# Patient Record
Sex: FEMALE | Race: BLACK OR AFRICAN AMERICAN | NOT HISPANIC OR LATINO | Employment: OTHER | URBAN - METROPOLITAN AREA
[De-identification: names, ages, dates, MRNs, and addresses within clinical notes are randomized per-mention and may not be internally consistent; named-entity substitution may affect disease eponyms.]

---

## 2017-09-13 ENCOUNTER — INITIAL CONSULT (OUTPATIENT)
Dept: OPTOMETRY | Facility: CLINIC | Age: 77
End: 2017-09-13
Payer: MEDICARE

## 2017-09-13 DIAGNOSIS — H01.029 SQUAMOUS BLEPHARITIS OF BOTH UPPER AND LOWER EYELID: ICD-10-CM

## 2017-09-13 DIAGNOSIS — H16.001 PERIPHERAL ULCERATIVE KERATITIS, RIGHT: Primary | ICD-10-CM

## 2017-09-13 PROCEDURE — 99999 PR PBB SHADOW E&M-EST. PATIENT-LVL II: CPT | Mod: PBBFAC,,, | Performed by: OPTOMETRIST

## 2017-09-13 PROCEDURE — 92002 INTRM OPH EXAM NEW PATIENT: CPT | Mod: S$GLB,,, | Performed by: OPTOMETRIST

## 2017-09-13 RX ORDER — NEOMYCIN SULFATE, POLYMYXIN B SULFATE AND DEXAMETHASONE 3.5; 10000; 1 MG/ML; [USP'U]/ML; MG/ML
1 SUSPENSION/ DROPS OPHTHALMIC 3 TIMES DAILY
Qty: 5 ML | Refills: 0 | Status: SHIPPED | OUTPATIENT
Start: 2017-09-13 | End: 2017-10-11 | Stop reason: ALTCHOICE

## 2017-09-13 RX ORDER — CIPROFLOXACIN HYDROCHLORIDE 3 MG/ML
1 SOLUTION/ DROPS OPHTHALMIC
COMMUNITY
End: 2017-10-11 | Stop reason: ALTCHOICE

## 2017-09-13 NOTE — PROGRESS NOTES
HPI     Patient's last dilated exam was: unsure    Pt here for annual eye exam. She had an eye exam on 8/20/2017 and was   treated for an eye infection, cipro 1 gtt OD Q2H while awake. She did this   regimen for 2 days only, currently only using systane ultra ou prn. She   did not follow up because the dr did not call in her rx and she had to   make a return trip to the office to pick it up, she was very unhappy. No   changes in vision, glasses are 9 months old. Only want to be seen for eye   infection. C/o fb sensation in her eyes, feels like she has trash in her   eyes. OD>OS    Last edited by LORIN Taylor on 9/13/2017  9:50 AM.   (History)            Assessment /Plan     For exam results, see Encounter Report.    Peripheral ulcerative keratitis, right  -     neomycin-polymyxin-dexamethasone (MAXITROL) 3.5mg/mL-10,000 unit/mL-0.1 % DrpS; Place 1 drop into the right eye 3 (three) times daily.  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  Dispense: 5 mL; Refill: 0            1-2.  Ciloxan tid OD only x 2 days then stop.  After stopping Ciloxan start Maxitrol tid OD only.  Educated pt need to do lid scrubs daily OU.  RTC 1 week for cornea check.

## 2017-09-20 ENCOUNTER — OFFICE VISIT (OUTPATIENT)
Dept: OPTOMETRY | Facility: CLINIC | Age: 77
End: 2017-09-20
Payer: MEDICARE

## 2017-09-20 DIAGNOSIS — H16.001 PERIPHERAL ULCERATIVE KERATITIS, RIGHT: Primary | ICD-10-CM

## 2017-09-20 DIAGNOSIS — H01.029 SQUAMOUS BLEPHARITIS OF BOTH UPPER AND LOWER EYELID: ICD-10-CM

## 2017-09-20 PROCEDURE — 99999 PR PBB SHADOW E&M-EST. PATIENT-LVL II: CPT | Mod: PBBFAC,,, | Performed by: OPTOMETRIST

## 2017-09-20 PROCEDURE — 92012 INTRM OPH EXAM EST PATIENT: CPT | Mod: S$GLB,,, | Performed by: OPTOMETRIST

## 2017-09-20 NOTE — PROGRESS NOTES
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 Feels her eyes are much better, no change in vision.     Last edited by Krys Ac, PCT on 9/20/2017 11:08 AM.   (History)            Assessment /Plan     For exam results, see Encounter Report.    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  Almost resolved.  Continue Maxitrol tid x 5 days then discontinue.  Continue with lid scrubs 3x/week for maintenance.

## 2017-10-11 ENCOUNTER — HOSPITAL ENCOUNTER (OUTPATIENT)
Dept: CARDIOLOGY | Facility: CLINIC | Age: 77
Discharge: HOME | End: 2017-10-11
Payer: MEDICARE

## 2017-10-11 ENCOUNTER — LAB VISIT (OUTPATIENT)
Dept: LAB | Facility: HOSPITAL | Age: 77
End: 2017-10-11
Attending: INTERNAL MEDICINE
Payer: MEDICARE

## 2017-10-11 ENCOUNTER — OFFICE VISIT (OUTPATIENT)
Dept: CARDIOLOGY | Facility: CLINIC | Age: 77
End: 2017-10-11
Payer: MEDICARE

## 2017-10-11 VITALS
WEIGHT: 203.69 LBS | HEIGHT: 64 IN | SYSTOLIC BLOOD PRESSURE: 138 MMHG | BODY MASS INDEX: 34.77 KG/M2 | HEART RATE: 70 BPM | DIASTOLIC BLOOD PRESSURE: 65 MMHG

## 2017-10-11 DIAGNOSIS — I44.0 AV BLOCK, 1ST DEGREE: Primary | ICD-10-CM

## 2017-10-11 DIAGNOSIS — R94.31 ABNORMAL EKG: Primary | ICD-10-CM

## 2017-10-11 DIAGNOSIS — I10 ESSENTIAL HYPERTENSION: ICD-10-CM

## 2017-10-11 DIAGNOSIS — R53.83 FATIGUE, UNSPECIFIED TYPE: ICD-10-CM

## 2017-10-11 DIAGNOSIS — I44.0 AV BLOCK, 1ST DEGREE: ICD-10-CM

## 2017-10-11 LAB
ALBUMIN SERPL BCP-MCNC: 3.4 G/DL
ALP SERPL-CCNC: 46 U/L
ALT SERPL W/O P-5'-P-CCNC: 11 U/L
ANION GAP SERPL CALC-SCNC: 8 MMOL/L
AST SERPL-CCNC: 16 U/L
BASOPHILS # BLD AUTO: 0.01 K/UL
BASOPHILS NFR BLD: 0.2 %
BILIRUB SERPL-MCNC: 0.4 MG/DL
BUN SERPL-MCNC: 17 MG/DL
CALCIUM SERPL-MCNC: 9.8 MG/DL
CHLORIDE SERPL-SCNC: 104 MMOL/L
CO2 SERPL-SCNC: 27 MMOL/L
CREAT SERPL-MCNC: 1.2 MG/DL
DIFFERENTIAL METHOD: ABNORMAL
EOSINOPHIL # BLD AUTO: 0.5 K/UL
EOSINOPHIL NFR BLD: 7 %
ERYTHROCYTE [DISTWIDTH] IN BLOOD BY AUTOMATED COUNT: 13.7 %
EST. GFR  (AFRICAN AMERICAN): 50 ML/MIN/1.73 M^2
EST. GFR  (NON AFRICAN AMERICAN): 43.4 ML/MIN/1.73 M^2
GLUCOSE SERPL-MCNC: 92 MG/DL
HCT VFR BLD AUTO: 37.1 %
HGB BLD-MCNC: 11.8 G/DL
LYMPHOCYTES # BLD AUTO: 2 K/UL
LYMPHOCYTES NFR BLD: 31.4 %
MCH RBC QN AUTO: 26.9 PG
MCHC RBC AUTO-ENTMCNC: 31.8 G/DL
MCV RBC AUTO: 85 FL
MONOCYTES # BLD AUTO: 0.5 K/UL
MONOCYTES NFR BLD: 7.3 %
NEUTROPHILS # BLD AUTO: 3.5 K/UL
NEUTROPHILS NFR BLD: 53.9 %
PLATELET # BLD AUTO: 259 K/UL
PMV BLD AUTO: 10.7 FL
POTASSIUM SERPL-SCNC: 4.2 MMOL/L
PROT SERPL-MCNC: 8.1 G/DL
RBC # BLD AUTO: 4.38 M/UL
SODIUM SERPL-SCNC: 139 MMOL/L
TSH SERPL DL<=0.005 MIU/L-ACNC: 0.89 UIU/ML
WBC # BLD AUTO: 6.43 K/UL

## 2017-10-11 PROCEDURE — 85025 COMPLETE CBC W/AUTO DIFF WBC: CPT

## 2017-10-11 PROCEDURE — 36415 COLL VENOUS BLD VENIPUNCTURE: CPT

## 2017-10-11 PROCEDURE — 93000 ELECTROCARDIOGRAM COMPLETE: CPT | Mod: S$GLB,,, | Performed by: INTERNAL MEDICINE

## 2017-10-11 PROCEDURE — 99213 OFFICE O/P EST LOW 20 MIN: CPT | Mod: S$GLB,,, | Performed by: INTERNAL MEDICINE

## 2017-10-11 PROCEDURE — 80053 COMPREHEN METABOLIC PANEL: CPT

## 2017-10-11 PROCEDURE — 84443 ASSAY THYROID STIM HORMONE: CPT

## 2017-10-11 PROCEDURE — 99999 PR PBB SHADOW E&M-EST. PATIENT-LVL III: CPT | Mod: PBBFAC,GC,, | Performed by: INTERNAL MEDICINE

## 2017-10-11 RX ORDER — CIPROFLOXACIN 250 MG/5ML
KIT ORAL 2 TIMES DAILY
COMMUNITY
End: 2018-09-20

## 2017-10-11 RX ORDER — ATORVASTATIN CALCIUM 40 MG/1
40 TABLET, FILM COATED ORAL DAILY
COMMUNITY
End: 2018-09-20

## 2017-10-11 RX ORDER — OMEPRAZOLE 20 MG/1
20 CAPSULE, DELAYED RELEASE ORAL DAILY
COMMUNITY
End: 2018-09-20

## 2017-10-11 NOTE — PROGRESS NOTES
Cardiology Clinic Note  Reason for Visit: Abnormal ECG and Hypertension    HPI:   Ms. Kelsey Fields is a 76 y.o. woman with history of HTN and first degree AV block presents for evaluation of an abnormal EKG.  She states she went to [facility] and had ekg there.  EKG was noted to be abnormal and so she was told to follow up with her cardiologist.  On assessment, she is in 1st degree av block which has been present since at least 2016 in our system.  She also notes a generalized sense of fatigue.  She does not state that she has an decreased exercise tolerance or functional changes, no weight gain, worsening orthopnea, PND, chest pain, shortness of breath, or nausea.  She just feels generally fatigued, lacks motivation, and finds herself needing to push herself just to get up and do things.  Upon further questioning patient states she does have sleep apnea, does snore at night, does not sleep most of the night, and wakes up fatigued with progressively worsening fatigue throughout the day.  She is non- compliant with CPAP and does not even have a machine, in fact.  Last sleep study was years ago.    ROS:    Review of Systems   Constitution: Negative.        Worsening fatigue   HENT: Negative.    Eyes: Negative.    Cardiovascular: Positive for leg swelling.   Respiratory: Negative.    Endocrine: Negative.    Skin: Negative.    Musculoskeletal: Negative.    Gastrointestinal: Negative.    Genitourinary: Negative.    Neurological: Negative.      PMH:     Past Medical History:   Diagnosis Date    Arthritis     Hypertension     Shingles      Past Surgical History:   Procedure Laterality Date     SECTION      FRACTURE SURGERY       Allergies:     Review of patient's allergies indicates:   Allergen Reactions    Codeine      Medications:     Current Outpatient Prescriptions on File Prior to Visit   Medication Sig Dispense Refill    amlodipine (NORVASC) 10 MG tablet       aspirin  "(ECOTRIN) 81 MG EC tablet Take 162 mg by mouth once daily.      gabapentin (NEURONTIN) 300 MG capsule Take one cap PO QHS for 7 days, then one cap PO BID for the next 7 days, and then take one cap PO TID and continue (Patient taking differently: 300 mg 3 (three) times daily. Take one cap PO QHS for 7 days, then one cap PO BID for the next 7 days, and then take one cap PO TID and continue) 90 capsule 2    hydrocodone-acetaminophen 5-325mg (NORCO) 5-325 mg per tablet       cyclobenzaprine (FLEXERIL) 10 MG tablet Take 10 mg by mouth 3 (three) times daily as needed for Muscle spasms.      furosemide (LASIX) 20 MG tablet Take 1 tablet (20 mg total) by mouth daily as needed. 30 tablet 6    [DISCONTINUED] ciprofloxacin HCl (CILOXAN) 0.3 % ophthalmic solution Place 1 drop into the right eye every 2 (two) hours.      [DISCONTINUED] neomycin-polymyxin-dexamethasone (MAXITROL) 3.5mg/mL-10,000 unit/mL-0.1 % DrpS Place 1 drop into the right eye 3 (three) times daily. 5 mL 0     No current facility-administered medications on file prior to visit.      Social History:     Social History   Substance Use Topics    Smoking status: Never Smoker    Smokeless tobacco: Never Used    Alcohol use No     Family History:     Family History   Problem Relation Age of Onset    Hypertension Mother     Stroke Mother     Hypertension Father     Heart attack Neg Hx     Heart disease Neg Hx      Physical Exam:   /65   Pulse 70   Ht 5' 4" (1.626 m)   Wt 92.4 kg (203 lb 11.3 oz)   BMI 34.97 kg/m²    Physical Exam   Constitutional: She is oriented to person, place, and time. She appears well-developed and well-nourished.   HENT:   Head: Normocephalic and atraumatic.   Eyes: Conjunctivae and EOM are normal. Pupils are equal, round, and reactive to light.   Neck: Normal range of motion. Neck supple. No JVD present.   Cardiovascular: Normal rate and regular rhythm.  Exam reveals no gallop and no friction rub.    Murmur heard.  2/6 " JF RSIS   Pulmonary/Chest: Effort normal and breath sounds normal. No respiratory distress. She has no wheezes. She has no rales. She exhibits no tenderness.   Abdominal: Soft. Bowel sounds are normal. She exhibits no distension. There is no tenderness.   Musculoskeletal: Normal range of motion. She exhibits edema. She exhibits no tenderness.   Mild RLE pitting edema     Neurological: She is alert and oriented to person, place, and time.   Skin: Skin is warm and dry. No erythema. No pallor.       Labs:     Lab Results   Component Value Date     10/27/2014    K 4.2 10/27/2014     10/27/2014    CO2 25 10/27/2014    BUN 20 10/27/2014    CREATININE 1.2 10/27/2014    ANIONGAP 14 10/27/2014     No results found for: HGBA1C  No results found for: BNP, BNPTRIAGEBLO Lab Results   Component Value Date    WBC 10.86 10/27/2014    HGB 13.0 10/27/2014    HCT 39.6 10/27/2014     10/27/2014    GRAN 7.8 (H) 10/27/2014    GRAN 71.5 10/27/2014     Lab Results   Component Value Date    CHOL 164 08/30/2006    HDL 29.0 (L) 08/30/2006    LDLCALC 113.8 08/30/2006    TRIG 106 08/30/2006          Imaging:     none    EKG: sinus with first degree avb no change from prior.  Assessment:     1. AV block, 1st degree    2. Fatigue, unspecified type          Plan:   AV block, 1st degree  No interventions  Follow up in 1 yr    Fatigue, unspecified type  Likely 2/2 untreated TRISTON  Recommend following up with PCP and pulmonary for repeat sleep study and assessment for CPAP    HTN  Continue amlodipine as she is well controlled    Attain baseline bloodwork cbc/cmp/tsh/lipids    Discussed with Dr. Zhou. RTC in 1 year.     Signed:  Kwesi Keyes MD  10/11/2017 3:29 PM

## 2017-10-12 DIAGNOSIS — I10 ESSENTIAL HYPERTENSION: Primary | ICD-10-CM

## 2017-10-26 ENCOUNTER — HOSPITAL ENCOUNTER (EMERGENCY)
Facility: HOSPITAL | Age: 77
Discharge: HOME | End: 2017-10-26
Attending: EMERGENCY MEDICINE
Payer: MEDICARE

## 2017-10-26 VITALS
WEIGHT: 206 LBS | OXYGEN SATURATION: 99 % | TEMPERATURE: 97 F | DIASTOLIC BLOOD PRESSURE: 77 MMHG | HEIGHT: 65 IN | BODY MASS INDEX: 34.32 KG/M2 | HEART RATE: 80 BPM | RESPIRATION RATE: 18 BRPM | SYSTOLIC BLOOD PRESSURE: 188 MMHG

## 2017-10-26 DIAGNOSIS — R51.9 NONINTRACTABLE HEADACHE, UNSPECIFIED CHRONICITY PATTERN, UNSPECIFIED HEADACHE TYPE: Primary | ICD-10-CM

## 2017-10-26 LAB
ALBUMIN SERPL BCP-MCNC: 3.6 G/DL
ALP SERPL-CCNC: 44 U/L
ALT SERPL W/O P-5'-P-CCNC: 10 U/L
ANION GAP SERPL CALC-SCNC: 7 MMOL/L
AST SERPL-CCNC: 16 U/L
BACTERIA #/AREA URNS AUTO: ABNORMAL /HPF
BASOPHILS # BLD AUTO: 0.03 K/UL
BASOPHILS NFR BLD: 0.5 %
BILIRUB SERPL-MCNC: 0.3 MG/DL
BILIRUB UR QL STRIP: NEGATIVE
BUN SERPL-MCNC: 13 MG/DL
CALCIUM SERPL-MCNC: 10 MG/DL
CHLORIDE SERPL-SCNC: 104 MMOL/L
CLARITY UR REFRACT.AUTO: CLEAR
CO2 SERPL-SCNC: 28 MMOL/L
COLOR UR AUTO: ABNORMAL
CREAT SERPL-MCNC: 1.1 MG/DL
DIFFERENTIAL METHOD: ABNORMAL
EOSINOPHIL # BLD AUTO: 0.5 K/UL
EOSINOPHIL NFR BLD: 7.7 %
ERYTHROCYTE [DISTWIDTH] IN BLOOD BY AUTOMATED COUNT: 13.8 %
EST. GFR  (AFRICAN AMERICAN): 55.6 ML/MIN/1.73 M^2
EST. GFR  (NON AFRICAN AMERICAN): 48.2 ML/MIN/1.73 M^2
GLUCOSE SERPL-MCNC: 94 MG/DL
GLUCOSE UR QL STRIP: NEGATIVE
HCT VFR BLD AUTO: 36.7 %
HGB BLD-MCNC: 11.7 G/DL
HGB UR QL STRIP: NEGATIVE
IMM GRANULOCYTES # BLD AUTO: 0.01 K/UL
IMM GRANULOCYTES NFR BLD AUTO: 0.2 %
KETONES UR QL STRIP: NEGATIVE
LEUKOCYTE ESTERASE UR QL STRIP: ABNORMAL
LYMPHOCYTES # BLD AUTO: 2 K/UL
LYMPHOCYTES NFR BLD: 33.4 %
MCH RBC QN AUTO: 27.4 PG
MCHC RBC AUTO-ENTMCNC: 31.9 G/DL
MCV RBC AUTO: 86 FL
MICROSCOPIC COMMENT: ABNORMAL
MONOCYTES # BLD AUTO: 0.5 K/UL
MONOCYTES NFR BLD: 8.2 %
NEUTROPHILS # BLD AUTO: 3 K/UL
NEUTROPHILS NFR BLD: 50 %
NITRITE UR QL STRIP: NEGATIVE
NON-SQ EPI CELLS #/AREA URNS AUTO: 1 /HPF
NRBC BLD-RTO: 0 /100 WBC
PH UR STRIP: 7 [PH] (ref 5–8)
PLATELET # BLD AUTO: 269 K/UL
PMV BLD AUTO: 10.2 FL
POTASSIUM SERPL-SCNC: 4.2 MMOL/L
PROT SERPL-MCNC: 8.3 G/DL
PROT UR QL STRIP: NEGATIVE
RBC # BLD AUTO: 4.27 M/UL
RBC #/AREA URNS AUTO: 1 /HPF (ref 0–4)
SODIUM SERPL-SCNC: 139 MMOL/L
SP GR UR STRIP: 1.01 (ref 1–1.03)
SQUAMOUS #/AREA URNS AUTO: 1 /HPF
URN SPEC COLLECT METH UR: ABNORMAL
UROBILINOGEN UR STRIP-ACNC: NEGATIVE EU/DL
WBC # BLD AUTO: 5.98 K/UL
WBC #/AREA URNS AUTO: 7 /HPF (ref 0–5)

## 2017-10-26 PROCEDURE — 25000003 PHARM REV CODE 250: Performed by: EMERGENCY MEDICINE

## 2017-10-26 PROCEDURE — 81001 URINALYSIS AUTO W/SCOPE: CPT

## 2017-10-26 PROCEDURE — 80053 COMPREHEN METABOLIC PANEL: CPT

## 2017-10-26 PROCEDURE — 99283 EMERGENCY DEPT VISIT LOW MDM: CPT

## 2017-10-26 PROCEDURE — 99283 EMERGENCY DEPT VISIT LOW MDM: CPT | Mod: ,,, | Performed by: EMERGENCY MEDICINE

## 2017-10-26 PROCEDURE — 85025 COMPLETE CBC W/AUTO DIFF WBC: CPT

## 2017-10-26 RX ORDER — ACETAMINOPHEN 325 MG/1
650 TABLET ORAL
Status: COMPLETED | OUTPATIENT
Start: 2017-10-26 | End: 2017-10-26

## 2017-10-26 RX ORDER — GABAPENTIN 300 MG/1
300 CAPSULE ORAL DAILY
COMMUNITY
End: 2018-09-20

## 2017-10-26 RX ORDER — FLUTICASONE PROPIONATE 50 MCG
1 SPRAY, SUSPENSION (ML) NASAL DAILY
COMMUNITY
End: 2018-01-05

## 2017-10-26 RX ADMIN — ACETAMINOPHEN 650 MG: 325 TABLET ORAL at 05:10

## 2017-10-26 NOTE — ED NOTES
Physician in to assess and speak at length with patient regarding current B/P Aware to take B/P and follow up with PCP. Dc home via wheelchair with family, states understanding to follow up as directed.
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
## 2017-10-26 NOTE — ED TRIAGE NOTES
Headache for 3 days.  Seen at [facility] earlier today and was told to come to the ER due to headaches and UTI.  Patient is a poor historian.    GENERAL: The patient is a frail elderly female in no apparent distress. Alert and oriented x4.                                                HEENT: Head is normocephalic and atraumatic. Extraocular muscles are intact. Pupils are equal, round, and reactive to light and accommodation. Nares appeared normal. Mouth is well hydrated and without lesions. Mucous membranes are moist. Posterior pharynx clear of any exudate or lesions.    NECK: Supple. No carotid bruits. No lymphadenopathy or thyromegaly.    LUNGS: Clear to auscultation.    HEART: Regular rate and rhythm without murmur.     ABDOMEN: Soft, nontender, and nondistended. Positive bowel sounds. No hepatosplenomegaly was noted.     EXTREMITIES: Without any cyanosis, clubbing, rash, lesions or edema.     NEUROLOGIC: Cranial nerves II through XII are grossly intact.     PSYCHIATRIC: Flat affect, but denies suicidal or homicidal ideations.    SKIN: No ulceration or induration present.

## 2017-11-09 ENCOUNTER — TELEPHONE (OUTPATIENT)
Dept: SLEEP MEDICINE | Facility: CLINIC | Age: 77
End: 2017-11-09

## 2017-11-09 ENCOUNTER — OFFICE VISIT (OUTPATIENT)
Dept: SLEEP MEDICINE | Facility: CLINIC | Age: 77
End: 2017-11-09
Payer: MEDICARE

## 2017-11-09 VITALS
BODY MASS INDEX: 34.93 KG/M2 | HEART RATE: 71 BPM | OXYGEN SATURATION: 97 % | SYSTOLIC BLOOD PRESSURE: 116 MMHG | HEIGHT: 65 IN | WEIGHT: 209.69 LBS | DIASTOLIC BLOOD PRESSURE: 60 MMHG

## 2017-11-09 DIAGNOSIS — G47.33 OBSTRUCTIVE SLEEP APNEA: Primary | ICD-10-CM

## 2017-11-09 DIAGNOSIS — R09.81 NASAL CONGESTION: ICD-10-CM

## 2017-11-09 DIAGNOSIS — E66.9 OBESITY (BMI 30-39.9): ICD-10-CM

## 2017-11-09 PROCEDURE — 99204 OFFICE O/P NEW MOD 45 MIN: CPT | Mod: S$GLB,,, | Performed by: NURSE PRACTITIONER

## 2017-11-09 PROCEDURE — 99999 PR PBB SHADOW E&M-EST. PATIENT-LVL III: CPT | Mod: PBBFAC,,, | Performed by: NURSE PRACTITIONER

## 2017-11-09 RX ORDER — CIPROFLOXACIN 500 MG/1
TABLET ORAL
COMMUNITY
Start: 2017-10-26 | End: 2017-11-09

## 2017-11-09 RX ORDER — CIPROFLOXACIN 250 MG/1
TABLET, FILM COATED ORAL
COMMUNITY
Start: 2017-10-10 | End: 2018-09-20

## 2017-11-09 NOTE — PROGRESS NOTES
"Kelsey Fields  was seen as a new patient at the request of  Mauricio Zhou MD for the evaluation of obstructive sleep apnea.    CHIEF COMPLAINT:    Chief Complaint   Patient presents with    Snoring       11/09/20017 KELIN Ross NP: HISTORY OF PRESENT ILLNESS: Kelsey Fields is a 76 y.o. female is here for sleep evaluation.       Cardiology, Dr. Keyes (Dr. Zhou) 10/11/2017: "Upon further questioning patient states she does have sleep apnea, does snore at night, does not sleep most of the night, and wakes up fatigued with progressively worsening fatigue throughout the day.  She is non- compliant with CPAP and does not even have a machine, in fact.  Last sleep study was years ago".    Patient complaints include: snoring, interrupted sleep, unrefreshing sleep, excessive daytime fatigue, and excessive daytime sleepiness     Diagnosed with TRISTON 8 years ago via in-lab sleep study at [facility]. Severity unknown. Never got set up with CPAP machine. Never had any TRISTON treatment.     Denies symptoms of restless legs or kicking during sleep.    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     Cutler Sleepiness Scale score during initial sleep evaluation was 13.    SLEEP ROUTINE:    Bed partner:  None   Time to bed: 9 pm when off, 8 am after working  Sleep onset latency:  <1 hour      Disruptions or awakenings: "multiple"  Wakeup time:   6 am when off, 2 pm when working   Perceived sleep quality:  fair        Previous Sleep Study:     08/20/2009 Baseline PSG BMI 36.6 AHI 12.4 with oxygen marie 80%.   10/22/2009 CPAP Titration study CPAP 8 cm       PAST MEDICAL HISTORY:    Active Ambulatory Problems     Diagnosis Date Noted    Esophageal stricture 10/27/2014    Hypertension 03/08/2016     Resolved Ambulatory Problems     Diagnosis Date Noted    No Resolved Ambulatory Problems     Past Medical History:   Diagnosis Date    Arthritis     GERD (gastroesophageal reflux " disease)     Hypertension     Shingles                 PAST SURGICAL HISTORY:    Past Surgical History:   Procedure Laterality Date     SECTION      FRACTURE SURGERY           FAMILY HISTORY:                Family History   Problem Relation Age of Onset    Hypertension Mother     Stroke Mother     Hypertension Father     Heart attack Neg Hx     Heart disease Neg Hx        SOCIAL HISTORY:          Tobacco:   History   Smoking Status    Never Smoker   Smokeless Tobacco    Never Used       Alcohol use:    History   Alcohol Use No                 ALLERGIES:    Review of patient's allergies indicates:   Allergen Reactions    Codeine        CURRENT MEDICATIONS:    Current Outpatient Prescriptions   Medication Sig Dispense Refill    amlodipine (NORVASC) 10 MG tablet       aspirin (ECOTRIN) 81 MG EC tablet Take 162 mg by mouth once daily.      atorvastatin (LIPITOR) 40 MG tablet Take 40 mg by mouth once daily.      ciprofloxacin 250 mg/5 ml (CIPRO) Take by mouth 2 (two) times daily.      ciprofloxacin HCl (CIPRO) 250 MG tablet       cyclobenzaprine (FLEXERIL) 10 MG tablet Take 10 mg by mouth 3 (three) times daily as needed for Muscle spasms.      fluticasone (FLONASE) 50 mcg/actuation nasal spray 1 spray by Each Nare route once daily.      gabapentin (NEURONTIN) 300 MG capsule Take one cap PO QHS for 7 days, then one cap PO BID for the next 7 days, and then take one cap PO TID and continue (Patient taking differently: 300 mg once daily. Take one cap PO QHS for 7 days, then one cap PO BID for the next 7 days, and then take one cap PO TID and continue) 90 capsule 2    gabapentin (NEURONTIN) 300 MG capsule Take 300 mg by mouth once daily.      hydrocodone-acetaminophen 5-325mg (NORCO) 5-325 mg per tablet       omeprazole (PRILOSEC) 20 MG capsule Take 20 mg by mouth once daily.      furosemide (LASIX) 20 MG tablet Take 1 tablet (20 mg total) by mouth daily as needed. 30 tablet 6     No current  "facility-administered medications for this visit.                   REVIEW OF SYSTEMS:     Sleep related symptoms as per HPI.  CONST:Denies weight gain    HEENT: Sometimes sinus congestion; dry mouth in AM  PULM: Denies dyspnea  CARD:  Denies palpitations   GI:  Reports acid reflux  : Denies polyuria  NEURO: Denies headaches  PSYCH: Denies mood disturbance  HEME: Denies anemia    Otherwise, a balance of systems reviewed is negative.          PHYSICAL EXAM:  Vitals:    11/09/17 1021   BP: 116/60   Pulse: 71   SpO2: 97%   Weight: 95.1 kg (209 lb 10.5 oz)   Height: 5' 5" (1.651 m)   PainSc: 0-No pain     Body mass index is 34.89 kg/m².     GENERAL: Overweight development, well groomed  HEENT:  Conjunctivae are non-erythematous; Pupils equal, round, and reactive to light; Nose is symmetrical; Nasal mucosa is pink and moist; Septum is midline; Inferior turbinates are normal; Nasal airflow is normal; Posterior pharynx is pink; Modified Mallampati: IV; Posterior palate is normal; Tonsils +1; Uvula is normal and pink;Tongue is normal; Dentition upper and lower dentures; No TMJ tenderness; Jaw opening and protrusion without click and without discomfort.  NECK: Supple. Neck circumference is 13.5 inches. No thyromegaly. No palpable nodes.    SKIN: On face and neck: No abrasions, no rashes, no lesions.  No subcutaneous nodules are palpable.  RESPIRATORY: Chest is clear to auscultation.  Normal chest expansion and non-labored breathing at rest.  CARDIOVASCULAR: Normal S1, S2.  No murmurs, gallops or rubs. No carotid bruits bilaterally.  EXTREMITIES: No edema. No clubbing. No cyanosis. Station normal. Gait normal.        NEURO/PSYCH: Oriented to time, place and person. Normal attention span and concentration. Affect is full. Mood is normal.                                              ASSESSMENT:    Obstructive sleep apnea, mild by AHI. The patient symptomatically has snoring, interrupted sleep, unrefreshing sleep, excessive " daytime fatigue, and excessive daytime sleepiness with findings of crowded oral airway and elevated body mas index. Medical co-morbidities: systemic HTN, 1st degree AV block, and obesity. This warrants treatment for obstructive sleep apnea.      Obesity, BMI >30, discussed relationship between TRISTON and weight    Nasal congestion, prn Flonase, could make acclimating to PAP therapy more difficult     PLAN:     Education: During our discussion today, we talked about the etiology of obstructive sleep apnea as well as the potential ramifications of untreated sleep apnea, which could include daytime sleepiness, hypertension, heart disease and/or stroke. We discussed potential treatment options, which could include weight loss, body positioning, continuous positive airway pressure (CPAP), OA, EPAP, or referral for surgical consideration.     Treatment:   -Trial auto CPAP 8 - 12 cm. RTC 31 - 90 days after .   -If patient has difficulty with CPAP adherence or ongoing TRISTON symptoms despite CPAP adherence, then consider an in-lab titration sleep study in order to determine optimal fixed CPAP setting.    Precautions: The patient was advised to abstain from driving should they feel sleepy  or drowsy.     Thank you for allowing me the opportunity to participate in the care of your patient.

## 2017-11-09 NOTE — TELEPHONE ENCOUNTER
----- Message from Padmini Tam MA sent at 11/9/2017  3:18 PM CST -----  Please notify pt that I have received and reviewed sleep study records from [facility].     I recommend starting CPAP therapy at this time, as we discussed during clinic visit.     Auto CPAP 8 - 12 cm machine/supplies ordered. After insurance approval, pt will be contacted by [employer] regarding set up.     RTC 31 - 90 days after CPAP .     Go ahead and schedule pt with me for f/u in 8 weeks.

## 2017-11-09 NOTE — TELEPHONE ENCOUNTER
Please notify pt that I have received and reviewed sleep study records from [facility].     I recommend starting CPAP therapy at this time, as we discussed during clinic visit.     Auto CPAP 8 - 12 cm machine/supplies ordered. After insurance approval, pt will be contacted by [employer] regarding set up.     RTC 31 - 90 days after CPAP .     Go ahead and schedule pt with me for f/u in 8 weeks.

## 2017-11-09 NOTE — LETTER
November 9, 2017      Mauricio Zhou MD  [address]  [place] [id number]           [facility]  [address]  [place] [address]  Phone: [phone]          Patient: Kelsey Fields   MR Number: [id number]   YOB: 1940   Date of Visit: 11/9/2017       Dear Dr. Mauricio Zhou:    Thank you for referring Kelsey Fields to me for evaluation. Attached you will find relevant portions of my assessment and plan of care.    If you have questions, please do not hesitate to call me. I look forward to following Kelsey Fields along with you.    Sincerely,    Anthony Ross, CANDY    Enclosure  CC:  No Recipients    If you would like to receive this communication electronically, please contact [email or web address] or [phone] to request more information on [facility] access.    For providers and/or their staff who would like to refer a patient to [facility], please contact us through our one-stop-shop provider referral line, [facility], at [phone].    If you feel you have received this communication in error or would no longer like to receive these types of communications, please e-mail [email or web address]

## 2017-11-10 ENCOUNTER — TELEPHONE (OUTPATIENT)
Dept: SLEEP MEDICINE | Facility: CLINIC | Age: 77
End: 2017-11-10

## 2017-11-10 NOTE — TELEPHONE ENCOUNTER
"Spoke with and told her that we already receive her sleep study from [facility] and "notify pt that I have received and reviewed sleep study records from [facility].      I recommend starting CPAP therapy at this time, as we discussed during clinic visit.      Auto CPAP 8 - 12 cm machine/supplies ordered. After insurance approval, pt will be contacted by [facility] regarding set up.      RTC 31 - 90 days after CPAP . "    Pt is scheduled for 1/5/2018 at 1:40 for after set up     "

## 2017-11-10 NOTE — TELEPHONE ENCOUNTER
----- Message from Paul Choudhury sent at 11/10/2017 11:00 AM CST -----  Contact: Kelsey Fields  X_  1st Request  _  2nd Request  _  3rd Request        Who: Kelsey Fields    Why: Patient needs to discuss bringing in old sleep studies from previous sleep provider    What Number to Call Back: [phone]    When to Expect a call back: (Within 24 hours)    Please return the call at earliest convenience. Thanks!

## 2017-11-15 ENCOUNTER — TELEPHONE (OUTPATIENT)
Dept: CARDIOLOGY | Facility: HOSPITAL | Age: 77
End: 2017-11-15

## 2017-11-15 DIAGNOSIS — D64.9 ANEMIA, UNSPECIFIED TYPE: Primary | ICD-10-CM

## 2017-11-15 NOTE — TELEPHONE ENCOUNTER
Spoke with patient re: decreased H/H  No obvious etiology per our conversation  Order consult to GI and Hemeoccult to begin evaluation for new and persistent anemia

## 2017-11-17 ENCOUNTER — TELEPHONE (OUTPATIENT)
Dept: SLEEP MEDICINE | Facility: CLINIC | Age: 77
End: 2017-11-17

## 2017-11-17 NOTE — TELEPHONE ENCOUNTER
----- Message from Eliz Swanson sent at 11/17/2017  9:07 AM CST -----  Regarding: Cpap face to face needed  I wanted to keep you in the loop I spoke with mrs. hunt yesterday. Since she never received a cpap I will need her face to face prior to her sleep study 8/2009 I just call dr. santillan's office at [facility] and requested it.

## 2017-11-27 ENCOUNTER — TELEPHONE (OUTPATIENT)
Dept: SLEEP MEDICINE | Facility: CLINIC | Age: 77
End: 2017-11-27

## 2017-11-27 NOTE — TELEPHONE ENCOUNTER
Received pt f2f encounter from Garrett (willi) as requested. Scanned pt face to face notes from Garrett on 11/17

## 2017-11-27 NOTE — TELEPHONE ENCOUNTER
Per Eliz Swanson, face to face prior to 8/20/2009 sleep study from [facility] required prior to CPAP .     Document received and scanned into Media by Dane Saavedra and uploaded on 11/17/2017 14:39.     As of today, 11/27/2017, pt has not been contacted by [facility] regarding CPAP .     Will follow-up with [facility].

## 2017-11-27 NOTE — TELEPHONE ENCOUNTER
----- Message from Dane Saavedra MA sent at 11/27/2017 11:39 AM CST -----  Contact: RODRIGO AVELAR [[id number]]  I'd called Eliz and asked her what more I can do(she didn't answer) I left a message for her requesting call back...    Please advise  ----- Message -----  From: Macey Gonzalez  Sent: 11/27/2017   9:43 AM  To: Cody Mccarthy Staff    _x  1st Request  _  2nd Request  _  3rd Request        Who: RODRIGO AVELAR [[id number]]    Why: Patient states she needs CANDY Ross to call her insurance company to state that it is medically necessary for her to have the C-PAP machine for them to pay for it.     What Number to Call Back: [phone]    When to Expect a call back: (Before the end of the day)   -- if call after 3:00 call back will be tomorrow.

## 2017-11-29 ENCOUNTER — TELEPHONE (OUTPATIENT)
Dept: SLEEP MEDICINE | Facility: CLINIC | Age: 77
End: 2017-11-29

## 2017-11-29 DIAGNOSIS — G47.33 OBSTRUCTIVE SLEEP APNEA: Primary | ICD-10-CM

## 2017-11-29 NOTE — TELEPHONE ENCOUNTER
Dane,    Please notify pt that pressure will be decreased remotely and no need to come in for that. The change will be reflected on her end in 24 - 48 hours. The starting pressure will be 4.     Please advise that at any point, should she feel that there is too much pressure to press the Ramp button on the machine. If her machine is white, it is the button to the right of the power button.

## 2017-11-29 NOTE — TELEPHONE ENCOUNTER
Rx updated in case she wants to switch to different mask. Advise pt to make switch before 30-day period is up post set up to get 30-day mask guarantee

## 2017-11-29 NOTE — TELEPHONE ENCOUNTER
LVM informing pt that she has 30 days from the day of set to change her mask, and after 30 days she won't be able to get it.  Provided [facility] contact info

## 2017-11-29 NOTE — TELEPHONE ENCOUNTER
----- Message from Paul Choudhury sent at 11/29/2017 11:34 AM CST -----  Contact: Kelsey Fields  X_  1st Request  _  2nd Request  _  3rd Request        Who: Kelsey Fields    Why: Patient following up on getting her CPAP adjusted     What Number to Call Back: [phone]    When to Expect a call back: (Within 24 hours)    Please return the call at earliest convenience. Thanks!

## 2017-11-29 NOTE — TELEPHONE ENCOUNTER
"Communicated with pt and notify pt that pressure will be decreased remotely and no need to come in for that. The change will be reflected on her end in 24 - 48 hours. The starting pressure will be 4.     Please advise that at any point, should she feel that there is too much pressure to press the Ramp button on the machine. If her machine is white, it is the button to the right of the power button"    Pt stated that she wants a nasal mask instead of FFM.  She said there is also air blowing out from the top of her nose.     And since she mentioned she sometime sleeps with mouth open, I told her to wait until pressure get adjusted before changing mask.    Provided [facility] number to pt so she can talk to someone about the leaking air from the top of her nose.  "

## 2017-12-06 ENCOUNTER — TELEPHONE (OUTPATIENT)
Dept: SLEEP MEDICINE | Facility: CLINIC | Age: 77
End: 2017-12-06

## 2017-12-06 NOTE — TELEPHONE ENCOUNTER
----- Message from Eliz Swanson sent at 12/5/2017  2:55 PM CST -----  Patient was setup with new cpap machine on 11/27/2017  ----- Message -----  From: Anthony Ross NP  Sent: 11/27/2017  11:51 AM  To: Eliz Wellington,    Apparently patient followed-up with Sleep Clinic again regarding set up of CPAP machine. As per your prior request, the Sleep Clinic has obtained face to face visit (07/22/2009)  from [facility] prior to baseline  PSG (08/20/2009) and was scanned into Media on 11/17/2017. But, patient has still not been set up with CPAP.     Please assist as soon as possible.       -sv

## 2017-12-12 ENCOUNTER — TELEPHONE (OUTPATIENT)
Dept: CARDIOLOGY | Facility: HOSPITAL | Age: 77
End: 2017-12-12

## 2017-12-12 NOTE — TELEPHONE ENCOUNTER
----- Message from Mauricio Zhou MD sent at 11/14/2017  6:53 AM CST -----  Keep trying to get the results to this patient. Her H&H has fallen . Let me know when done.    ARTI Zhou

## 2018-01-05 ENCOUNTER — OFFICE VISIT (OUTPATIENT)
Dept: SLEEP MEDICINE | Facility: CLINIC | Age: 78
End: 2018-01-05
Payer: MEDICARE

## 2018-01-05 VITALS
HEART RATE: 92 BPM | SYSTOLIC BLOOD PRESSURE: 129 MMHG | BODY MASS INDEX: 36.55 KG/M2 | HEIGHT: 64 IN | WEIGHT: 214.06 LBS | DIASTOLIC BLOOD PRESSURE: 62 MMHG

## 2018-01-05 DIAGNOSIS — E66.9 OBESITY (BMI 30-39.9): ICD-10-CM

## 2018-01-05 DIAGNOSIS — R09.81 NASAL CONGESTION: ICD-10-CM

## 2018-01-05 DIAGNOSIS — G47.33 OBSTRUCTIVE SLEEP APNEA: Primary | ICD-10-CM

## 2018-01-05 PROCEDURE — 99214 OFFICE O/P EST MOD 30 MIN: CPT | Mod: S$GLB,,, | Performed by: NURSE PRACTITIONER

## 2018-01-05 PROCEDURE — 99999 PR PBB SHADOW E&M-EST. PATIENT-LVL IV: CPT | Mod: PBBFAC,,, | Performed by: NURSE PRACTITIONER

## 2018-01-05 RX ORDER — FLUNISOLIDE 0.25 MG/ML
2 SOLUTION NASAL DAILY
Qty: 1 BOTTLE | Refills: 2 | Status: SHIPPED | OUTPATIENT
Start: 2018-01-05 | End: 2019-12-24

## 2018-01-05 NOTE — PROGRESS NOTES
"Kelsey Fields  was seen in follow-up for TRISTON management and CPAP equipment check after set up.     01/05/2018 Checked-in at 2:07 for 20 min appt scheduled at 1:40 pm.     CHIEF COMPLAINT:    Chief Complaint   Patient presents with    Sleep Apnea       INTERVAL HISTORY:    01/05/2018 KELIN Ross NP: Pt returns after CPAP  on 11/27/2017 at [facility]. Used CPAP some but irritated chronic nasal congestion. Cotninues to use Floanse OTC prn only. Since OTC daily use would require more Flonase which is cost prohibitive. Likes fit of nasal mask with chinstrap. Has not meaningfully used it much to tell if she really likes it. Continues to have snoring, interrupted sleep, unrefreshing sleep, excessive daytime fatigue, and excessive daytime sleepiness.  ESS - did not fill out form.     CPAP Interrogation: DreamStation APAP 4 - 12   Compliance Summary Days with Device Usage: 6 days Percentage of Days >=4 Hours: 0.0% Average Usage (Days Used): 7 mins. 20 secs. Average Usage (All Days): 1 mins. 28 secs  Apnea Indices Average AHI: 9.6 Average OA Index: 1.4 Average CA Index: 8.2   Large Leak Average Time in Large Leak: 0 secs. Average % of Night in Large Leak: 0.0%   Periodic Breathing Average % of Night in PB: 0.0%       11/09/20017 KELIN Ross NP: Initial  HISTORY OF PRESENT ILLNESS: Kelsey Fields is a 77 y.o. female is here for sleep evaluation.       Cardiology, Dr. Keyes (Dr. Zhou) 10/11/2017: "Upon further questioning patient states she does have sleep apnea, does snore at night, does not sleep most of the night, and wakes up fatigued with progressively worsening fatigue throughout the day.  She is non- compliant with CPAP and does not even have a machine, in fact.  Last sleep study was years ago".    Patient complaints include: snoring, interrupted sleep, unrefreshing sleep, excessive daytime fatigue, and excessive daytime sleepiness     Diagnosed with TRISTON 8 years ago via in-lab sleep study at [facility]. " "Severity unknown. Never got set up with CPAP machine. Never had any TRISTON treatment.     Denies symptoms of restless legs or kicking during sleep.    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     Henderson Sleepiness Scale score during initial sleep evaluation was 13.    SLEEP ROUTINE:    Bed partner:  None   Time to bed: 9 pm when off, 8 am after working  Sleep onset latency:  <1 hour      Disruptions or awakenings: "multiple"  Wakeup time:   6 am when off, 2 pm when working   Perceived sleep quality:  fair        Previous Sleep Study:     2009 Baseline PSG BMI 36.6 AHI 12.4 with oxygen marie 80%.   10/22/2009 CPAP Titration study CPAP 8 cm       PAST MEDICAL HISTORY:    Active Ambulatory Problems     Diagnosis Date Noted    Esophageal stricture 10/27/2014    Hypertension 2016     Resolved Ambulatory Problems     Diagnosis Date Noted    No Resolved Ambulatory Problems     Past Medical History:   Diagnosis Date    Arthritis     GERD (gastroesophageal reflux disease)     Hypertension     Shingles                 PAST SURGICAL HISTORY:    Past Surgical History:   Procedure Laterality Date     SECTION      FRACTURE SURGERY           FAMILY HISTORY:                Family History   Problem Relation Age of Onset    Hypertension Mother     Stroke Mother     Hypertension Father     Heart attack Neg Hx     Heart disease Neg Hx        SOCIAL HISTORY:          Tobacco:   History   Smoking Status    Never Smoker   Smokeless Tobacco    Never Used       Alcohol use:    History   Alcohol Use No                 ALLERGIES:    Review of patient's allergies indicates:   Allergen Reactions    Codeine        CURRENT MEDICATIONS:    Current Outpatient Prescriptions   Medication Sig Dispense Refill    amlodipine (NORVASC) 10 MG tablet       aspirin (ECOTRIN) 81 MG EC tablet Take 162 mg by mouth once daily.      atorvastatin (LIPITOR) 40 MG tablet Take " "40 mg by mouth once daily.      ciprofloxacin 250 mg/5 ml (CIPRO) Take by mouth 2 (two) times daily.      ciprofloxacin HCl (CIPRO) 250 MG tablet       cyclobenzaprine (FLEXERIL) 10 MG tablet Take 10 mg by mouth 3 (three) times daily as needed for Muscle spasms.      fluticasone (FLONASE) 50 mcg/actuation nasal spray 1 spray by Each Nare route once daily.      furosemide (LASIX) 20 MG tablet Take 1 tablet (20 mg total) by mouth daily as needed. 30 tablet 6    gabapentin (NEURONTIN) 300 MG capsule Take one cap PO QHS for 7 days, then one cap PO BID for the next 7 days, and then take one cap PO TID and continue (Patient taking differently: 300 mg once daily. Take one cap PO QHS for 7 days, then one cap PO BID for the next 7 days, and then take one cap PO TID and continue) 90 capsule 2    gabapentin (NEURONTIN) 300 MG capsule Take 300 mg by mouth once daily.      hydrocodone-acetaminophen 5-325mg (NORCO) 5-325 mg per tablet       omeprazole (PRILOSEC) 20 MG capsule Take 20 mg by mouth once daily.       No current facility-administered medications for this visit.                   REVIEW OF SYSTEMS:     Sleep related symptoms as per HPI.  CONST:Denies weight gain    HEENT: Sometimes sinus congestion; dry mouth in AM  PULM: Denies dyspnea  CARD:  Denies palpitations   GI:  Reports acid reflux  : Denies polyuria  NEURO: Denies headaches  PSYCH: Denies mood disturbance  HEME: Denies anemia    Otherwise, a balance of systems reviewed is negative.          PHYSICAL EXAM:  Vitals:    01/05/18 1412   BP: 129/62   Pulse: 92   Weight: 97.1 kg (214 lb 1.1 oz)   Height: 5' 4" (1.626 m)   PainSc:   8   PainLoc: Shoulder     Body mass index is 36.74 kg/m².     GENERAL: Overweight development, well groomed  HEENT:  Conjunctivae are non-erythematous; Pupils equal, round, and reactive to light; Nose is symmetrical; Nasal mucosa is pink and moist; Septum is midline; Inferior turbinates are normal; Nasal airflow is normal; " Posterior pharynx is pink; Modified Mallampati: IV; Posterior palate is normal; Tonsils +1; Uvula is normal and pink;Tongue is normal; Dentition upper and lower dentures; No TMJ tenderness; Jaw opening and protrusion without click and without discomfort.  NECK: Supple. Neck circumference is 13.5 inches. No thyromegaly. No palpable nodes.    SKIN: On face and neck: No abrasions, no rashes, no lesions.  No subcutaneous nodules are palpable.  RESPIRATORY: Chest is clear to auscultation.  Normal chest expansion and non-labored breathing at rest.  CARDIOVASCULAR: Normal S1, S2.  No murmurs, gallops or rubs. No carotid bruits bilaterally.  EXTREMITIES: No edema. No clubbing. No cyanosis. Station normal. Gait normal.        NEURO/PSYCH: Oriented to time, place and person. Normal attention span and concentration. Affect is full. Mood is normal.                                              ASSESSMENT:    Obstructive sleep apnea, mild by AHI. The patient symptomatically has snoring, interrupted sleep, unrefreshing sleep, excessive daytime fatigue, and excessive daytime sleepiness with findings of crowded oral airway and elevated body mas index. The patient has not yet met insurance compliance because she has not been able to meaningfully use CPAP machine due to nasal congestion. Medical co-morbidities: systemic HTN, 1st degree AV block, and obesity. This warrants treatment for obstructive sleep apnea.      Obesity, BMI >30, discussed relationship between TRISTON and weight    Nasal congestion, uncontrolled on prn Flonase, could make acclimating to PAP therapy more difficult     PLAN:     Treatment:   -Trial auto CPAP 8 - 12 cm. RTC 6- 8 weeks.   -Start flunisolide pending ins auth for nasal congestion. Start taking OTC antihistamine ie Zyrtec, Claritin, or Allegra  -Reviewed insurance guidelines for adherence to therapy defined as use of PAP = 4 hours per night on 70% of nights during a consecutive 30-day period anytime during  the first 3 months of initial usage.   -If patient has difficulty with CPAP adherence or ongoing TRISTON symptoms despite CPAP adherence, then consider an in-lab titration sleep study in order to determine optimal fixed CPAP setting.    Education: During our discussion today, we talked about the etiology of obstructive sleep apnea as well as the potential ramifications of untreated sleep apnea, which could include daytime sleepiness, hypertension, heart disease and/or stroke. We discussed potential treatment options, which could include weight loss, body positioning, continuous positive airway pressure (CPAP), OA, EPAP, or referral for surgical consideration.     Precautions: The patient was advised to abstain from driving should they feel sleepy  or drowsy.

## 2018-01-05 NOTE — PATIENT INSTRUCTIONS
For nasal congestion, start taking flunisolide intranasal spray 2 sprays in each nostril every day. In addition to nasal spray, start taking one of these over the counter antihistamines: Claritin, Allegra, or Zyrtec. Have the pharmacist show you.     Take both oral medication and nasal spray every day.

## 2018-01-06 NOTE — PROGRESS NOTES
Patient, Kelsey Fields (MRN #[id number]), presented with a recorded BMI of 36.74 kg/m^2 and a documented comorbidity(s):  - Obstructive Sleep Apnea  to which the severe obesity is a contributing factor. This is consistent with the definition of severe obesity (BMI 35.0-35.9) with comorbidity (ICD-10 E66.01, Z68.35). The patient's severe obesity was monitored, evaluated, addressed and/or treated. This addendum to the medical record is made on 01/06/2018.

## 2018-04-05 ENCOUNTER — OFFICE VISIT (OUTPATIENT)
Dept: SLEEP MEDICINE | Facility: CLINIC | Age: 78
End: 2018-04-05
Payer: MEDICARE

## 2018-04-05 VITALS
HEIGHT: 65 IN | BODY MASS INDEX: 36.07 KG/M2 | WEIGHT: 216.5 LBS | SYSTOLIC BLOOD PRESSURE: 126 MMHG | DIASTOLIC BLOOD PRESSURE: 80 MMHG | HEART RATE: 88 BPM

## 2018-04-05 DIAGNOSIS — G47.33 OBSTRUCTIVE SLEEP APNEA: Primary | ICD-10-CM

## 2018-04-05 PROCEDURE — 99999 PR PBB SHADOW E&M-EST. PATIENT-LVL III: CPT | Mod: PBBFAC,,, | Performed by: NURSE PRACTITIONER

## 2018-04-05 PROCEDURE — 3074F SYST BP LT 130 MM HG: CPT | Mod: CPTII,S$GLB,, | Performed by: NURSE PRACTITIONER

## 2018-04-05 PROCEDURE — 99213 OFFICE O/P EST LOW 20 MIN: CPT | Mod: S$GLB,,, | Performed by: NURSE PRACTITIONER

## 2018-04-05 PROCEDURE — 3079F DIAST BP 80-89 MM HG: CPT | Mod: CPTII,S$GLB,, | Performed by: NURSE PRACTITIONER

## 2018-04-05 NOTE — PROGRESS NOTES
Kelsey Fields  was seen in follow-up for TRISTON management and CPAP equipment check.    2018 Checked-in at 2:35 for 20 min appt scheduled at 2:20 pm.  2018 Checked-in at 2:07 for 20 min appt scheduled at 1:40 pm.     CHIEF COMPLAINT:    Chief Complaint   Patient presents with    Sleep Apnea       INTERVAL HISTORY:    2018 KELIN Ross NP:  Of note, pt was asked to return CPAP unit to [facility] due to non-compliance. But, rather than returning unit pt decided to purchase machine to continue use. However, she has not been able to use CPAP machine due to difficulty with donning current Respironics Comfort Gel Blue mask; specifically has difficulty fastening mask in place due to cumbersome fastener. Pt lives alone and does not have help with CPAP set up. She believes with an easier mask to fasten that she can better try CPAP. She continues to be tired during the day and have interrupted, unrefreshing sleep. ESS 10.     CPAP Interrogation: DreamStation APAP 4 - 12 cm, Therapy Hours: 8.1, Blower hours: 21.7, Days > 4 hours: 0, 30-day average: 0.1 hours, 30-day Mask Fit: 61%, Predicted AHI: 6.1, Periodic Breathin%, 90%tile pressure: 5.2 cm, Machine condition: good    2018 KELIN Ross NP: Pt returns after CPAP  on 2017 at [facility]. Used CPAP some but irritated chronic nasal congestion. Continues to use Floanse OTC prn only. Since OTC daily use would require more Flonase which is cost prohibitive. Likes fit of nasal mask with chinstrap. Has not meaningfully used it much to tell if she really likes it. Continues to have snoring, interrupted sleep, unrefreshing sleep, excessive daytime fatigue, and excessive daytime sleepiness.  ESS - did not fill out form.     CPAP Interrogation: DreamStation APAP 4 - 12   Compliance Summary Days with Device Usage: 6 days Percentage of Days >=4 Hours: 0.0% Average Usage (Days Used): 7 mins. 20 secs. Average Usage (All Days): 1 mins. 28 secs  Apnea  "Indices Average AHI: 9.6 Average OA Index: 1.4 Average CA Index: 8.2   Large Leak Average Time in Large Leak: 0 secs. Average % of Night in Large Leak: 0.0%   Periodic Breathing Average % of Night in PB: 0.0%       11/09/20017 KELIN Ross NP: Initial  HISTORY OF PRESENT ILLNESS: Kelsey Fields is a 77 y.o. female is here for sleep evaluation.       Cardiology, Dr. Keyes (Dr. Zhou) 10/11/2017: "Upon further questioning patient states she does have sleep apnea, does snore at night, does not sleep most of the night, and wakes up fatigued with progressively worsening fatigue throughout the day.  She is non- compliant with CPAP and does not even have a machine, in fact.  Last sleep study was years ago".    Patient complaints include: snoring, interrupted sleep, unrefreshing sleep, excessive daytime fatigue, and excessive daytime sleepiness     Diagnosed with TRISTON 8 years ago via in-lab sleep study at [facility]. Severity unknown. Never got set up with CPAP machine. Never had any TRISTON treatment.     Denies symptoms of restless legs or kicking during sleep.    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     [place] Sleepiness Scale score during initial sleep evaluation was 13.    SLEEP ROUTINE:    Bed partner:  None   Time to bed: 9 pm when off, 8 am after working  Sleep onset latency:  <1 hour      Disruptions or awakenings: "multiple"  Wakeup time:   6 am when off, 2 pm when working   Perceived sleep quality:  fair        Previous Sleep Study:     08/20/2009 Baseline PSG BMI 36.6 AHI 12.4 with oxygen marie 80%.   10/22/2009 CPAP Titration study CPAP 8 cm       PAST MEDICAL HISTORY:    Active Ambulatory Problems     Diagnosis Date Noted    Esophageal stricture 10/27/2014    Hypertension 03/08/2016     Resolved Ambulatory Problems     Diagnosis Date Noted    No Resolved Ambulatory Problems     Past Medical History:   Diagnosis Date    Arthritis     GERD " (gastroesophageal reflux disease)     Hypertension     Shingles                 PAST SURGICAL HISTORY:    Past Surgical History:   Procedure Laterality Date     SECTION      FRACTURE SURGERY           FAMILY HISTORY:                Family History   Problem Relation Age of Onset    Hypertension Mother     Stroke Mother     Hypertension Father     Heart attack Neg Hx     Heart disease Neg Hx        SOCIAL HISTORY:          Tobacco:   History   Smoking Status    Never Smoker   Smokeless Tobacco    Never Used       Alcohol use:    History   Alcohol Use No                 ALLERGIES:    Review of patient's allergies indicates:   Allergen Reactions    Codeine        CURRENT MEDICATIONS:    Current Outpatient Prescriptions   Medication Sig Dispense Refill    amlodipine (NORVASC) 10 MG tablet       aspirin (ECOTRIN) 81 MG EC tablet Take 162 mg by mouth once daily.      atorvastatin (LIPITOR) 40 MG tablet Take 40 mg by mouth once daily.      ciprofloxacin 250 mg/5 ml (CIPRO) Take by mouth 2 (two) times daily.      ciprofloxacin HCl (CIPRO) 250 MG tablet       cyclobenzaprine (FLEXERIL) 10 MG tablet Take 10 mg by mouth 3 (three) times daily as needed for Muscle spasms.      flunisolide 25 mcg, 0.025%, (NASALIDE) 25 mcg (0.025 %) Spry 2 sprays by Nasal route Daily. 1 Bottle 2    furosemide (LASIX) 20 MG tablet Take 1 tablet (20 mg total) by mouth daily as needed. 30 tablet 6    gabapentin (NEURONTIN) 300 MG capsule Take one cap PO QHS for 7 days, then one cap PO BID for the next 7 days, and then take one cap PO TID and continue (Patient taking differently: 300 mg once daily. Take one cap PO QHS for 7 days, then one cap PO BID for the next 7 days, and then take one cap PO TID and continue) 90 capsule 2    gabapentin (NEURONTIN) 300 MG capsule Take 300 mg by mouth once daily.      hydrocodone-acetaminophen 5-325mg (NORCO) 5-325 mg per tablet       omeprazole (PRILOSEC) 20 MG capsule Take 20 mg by  "mouth once daily.       No current facility-administered medications for this visit.                   REVIEW OF SYSTEMS:     Sleep related symptoms as per HPI.  CONST:Denies weight gain    HEENT: Sometimes sinus congestion; dry mouth in AM  PULM: Denies dyspnea  CARD:  Denies palpitations   GI:  Reports acid reflux  : Denies polyuria  NEURO: Denies headaches  PSYCH: Denies mood disturbance  HEME: Denies anemia    Otherwise, a balance of systems reviewed is negative.          PHYSICAL EXAM:  Vitals:    04/05/18 1447   BP: 126/80   Pulse: 88   Weight: 98.2 kg (216 lb 7.9 oz)   Height: 5' 5" (1.651 m)   PainSc:   8     Body mass index is 36.03 kg/m².     GENERAL: Overweight development, well groomed  HEENT:  Conjunctivae are non-erythematous; Pupils equal, round, and reactive to light; Nose is symmetrical; Nasal mucosa is pink and moist; Septum is midline; Inferior turbinates are normal; Nasal airflow is normal; Posterior pharynx is pink; Modified Mallampati: IV; Posterior palate is normal; Tonsils +1; Uvula is normal and pink;Tongue is normal; Dentition upper and lower dentures; No TMJ tenderness; Jaw opening and protrusion without click and without discomfort.  NECK: Supple. Neck circumference is 13.5 inches. No thyromegaly. No palpable nodes.    SKIN: On face and neck: No abrasions, no rashes, no lesions.  No subcutaneous nodules are palpable.  RESPIRATORY: Chest is clear to auscultation.  Normal chest expansion and non-labored breathing at rest.  CARDIOVASCULAR: Normal S1, S2.  No murmurs, gallops or rubs. No carotid bruits bilaterally.  EXTREMITIES: No edema. No clubbing. No cyanosis. Station normal. Gait normal.        NEURO/PSYCH: Oriented to time, place and person. Normal attention span and concentration. Affect is full. Mood is normal.                                              ASSESSMENT:    Obstructive sleep apnea, mild by AHI. The patient symptomatically has snoring, interrupted sleep, unrefreshing " sleep, excessive daytime fatigue, and excessive daytime sleepiness with findings of crowded oral airway and elevated body mas index. The patient has not been able to meaningfully use CPAP machine due to difficulty with interface fastener. Pt initially tried CPAP with nasal mask but required chinstrap for mouth breathing, which she did not like. She was switched to current FFM which she can not use.  Pt is motivated to continue trying CPAP therapy and paid out of pocket for machine when insurance compliance not met within 90-day window immediately after set up.  Medical co-morbidities: systemic HTN, 1st degree AV block, and obesity. This warrants treatment for obstructive sleep apnea.      Obesity, BMI >30, discussed relationship between TRISTON and weight    Nasal congestion, improving on flunisolide, could make acclimating to PAP therapy more difficult     PLAN:     Treatment:   -continue APAP 4 - 12 cm. Immediately go to [facility] to get fitted for Airfit F20 FFM - this mask has magnetic clip  -Daily flunisolide intranasal spray use vs prn   -Will call patient in 2 weeks to check if this mask is working better; RTC 6 months Sleep Clinic     Education: During our discussion today, we talked about the etiology of obstructive sleep apnea as well as the potential ramifications of untreated sleep apnea, which could include daytime sleepiness, hypertension, heart disease and/or stroke. We discussed potential treatment options, which could include weight loss, body positioning, continuous positive airway pressure (CPAP), OA, EPAP, or referral for surgical consideration.     Precautions: The patient was advised to abstain from driving should they feel sleepy  or drowsy.

## 2018-04-06 ENCOUNTER — TELEPHONE (OUTPATIENT)
Dept: SLEEP MEDICINE | Facility: CLINIC | Age: 78
End: 2018-04-06

## 2018-04-06 NOTE — TELEPHONE ENCOUNTER
Called pt to make sure she got Resmed Airfit F20 mask from [facility] yesterday as planned.     Per patient she did get new mask and it feels like she will be able to use this fastener with more ease because of magnetic clips. However, she fell asleep last night before putting mask on and was not able to wear new mask for CPAP last night. Pt reassured me that tonight she will try new mask.

## 2018-04-19 ENCOUNTER — TELEPHONE (OUTPATIENT)
Dept: INTERNAL MEDICINE | Facility: CLINIC | Age: 78
End: 2018-04-19

## 2018-04-19 NOTE — TELEPHONE ENCOUNTER
Attempted to call to follow-up regarding comfort/efficiency of new FFM acquired after last clinic visit.

## 2018-09-04 ENCOUNTER — TELEPHONE (OUTPATIENT)
Dept: INTERNAL MEDICINE | Facility: CLINIC | Age: 78
End: 2018-09-04

## 2018-09-20 ENCOUNTER — IMMUNIZATION (OUTPATIENT)
Dept: INTERNAL MEDICINE | Facility: CLINIC | Age: 78
End: 2018-09-20
Payer: MEDICARE

## 2018-09-20 ENCOUNTER — LAB VISIT (OUTPATIENT)
Dept: LAB | Facility: HOSPITAL | Age: 78
End: 2018-09-20
Attending: INTERNAL MEDICINE
Payer: MEDICARE

## 2018-09-20 ENCOUNTER — OFFICE VISIT (OUTPATIENT)
Dept: PRIMARY CARE CLINIC | Facility: CLINIC | Age: 78
End: 2018-09-20
Payer: MEDICARE

## 2018-09-20 VITALS
WEIGHT: 205.25 LBS | DIASTOLIC BLOOD PRESSURE: 60 MMHG | HEIGHT: 60 IN | SYSTOLIC BLOOD PRESSURE: 118 MMHG | OXYGEN SATURATION: 98 % | HEART RATE: 79 BPM | BODY MASS INDEX: 40.3 KG/M2

## 2018-09-20 DIAGNOSIS — B02.23 NEUROPATHY DUE TO HERPES ZOSTER: ICD-10-CM

## 2018-09-20 DIAGNOSIS — J45.20 MILD INTERMITTENT ASTHMA WITHOUT COMPLICATION: ICD-10-CM

## 2018-09-20 DIAGNOSIS — E66.01 MORBID OBESITY: ICD-10-CM

## 2018-09-20 DIAGNOSIS — R79.9 ABNORMAL FINDING OF BLOOD CHEMISTRY: ICD-10-CM

## 2018-09-20 DIAGNOSIS — E55.9 VITAMIN D DEFICIENCY: ICD-10-CM

## 2018-09-20 DIAGNOSIS — D69.2 SENILE PURPURA: ICD-10-CM

## 2018-09-20 DIAGNOSIS — K21.9 GASTROESOPHAGEAL REFLUX DISEASE WITHOUT ESOPHAGITIS: ICD-10-CM

## 2018-09-20 DIAGNOSIS — G47.39 OTHER SLEEP APNEA: ICD-10-CM

## 2018-09-20 DIAGNOSIS — I10 ESSENTIAL HYPERTENSION: ICD-10-CM

## 2018-09-20 DIAGNOSIS — E78.2 MIXED HYPERLIPIDEMIA: ICD-10-CM

## 2018-09-20 DIAGNOSIS — R21 RASH: ICD-10-CM

## 2018-09-20 LAB
25(OH)D3+25(OH)D2 SERPL-MCNC: 18 NG/ML
ALBUMIN SERPL BCP-MCNC: 3.8 G/DL
ALP SERPL-CCNC: 38 U/L
ALT SERPL W/O P-5'-P-CCNC: 12 U/L
ANION GAP SERPL CALC-SCNC: 8 MMOL/L
AST SERPL-CCNC: 19 U/L
BASOPHILS # BLD AUTO: 0.02 K/UL
BASOPHILS NFR BLD: 0.3 %
BILIRUB SERPL-MCNC: 0.4 MG/DL
BUN SERPL-MCNC: 15 MG/DL
CALCIUM SERPL-MCNC: 9.5 MG/DL
CHLORIDE SERPL-SCNC: 107 MMOL/L
CHOLEST SERPL-MCNC: 224 MG/DL
CHOLEST/HDLC SERPL: 6.1 {RATIO}
CO2 SERPL-SCNC: 24 MMOL/L
CREAT SERPL-MCNC: 1.2 MG/DL
DIFFERENTIAL METHOD: ABNORMAL
EOSINOPHIL # BLD AUTO: 0.5 K/UL
EOSINOPHIL NFR BLD: 6.5 %
ERYTHROCYTE [DISTWIDTH] IN BLOOD BY AUTOMATED COUNT: 14 %
EST. GFR  (AFRICAN AMERICAN): 50 ML/MIN/1.73 M^2
EST. GFR  (NON AFRICAN AMERICAN): 43 ML/MIN/1.73 M^2
ESTIMATED AVG GLUCOSE: 120 MG/DL
GLUCOSE SERPL-MCNC: 93 MG/DL
HBA1C MFR BLD HPLC: 5.8 %
HCT VFR BLD AUTO: 37 %
HDLC SERPL-MCNC: 37 MG/DL
HDLC SERPL: 16.5 %
HGB BLD-MCNC: 11.8 G/DL
LDLC SERPL CALC-MCNC: 167.2 MG/DL
LYMPHOCYTES # BLD AUTO: 2.4 K/UL
LYMPHOCYTES NFR BLD: 30.7 %
MCH RBC QN AUTO: 27.5 PG
MCHC RBC AUTO-ENTMCNC: 31.9 G/DL
MCV RBC AUTO: 86 FL
MONOCYTES # BLD AUTO: 0.6 K/UL
MONOCYTES NFR BLD: 7.3 %
NEUTROPHILS # BLD AUTO: 4.3 K/UL
NEUTROPHILS NFR BLD: 55.1 %
NONHDLC SERPL-MCNC: 187 MG/DL
PLATELET # BLD AUTO: 238 K/UL
PMV BLD AUTO: 10.4 FL
POTASSIUM SERPL-SCNC: 4.2 MMOL/L
PROT SERPL-MCNC: 8.1 G/DL
RBC # BLD AUTO: 4.29 M/UL
SODIUM SERPL-SCNC: 139 MMOL/L
TRIGL SERPL-MCNC: 99 MG/DL
TSH SERPL DL<=0.005 MIU/L-ACNC: 0.72 UIU/ML
WBC # BLD AUTO: 7.81 K/UL

## 2018-09-20 PROCEDURE — 80061 LIPID PANEL: CPT

## 2018-09-20 PROCEDURE — 1101F PT FALLS ASSESS-DOCD LE1/YR: CPT | Mod: CPTII,S$GLB,, | Performed by: INTERNAL MEDICINE

## 2018-09-20 PROCEDURE — 82306 VITAMIN D 25 HYDROXY: CPT

## 2018-09-20 PROCEDURE — 36415 COLL VENOUS BLD VENIPUNCTURE: CPT

## 2018-09-20 PROCEDURE — 99215 OFFICE O/P EST HI 40 MIN: CPT | Mod: S$GLB,,, | Performed by: INTERNAL MEDICINE

## 2018-09-20 PROCEDURE — 85025 COMPLETE CBC W/AUTO DIFF WBC: CPT

## 2018-09-20 PROCEDURE — 90662 IIV NO PRSV INCREASED AG IM: CPT | Mod: PBBFAC

## 2018-09-20 PROCEDURE — 83036 HEMOGLOBIN GLYCOSYLATED A1C: CPT

## 2018-09-20 PROCEDURE — 80053 COMPREHEN METABOLIC PANEL: CPT

## 2018-09-20 PROCEDURE — 3078F DIAST BP <80 MM HG: CPT | Mod: CPTII,S$GLB,, | Performed by: INTERNAL MEDICINE

## 2018-09-20 PROCEDURE — 84443 ASSAY THYROID STIM HORMONE: CPT

## 2018-09-20 PROCEDURE — 3074F SYST BP LT 130 MM HG: CPT | Mod: CPTII,S$GLB,, | Performed by: INTERNAL MEDICINE

## 2018-09-20 RX ORDER — ATORVASTATIN CALCIUM 20 MG/1
20 TABLET, FILM COATED ORAL DAILY
Qty: 90 TABLET | Refills: 3 | Status: SHIPPED | OUTPATIENT
Start: 2018-09-20 | End: 2018-11-27

## 2018-09-20 RX ORDER — AMLODIPINE BESYLATE 10 MG/1
10 TABLET ORAL DAILY
Qty: 90 TABLET | Refills: 3 | Status: SHIPPED | OUTPATIENT
Start: 2018-09-20 | End: 2019-09-24 | Stop reason: SDUPTHER

## 2018-09-20 RX ORDER — CIMETIDINE 400 MG/1
400 TABLET, FILM COATED ORAL 2 TIMES DAILY
Qty: 180 TABLET | Refills: 3 | Status: SHIPPED | OUTPATIENT
Start: 2018-09-20 | End: 2019-07-15 | Stop reason: SDUPTHER

## 2018-09-20 RX ORDER — GABAPENTIN 300 MG/1
300 CAPSULE ORAL 2 TIMES DAILY PRN
Qty: 180 CAPSULE | Refills: 3 | Status: SHIPPED | OUTPATIENT
Start: 2018-09-20 | End: 2021-04-07 | Stop reason: SDUPTHER

## 2018-09-20 RX ORDER — ALBUTEROL SULFATE 90 UG/1
2 AEROSOL, METERED RESPIRATORY (INHALATION) EVERY 6 HOURS PRN
Qty: 3 INHALER | Refills: 3 | Status: SHIPPED | OUTPATIENT
Start: 2018-09-20 | End: 2020-06-02 | Stop reason: SDUPTHER

## 2018-09-20 NOTE — PROGRESS NOTES
"Primary Care Provider Appointment    Subjective:      Patient ID: Kelsey Fields is a 77 y.o. female with HTN, HLD, skin disorder, obesity, GERD    Chief Complaint: Establish Care    New patient to this clinic, previously seen by Dr Malhotra. Has received care at [facility] for knee surgery with Dr Fam. She gets narcotics from him. Also saw another PCP at [facility] (Dr Malhotra and Dr Moody) but her doctor moved to private practice on [facility]. She hasn't had labs in over a year, care not consolidated. Sees providers in numerous hospital systems here to collaborate care.    She heard about [facility] through [facility], and lecture Dr Todd performed there on CKD. She has CKD stage 2, is very concerned about this.    Pressure is well-controlled on amlodipine 10mg. She is noncompliant with statin. She is taking ASA, but has uncontrolled GERD on tagamet 200mg daily.    She is noncompliant with CPAP nor nasal spray due to "filling up with all that mucus."    Past Surgical History:   Procedure Laterality Date     SECTION      ESOPHAGOGASTRODUODENOSCOPY (EGD) WITH REMOVAL OF FOREIGN BODY(FOOD) N/A 10/27/2014    Performed by Pacheco Cole MD at [facility] ENDO    FRACTURE SURGERY         Past Medical History:   Diagnosis Date    Arthritis     GERD (gastroesophageal reflux disease)     Hypertension     Shingles        Review of Systems   Constitutional: Positive for activity change, appetite change and fatigue.   HENT: Negative for sneezing and sore throat.    Eyes: Negative for photophobia and pain.   Respiratory: Negative for cough, chest tightness and shortness of breath.    Cardiovascular: Negative for chest pain and leg swelling.   Gastrointestinal: Negative for abdominal pain, constipation and diarrhea.   Endocrine: Negative for cold intolerance, heat intolerance and polyuria.   Genitourinary: Negative for frequency.   Musculoskeletal: Positive for back pain. " Negative for joint swelling and neck stiffness.   Skin: Negative for color change and pallor.   Neurological: Negative for seizures, speech difficulty and headaches.   Hematological: Negative for adenopathy.   Psychiatric/Behavioral: Positive for decreased concentration, dysphoric mood, sleep disturbance and suicidal ideas. Negative for agitation.       Objective:   /60 (BP Location: Left arm, Patient Position: Sitting, BP Method: Large (Manual))   Pulse 79   Ht 5' (1.524 m)   Wt 93.1 kg (205 lb 4 oz)   SpO2 98%   BMI 40.08 kg/m²     Physical Exam   Constitutional: She is oriented to person, place, and time. She appears well-developed and well-nourished.   obese   HENT:   Head: Normocephalic and atraumatic.   underbite   Neck: Normal range of motion.   Cardiovascular: Normal rate.   Pulmonary/Chest: Effort normal.   Musculoskeletal: She exhibits edema and deformity.   Neurological: She is alert and oriented to person, place, and time.   Skin:   Ecchymoses and purpura on UE bilaterally   Psychiatric: She has a normal mood and affect. Her behavior is normal.   Vitals reviewed.      Lab Results   Component Value Date    WBC 5.98 10/26/2017    HGB 11.7 (L) 10/26/2017    HCT 36.7 (L) 10/26/2017     10/26/2017    CHOL 164 08/30/2006    TRIG 106 08/30/2006    HDL 29.0 (L) 08/30/2006    ALT 10 10/26/2017    AST 16 10/26/2017     10/26/2017    K 4.2 10/26/2017     10/26/2017    CREATININE 1.1 10/26/2017    BUN 13 10/26/2017    CO2 28 10/26/2017    TSH 0.893 10/11/2017         Assessment:   77 y.o. 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     T2 dermatome pain following herpes zoster episode  · Continue gabapentin  · Advised lidocaine patch         Relevant Medications    gabapentin (NEURONTIN) 300 MG  capsule       Derm    Rash     Numerous skin lesions, possible psoriasis  · Refer to Dermatology         Relevant Orders    Ambulatory Referral to Dermatology       Pulmonary    Mild intermittent asthma without complication     Controlled with rescue inhaler  · Continue ventolin         Relevant Medications    albuterol (PROVENTIL/VENTOLIN HFA) 90 mcg/actuation inhaler       Cardiac/Vascular    Hypertension     BP well-controlled on CCB  · Continue amlodipine 10mg  · Discontinue ASA  · Decrease statin to atorvastatin 20mg         Relevant Medications    amLODIPine (NORVASC) 10 MG tablet    Other Relevant Orders    Hemoglobin A1c    Comprehensive metabolic panel    CBC auto differential    Mixed hyperlipidemia     Elevated ASCVD, noncompliant with high-intensity statin  · Start atorvastatin 20mg  · Discontinue ASA         Relevant Medications    atorvastatin (LIPITOR) 20 MG tablet    Other Relevant Orders    Comprehensive metabolic panel    Lipid panel    TSH       Hematology    Senile purpura     Ecchymoses and purpura on UE bilaterally  · monitor         Relevant Orders    CBC auto differential    Vitamin D       Endocrine    Morbid obesity     BMI>35, HTN, HLD, OA  · Advised to increase activity         Relevant Orders    TSH       GI    Gastroesophageal reflux disease without esophagitis     Uncontrolled reflux on cimetidine 200mg  · Increase to cimetidine 400mg BID         Relevant Medications    cimetidine (TAGAMET) 400 MG tablet    Other Relevant Orders    TSH       Other    Other sleep apnea     Noncompliant with CPAP  · Refer to sleep medicine  · Advised to bring machine to appointment         Relevant Orders    Ambulatory consult to Sleep Disorders      Other Visit Diagnoses     Abnormal finding of blood chemistry         Relevant Orders    Hemoglobin A1c    Vitamin D deficiency         Relevant Orders    Vitamin D          Health Maintenance       Date Due Completion Date    TETANUS VACCINE 11/19/1956 ---     DEXA SCAN 11/19/1978 ---    Lipid Panel 08/30/2011 8/30/2006- TODAY    Influenza Vaccine 08/01/2018 4/5/2018- TODAY          Follow-up in about 3 months (around 12/20/2018). . One hour spent with this patient today, half of that in counseling.    Laurie Todd MD/MPH  Internal Medicine  [facility]  [phone]

## 2018-09-20 NOTE — ASSESSMENT & PLAN NOTE
"Subjective   Suzanne Adames is a 66 y.o. female.     History of Present Illness   Mrs. Adames is here today with complaint of , nausea, and abdominal pain since last Tues (6 days),  has had diarrhea for same time period. Patient states she took a laxative, has had one BM since last Tues, and describes the abdominal pain as a \"knot\" in her stomach.  She does have history of chronic constipation and was on Amitiza in the past. Her last colonoscopy was in 2015 and was normal. Patient denies chest pain, shortness of breath, or dizziness.   The following portions of the patient's history were reviewed and updated as appropriate: allergies, current medications, past family history, past medical history, past social history, past surgical history and problem list.    Review of Systems   Constitutional: Positive for fatigue. Negative for activity change, diaphoresis, fever and unexpected weight change.   Eyes: Negative.    Respiratory: Negative.    Cardiovascular: Negative.    Gastrointestinal: Positive for abdominal pain (no real pain but \"bloating\"), constipation and nausea (very mild, no emesis). Negative for diarrhea and vomiting.        Positive for gastric reflux   Endocrine: Negative.    Genitourinary: Negative.    Musculoskeletal: Positive for arthralgias, back pain and neck pain.   Allergic/Immunologic: Negative.    Neurological: Negative for dizziness, seizures, facial asymmetry, light-headedness and numbness.   Hematological: Negative.    Psychiatric/Behavioral: Negative.        Objective   Physical Exam   Constitutional: She is oriented to person, place, and time. She appears well-developed and well-nourished. No distress.   HENT:   Head: Normocephalic and atraumatic.   Mouth/Throat: Oropharynx is clear and moist.   Eyes: Conjunctivae and EOM are normal. No scleral icterus.   Neck: Normal range of motion. Neck supple. No JVD present.   Cardiovascular: Normal rate, regular rhythm, normal heart sounds and " T2 dermatome pain following herpes zoster episode  · Continue gabapentin  · Advised lidocaine patch   intact distal pulses.  Exam reveals no friction rub.    No murmur heard.  Pulmonary/Chest: Effort normal and breath sounds normal. No respiratory distress. She has no wheezes.   Abdominal: Soft. Bowel sounds are normal. She exhibits no distension. There is tenderness (proximal umbilicus area tenderness to palpation).   Musculoskeletal: She exhibits no edema, tenderness or deformity.   Normal ROM of major joints   Lymphadenopathy:     She has no cervical adenopathy.   Neurological: She is alert and oriented to person, place, and time.   Skin: Skin is warm and dry.   Psychiatric: She has a normal mood and affect. Her behavior is normal. Judgment and thought content normal.   Nursing note and vitals reviewed.      Assessment/Plan   Suzanne was seen today for bloated.    Diagnoses and all orders for this visit:    Abdominal bloating  -     POC Urinalysis Dipstick, Automated    Fatigue, unspecified type  -     CBC & Differential  -     TSH  -     Comprehensive Metabolic Panel      Results for orders placed or performed in visit on 04/10/17   POC Urinalysis Dipstick, Automated   Result Value Ref Range    Color Yellow Yellow, Straw, Dark Yellow, Neha    Clarity, UA Clear Clear    Glucose, UA Negative Negative, 1000 mg/dL (3+) mg/dL    Bilirubin Negative Negative    Ketones, UA Negative Negative    Specific Gravity  1.020 1.005 - 1.030    Blood, UA Negative Negative    pH, Urine 6.0 5.0 - 8.0    Protein, POC Negative Negative mg/dL    Urobilinogen, UA Normal Normal    Leukocytes Negative Negative    Nitrite, UA Negative Negative       UA was negative in clinic today. Screening labs collected to evaluate cause of fatigue.I will contact patient regarding test results and provide instructions regarding any necessary changes in plan of care. I have also given her an order for abdominal xray.    If the labs and xray are normal, I discussed starting patient back on Amitiza to manage the constipation and will start her on a  daily pill for gastric reflux.  Patient was encouraged to keep me informed of any acute changes, lack of improvement, or any new concerning symptoms.  Her abdomens is soft and certainly does not appear to be a surgical issue  Patient voiced understanding of all instructions and denied further questions.  Written by Sarah CAMPBELLN, APRN student, acting as a scribe for NIYAH Oreilly  This note accurately reflects work and decisions made by myself, Ying MCDOWELL

## 2018-09-20 NOTE — ASSESSMENT & PLAN NOTE
BP well-controlled on CCB  · Continue amlodipine 10mg  · Discontinue ASA  · Decrease statin to atorvastatin 20mg

## 2018-09-20 NOTE — PATIENT INSTRUCTIONS
TODAY:  - flu vaccine at pharmacy  - sleep medicine appointment  - labs today  - dermatology appointment  - refills sent to [facility]  - stop taking aspirin  - cut atorvastatin in half    + new script sent to [facility] for atorvastatin 20mg  - increase cimetidine to 400mg (2 tablets) twice daily for reflux

## 2018-09-20 NOTE — ASSESSMENT & PLAN NOTE
Elevated ASCVD, noncompliant with high-intensity statin  · Start atorvastatin 20mg  · Discontinue ASA

## 2018-09-21 ENCOUNTER — TELEPHONE (OUTPATIENT)
Dept: PRIMARY CARE CLINIC | Facility: CLINIC | Age: 78
End: 2018-09-21

## 2018-09-21 RX ORDER — ACETAMINOPHEN 500 MG
5000 TABLET ORAL DAILY
COMMUNITY
Start: 2018-09-21 | End: 2019-09-24 | Stop reason: SDUPTHER

## 2018-09-21 NOTE — TELEPHONE ENCOUNTER
Please let patient know that her labs show that her cholesterol is elevated, and vitamin D low. Otherwise her labs look great!    I want her to restart her statin, she can take a half-tablet of the 40mg. Her new script for atorvastatin 20mg was sent yesterday to [facility] pharmacy.     Please order her daily vitamin D 5000U from [employer] [facility], if she agrees.    Thanks,  KARLY

## 2018-09-24 ENCOUNTER — OFFICE VISIT (OUTPATIENT)
Dept: SLEEP MEDICINE | Facility: CLINIC | Age: 78
End: 2018-09-24
Payer: MEDICARE

## 2018-09-24 VITALS
WEIGHT: 204.38 LBS | SYSTOLIC BLOOD PRESSURE: 112 MMHG | BODY MASS INDEX: 40.13 KG/M2 | DIASTOLIC BLOOD PRESSURE: 62 MMHG | HEART RATE: 80 BPM | HEIGHT: 60 IN

## 2018-09-24 DIAGNOSIS — G47.33 OBSTRUCTIVE SLEEP APNEA: Primary | ICD-10-CM

## 2018-09-24 PROCEDURE — 1101F PT FALLS ASSESS-DOCD LE1/YR: CPT | Mod: CPTII,,, | Performed by: NURSE PRACTITIONER

## 2018-09-24 PROCEDURE — 99999 PR PBB SHADOW E&M-EST. PATIENT-LVL III: CPT | Mod: PBBFAC,,, | Performed by: NURSE PRACTITIONER

## 2018-09-24 PROCEDURE — 99213 OFFICE O/P EST LOW 20 MIN: CPT | Mod: S$PBB,,, | Performed by: NURSE PRACTITIONER

## 2018-09-24 PROCEDURE — 3078F DIAST BP <80 MM HG: CPT | Mod: CPTII,,, | Performed by: NURSE PRACTITIONER

## 2018-09-24 PROCEDURE — 3074F SYST BP LT 130 MM HG: CPT | Mod: CPTII,,, | Performed by: NURSE PRACTITIONER

## 2018-09-24 PROCEDURE — 99213 OFFICE O/P EST LOW 20 MIN: CPT | Mod: PBBFAC | Performed by: NURSE PRACTITIONER

## 2018-09-24 NOTE — PATIENT INSTRUCTIONS
Start using nasal spray flunisolide 2 sprays in each nostril once daily     In addition to nasal spray, start taking over-the-counter antihistamine Zyrtec 10 mg once daily - this should not make you sleepy, but if it does, you can take it at bedtime.

## 2018-09-24 NOTE — PROGRESS NOTES
Kelsey Fields  was seen in follow-up for TRISTON management and CPAP equipment check.    09/24/2018 Checked-in at 10:54 am for 20 min appt scheduled at 10:40 am  04/05/2018 Checked-in at 2:35 for 20 min appt scheduled at 2:20 pm.  01/05/2018 Checked-in at 2:07 for 20 min appt scheduled at 1:40 pm.     INTERVAL HISTORY:    09/24/2018 KELIN Ross NP: Still not able to use CPAP due to nasal congestion. Pt has not started using intranasal steroid spray or OTC antihistamine as recommended. Now has nasal congestion all the time, not just with CPAP use. Reports its worse when she spends time outside. 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 Has gotten Airfit F20, thinks she likes mask but has not used it much.     ESS 13    CPAP Interrogation:  APAP 4 - 12 cm  04/05/2018 - 09/23/2018  Compliance Summary Days with Device Usage: 10 days Percentage of Days >=4 Hours: 0.0% Average Usage (Days Used): 1 hrs. 1 mins. 57 secs. Average Usage (All Days): 3 mins. 36 secs.  Apnea Indices Average AHI: 9.6 Average OA Index: 0.1 Average CA Index: 0.0   Large Leak Average Time in Large Leak: 14 mins. 30 secs. Average % of Night in Large Leak: 23.4%   Periodic Breathing Average % of Night in PB: 3.1%        04/05/2018 KELIN Ross NP:  Of note, pt was asked to return CPAP unit to [facility] due to non-compliance. But, rather than returning unit pt decided to purchase machine to continue use. However, she has not been able to use CPAP machine due to difficulty with donning current Respironics Comfort Gel Blue mask; specifically has difficulty fastening mask in place due to cumbersome fastener. Pt lives alone and does not have help with CPAP set up. She believes with an easier mask to fasten that she can better try CPAP. She continues to be tired during the day and have interrupted, unrefreshing sleep. ESS 10.     CPAP Interrogation:  "DreamStation APAP 4 - 12 cm, Therapy Hours: 8.1, Blower hours: 21.7, Days > 4 hours: 030, 30-day average: 0.1 hours, 30-day Mask Fit: 61%, Predicted AHI: 6.1, Periodic Breathin%, 90%tile pressure: 5.2 cm, Machine condition: good    2018 KELIN Ross NP: Pt returns after CPAP  on 2017 at [facility]. Used CPAP some but irritated chronic nasal congestion. Continues to use Floanse OTC prn only. Since OTC daily use would require more Flonase which is cost prohibitive. Likes fit of nasal mask with chinstrap. Has not meaningfully used it much to tell if she really likes it. Continues to have snoring, interrupted sleep, unrefreshing sleep, excessive daytime fatigue, and excessive daytime sleepiness.  ESS - did not fill out form.     CPAP Interrogation: DreamStation APAP 4 - 12   Compliance Summary Days with Device Usage: 6 days Percentage of Days >=4 Hours: 0.0% Average Usage (Days Used): 7 mins. 20 secs. Average Usage (All Days): 1 mins. 28 secs  Apnea Indices Average AHI: 9.6 Average OA Index: 1.4 Average CA Index: 8.2   Large Leak Average Time in Large Leak: 0 secs. Average % of Night in Large Leak: 0.0%   Periodic Breathing Average % of Night in PB: 0.0%        KELIN Ross NP: Initial  HISTORY OF PRESENT ILLNESS: Kelsey Fields is a 77 y.o. female is here for sleep evaluation.       Cardiology, Dr. Keyes (Dr. Zhou) 10/11/2017: "Upon further questioning patient states she does have sleep apnea, does snore at night, does not sleep most of the night, and wakes up fatigued with progressively worsening fatigue throughout the day.  She is non- compliant with CPAP and does not even have a machine, in fact.  Last sleep study was years ago".    Patient complaints include: snoring, interrupted sleep, unrefreshing sleep, excessive daytime fatigue, and excessive daytime sleepiness     Diagnosed with TRISTON 8 years ago via in-lab sleep study at [facility]. Severity unknown. Never got set up with CPAP " "machine. Never had any TRISTON treatment.     Denies symptoms of restless legs or kicking during sleep.    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     Saint Anthony Sleepiness Scale score during initial sleep evaluation was 13.    SLEEP ROUTINE:    Bed partner:  None   Time to bed: 9 pm when off, 8 am after working  Sleep onset latency:  <1 hour      Disruptions or awakenings: "multiple"  Wakeup time:   6 am when off, 2 pm when working   Perceived sleep quality:  fair        Previous Sleep Study:     2009 Baseline PSG BMI 36.6 AHI 12.4 with oxygen marie 80%.   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    Mild intermittent asthma without complication 2018     Resolved Ambulatory Problems     Diagnosis Date Noted    No Resolved Ambulatory Problems     Past Medical History:   Diagnosis Date    Arthritis     GERD (gastroesophageal reflux disease)     Hypertension     Shingles                 PAST SURGICAL HISTORY:    Past Surgical History:   Procedure Laterality Date     SECTION      ESOPHAGOGASTRODUODENOSCOPY (EGD) WITH REMOVAL OF FOREIGN BODY(FOOD) N/A 10/27/2014    Performed by Pacheco [facility] at [facility] ENDO    FRACTURE SURGERY           FAMILY HISTORY:                Family History   Problem Relation Age of Onset    Hypertension Mother     Stroke Mother     Hypertension Father     Heart attack Neg Hx     Heart disease Neg Hx        SOCIAL HISTORY:          Tobacco:   Social History     Tobacco Use   Smoking Status Never Smoker "   Smokeless Tobacco Never Used       Alcohol use:    Social History     Substance and Sexual Activity   Alcohol Use No                 ALLERGIES:    Review of patient's allergies indicates:   Allergen Reactions    Codeine        CURRENT MEDICATIONS:    Current Outpatient Medications   Medication Sig Dispense Refill    albuterol (PROVENTIL/VENTOLIN HFA) 90 mcg/actuation inhaler Inhale 2 puffs into the lungs every 6 (six) hours as needed for Wheezing. Rescue 3 Inhaler 3    amLODIPine (NORVASC) 10 MG tablet Take 1 tablet (10 mg total) by mouth once daily. 90 tablet 3    atorvastatin (LIPITOR) 20 MG tablet Take 1 tablet (20 mg total) by mouth once daily. 90 tablet 3    cholecalciferol, vitamin D3, (VITAMIN D3) 5,000 unit Tab Take 5,000 Units by mouth once daily.      cimetidine (TAGAMET) 400 MG tablet Take 1 tablet (400 mg total) by mouth 2 (two) times daily. 180 tablet 3    flunisolide 25 mcg, 0.025%, (NASALIDE) 25 mcg (0.025 %) Spry 2 sprays by Nasal route Daily. 1 Bottle 2    gabapentin (NEURONTIN) 300 MG capsule Take 1 capsule (300 mg total) by mouth 2 (two) times daily as needed. 180 capsule 3    hydrocodone-acetaminophen 5-325mg (NORCO) 5-325 mg per tablet        No current facility-administered medications for this visit.                   REVIEW OF SYSTEMS:     Sleep related symptoms as per HPI.  CONST:Denies weight gain    HEENT: Sometimes sinus congestion; dry mouth in AM  PULM: Denies dyspnea  CARD:  Denies palpitations   GI:  Reports acid reflux  : Denies polyuria  NEURO: Denies headaches  PSYCH: Denies mood disturbance  HEME: Denies anemia    Otherwise, a balance of systems reviewed is negative.          PHYSICAL EXAM:  There were no vitals filed for this visit.  There is no height or weight on file to calculate BMI.     GENERAL: Overweight development, well groomed                                             ASSESSMENT:    Obstructive sleep apnea, mild by AHI. The patient symptomatically has  snoring, interrupted sleep, unrefreshing sleep, excessive daytime fatigue, and excessive daytime sleepiness with findings of crowded oral airway and elevated body mass index. The patient has not been able to meaningfully use CPAP machine due to difficulty with interface fastener. Pt initially tried CPAP with nasal mask but required chinstrap for mouth breathing, which she did not like. Pt is motivated to continue trying CPAP therapy and paid out of pocket for machine when insurance compliance not met within 90-day window immediately after set up. 09/24/2018 unable to try CPAP due to continued nasal congestion, did not start intranasal spray and antihistamine as recommended.  Medical co-morbidities: systemic HTN, 1st degree AV block, and obesity. This warrants treatment for obstructive sleep apnea.      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 Immediately go to [facility] to get fitted for Airfit F20 FFM - this mask has magnetic clip  -Reinforced daily flunisolide and antistamine  -Will call patient in 2 weeks to check if this mask is working better; RTC 6 months Sleep Clinic     Education: During our discussion today, we talked about the etiology of obstructive sleep apnea as well as the potential ramifications of untreated sleep apnea, which could include daytime sleepiness, hypertension, heart disease and/or stroke. We discussed potential treatment options, which could include weight loss, body positioning, continuous positive airway pressure (CPAP), OA, EPAP, or referral for surgical consideration.     Precautions: The patient was advised to abstain from driving should they feel sleepy  or drowsy.

## 2018-09-24 NOTE — LETTER
September 24, 2018      Laurie Todd MD  [address]  [facility] [address]           [facility]  [address]  [facility] [address]  Phone: [phone]          Patient: Kelsey Fields   MR Number: [id number]   YOB: 1940   Date of Visit: 9/24/2018       Dear Dr. Laurie Todd:    Thank you for referring Kelsey Fields to me for evaluation. Attached you will find relevant portions of my assessment and plan of care.    If you have questions, please do not hesitate to call me. I look forward to following Kelsey Fields along with you.    Sincerely,    Anthony Ross, CANDY    Enclosure  CC:  No Recipients    If you would like to receive this communication electronically, please contact [email or web address] or [phone] to request more information on [facility] access.    For providers and/or their staff who would like to refer a patient to [facility], please contact us through our one-stop-shop provider referral line, [facility], at [phone].    If you feel you have received this communication in error or would no longer like to receive these types of communications, please e-mail [email or web address]

## 2018-11-19 ENCOUNTER — TELEPHONE (OUTPATIENT)
Dept: PRIMARY CARE CLINIC | Facility: CLINIC | Age: 78
End: 2018-11-19

## 2018-11-19 ENCOUNTER — OFFICE VISIT (OUTPATIENT)
Dept: PRIMARY CARE CLINIC | Facility: CLINIC | Age: 78
End: 2018-11-19
Payer: MEDICARE

## 2018-11-19 VITALS
SYSTOLIC BLOOD PRESSURE: 144 MMHG | BODY MASS INDEX: 34.38 KG/M2 | WEIGHT: 206.38 LBS | HEIGHT: 65 IN | OXYGEN SATURATION: 98 % | HEART RATE: 100 BPM | DIASTOLIC BLOOD PRESSURE: 64 MMHG

## 2018-11-19 DIAGNOSIS — N18.1 CHRONIC KIDNEY DISEASE, STAGE I: ICD-10-CM

## 2018-11-19 DIAGNOSIS — R73.03 PREDIABETES: ICD-10-CM

## 2018-11-19 DIAGNOSIS — G47.39 OTHER SLEEP APNEA: ICD-10-CM

## 2018-11-19 DIAGNOSIS — R53.82 CHRONIC FATIGUE: ICD-10-CM

## 2018-11-19 DIAGNOSIS — E78.2 MIXED HYPERLIPIDEMIA: ICD-10-CM

## 2018-11-19 PROCEDURE — 1100F PTFALLS ASSESS-DOCD GE2>/YR: CPT | Mod: CPTII,HCNC,S$GLB, | Performed by: INTERNAL MEDICINE

## 2018-11-19 PROCEDURE — 3077F SYST BP >= 140 MM HG: CPT | Mod: CPTII,HCNC,S$GLB, | Performed by: INTERNAL MEDICINE

## 2018-11-19 PROCEDURE — 99215 OFFICE O/P EST HI 40 MIN: CPT | Mod: HCNC,S$GLB,, | Performed by: INTERNAL MEDICINE

## 2018-11-19 PROCEDURE — 3288F FALL RISK ASSESSMENT DOCD: CPT | Mod: CPTII,HCNC,S$GLB, | Performed by: INTERNAL MEDICINE

## 2018-11-19 PROCEDURE — 3078F DIAST BP <80 MM HG: CPT | Mod: CPTII,HCNC,S$GLB, | Performed by: INTERNAL MEDICINE

## 2018-11-19 NOTE — PATIENT INSTRUCTIONS
TODAY:  - avoid gabapentin  - use your CPAP  - continue your statin and blood pressure meds  - you do NOT have diabetes, you have prediabetes   + CUT BACK ON SUGAR AND CARBS  - nutrition appt

## 2018-11-19 NOTE — PROGRESS NOTES
"Primary Care Provider Appointment    Subjective:      Patient ID: Kelsey Fields is a 78 y.o. female with healthcare anxiety, HTN, HLD, preDM    Chief Complaint: diabetes ck and Fatigue    Patient went to health screening today. Was a walk-in for clinic today because she was told that she MAY have diabetes at health screening. Last A1c was 5.8 in 2018.    She was started on a statin 2 months ago. She is taking atorvastatin 20mg most days.    She states "I was all confused yesterday." She takes gabapentin "anytime I feel the pain."    She is noncompliant with CPAP, she states mask does not fit well and she falls asleep before she puts the machine on. Last seen in clinic 2018.      Past Surgical History:   Procedure Laterality Date     SECTION      ESOPHAGOGASTRODUODENOSCOPY (EGD) WITH REMOVAL OF FOREIGN BODY(FOOD) N/A 10/27/2014    Performed by Pacheco Cole MD at [facility] [place] ENDO    FRACTURE SURGERY         Past Medical History:   Diagnosis Date    Arthritis     GERD (gastroesophageal reflux disease)     Hypertension     Shingles        Review of Systems   Constitutional: Positive for activity change, appetite change and fatigue.   HENT: Negative for sneezing and sore throat.    Eyes: Negative for photophobia and pain.   Respiratory: Negative for cough, chest tightness and shortness of breath.    Cardiovascular: Negative for chest pain and leg swelling.   Gastrointestinal: Negative for abdominal pain, constipation and diarrhea.   Endocrine: Negative for cold intolerance, heat intolerance and polyuria.   Genitourinary: Negative for frequency.   Musculoskeletal: Positive for back pain. Negative for joint swelling and neck stiffness.   Skin: Negative for color change and pallor.   Neurological: Negative for seizures, speech difficulty and headaches.   Hematological: Negative for adenopathy.   Psychiatric/Behavioral: Positive for decreased concentration, dysphoric mood, sleep disturbance and " "suicidal ideas. Negative for agitation.       Objective:   BP (!) 144/64 (BP Location: Left arm, Patient Position: Sitting, BP Method: Medium (Manual))   Pulse 100   Ht 5' 5" (1.651 m)   Wt 93.6 kg (206 lb 5.6 oz)   SpO2 98%   BMI 34.34 kg/m²     Physical Exam   Constitutional: She is oriented to person, place, and time. She appears well-developed and well-nourished.   obese   HENT:   Head: Normocephalic and atraumatic.   underbite   Neck: Normal range of motion.   Cardiovascular: Normal rate.   Pulmonary/Chest: Effort normal.   Musculoskeletal: She exhibits edema and deformity.   Neurological: She is alert and oriented to person, place, and time.   Skin:   Ecchymoses and purpura on UE bilaterally   Psychiatric: She has a normal mood and affect. Her behavior is normal.   Vitals reviewed.      Lab Results   Component Value Date    WBC 7.81 09/20/2018    HGB 11.8 (L) 09/20/2018    HCT 37.0 09/20/2018     09/20/2018    CHOL 224 (H) 09/20/2018    TRIG 99 09/20/2018    HDL 37 (L) 09/20/2018    ALT 12 09/20/2018    AST 19 09/20/2018     09/20/2018    K 4.2 09/20/2018     09/20/2018    CREATININE 1.2 09/20/2018    BUN 15 09/20/2018    CO2 24 09/20/2018    TSH 0.725 09/20/2018    HGBA1C 5.8 (H) 09/20/2018         Assessment:   78 y.o. female with multiple co-morbid illnesses here to continue work-up of chronic issues notably healthcare anxiety, HTN, HLD, preDM.     Plan:     Problem List Items Addressed This Visit        Cardiac/Vascular    Mixed hyperlipidemia     Elevated ASCVD, newly compliant with high-intensity statin  · Continue atorvastatin 20mg  · Discontinue ASA         Relevant Orders    Ambulatory consult to Nutrition Services       Endocrine    Prediabetes     A1c 5.8, poor dietary compliance  · Diabetes education  · Advised lifestyle changes         Relevant Orders    Ambulatory consult to Nutrition Services       Other    Other sleep apnea     Noncompliant with CPAP  · Refer to sleep " medicine  · Advised to bring machine to appointment         Relevant Orders    Ambulatory consult to Nutrition Services    Chronic fatigue     Likely related to gabapentin use and untreataed sleep apnea  · Avoid gapabentin  · Use CPAP         Relevant Orders    Ambulatory consult to Nutrition Services      Other Visit Diagnoses     Chronic kidney disease, stage I         Relevant Orders    Ambulatory consult to Nutrition Services          Health Maintenance       Date Due Completion Date    TETANUS VACCINE 11/19/1956 ---    DEXA SCAN 11/19/1978 ---    Lipid Panel 09/20/2023 9/20/2018          Follow-up if symptoms worsen or fail to improve. Already has f/u on 12/10/18. One hour spent with this patient today, half of that in counseling.    Laurie Todd MD/MPH  Internal Medicine  [facility]  [phone]

## 2018-11-19 NOTE — TELEPHONE ENCOUNTER
Pt went to have a life line screening and was told she may have diabetes. Pt said latly shes been feeling tired and sleepy. Pt also said she is having L foot pain. Pt would like to come in now to see you.

## 2018-11-19 NOTE — ASSESSMENT & PLAN NOTE
Elevated ASCVD, newly compliant with high-intensity statin  · Continue atorvastatin 20mg  · Discontinue ASA

## 2018-11-20 ENCOUNTER — NUTRITION (OUTPATIENT)
Dept: NUTRITION | Facility: CLINIC | Age: 78
End: 2018-11-20
Payer: MEDICARE

## 2018-11-20 VITALS — HEIGHT: 65 IN | BODY MASS INDEX: 34.53 KG/M2 | WEIGHT: 207.25 LBS

## 2018-11-20 DIAGNOSIS — E78.5 DYSLIPIDEMIA: ICD-10-CM

## 2018-11-20 DIAGNOSIS — E66.9 OBESITY (BMI 30-39.9): ICD-10-CM

## 2018-11-20 DIAGNOSIS — Z71.3 DIETARY COUNSELING: ICD-10-CM

## 2018-11-20 DIAGNOSIS — R73.03 PREDIABETES: Primary | ICD-10-CM

## 2018-11-20 DIAGNOSIS — I10 ESSENTIAL HYPERTENSION: ICD-10-CM

## 2018-11-20 PROCEDURE — 99999 PR PBB SHADOW E&M-EST. PATIENT-LVL III: CPT | Mod: PBBFAC,HCNC,,

## 2018-11-20 PROCEDURE — 97802 MEDICAL NUTRITION INDIV IN: CPT | Mod: HCNC,S$GLB,, | Performed by: DIETITIAN, REGISTERED

## 2018-11-20 NOTE — PROGRESS NOTES
"Referring Physician:Laurie Todd, *     Reason for visit:  Chief Complaint   Patient presents with    prediabetes    Hypertension    Obesity    Dyslipidemia    Nutrition Counseling      Initial Visit    :1940     Allergies Reviewed  Meds Reviewed    Anthropometrics  Weight:94 kg (207 lb 3.7 oz)  Height:5' 5" (1.651 m)  BMI:Body mass index is 34.49 kg/m².   IBW:   56.8 kg  +/-10%    Meds:  Outpatient Medications Prior to Visit   Medication Sig Dispense Refill    albuterol (PROVENTIL/VENTOLIN HFA) 90 mcg/actuation inhaler Inhale 2 puffs into the lungs every 6 (six) hours as needed for Wheezing. Rescue 3 Inhaler 3    amLODIPine (NORVASC) 10 MG tablet Take 1 tablet (10 mg total) by mouth once daily. 90 tablet 3    atorvastatin (LIPITOR) 20 MG tablet Take 1 tablet (20 mg total) by mouth once daily. 90 tablet 3    cholecalciferol, vitamin D3, (VITAMIN D3) 5,000 unit Tab Take 5,000 Units by mouth once daily.      cimetidine (TAGAMET) 400 MG tablet Take 1 tablet (400 mg total) by mouth 2 (two) times daily. 180 tablet 3    flunisolide 25 mcg, 0.025%, (NASALIDE) 25 mcg (0.025 %) Spry 2 sprays by Nasal route Daily. 1 Bottle 2    gabapentin (NEURONTIN) 300 MG capsule Take 1 capsule (300 mg total) by mouth 2 (two) times daily as needed. 180 capsule 3     No facility-administered medications prior to visit.          Vitamins/Supplements/Herbs:  Vit D    Labs:   HgbA1c  5.8   Chol  224   HDL  37   TG  99  LDL Chol  167.2     Nutrition Prescription:   1704 Kcals/day( 30 kcal/kg IBW),  46 g protein( 0.8 g/kg IBW)     Support System:    Pt lives alone, and does her own grocery shopping.  Doesn't cook much; relies on restaurant meals.  Pt's sister Helga present with her today.    Diet Hx:   Pt has been following a low sodium diet; using Light Salt.  Pt likes to go to [facility] for fried fish and veggies and beet salad; [employer]'s hot food bar for mashed potatoes, meat, veggie.  Eats canned fruit in heavy " "syrup.  Snacks:  Cream-filled cookies; green onion chips; Fritos    Breakfast:   At restaurant:  Grits and fish.  Coffee with sugar.  Lunch:   Cheese & crackers.  Diet root beer  Dinner:   Red beans or butter beans and rice.    Current activity level and/or physical limitations:    Works at [employer] as a  on the evening shift.  In wheelchair today; walks with cane    Motivation to make changes/anticipated barriers and/or expected adherence:    Pt with family history of diabetes; "I want to stay well, so I guess I'll follow the recommendations"    Nutrition-Focus Physical Findings:    Appears well nourished      Assessment:   Pt attentive and asked relevant questions about foods/beverages recommended and to avoid; healthy snacks; eating out tips.  All questions answered, and pt verbalized understanding of information.    Nutrition Diagnosis: Food- and nutrition-related knowledge deficit RT lack of prior exposure to information AEB dx prediabetes      Recommendations:   Carbohydrate controlled, low fat, low sodium, high fiber diet.  Handouts provided & reviewed:  My Plate Planner; Prediabetes Management Guide    Strategies Implemented:    Switch to Splenda in coffee; canned fruit in own juice; vanilla wafers or alfred crackers instead of cream-filled cookies    Consultation Time:40 minutes.    Follow Up:  Pt provided with dietitian contact number and advised to call with questions or make future appointment if further intervention needed.    "

## 2018-11-27 ENCOUNTER — OFFICE VISIT (OUTPATIENT)
Dept: CARDIOLOGY | Facility: CLINIC | Age: 78
End: 2018-11-27
Payer: MEDICARE

## 2018-11-27 VITALS
SYSTOLIC BLOOD PRESSURE: 151 MMHG | WEIGHT: 209 LBS | BODY MASS INDEX: 34.82 KG/M2 | HEIGHT: 65 IN | HEART RATE: 81 BPM | DIASTOLIC BLOOD PRESSURE: 68 MMHG

## 2018-11-27 DIAGNOSIS — R00.2 PALPITATIONS: ICD-10-CM

## 2018-11-27 DIAGNOSIS — G47.33 OSA (OBSTRUCTIVE SLEEP APNEA): ICD-10-CM

## 2018-11-27 DIAGNOSIS — E78.5 HYPERLIPIDEMIA, UNSPECIFIED HYPERLIPIDEMIA TYPE: ICD-10-CM

## 2018-11-27 DIAGNOSIS — I10 HYPERTENSION, UNSPECIFIED TYPE: Primary | ICD-10-CM

## 2018-11-27 DIAGNOSIS — E78.2 MIXED HYPERLIPIDEMIA: ICD-10-CM

## 2018-11-27 PROCEDURE — 3077F SYST BP >= 140 MM HG: CPT | Mod: CPTII,HCNC,GC,S$GLB | Performed by: INTERNAL MEDICINE

## 2018-11-27 PROCEDURE — 99215 OFFICE O/P EST HI 40 MIN: CPT | Mod: HCNC,GC,S$GLB, | Performed by: INTERNAL MEDICINE

## 2018-11-27 PROCEDURE — 93000 ELECTROCARDIOGRAM COMPLETE: CPT | Mod: HCNC,S$GLB,, | Performed by: INTERNAL MEDICINE

## 2018-11-27 PROCEDURE — 3078F DIAST BP <80 MM HG: CPT | Mod: CPTII,HCNC,GC,S$GLB | Performed by: INTERNAL MEDICINE

## 2018-11-27 PROCEDURE — 99999 PR PBB SHADOW E&M-EST. PATIENT-LVL III: CPT | Mod: PBBFAC,HCNC,GC, | Performed by: INTERNAL MEDICINE

## 2018-11-27 PROCEDURE — 1100F PTFALLS ASSESS-DOCD GE2>/YR: CPT | Mod: CPTII,HCNC,GC,S$GLB | Performed by: INTERNAL MEDICINE

## 2018-11-27 PROCEDURE — 3288F FALL RISK ASSESSMENT DOCD: CPT | Mod: CPTII,HCNC,GC,S$GLB | Performed by: INTERNAL MEDICINE

## 2018-11-27 RX ORDER — PREDNISONE 20 MG/1
TABLET ORAL DAILY PRN
COMMUNITY
Start: 2018-10-05 | End: 2019-05-07 | Stop reason: DRUGHIGH

## 2018-11-27 RX ORDER — CYCLOBENZAPRINE HCL 10 MG
10 TABLET ORAL 3 TIMES DAILY PRN
COMMUNITY
End: 2019-12-13

## 2018-11-27 RX ORDER — ATORVASTATIN CALCIUM 20 MG/1
40 TABLET, FILM COATED ORAL DAILY
Qty: 180 TABLET | Refills: 3 | Status: SHIPPED | OUTPATIENT
Start: 2018-11-27 | End: 2018-12-10

## 2018-11-27 NOTE — PROGRESS NOTES
I have personally taken the history and examined this patient and agree with the Fellow's note as stated above.    Agree with increase atorvastatin.

## 2018-11-27 NOTE — PROGRESS NOTES
Cardiology Clinic Note  Reason for Visit: AV block, 1st degree (1 yr fu)    HPI:   Ms. Kelsey Fields is a 78 y.o. woman with history of  HTN and first degree AV block first presented to our clinic 10/2017 for evaluation of an abnormal EKG.  She states she went to [facility] and had ekg there.  EKG was noted to be abnormal and so she was told to follow up with her cardiologist.  On assessment, she is in 1st degree av block which has been present since at least  in our system.  She also noted a generalized sense of fatigue at the time.  She did also state that she has an decreased exercise tolerance or functional changes, no weight gain, worsening orthopnea, PND, chest pain, shortness of breath, or nausea.  She felt generally fatigued, lacked motivation, and needed to push herself just to get up and do things.  Upon further questioning patient stated she did have sleep apnea, does snore at night, does not sleep most of the night, and wakes up fatigued with progressively worsening fatigue throughout the day.  She was non- compliant with CPAP and does not even have a machine, in fact.  Last sleep study was years ago.    Since then, she is still non-compliant with CPAP and is hypertensive today.  She reports improved fatigue and otherwise improved wellness overall.          ROS:    Review of Systems   Constitution: Negative.   HENT: Negative.    Eyes: Negative.    Cardiovascular: Negative.    Respiratory: Negative.    Endocrine: Negative.    Skin: Negative.    Musculoskeletal: Negative.    Gastrointestinal: Negative.    Genitourinary: Negative.    Neurological: Negative.      PMH:     Past Medical History:   Diagnosis Date    Arthritis     GERD (gastroesophageal reflux disease)     Hypertension     Shingles      Past Surgical History:   Procedure Laterality Date     SECTION      ESOPHAGOGASTRODUODENOSCOPY (EGD) WITH REMOVAL OF FOREIGN BODY(FOOD) N/A 10/27/2014    Performed by  "Pacheco Cole MD at [facility] ENDO    FRACTURE SURGERY       Allergies:     Review of patient's allergies indicates:   Allergen Reactions    Codeine      Medications:     Current Outpatient Medications on File Prior to Visit   Medication Sig Dispense Refill    albuterol (PROVENTIL/VENTOLIN HFA) 90 mcg/actuation inhaler Inhale 2 puffs into the lungs every 6 (six) hours as needed for Wheezing. Rescue 3 Inhaler 3    amLODIPine (NORVASC) 10 MG tablet Take 1 tablet (10 mg total) by mouth once daily. 90 tablet 3    atorvastatin (LIPITOR) 20 MG tablet Take 1 tablet (20 mg total) by mouth once daily. 90 tablet 3    cholecalciferol, vitamin D3, (VITAMIN D3) 5,000 unit Tab Take 5,000 Units by mouth once daily.      cimetidine (TAGAMET) 400 MG tablet Take 1 tablet (400 mg total) by mouth 2 (two) times daily. 180 tablet 3    cyclobenzaprine (FLEXERIL) 10 MG tablet Take 10 mg by mouth 3 (three) times daily as needed for Muscle spasms.      flunisolide 25 mcg, 0.025%, (NASALIDE) 25 mcg (0.025 %) Spry 2 sprays by Nasal route Daily. 1 Bottle 2    gabapentin (NEURONTIN) 300 MG capsule Take 1 capsule (300 mg total) by mouth 2 (two) times daily as needed. 180 capsule 3    predniSONE (DELTASONE) 20 MG tablet daily as needed.        No current facility-administered medications on file prior to visit.      Social History:     Social History     Tobacco Use    Smoking status: Never Smoker    Smokeless tobacco: Never Used   Substance Use Topics    Alcohol use: No     Family History:     Family History   Problem Relation Age of Onset    Hypertension Mother     Stroke Mother     Hypertension Father     Heart attack Neg Hx     Heart disease Neg Hx      Physical Exam:   BP (!) 151/68 (BP Location: Left arm, Patient Position: Sitting, BP Method: X-Large (Automatic))   Pulse 81   Ht 5' 5" (1.651 m)   Wt 94.8 kg (208 lb 15.9 oz)   BMI 34.78 kg/m²    Physical Exam   Constitutional: She is oriented to person, place, and time. " She appears well-developed and well-nourished.   HENT:   Head: Normocephalic and atraumatic.   Eyes: Conjunctivae and EOM are normal. Pupils are equal, round, and reactive to light.   Neck: Normal range of motion. Neck supple. No JVD present.   Cardiovascular: Normal rate and regular rhythm. Exam reveals no gallop and no friction rub.   Murmur (1/6 diffuse precordial murmur) heard.  Pulmonary/Chest: Effort normal and breath sounds normal. No respiratory distress. She has no wheezes. She has no rales. She exhibits no tenderness.   Abdominal: Soft. Bowel sounds are normal. She exhibits no distension. There is no tenderness.   Musculoskeletal: Normal range of motion. She exhibits edema (1+ pitting edema). She exhibits no tenderness.   Neurological: She is alert and oriented to person, place, and time.   Skin: Skin is warm and dry. No erythema. No pallor.       Labs:     Lab Results   Component Value Date     09/20/2018    K 4.2 09/20/2018     09/20/2018    CO2 24 09/20/2018    BUN 15 09/20/2018    CREATININE 1.2 09/20/2018    ANIONGAP 8 09/20/2018     Lab Results   Component Value Date    HGBA1C 5.8 (H) 09/20/2018     No results found for: BNP, BNPTRIAGEBLO Lab Results   Component Value Date    WBC 7.81 09/20/2018    HGB 11.8 (L) 09/20/2018    HCT 37.0 09/20/2018     09/20/2018    GRAN 4.3 09/20/2018    GRAN 55.1 09/20/2018     Lab Results   Component Value Date    CHOL 224 (H) 09/20/2018    HDL 37 (L) 09/20/2018    LDLCALC 167.2 (H) 09/20/2018    TRIG 99 09/20/2018          Imaging:   none    EKG: nsr 1st degree avb  Assessment:     1. Hypertension, unspecified type    2. Hyperlipidemia, unspecified hyperlipidemia type    3. TRISTON (obstructive sleep apnea)          Plan:   Hypertension, unspecified type  Continue norvasc  Started on hctz yesterday -- has yet to take it -- will monitor on this medication    Hyperlipidemia, unspecified hyperlipidemia type  Increase atorvastatin to 40mg po qhs given  latest lipid profile    TRISTON (obstructive sleep apnea)  Stress need for compliance with CPAP      Discussed with Dr. Garrison. RTC as needed.     Signed:  Kwesi Keyes MD  11/27/2018 2:34 PM

## 2018-11-28 ENCOUNTER — TELEPHONE (OUTPATIENT)
Dept: CARDIOLOGY | Facility: CLINIC | Age: 78
End: 2018-11-28

## 2018-11-28 DIAGNOSIS — R00.2 PALPITATIONS: Primary | ICD-10-CM

## 2018-12-10 ENCOUNTER — OFFICE VISIT (OUTPATIENT)
Dept: PRIMARY CARE CLINIC | Facility: CLINIC | Age: 78
End: 2018-12-10
Payer: MEDICARE

## 2018-12-10 VITALS
WEIGHT: 212.94 LBS | BODY MASS INDEX: 35.48 KG/M2 | OXYGEN SATURATION: 98 % | DIASTOLIC BLOOD PRESSURE: 40 MMHG | HEIGHT: 65 IN | SYSTOLIC BLOOD PRESSURE: 130 MMHG | HEART RATE: 96 BPM

## 2018-12-10 DIAGNOSIS — E78.2 MIXED HYPERLIPIDEMIA: ICD-10-CM

## 2018-12-10 DIAGNOSIS — N18.30 ANEMIA DUE TO STAGE 3 CHRONIC KIDNEY DISEASE: ICD-10-CM

## 2018-12-10 DIAGNOSIS — N18.30 STAGE 3 CHRONIC KIDNEY DISEASE: ICD-10-CM

## 2018-12-10 DIAGNOSIS — G47.39 OTHER SLEEP APNEA: ICD-10-CM

## 2018-12-10 DIAGNOSIS — I10 HYPERTENSION, UNSPECIFIED TYPE: ICD-10-CM

## 2018-12-10 DIAGNOSIS — D63.1 ANEMIA DUE TO STAGE 3 CHRONIC KIDNEY DISEASE: ICD-10-CM

## 2018-12-10 DIAGNOSIS — R73.03 PREDIABETES: ICD-10-CM

## 2018-12-10 LAB
BILIRUB UR QL STRIP: NEGATIVE
CLARITY UR REFRACT.AUTO: CLEAR
COLOR UR AUTO: NORMAL
GLUCOSE UR QL STRIP: NEGATIVE
HGB UR QL STRIP: NEGATIVE
KETONES UR QL STRIP: NEGATIVE
LEUKOCYTE ESTERASE UR QL STRIP: NEGATIVE
NITRITE UR QL STRIP: NEGATIVE
PH UR STRIP: 6 [PH] (ref 5–8)
PROT UR QL STRIP: NEGATIVE
SP GR UR STRIP: 1.01 (ref 1–1.03)
URN SPEC COLLECT METH UR: NORMAL

## 2018-12-10 PROCEDURE — 99215 OFFICE O/P EST HI 40 MIN: CPT | Mod: HCNC,S$GLB,, | Performed by: INTERNAL MEDICINE

## 2018-12-10 PROCEDURE — 1101F PT FALLS ASSESS-DOCD LE1/YR: CPT | Mod: CPTII,HCNC,S$GLB, | Performed by: INTERNAL MEDICINE

## 2018-12-10 PROCEDURE — 3075F SYST BP GE 130 - 139MM HG: CPT | Mod: CPTII,HCNC,S$GLB, | Performed by: INTERNAL MEDICINE

## 2018-12-10 PROCEDURE — 81003 URINALYSIS AUTO W/O SCOPE: CPT | Mod: HCNC

## 2018-12-10 PROCEDURE — 3078F DIAST BP <80 MM HG: CPT | Mod: CPTII,HCNC,S$GLB, | Performed by: INTERNAL MEDICINE

## 2018-12-10 RX ORDER — ATORVASTATIN CALCIUM 40 MG/1
40 TABLET, FILM COATED ORAL DAILY
Qty: 90 TABLET | Refills: 3
Start: 2018-12-10 | End: 2019-09-24 | Stop reason: SDUPTHER

## 2018-12-10 NOTE — ASSESSMENT & PLAN NOTE
Noncompliant with CPAP (unclear whether she has a machine)  · Refer to sleep medicine  · Advised to bring machine to appointment (if she has one??)

## 2018-12-10 NOTE — ASSESSMENT & PLAN NOTE
BP well-controlled on CCB  · Continue amlodipine 10mg  · PCP discontinued ASA  · Cardiology increased statin to atorvastatin 40mg

## 2018-12-10 NOTE — PROGRESS NOTES
"Primary Care Provider Appointment    Subjective:      Patient ID: Kelsey Fields is a 78 y.o. female with CKD, HTN, HLD, GERD    Chief Complaint: Hypertension; Hyperlipidemia; Toe Pain (L foot ); and Follow-up    Patient with paperwork from [facility] with outside PCP and nephrologist. Patient requesting that all specialists are changed to [facility].    She reports that her cardiologist increased her statin to atorvastatin 40mg. She has an old pill bottles for HCTZ with her today that she states she takes as needed for swelling. Last cardiologist note advises to restart it, she was hyPERtensive at that appt, although controlled today. She is taking as needed.     She also has a script for prednisone 10mg in addition to flexeril for her chronic knee pain following knee surgery. Followed by outside Ortho.    She complains of leg cramping at night. She has numerous nutritional deficiencies (iron, vit D). Unclear whether she is taking her supplements. Hg trending down. Her last colonoscopy was "a long long time ago at [facility]." She had an EGD in  for removal of food bolus. Is no longer able to take PPI due to CKD. Tolerating tagamet.    Has CKD stage 3, previously saw [facility], needs follow-up here.    She continues to have a rash, has Derm follow-up in 2019.    She reported to PCP that she has an outdated CPAP, but advised her cardiologist that she does not have a machine at all. On chart review she has a CPAP that is one year old (17). Had sleep eval at [facility] in 2009, which is scanned into chart. Under "care everywhere" there is a colonoscopy order on 3/20/2008, but patient is unclear whether this was performed. She reports compliance with her nasal sprays.     Past Surgical History:   Procedure Laterality Date     SECTION      ESOPHAGOGASTRODUODENOSCOPY (EGD) WITH REMOVAL OF FOREIGN BODY(FOOD) N/A 10/27/2014    Performed by [facility] at [facility] ENDO    " "FRACTURE SURGERY         Past Medical History:   Diagnosis Date    Arthritis     GERD (gastroesophageal reflux disease)     Hypertension     Shingles        Review of Systems   Constitutional: Positive for activity change, appetite change and fatigue.   HENT: Negative for sneezing and sore throat.    Eyes: Negative for photophobia and pain.   Respiratory: Negative for cough, chest tightness and shortness of breath.    Cardiovascular: Negative for chest pain and leg swelling.   Gastrointestinal: Negative for abdominal pain, constipation and diarrhea.   Endocrine: Negative for cold intolerance, heat intolerance and polyuria.   Genitourinary: Negative for frequency.   Musculoskeletal: Positive for back pain. Negative for joint swelling and neck stiffness.   Skin: Negative for color change and pallor.   Neurological: Negative for seizures, speech difficulty and headaches.   Hematological: Negative for adenopathy.   Psychiatric/Behavioral: Positive for decreased concentration, dysphoric mood, sleep disturbance and suicidal ideas. Negative for agitation.       Objective:   BP (!) 130/40 (BP Location: Right arm, Patient Position: Sitting, BP Method: Large (Manual))   Pulse 96   Ht 5' 5" (1.651 m)   Wt 96.6 kg (212 lb 15.4 oz)   SpO2 98%   BMI 35.44 kg/m²     Physical Exam   Constitutional: She is oriented to person, place, and time. She appears well-developed and well-nourished.   obese   HENT:   Head: Normocephalic and atraumatic.   underbite   Neck: Normal range of motion.   Cardiovascular: Normal rate.   Pulmonary/Chest: Effort normal.   Musculoskeletal: She exhibits edema and deformity.   Neurological: She is alert and oriented to person, place, and time.   Skin:   Ecchymoses and purpura on UE bilaterally  Pruritic dry skin on UE bilaterally   Psychiatric: She has a normal mood and affect. Her behavior is normal.   Vitals reviewed.      Lab Results   Component Value Date    WBC 7.81 09/20/2018    HGB 11.8 (L) " 09/20/2018    HCT 37.0 09/20/2018     09/20/2018    CHOL 224 (H) 09/20/2018    TRIG 99 09/20/2018    HDL 37 (L) 09/20/2018    ALT 12 09/20/2018    AST 19 09/20/2018     09/20/2018    K 4.2 09/20/2018     09/20/2018    CREATININE 1.2 09/20/2018    BUN 15 09/20/2018    CO2 24 09/20/2018    TSH 0.725 09/20/2018    HGBA1C 5.8 (H) 09/20/2018         Assessment:   78 y.o. female with multiple co-morbid illnesses here to continue work-up of chronic issues notably CKD, HTN, HLD, GERD     Plan:     Problem List Items Addressed This Visit        Cardiac/Vascular    Hypertension     BP well-controlled on CCB  · Continue amlodipine 10mg  · PCP discontinued ASA  · Cardiology increased statin to atorvastatin 40mg         Relevant Orders    Ambulatory Referral to Podiatry    Mixed hyperlipidemia     Elevated ASCVD, newly compliant with high-intensity statin  · Cardiology increased to atorvastatin 40mg  · PCP discontinued ASA         Relevant Medications    atorvastatin (LIPITOR) 40 MG tablet       Renal/    Stage 3 chronic kidney disease     GFR stable, possible ACI. Previously followed by [facility]  · Monitor labs  · Nephro referral         Relevant Orders    PTH, intact    Uric acid    Comprehensive metabolic panel    URINALYSIS    Ambulatory Referral to Nephrology       Oncology    Anemia due to stage 3 chronic kidney disease     GFR stable, Hg trending down  · Monitor  · Work-up for anemia         Relevant Orders    CBC auto differential    Iron and TIBC    Ferritin       Endocrine    Prediabetes     A1c 5.8, poor dietary compliance  · Diabetes education  · Advised lifestyle changes         Relevant Orders    Ambulatory Referral to Podiatry       Other    Other sleep apnea     Noncompliant with CPAP (unclear whether she has a machine)  · Refer to sleep medicine  · Advised to bring machine to appointment (if she has one??)         Relevant Orders    Ambulatory consult to Sleep Disorders          Health  Maintenance       Date Due Completion Date    TETANUS VACCINE 11/19/1956 ---    DEXA SCAN 11/19/1978 ---    Lipid Panel 09/20/2023 9/20/2018          Follow-up in about 2 months (around 2/10/2019). One hour spent with this patient today, half of that in counseling.    Laurie Todd MD/MPH  Internal Medicine  [facility]  [phone]

## 2018-12-10 NOTE — ASSESSMENT & PLAN NOTE
Elevated ASCVD, newly compliant with high-intensity statin  · Cardiology increased to atorvastatin 40mg  · PCP discontinued ASA

## 2018-12-11 ENCOUNTER — TELEPHONE (OUTPATIENT)
Dept: INTERNAL MEDICINE | Facility: CLINIC | Age: 78
End: 2018-12-11

## 2018-12-11 NOTE — TELEPHONE ENCOUNTER
Please let patient know that her labs showed that she will need to see Nephrology to discuss her need for additional medications to help with her kidney. Her kidney function is stable at this time, so the January appointment is fine for her to wait for.    Please remind her of her follow-up appointments.    KARLY Valenzuela   good, to achieve stated therapy goals

## 2018-12-19 ENCOUNTER — OFFICE VISIT (OUTPATIENT)
Dept: PODIATRY | Facility: CLINIC | Age: 78
End: 2018-12-19
Payer: MEDICARE

## 2018-12-19 VITALS
SYSTOLIC BLOOD PRESSURE: 130 MMHG | HEIGHT: 65 IN | RESPIRATION RATE: 19 BRPM | HEART RATE: 68 BPM | WEIGHT: 210.31 LBS | DIASTOLIC BLOOD PRESSURE: 71 MMHG | BODY MASS INDEX: 35.04 KG/M2

## 2018-12-19 DIAGNOSIS — N18.30 STAGE 3 CHRONIC KIDNEY DISEASE: Primary | ICD-10-CM

## 2018-12-19 DIAGNOSIS — L84 CORN OR CALLUS: ICD-10-CM

## 2018-12-19 DIAGNOSIS — L60.9 DISEASE OF NAIL: ICD-10-CM

## 2018-12-19 PROCEDURE — 11057 PARNG/CUTG B9 HYPRKR LES >4: CPT | Mod: Q9,HCNC,S$GLB, | Performed by: PODIATRIST

## 2018-12-19 PROCEDURE — 11721 DEBRIDE NAIL 6 OR MORE: CPT | Mod: 59,Q9,HCNC,S$GLB | Performed by: PODIATRIST

## 2018-12-19 PROCEDURE — 99499 UNLISTED E&M SERVICE: CPT | Mod: HCNC,S$GLB,, | Performed by: PODIATRIST

## 2018-12-19 PROCEDURE — 99999 PR PBB SHADOW E&M-EST. PATIENT-LVL III: CPT | Mod: PBBFAC,HCNC,, | Performed by: PODIATRIST

## 2018-12-19 RX ORDER — HYDROCHLOROTHIAZIDE 50 MG/1
TABLET ORAL
COMMUNITY
Start: 2018-12-10 | End: 2019-02-13

## 2018-12-19 RX ORDER — RESVER/WINE/BFL/GRPSD/PC/C/POM 200MG-60MG
CAPSULE ORAL
COMMUNITY
Start: 2018-12-04 | End: 2018-12-19 | Stop reason: SDUPTHER

## 2018-12-19 NOTE — LETTER
December 19, 2018      Laurie Todd MD  [address]  [facility] [address]           [facility]  [address]  [facility] [address]  Phone: [phone]          Patient: Kelsey Fields   MR Number: [id number]   YOB: 1940   Date of Visit: 12/19/2018       Dear Dr. Laurie Todd:    Thank you for referring Kelsey Fields to me for evaluation. Attached you will find relevant portions of my assessment and plan of care.    If you have questions, please do not hesitate to call me. I look forward to following Kelsey Fields along with you.    Sincerely,    Shahida Augustin, CALLIE    Enclosure  CC:  No Recipients    If you would like to receive this communication electronically, please contact [email or web address] or [phone] to request more information on [employer] Link access.    For providers and/or their staff who would like to refer a patient to [facility], please contact us through our one-stop-shop provider referral line, [facility], at [phone].    If you feel you have received this communication in error or would no longer like to receive these types of communications, please e-mail [email or web address]

## 2018-12-19 NOTE — PROGRESS NOTES
"Subjective:      Patient ID: Kelsey Fields is a 78 y.o. female.    Chief Complaint: PCP (DIANA TODD 12/10/18); Nail Problem (Toe pain 4th toe left foot ); Nail Care; and Callouses    Kelsey is a 78 y.o. female who presents to the clinic for evaluation and treatment of high risk feet. Kelsey has a past medical history of Arthritis, GERD (gastroesophageal reflux disease), Hypertension, and Shingles. The patient's chief complaint is long, thick toenails. This patient has documented high risk feet requiring routine maintenance secondary to CKD.    PCP: Diana Todd MD    Date Last Seen by PCP:   Chief Complaint   Patient presents with    PCP     DIANA TODD 12/10/18    Nail Problem     Toe pain 4th toe left foot     Nail Care    Callouses       Last encounter in this department: Visit date not found    Hemoglobin A1C   Date Value Ref Range Status   09/20/2018 5.8 (H) 4.0 - 5.6 % Final     Comment:     ADA Screening Guidelines:  5.7-6.4%  Consistent with prediabetes  >or=6.5%  Consistent with diabetes  High levels of fetal hemoglobin interfere with the HbA1C  assay. Heterozygous hemoglobin variants (HbS, HgC, etc)do  not significantly interfere with this assay.   However, presence of multiple variants may affect accuracy.         Review of Systems   Constitution: Negative for chills, decreased appetite and fever.   Cardiovascular: Negative for leg swelling.   Skin: Positive for dry skin and nail changes.   Musculoskeletal: Positive for back pain and joint pain. Negative for arthritis, joint swelling and myalgias.   Gastrointestinal: Negative for nausea and vomiting.   Neurological: Negative for loss of balance, numbness and paresthesias.           Objective:       Vitals:    12/19/18 0830   BP: 130/71   Pulse: 68   Resp: 19   Weight: 95.4 kg (210 lb 5.1 oz)   Height: 5' 5" (1.651 m)   PainSc:   5   PainLoc: Toe        Physical Exam   Constitutional: She is oriented to person, place, and " time. She appears well-developed and well-nourished.   Cardiovascular:   Dorsalis pedis and posterior tibial pulses are diminished Bilaterally. Toes are cool to touch. Feet are warm proximally.There is decreased digital hair. Skin is atrophic, slightly hyperpigmented, and mildly edematous       Musculoskeletal: Normal range of motion. She exhibits no edema or tenderness.   Adequate joint range of motion without pain, limitation, nor crepitation Bilateral feet and ankle joints. Muscle strength is 5/5 in all groups bilaterally.         Neurological: She is alert and oriented to person, place, and time.   [place] 5.07 monofilamant testing is diminished Jorge feet. Sharp/dull sensation diminished Bilaterally. Light touch absent Bilaterally.       Skin: Skin is warm, dry and intact. No ecchymosis and no lesion noted. No erythema.   Nails x10 are elongated by  3-4mm's, thickened by 3-8 mm's, dystrophic, and are white, crumbling in  coloration . Xerosis Bilaterally. No open lesions noted.    Hyperkeratotic tissue noted to  distal L 4th toe, medial HIPJ b/l, plantar 5th MPJ b/l      Psychiatric: She has a normal mood and affect. Her behavior is normal.             Assessment:       Encounter Diagnoses   Name Primary?    Stage 3 chronic kidney disease Yes    Disease of nail     Corn or callus          Plan:       Kelsey was seen today for pcp, nail problem, nail care and callouses.    Diagnoses and all orders for this visit:    Stage 3 chronic kidney disease    Disease of nail    Corn or callus      I counseled the patient on her conditions, their implications and medical management.        - Shoe inspection.  Foot Education.  Patient instructed on proper foot hygeine. We discussed wearing proper shoe gear, daily foot inspections, never walking without protective shoe gear, never putting sharp instruments to feet, routine podiatric nail visits every 2-3 months.      - With patient's permission, nails were  aggressively reduced and debrided x 10 to their soft tissue attachment mechanically and with electric , removing all offending nail and debris. Patient relates relief following the procedure. She will continue to monitor the areas daily, inspect her feet, wear protective shoe gear when ambulatory, moisturizer to maintain skin integrity and follow in this office in approximately 2-3 months, sooner p.r.n.    - After cleansing the  area w/ alcohol prep pad the above mentioned hyperkeratosis was trimmed utilizing No 15 scapel, to a smooth base with out incident. Patient tolerated this  well and reported comfort to the area of distal L 4th toe, medial HIPJ b/l, plantar 5th MPJ b/l

## 2019-01-07 ENCOUNTER — TELEPHONE (OUTPATIENT)
Dept: SLEEP MEDICINE | Facility: CLINIC | Age: 79
End: 2019-01-07

## 2019-01-07 NOTE — TELEPHONE ENCOUNTER
Unsure why pt scheduled today; Called to inquire since pt takes public transit, in case in-person visit unwarranted. Per pt she is not having issues with regards to CPAP and TRISTON. Unsure why she has this appointment. Admits she is not using machine hardly at all. Reports that kidney issues are her main priority right now. Reports that nasal congestion is better, but have not really used machine to see if it continues to be controlled with addition of CPAP. [facility] most recent use was 32 minutes on 01/06/2019 evening. Encouraged pt that even if she is not using machine nightly, it is best for her to try every day even if just 30 minutes at a time so as to have daily exposure to CPAP. As it is, sporadic use of CPAP is not helping acclimation. Per pt she likes mask that she has, but simply does not prioritize use. Explicitly recommended increasing use and revisited ramifications of untreated TRISTON. Ultimate goal hourly use daily.     Will send reminder to make appt in 6 months

## 2019-01-21 ENCOUNTER — INITIAL CONSULT (OUTPATIENT)
Dept: DERMATOLOGY | Facility: CLINIC | Age: 79
End: 2019-01-21
Payer: MEDICARE

## 2019-01-21 DIAGNOSIS — L85.3 XEROSIS CUTIS: Primary | ICD-10-CM

## 2019-01-21 PROCEDURE — 1101F PT FALLS ASSESS-DOCD LE1/YR: CPT | Mod: CPTII,HCNC,S$GLB, | Performed by: DERMATOLOGY

## 2019-01-21 PROCEDURE — 99999 PR PBB SHADOW E&M-EST. PATIENT-LVL II: ICD-10-PCS | Mod: PBBFAC,HCNC,, | Performed by: DERMATOLOGY

## 2019-01-21 PROCEDURE — 99999 PR PBB SHADOW E&M-EST. PATIENT-LVL II: CPT | Mod: PBBFAC,HCNC,, | Performed by: DERMATOLOGY

## 2019-01-21 PROCEDURE — 99202 OFFICE O/P NEW SF 15 MIN: CPT | Mod: HCNC,S$GLB,, | Performed by: DERMATOLOGY

## 2019-01-21 PROCEDURE — 1101F PR PT FALLS ASSESS DOC 0-1 FALLS W/OUT INJ PAST YR: ICD-10-PCS | Mod: CPTII,HCNC,S$GLB, | Performed by: DERMATOLOGY

## 2019-01-21 PROCEDURE — 99202 PR OFFICE/OUTPT VISIT, NEW, LEVL II, 15-29 MIN: ICD-10-PCS | Mod: HCNC,S$GLB,, | Performed by: DERMATOLOGY

## 2019-01-21 RX ORDER — TRIAMCINOLONE ACETONIDE 0.25 MG/ML
LOTION TOPICAL
Qty: 2 BOTTLE | Refills: 3 | Status: SHIPPED | OUTPATIENT
Start: 2019-01-21 | End: 2019-01-21 | Stop reason: SDUPTHER

## 2019-01-21 RX ORDER — TRIAMCINOLONE ACETONIDE 0.25 MG/ML
LOTION TOPICAL
Qty: 2 BOTTLE | Refills: 3 | Status: SHIPPED | OUTPATIENT
Start: 2019-01-21 | End: 2020-02-26 | Stop reason: SDUPTHER

## 2019-01-21 NOTE — LETTER
January 21, 2019      Laurie Todd MD  [address]  [facility] [address]           [facility] - Dermatology  [address]  [facility] [address]  Phone: [phone]  Fax: [phone]          Patient: Kelsey Fields   MR Number: [id number]   YOB: 1940   Date of Visit: 1/21/2019       Dear Dr. Laurie Todd:    Thank you for referring Kelsey Fields to me for evaluation. Attached you will find relevant portions of my assessment and plan of care.    If you have questions, please do not hesitate to call me. I look forward to following Kelsey Fields along with you.    Sincerely,    Nida Jaimes MD    Enclosure  CC:  No Recipients    If you would like to receive this communication electronically, please contact [email or web address] or [phone] to request more information on [employer] Link access.    For providers and/or their staff who would like to refer a patient to [facility], please contact us through our one-stop-shop provider referral line, [facility] [place], at [phone].    If you feel you have received this communication in error or would no longer like to receive these types of communications, please e-mail [email or web address]

## 2019-01-21 NOTE — PROGRESS NOTES
Subjective:       Patient ID:  Kelsey Fields is a 78 y.o. female who presents for   Chief Complaint   Patient presents with    Rash     arms and legs     Patient is an 78 year old woman here today for evaluation of rash on her arms and legs. She states it has been present for many years. She says it is scaly, mildly pruritic. Patient notes a history of psoriasis on her legs, but no psoriatic lesions today. She previously had TAC, but it is very old/ and no longer works. She is using OTC moisturizer (Dermasil) with minimal improvement.       Rash  - Initial  Affected locations: left arm, right arm, right lower leg, left lower leg, left upper leg and right upper leg  Signs / symptoms: itching and scaling (at times)  Relieving factors/Treatments tried: Rx topical steroids ( but only using OTC Dermasil now)  Improvement on treatment: mild    Uses Dial soap. Does not take long hot showers or baths.      Review of Systems   Skin: Positive for itching (at times) and rash.        Objective:    Physical Exam   Constitutional: She appears well-developed and well-nourished. No distress.   Neurological: She is alert and oriented to person, place, and time. She is not disoriented.   Psychiatric: She has a normal mood and affect.   Skin:   Areas Examined (abnormalities noted in diagram):   Head / Face Inspection Performed  Neck Inspection Performed  Chest / Axilla Inspection Performed  RUE Inspected  LUE Inspection Performed  RLE Inspected  LLE Inspection Performed  Nails and Digits Inspection Performed              Diagram Legend     Erythematous scaling macule/papule c/w actinic keratosis       Vascular papule c/w angioma      Pigmented verrucoid papule/plaque c/w seborrheic keratosis      Yellow umbilicated papule c/w sebaceous hyperplasia      Irregularly shaped tan macule c/w lentigo     1-2 mm smooth white papules consistent with Milia      Movable subcutaneous cyst with punctum c/w epidermal inclusion cyst       Subcutaneous movable cyst c/w pilar cyst      Firm pink to brown papule c/w dermatofibroma      Pedunculated fleshy papule(s) c/w skin tag(s)      Evenly pigmented macule c/w junctional nevus     Mildly variegated pigmented, slightly irregular-bordered macule c/w mildly atypical nevus      Flesh colored to evenly pigmented papule c/w intradermal nevus       Pink pearly papule/plaque c/w basal cell carcinoma      Erythematous hyperkeratotic cursted plaque c/w SCC      Surgical scar with no sign of skin cancer recurrence      Open and closed comedones      Inflammatory papules and pustules      Verrucoid papule consistent consistent with wart     Erythematous eczematous patches and plaques     Dystrophic onycholytic nail with subungual debris c/w onychomycosis     Umbilicated papule    Erythematous-base heme-crusted tan verrucoid plaque consistent with inflamed seborrheic keratosis     Erythematous Silvery Scaling Plaque c/w Psoriasis     See annotation      Assessment / Plan:        Xerosis cutis -   Apply Am Lactin lotion or cream to arms and hands nightly. Available over-the counter. Would advise Ultra strength to elbows; and regular to body.    Other orders  -     triamcinolone acetonide 0.025 % Lotn; AAA to itching on arms and legs  Dispense: 2 Bottle; Refill: 3    Good skin care regimen discussed including limiting to one bath or shower/day, using lukewarm water with mild soap and moisturizing cream to skin 1 - 2x/day. Brochure was provided and reviewed with patient.    I advised changing to a fragrance free bar soap or body wash such as Dove, and fragrance free detergent such as Tide Free & Clear, for sensitive skin.               Follow-up if symptoms worsen or fail to improve.

## 2019-01-21 NOTE — TELEPHONE ENCOUNTER
Called pharmacy to verify directions for medication for pt. They stated they do not have a medication under this pt from Dr. Jaimes. Asked for medication to be resent. Sent refill request to Dr. Jaimes. ----- Message from Rosemarie Cristina sent at 1/21/2019 12:57 PM CST -----  Contact: [facility] [place]   Pharmacy Calling  Cool -PT Reason for call:  Clarify the directions the frequency of use of the lotion     Pharmacy Name: [facility]     Prescription Name:  triamcinolone acetonide 0.025 % Lotn    Phone Number: [phone]    Additional Information: none    RICH

## 2019-01-25 ENCOUNTER — HOSPITAL ENCOUNTER (OUTPATIENT)
Dept: RADIOLOGY | Facility: HOSPITAL | Age: 79
Discharge: HOME | End: 2019-01-25
Attending: INTERNAL MEDICINE
Payer: MEDICARE

## 2019-01-25 ENCOUNTER — OFFICE VISIT (OUTPATIENT)
Dept: NEPHROLOGY | Facility: CLINIC | Age: 79
End: 2019-01-25
Payer: MEDICARE

## 2019-01-25 VITALS
HEART RATE: 97 BPM | WEIGHT: 209.19 LBS | BODY MASS INDEX: 34.85 KG/M2 | DIASTOLIC BLOOD PRESSURE: 78 MMHG | SYSTOLIC BLOOD PRESSURE: 150 MMHG | HEIGHT: 65 IN | OXYGEN SATURATION: 98 %

## 2019-01-25 DIAGNOSIS — N18.30 STAGE 3 CHRONIC KIDNEY DISEASE: ICD-10-CM

## 2019-01-25 DIAGNOSIS — D63.1 ANEMIA DUE TO STAGE 3 CHRONIC KIDNEY DISEASE: ICD-10-CM

## 2019-01-25 DIAGNOSIS — N18.30 ANEMIA DUE TO STAGE 3 CHRONIC KIDNEY DISEASE: ICD-10-CM

## 2019-01-25 DIAGNOSIS — N18.30 STAGE 3 CHRONIC KIDNEY DISEASE: Primary | ICD-10-CM

## 2019-01-25 PROCEDURE — 3078F DIAST BP <80 MM HG: CPT | Mod: HCNC,CPTII,S$GLB, | Performed by: INTERNAL MEDICINE

## 2019-01-25 PROCEDURE — 3077F PR MOST RECENT SYSTOLIC BLOOD PRESSURE >= 140 MM HG: ICD-10-PCS | Mod: HCNC,CPTII,S$GLB, | Performed by: INTERNAL MEDICINE

## 2019-01-25 PROCEDURE — 99204 OFFICE O/P NEW MOD 45 MIN: CPT | Mod: HCNC,S$GLB,, | Performed by: INTERNAL MEDICINE

## 2019-01-25 PROCEDURE — 99999 PR PBB SHADOW E&M-EST. PATIENT-LVL III: ICD-10-PCS | Mod: PBBFAC,HCNC,, | Performed by: INTERNAL MEDICINE

## 2019-01-25 PROCEDURE — 93975 VASCULAR STUDY: CPT | Mod: TC,HCNC

## 2019-01-25 PROCEDURE — 3077F SYST BP >= 140 MM HG: CPT | Mod: HCNC,CPTII,S$GLB, | Performed by: INTERNAL MEDICINE

## 2019-01-25 PROCEDURE — 3078F PR MOST RECENT DIASTOLIC BLOOD PRESSURE < 80 MM HG: ICD-10-PCS | Mod: HCNC,CPTII,S$GLB, | Performed by: INTERNAL MEDICINE

## 2019-01-25 PROCEDURE — 99204 PR OFFICE/OUTPT VISIT, NEW, LEVL IV, 45-59 MIN: ICD-10-PCS | Mod: HCNC,S$GLB,, | Performed by: INTERNAL MEDICINE

## 2019-01-25 PROCEDURE — 1101F PR PT FALLS ASSESS DOC 0-1 FALLS W/OUT INJ PAST YR: ICD-10-PCS | Mod: HCNC,CPTII,S$GLB, | Performed by: INTERNAL MEDICINE

## 2019-01-25 PROCEDURE — 93975 VASCULAR STUDY: CPT | Mod: 26,HCNC,, | Performed by: RADIOLOGY

## 2019-01-25 PROCEDURE — 99999 PR PBB SHADOW E&M-EST. PATIENT-LVL III: CPT | Mod: PBBFAC,HCNC,, | Performed by: INTERNAL MEDICINE

## 2019-01-25 PROCEDURE — 93975 US RENAL ARTERY STENOSIS HYPERTEN (XPD): ICD-10-PCS | Mod: 26,HCNC,, | Performed by: RADIOLOGY

## 2019-01-25 PROCEDURE — 1101F PT FALLS ASSESS-DOCD LE1/YR: CPT | Mod: HCNC,CPTII,S$GLB, | Performed by: INTERNAL MEDICINE

## 2019-01-25 NOTE — PROGRESS NOTES
Subjective:       Patient ID: Kelsey Fields is a 78 y.o. Black or  female who presents for new evaluation of No chief complaint on file.  The patient has a history of HTN x 35 yrs, first degree AV block, arthritis, TRISTON. Has had CKD 3 for the last yrs, followed by Dr. Pavon (U).   The patient has no family history of kidney disease, no history of kidney stones (father had kidney stone). The patient does not freuqently use NSAIDS or herbal supplements.     HPI  Review of Systems   Constitutional: Negative for activity change, appetite change, chills, fatigue, fever and unexpected weight change.   HENT: Negative.  Negative for nosebleeds.    Eyes: Negative.    Respiratory: Positive for shortness of breath (short winded with stairs). Negative for cough and chest tightness.    Cardiovascular: Negative.  Negative for chest pain and leg swelling.   Gastrointestinal: Negative for abdominal pain, anal bleeding, diarrhea, nausea and vomiting.   Genitourinary: Positive for urgency. Negative for difficulty urinating, dysuria, flank pain, frequency and hematuria.   Musculoskeletal: Negative.  Negative for joint swelling and myalgias.   Skin: Negative.  Negative for rash.   Neurological: Negative.    Psychiatric/Behavioral: Negative.    All other systems reviewed and are negative.      Objective:      Physical Exam   Constitutional: She is oriented to person, place, and time. She appears well-developed and well-nourished. No distress.   HENT:   Head: Normocephalic and atraumatic.   Right Ear: External ear normal.   Left Ear: External ear normal.   Nose: Nose normal.   Mouth/Throat: Oropharynx is clear and moist.   Eyes: Conjunctivae and EOM are normal. Pupils are equal, round, and reactive to light.   Neck: Normal range of motion. Neck supple. No thyromegaly present.   Cardiovascular: Normal rate, regular rhythm and intact distal pulses.   Murmur heard.  Pulmonary/Chest: Effort normal and breath sounds  normal. No respiratory distress. She has no wheezes. She has no rales. She exhibits no tenderness.   Abdominal: Soft. Normal appearance and bowel sounds are normal. She exhibits no distension. There is no tenderness. There is no rebound and no guarding.   Musculoskeletal: Normal range of motion. She exhibits edema (bilateral). She exhibits no tenderness.   Neurological: She is alert and oriented to person, place, and time. She has normal reflexes.   Skin: Skin is warm, dry and intact. She is not diaphoretic.   Psychiatric: She has a normal mood and affect. Her behavior is normal. Judgment and thought content normal.   Nursing note and vitals reviewed.      Assessment:       1. Stage 3 chronic kidney disease    2. Anemia due to stage 3 chronic kidney disease        Plan:       1. CKD3, long standing: no signs of microscopic hematuria or proteinuria. Likely secondary to vascular disease and HTN.   We will send the patient for a renal US, follow UA and protein/creatinine ratio and uric acid level.  Please see me back in my clinic in 6 month with blood work.        Lab Results   Component Value Date    CREATININE 1.3 12/10/2018     Protein Creatinine Ratios:    No results found for: UTPCR  ·   ·   Acid-Base:   Lab Results   Component Value Date     12/10/2018    K 4.2 12/10/2018    CO2 25 12/10/2018     2. HTN: Blood pressures is 130/70    3. Renal osteodystrophy: last PTH increased  Lab Results   Component Value Date    .0 (H) 12/10/2018    CALCIUM 9.9 12/10/2018       4. Anemia:  stable  Lab Results   Component Value Date    HGB 11.3 (L) 12/10/2018        5. DM:  Last HbA1C   Lab Results   Component Value Date    HGBA1C 5.8 (H) 09/20/2018       6. Lipid management:   Lab Results   Component Value Date    LDLCALC 167.2 (H) 09/20/2018       Follow up in  6 month with labs and US

## 2019-01-25 NOTE — LETTER
January 25, 2019      Laurie Todd MD  [address]  [facility] [address]           [facility] - Nephrology  [address]  [facility] [address]  Phone: [phone]  Fax: [phone]          Patient: Kelsey Fields   MR Number: [id number]   YOB: 1940   Date of Visit: 1/25/2019       Dear Dr. Laurie Todd:    Thank you for referring Kelsey Fields to me for evaluation. Attached you will find relevant portions of my assessment and plan of care.    If you have questions, please do not hesitate to call me. I look forward to following Kelsey Fields along with you.    Sincerely,    Linn Larson MD    Enclosure  CC:  No Recipients    If you would like to receive this communication electronically, please contact [email or web address] or [phone] to request more information on [facility] access.    For providers and/or their staff who would like to refer a patient to [facility], please contact us through our one-stop-shop provider referral line, [facility], at [phone].    If you feel you have received this communication in error or would no longer like to receive these types of communications, please e-mail [email or web address]

## 2019-02-13 ENCOUNTER — TELEPHONE (OUTPATIENT)
Dept: SLEEP MEDICINE | Facility: CLINIC | Age: 79
End: 2019-02-13

## 2019-02-13 ENCOUNTER — TELEPHONE (OUTPATIENT)
Dept: INTERNAL MEDICINE | Facility: CLINIC | Age: 79
End: 2019-02-13

## 2019-02-13 ENCOUNTER — OFFICE VISIT (OUTPATIENT)
Dept: PRIMARY CARE CLINIC | Facility: CLINIC | Age: 79
End: 2019-02-13
Payer: MEDICARE

## 2019-02-13 VITALS
DIASTOLIC BLOOD PRESSURE: 76 MMHG | BODY MASS INDEX: 35.56 KG/M2 | HEART RATE: 78 BPM | SYSTOLIC BLOOD PRESSURE: 146 MMHG | HEIGHT: 64 IN | WEIGHT: 208.31 LBS | OXYGEN SATURATION: 99 %

## 2019-02-13 DIAGNOSIS — K59.01 SLOW TRANSIT CONSTIPATION: ICD-10-CM

## 2019-02-13 DIAGNOSIS — E66.01 MORBID OBESITY: ICD-10-CM

## 2019-02-13 DIAGNOSIS — N18.30 STAGE 3 CHRONIC KIDNEY DISEASE: ICD-10-CM

## 2019-02-13 DIAGNOSIS — D69.2 SENILE PURPURA: ICD-10-CM

## 2019-02-13 DIAGNOSIS — G47.33 OBSTRUCTIVE SLEEP APNEA: Primary | ICD-10-CM

## 2019-02-13 DIAGNOSIS — M25.571 ACUTE RIGHT ANKLE PAIN: ICD-10-CM

## 2019-02-13 DIAGNOSIS — E55.9 VITAMIN D DEFICIENCY: ICD-10-CM

## 2019-02-13 DIAGNOSIS — R93.3 ABNORMAL FINDINGS ON DIAGNOSTIC IMAGING OF OTHER PARTS OF DIGESTIVE TRACT: ICD-10-CM

## 2019-02-13 DIAGNOSIS — R73.03 PREDIABETES: ICD-10-CM

## 2019-02-13 PROCEDURE — 3078F DIAST BP <80 MM HG: CPT | Mod: HCNC,CPTII,S$GLB, | Performed by: INTERNAL MEDICINE

## 2019-02-13 PROCEDURE — 3078F PR MOST RECENT DIASTOLIC BLOOD PRESSURE < 80 MM HG: ICD-10-PCS | Mod: HCNC,CPTII,S$GLB, | Performed by: INTERNAL MEDICINE

## 2019-02-13 PROCEDURE — 1101F PT FALLS ASSESS-DOCD LE1/YR: CPT | Mod: HCNC,CPTII,S$GLB, | Performed by: INTERNAL MEDICINE

## 2019-02-13 PROCEDURE — 3077F SYST BP >= 140 MM HG: CPT | Mod: HCNC,CPTII,S$GLB, | Performed by: INTERNAL MEDICINE

## 2019-02-13 PROCEDURE — 99215 OFFICE O/P EST HI 40 MIN: CPT | Mod: HCNC,S$GLB,, | Performed by: INTERNAL MEDICINE

## 2019-02-13 PROCEDURE — 99215 PR OFFICE/OUTPT VISIT, EST, LEVL V, 40-54 MIN: ICD-10-PCS | Mod: HCNC,S$GLB,, | Performed by: INTERNAL MEDICINE

## 2019-02-13 PROCEDURE — 3077F PR MOST RECENT SYSTOLIC BLOOD PRESSURE >= 140 MM HG: ICD-10-PCS | Mod: HCNC,CPTII,S$GLB, | Performed by: INTERNAL MEDICINE

## 2019-02-13 PROCEDURE — 1101F PR PT FALLS ASSESS DOC 0-1 FALLS W/OUT INJ PAST YR: ICD-10-PCS | Mod: HCNC,CPTII,S$GLB, | Performed by: INTERNAL MEDICINE

## 2019-02-13 RX ORDER — LOSARTAN POTASSIUM 25 MG/1
25 TABLET ORAL DAILY
Qty: 90 TABLET | Refills: 3 | Status: SHIPPED | OUTPATIENT
Start: 2019-02-13 | End: 2019-05-07 | Stop reason: SDUPTHER

## 2019-02-13 NOTE — TELEPHONE ENCOUNTER
Called pt to let her know that she would have to contact her DME to schedule an appointment to be fitted for a new mask.

## 2019-02-13 NOTE — TELEPHONE ENCOUNTER
Farrukh, please call patient to advise her that since she is currently having troubles with current CPAP mask that she can call [facility] at [phone] to request an appointment for mask fitting. She does not need to come to [facility] for this.

## 2019-02-13 NOTE — MEDICAL/APP STUDENT
Subjective:       Patient ID: Kelsey Fields is a 78 y.o. female.    Chief Complaint: Hypertension; Hyperlipidemia; and Follow-up    Patient's sister, Helga, is present at interview.     Hasn't been able to consistently use CPAP because she lives alone and has problems fastening it. Patient thinks that triamcinolone cream prescribed by dermatology is helping; she just needs to use it more (uses cream about 3 times per week). She is wondering what her dietary options with CKD. Patient stated that nephrology never spoke to her about this. She states that she taking all of her medications.     Sometimes feels like her right ankle is giving out (has hx of knee replacement). Has pain in 4th toe of left foot after traumatic toenail removal by a nurse.    She complains of white thrush-like substance on tongue in the morning that can be scraped off. Patient stated she does not drink a lot of water. She also reports post-nasal drip for around 2 weeks.    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 Pain is about 7/10 in intensity. Pain is constant.       Review of Systems   Constitutional: Positive for fatigue. Negative for appetite change and fever.   HENT: Positive for sore throat.    Respiratory: Positive for choking.    Cardiovascular: Negative for chest pain.   Gastrointestinal: Positive for constipation. Negative for abdominal pain and diarrhea.   Genitourinary: Positive for urgency. Negative for difficulty urinating.   Musculoskeletal: Negative for back pain.   Skin: Positive for color change. Negative for rash.   Neurological: Negative for headaches.   Psychiatric/Behavioral: Negative.        Objective:      Physical Exam   Constitutional: She is oriented to person, place, and time. She appears well-developed and well-nourished.   Cardiovascular: Normal rate and regular rhythm.   Murmur (systolic) heard.  Pulmonary/Chest: Effort normal and breath sounds normal. She has no wheezes.   Abdominal:  Soft. Bowel sounds are normal.   Neurological: She is alert and oriented to person, place, and time.   Skin: Skin is warm and dry. Capillary refill takes less than 2 seconds. Rash (left forearm) noted.   Psychiatric: She has a normal mood and affect. Her behavior is normal. Thought content normal.       Assessment:   78 y.o. F with a past medical history of CKD stage 3, herpes zoster-ibnduced neuropathy, HTN, HLD, asthma, and sleep apnea presents to clinic for f/u appointment.   Plan:      HTN  -BP elevated at 146/76 today  -continue amlodipine and HCTZ PRN    Neuropathy  -continue 2, 300 mg gabapentin (limited due to CKD stage 3)    Sleep apnea  -patient has difficulty fastening CPAP mask to her face  -patient should bring mask to her next appointment so we can work on how to figure out she can fasten it on her own    HLD  -continue atorvastatin

## 2019-02-13 NOTE — TELEPHONE ENCOUNTER
----- Message from Anthony Ross NP sent at 2/13/2019  1:50 PM CST -----  You shouldn't have to place another order at all since I have ordered it. After mask re-fitting, pending she is comfortable with mask she gets, [facility] will then forward to [employer] for q3mo refills. The script I wrote today is good for the year.    -sv      ----- Message -----  From: Laurie Todd MD  Sent: 2/13/2019   1:48 PM  To: Anthony Ross NP    So I shouldn't sign the script from [employer] until you get her refitted?    KARLY Valenzuela  ----- Message -----  From: Anthony Ross NP  Sent: 2/13/2019   1:03 PM  To: Laurie Todd MD    I called [facility] to clarify. [employer] is the new company (3rd party) that is going to refill q 3 month CPAP supplies for patients. If via [employer], supplies will be replaced but they won't complete formal mask fittings. I re-ordered her supplies today with directions to perform formal mask fitting because of issues with current mask she has. Our patient liaison at [facility] will call the patient to schedule.     No need to send me copy of order from [employer].     -sv      ----- Message -----  From: Laurie Todd MD  Sent: 2/13/2019  12:03 PM  To: Anthony Ross NP    [facility] referred us to [employer]. They are sending me the script. Do you mind if I email it to you once I receive it? I want to make sure I'm ordering the right thing for her.    KARLY Zepeda  ----- Message -----  From: Anthony Ross NP  Sent: 2/13/2019  11:59 AM  To: Laurie Todd MD    I'm not familiar with [employer]. [facility] will do proper mask fitting. We will get them in touch.     -sv      ----- Message -----  From: Laurie Todd MD  Sent: 2/13/2019  11:39 AM  To: Anthony Ross NP    My MA called your office and someone from [employer] will deliver a new mask to her. Will they fit her for it? I think the design and fit are the  reasons that she can't wear the current mask.    Thanks,  KARLY  ----- Message -----  From: Anthony Ross NP  Sent: 2/13/2019  11:16 AM  To: MD Dr. Peyton Cheek,     We will get in touch with patient so she can get fitted for different mask at [facility].     -sv    ----- Message -----  From: Laurie Todd MD  Sent: 2/13/2019  10:57 AM  To: Anthony Ross NP    Patient can not apply her current CPAP mask to her face. Can she come in to get fitted for a new mask, as she is noncompliant at this time.    Thanks,  KARLY (PCP)

## 2019-02-13 NOTE — Clinical Note
Patient can not apply her current CPAP mask to her face. Can she come in to get fitted for a new mask, as she is noncompliant at this time.Thanks,KARLY (PCP)

## 2019-02-13 NOTE — ASSESSMENT & PLAN NOTE
Dehydrated, low fiber diet, previously on diuretic  · Advised increased fruit/veggie  · Increase water intkae

## 2019-02-13 NOTE — PATIENT INSTRUCTIONS
TODAY:  - PCP messaged podiatry about the ankle weakness and next steps in work-up  - PCP messaged sleep medicine to get another mask   + call [employer] for future CPAP supplies [phone]   + new mask to be delivered  - order colace from [facility] mag  - labs today  - start losartan for blood pressure   + sent to [employer]  - avoid hydrochlorothiazide  - increase water intake  - come to prediabetes talk at [facility] on 1/21/19    NEXT:  - bone density scan

## 2019-02-13 NOTE — Clinical Note
"Dear Dr Kaylie Willis,Can you assist with this patient's R ankle weakness? She describes the sensation as  "my ankle trying to leave my foot." Could you help with this work-up? Thanks,KARLY (PCP)"

## 2019-02-13 NOTE — TELEPHONE ENCOUNTER
----- Message from Laurie Todd MD sent at 2/13/2019 10:57 AM CST -----  Patient can not apply her current CPAP mask to her face. Can she come in to get fitted for a new mask, as she is noncompliant at this time.    Thanks,  KARLY (PCP)

## 2019-02-13 NOTE — PROGRESS NOTES
"Primary Care Provider Appointment    Subjective:      Patient ID: Kelsey Fields is a 78 y.o. female with CKD 3, anemia, GERD, preDM    Chief Complaint: Hypertension; Hyperlipidemia; and Follow-up    Tea complains of pain in her ankle. She attributes this to a [facility] foot exam where her toenail was "twisted and yanked." She sees Dr Augustin, has f/u on 3/26 for routine debridement. She feels that "my ankle trying to leave my foot." She had 5/5 strength on exam with podiatry on 18. Patient refuses XRays today.    She also complains of wrist pain. She has decreased ROM in R wrist.    Seen by Derm for rash on legs, and started on topical steroids.     Has mild anemia, is beyond age for colonsocopy.    CKD stable, is followed by Nephro. Had renal US with no KAM, and does have medical renal disease likely     Seen by Cardiology. She is noncompliant with CPAP because she falls asleep on the couch without it. She was not seen in sleep medicine clinic because not using CPAP. She states the mask doesn't fit. PCP messaged sleep medicine to get another mask.    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 Cardiology started HCTZ, she only takes it every 3 days due to polyuria such that she can't leave her padgett at work. She is constipated, and does not drink much water.    Past Surgical History:   Procedure Laterality Date     SECTION      ESOPHAGOGASTRODUODENOSCOPY (EGD) WITH REMOVAL OF FOREIGN BODY(FOOD) N/A 10/27/2014    Performed by Pacheco Cole [facility] at [facility]O    FRACTURE SURGERY         Past Medical History:   Diagnosis Date    Arthritis     GERD (gastroesophageal reflux disease)     Hypertension     Shingles        Review of Systems   Constitutional: Positive for activity change, appetite change and fatigue.   HENT: Negative for sneezing and sore throat.    Eyes: Negative for photophobia and pain.   Respiratory: Negative for cough, chest tightness and shortness of breath.  " "  Cardiovascular: Negative for chest pain and leg swelling.   Gastrointestinal: Negative for abdominal pain, constipation and diarrhea.   Endocrine: Negative for cold intolerance, heat intolerance and polyuria.   Genitourinary: Negative for frequency.   Musculoskeletal: Positive for back pain. Negative for joint swelling and neck stiffness.   Skin: Negative for color change and pallor.   Neurological: Negative for seizures, speech difficulty and headaches.   Hematological: Negative for adenopathy.   Psychiatric/Behavioral: Positive for decreased concentration, dysphoric mood and sleep disturbance. Negative for agitation.       Objective:   BP (!) 146/76 (BP Location: Left arm, Patient Position: Sitting, BP Method: Medium (Manual))   Pulse 78   Ht 5' 4" (1.626 m)   Wt 94.5 kg (208 lb 5.4 oz)   SpO2 99%   BMI 35.76 kg/m²     Physical Exam   Constitutional: She is oriented to person, place, and time. She appears well-developed and well-nourished.   obese   HENT:   Head: Normocephalic and atraumatic.   underbite   Neck: Normal range of motion.   Cardiovascular: Normal rate.   Pulmonary/Chest: Effort normal.   Musculoskeletal: She exhibits edema and deformity.   Neurological: She is alert and oriented to person, place, and time.   Skin:   Ecchymoses and purpura on UE bilaterally  Pruritic dry skin on UE bilaterally   Psychiatric: She has a normal mood and affect. Her behavior is normal.   Vitals reviewed.      Lab Results   Component Value Date    WBC 8.95 12/10/2018    HGB 11.3 (L) 12/10/2018    HCT 36.7 (L) 12/10/2018     12/10/2018    CHOL 224 (H) 09/20/2018    TRIG 99 09/20/2018    HDL 37 (L) 09/20/2018    ALT 11 12/10/2018    AST 17 12/10/2018     12/10/2018    K 4.2 12/10/2018     12/10/2018    CREATININE 1.3 12/10/2018    BUN 19 12/10/2018    CO2 25 12/10/2018    TSH 0.725 09/20/2018    HGBA1C 5.8 (H) 09/20/2018         Assessment:   78 y.o. female with multiple co-morbid illnesses here to " continue work-up of chronic issues notably with CKD 3, anemia, GERD, preDM.     Plan:     Problem List Items Addressed This Visit        Renal/    Stage 3 chronic kidney disease     GFR stable, possible ACI. Previously followed by [facility]  · Monitor labs  · Nephro referral         Relevant Medications    losartan (COZAAR) 25 MG tablet    Other Relevant Orders    Comprehensive metabolic panel       Hematology    Senile purpura     Ecchymoses and purpura on UE bilaterally  · monitor            Endocrine    Morbid obesity     BMI>35, HTN, HLD, OA  · Advised to increase activity         Prediabetes     A1c 5.8, poor dietary compliance  · Diabetes education  · Advised lifestyle changes         Relevant Medications    losartan (COZAAR) 25 MG tablet    Other Relevant Orders    Hemoglobin A1c    Comprehensive metabolic panel    CBC auto differential    Lipid panel    Vitamin D deficiency     Low Vit D, supplementing with Vit D3 5000U daily  · Monitor level  · Continue supplementation         Relevant Orders    Vitamin D       GI    Slow transit constipation     Dehydrated, low fiber diet, previously on diuretic  · Advised increased fruit/veggie  · Increase water intkae             Orthopedic    Acute right ankle pain     Ankle weakness, followed by podiatry  · PCP messaged podiatry for next steps  · Patient refusing surgery           Other Visit Diagnoses     Abnormal findings on diagnostic imaging of other parts of digestive tract         Relevant Orders    Lipid panel          Health Maintenance       Date Due Completion Date    TETANUS VACCINE 11/19/1956 ---    DEXA SCAN 11/19/1978 ---    Lipid Panel 09/20/2023 9/20/2018          Follow-up in about 3 months (around 5/13/2019). One hour spent with this patient today, half of that in counseling.    Laurie Todd MD/MPH  Internal Medicine  [facility]  [phone]

## 2019-02-13 NOTE — ASSESSMENT & PLAN NOTE
Ankle weakness, followed by podiatry  · PCP messaged podiatry for next steps  · Patient refusing surgery

## 2019-02-14 ENCOUNTER — TELEPHONE (OUTPATIENT)
Dept: INTERNAL MEDICINE | Facility: CLINIC | Age: 79
End: 2019-02-14

## 2019-02-14 NOTE — TELEPHONE ENCOUNTER
Please let patient know that all of her labs look great! A1c is well-controlled at 6 (this is still pre-diabetes), Vit D therapeutic, kidney function stable, cholesterol well-controlled.    She needs to continue all of her lifestyle changes including physical activity and a healthy diet.    Thanks,  KARLY

## 2019-03-26 ENCOUNTER — OFFICE VISIT (OUTPATIENT)
Dept: PODIATRY | Facility: CLINIC | Age: 79
End: 2019-03-26
Payer: MEDICARE

## 2019-03-26 VITALS
HEART RATE: 73 BPM | RESPIRATION RATE: 18 BRPM | HEIGHT: 64 IN | SYSTOLIC BLOOD PRESSURE: 140 MMHG | DIASTOLIC BLOOD PRESSURE: 76 MMHG | WEIGHT: 208 LBS | BODY MASS INDEX: 35.51 KG/M2

## 2019-03-26 DIAGNOSIS — L84 CORN OR CALLUS: ICD-10-CM

## 2019-03-26 DIAGNOSIS — L60.9 DISEASE OF NAIL: ICD-10-CM

## 2019-03-26 DIAGNOSIS — N18.30 STAGE 3 CHRONIC KIDNEY DISEASE: Primary | ICD-10-CM

## 2019-03-26 PROCEDURE — 11721 DEBRIDE NAIL 6 OR MORE: CPT | Mod: 59,Q9,HCNC,S$GLB | Performed by: PODIATRIST

## 2019-03-26 PROCEDURE — 11056 PARNG/CUTG B9 HYPRKR LES 2-4: CPT | Mod: Q9,S$GLB,, | Performed by: PODIATRIST

## 2019-03-26 PROCEDURE — 11056 PR TRIM BENIGN HYPERKERATOTIC SKIN LESION,2-4: ICD-10-PCS | Mod: Q9,S$GLB,, | Performed by: PODIATRIST

## 2019-03-26 PROCEDURE — 99999 PR PBB SHADOW E&M-EST. PATIENT-LVL III: ICD-10-PCS | Mod: PBBFAC,HCNC,, | Performed by: PODIATRIST

## 2019-03-26 PROCEDURE — 99999 PR PBB SHADOW E&M-EST. PATIENT-LVL III: CPT | Mod: PBBFAC,HCNC,, | Performed by: PODIATRIST

## 2019-03-26 PROCEDURE — 11721 PR DEBRIDEMENT OF NAILS, 6 OR MORE: ICD-10-PCS | Mod: 59,Q9,HCNC,S$GLB | Performed by: PODIATRIST

## 2019-03-26 PROCEDURE — 99499 NO LOS: ICD-10-PCS | Mod: HCNC,S$GLB,, | Performed by: PODIATRIST

## 2019-03-26 PROCEDURE — 99499 UNLISTED E&M SERVICE: CPT | Mod: HCNC,S$GLB,, | Performed by: PODIATRIST

## 2019-03-26 RX ORDER — ATORVASTATIN CALCIUM 20 MG/1
TABLET, FILM COATED ORAL
COMMUNITY
Start: 2019-03-20 | End: 2019-05-07 | Stop reason: DRUGHIGH

## 2019-03-26 NOTE — PROGRESS NOTES
Subjective:      Patient ID: Kelsey Fields is a 78 y.o. female.    Chief Complaint: PCP (DIANA TODD 2/13/19); Nail Problem; Nail Care; Callouses (Corns ); and Toe Pain (4th toe left foot )    Kelsey is a 78 y.o. female who presents to the clinic for evaluation and treatment of high risk feet. Kelsey has a past medical history of Arthritis, GERD (gastroesophageal reflux disease), Hypertension, and Shingles. The patient's chief complaint is long, thick toenails. This patient has documented high risk feet requiring routine maintenance secondary to CKD.    PCP: Diana Todd MD    Date Last Seen by PCP:   Chief Complaint   Patient presents with    PCP     DIANA TODD 2/13/19    Nail Problem    Nail Care    Callouses     Corns     Toe Pain     4th toe left foot        Last encounter in this department: Visit date not found    Hemoglobin A1C   Date Value Ref Range Status   02/13/2019 6.0 (H) 4.0 - 5.6 % Final     Comment:     ADA Screening Guidelines:  5.7-6.4%  Consistent with prediabetes  >or=6.5%  Consistent with diabetes  High levels of fetal hemoglobin interfere with the HbA1C  assay. Heterozygous hemoglobin variants (HbS, HgC, etc)do  not significantly interfere with this assay.   However, presence of multiple variants may affect accuracy.     09/20/2018 5.8 (H) 4.0 - 5.6 % Final     Comment:     ADA Screening Guidelines:  5.7-6.4%  Consistent with prediabetes  >or=6.5%  Consistent with diabetes  High levels of fetal hemoglobin interfere with the HbA1C  assay. Heterozygous hemoglobin variants (HbS, HgC, etc)do  not significantly interfere with this assay.   However, presence of multiple variants may affect accuracy.         Review of Systems   Constitution: Negative for chills, decreased appetite and fever.   Cardiovascular: Negative for leg swelling.   Skin: Positive for dry skin and nail changes. Negative for itching.   Musculoskeletal: Positive for back pain and joint  "pain. Negative for arthritis, joint swelling and myalgias.   Gastrointestinal: Negative for nausea and vomiting.   Neurological: Negative for loss of balance, numbness and paresthesias.           Objective:       Vitals:    03/26/19 1430   BP: (!) 140/76   Pulse: 73   Resp: 18   Weight: 94.3 kg (208 lb)   Height: 5' 4" (1.626 m)   PainSc:   6   PainLoc: Toe        Physical Exam   Constitutional: She is oriented to person, place, and time. She appears well-developed and well-nourished.   Cardiovascular:   Dorsalis pedis and posterior tibial pulses are diminished Bilaterally. Toes are cool to touch. Feet are warm proximally.There is decreased digital hair. Skin is atrophic, slightly hyperpigmented, and mildly edematous       Musculoskeletal: Normal range of motion. She exhibits no edema or tenderness.   Adequate joint range of motion without pain, limitation, nor crepitation Bilateral feet and ankle joints. Muscle strength is 5/5 in all groups bilaterally.         Neurological: She is alert and oriented to person, place, and time.   [place]-Dilip 5.07 monofilamant testing is diminished Jorge feet. Sharp/dull sensation diminished Bilaterally. Light touch absent Bilaterally.       Skin: Skin is warm, dry and intact. No ecchymosis and no lesion noted. No erythema.   Nails x10 are elongated by  2-6mm's, thickened by 3-8 mm's, dystrophic, and are white, crumbling in  coloration . Xerosis Bilaterally. No open lesions noted.    Hyperkeratotic tissue noted to  distal L 4th toe, medial HIPJ b/l, plantar 5th MPJ b/l      Psychiatric: She has a normal mood and affect. Her behavior is normal.             Assessment:       Encounter Diagnoses   Name Primary?    Stage 3 chronic kidney disease Yes    Disease of nail     Corn or callus          Plan:       Kelsey was seen today for pcp, nail problem, nail care, callouses and toe pain.    Diagnoses and all orders for this visit:    Stage 3 chronic kidney disease    Disease of " nail    Corn or callus      I counseled the patient on her conditions, their implications and medical management.        - Shoe inspection.  Foot Education.  Patient instructed on proper foot hygeine. We discussed wearing proper shoe gear, daily foot inspections, never walking without protective shoe gear, never putting sharp instruments to feet, routine podiatric nail visits every 2-3 months.      - With patient's permission, nails were aggressively reduced and debrided x 10 to their soft tissue attachment mechanically and with electric , removing all offending nail and debris. Patient relates relief following the procedure. She will continue to monitor the areas daily, inspect her feet, wear protective shoe gear when ambulatory, moisturizer to maintain skin integrity and follow in this office in approximately 2-3 months, sooner p.r.n.    - After cleansing the  area w/ alcohol prep pad the above mentioned hyperkeratosis was trimmed utilizing No 15 scapel, to a smooth base with out incident. Patient tolerated this  well and reported comfort to the area of distal L 4th toe, medial HIPJ b/l, plantar 5th MPJ b/l

## 2019-05-01 ENCOUNTER — PATIENT OUTREACH (OUTPATIENT)
Dept: PRIMARY CARE CLINIC | Facility: CLINIC | Age: 79
End: 2019-05-01

## 2019-05-01 DIAGNOSIS — Z13.820 OSTEOPOROSIS SCREENING: Primary | ICD-10-CM

## 2019-05-01 DIAGNOSIS — Z78.0 ASYMPTOMATIC MENOPAUSAL STATE: ICD-10-CM

## 2019-05-01 NOTE — PROGRESS NOTES
VM reminder left of PCP visit due 5-7-19 and of need to call our office to schedule her Dexa Scan.

## 2019-05-07 ENCOUNTER — OFFICE VISIT (OUTPATIENT)
Dept: PRIMARY CARE CLINIC | Facility: CLINIC | Age: 79
End: 2019-05-07
Payer: MEDICARE

## 2019-05-07 VITALS
HEIGHT: 64 IN | HEART RATE: 77 BPM | OXYGEN SATURATION: 98 % | BODY MASS INDEX: 35.98 KG/M2 | DIASTOLIC BLOOD PRESSURE: 60 MMHG | SYSTOLIC BLOOD PRESSURE: 124 MMHG | WEIGHT: 210.75 LBS

## 2019-05-07 DIAGNOSIS — R73.03 PREDIABETES: ICD-10-CM

## 2019-05-07 DIAGNOSIS — G89.29 WRIST PAIN, CHRONIC, RIGHT: ICD-10-CM

## 2019-05-07 DIAGNOSIS — N18.30 STAGE 3 CHRONIC KIDNEY DISEASE: ICD-10-CM

## 2019-05-07 DIAGNOSIS — G47.39 OTHER SLEEP APNEA: ICD-10-CM

## 2019-05-07 DIAGNOSIS — I10 ESSENTIAL HYPERTENSION: ICD-10-CM

## 2019-05-07 DIAGNOSIS — R53.82 CHRONIC FATIGUE: ICD-10-CM

## 2019-05-07 DIAGNOSIS — Z91.89 AT RISK FOR DECREASED BONE DENSITY: ICD-10-CM

## 2019-05-07 DIAGNOSIS — M25.571 ACUTE RIGHT ANKLE PAIN: Primary | ICD-10-CM

## 2019-05-07 DIAGNOSIS — M25.531 WRIST PAIN, CHRONIC, RIGHT: ICD-10-CM

## 2019-05-07 DIAGNOSIS — M89.9 DISORDER OF BONE: ICD-10-CM

## 2019-05-07 PROCEDURE — 1101F PR PT FALLS ASSESS DOC 0-1 FALLS W/OUT INJ PAST YR: ICD-10-PCS | Mod: HCNC,CPTII,S$GLB, | Performed by: INTERNAL MEDICINE

## 2019-05-07 PROCEDURE — 3074F SYST BP LT 130 MM HG: CPT | Mod: HCNC,CPTII,S$GLB, | Performed by: INTERNAL MEDICINE

## 2019-05-07 PROCEDURE — 99215 OFFICE O/P EST HI 40 MIN: CPT | Mod: HCNC,S$GLB,, | Performed by: INTERNAL MEDICINE

## 2019-05-07 PROCEDURE — 3078F DIAST BP <80 MM HG: CPT | Mod: HCNC,CPTII,S$GLB, | Performed by: INTERNAL MEDICINE

## 2019-05-07 PROCEDURE — 1101F PT FALLS ASSESS-DOCD LE1/YR: CPT | Mod: HCNC,CPTII,S$GLB, | Performed by: INTERNAL MEDICINE

## 2019-05-07 PROCEDURE — 3074F PR MOST RECENT SYSTOLIC BLOOD PRESSURE < 130 MM HG: ICD-10-PCS | Mod: HCNC,CPTII,S$GLB, | Performed by: INTERNAL MEDICINE

## 2019-05-07 PROCEDURE — 99215 PR OFFICE/OUTPT VISIT, EST, LEVL V, 40-54 MIN: ICD-10-PCS | Mod: HCNC,S$GLB,, | Performed by: INTERNAL MEDICINE

## 2019-05-07 PROCEDURE — 3078F PR MOST RECENT DIASTOLIC BLOOD PRESSURE < 80 MM HG: ICD-10-PCS | Mod: HCNC,CPTII,S$GLB, | Performed by: INTERNAL MEDICINE

## 2019-05-07 RX ORDER — LOSARTAN POTASSIUM 25 MG/1
25 TABLET ORAL DAILY
Qty: 90 TABLET | Refills: 3 | Status: SHIPPED | OUTPATIENT
Start: 2019-05-07 | End: 2019-12-27 | Stop reason: SDUPTHER

## 2019-05-07 RX ORDER — PREDNISONE 5 MG/1
5 TABLET ORAL DAILY PRN
Qty: 20 TABLET | Refills: 0 | Status: SHIPPED | OUTPATIENT
Start: 2019-05-07 | End: 2019-09-06 | Stop reason: CLARIF

## 2019-05-07 NOTE — PROGRESS NOTES
Primary Care Provider Appointment    Subjective:      Patient ID: Kelsey Fields is a 78 y.o. female with HTN, HLD, CKD, anemia, preDM    Chief Complaint: Follow-up; Hand Pain (right hand); and Fatigue    Patient is compliant with all meds, brought the bottles with her today.     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 She worked an overnight shift last night at the [employer] parking padgett. Her legs are swollen today due to being dependent last night. She does not have any compression stockings (calf cirm 40.5cm).    She saw a podiatrist who cut her toenails, and she complains of toe pain today. Last seen by podiatry on 3/26/19.     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    Past Surgical History:   Procedure Laterality Date     SECTION      ESOPHAGOGASTRODUODENOSCOPY (EGD) WITH REMOVAL OF FOREIGN BODY(FOOD) N/A 10/27/2014    Performed by Pacheco Cole MD at [facility]    FRACTURE SURGERY         Past Medical History:   Diagnosis Date    Arthritis     GERD (gastroesophageal reflux disease)     Hypertension     Shingles        Social History     Socioeconomic History    Marital status: Single     Spouse name: Not on file    Number of children: Not on file    Years of education: Not on file    Highest education level: Not on file   Occupational History    Not on file   Social Needs    Financial resource strain: Not very hard    Food insecurity:     Worry: Never true     Inability: Never true    Transportation needs:     Medical: No     Non-medical: No   Tobacco Use    Smoking status: Never Smoker    Smokeless tobacco: Never Used   Substance and Sexual Activity    Alcohol use: No    Drug use: No    Sexual activity: Never   Lifestyle    Physical activity:     Days per week: Not on file     Minutes per session: Not on file    Stress: Not on file   Relationships    Social  "connections:     Talks on phone: Not on file     Gets together: Not on file     Attends [employer] service: Not on file     Active member of club or organization: Not on file     Attends meetings of clubs or organizations: Not on file     Relationship status: Not on file   Other Topics Concern    Not on file   Social History Narrative    Not on file       Review of Systems   Constitutional: Positive for activity change, appetite change and fatigue.   HENT: Negative for sneezing and sore throat.    Eyes: Negative for photophobia and pain.   Respiratory: Negative for cough, chest tightness and shortness of breath.    Cardiovascular: Negative for chest pain and leg swelling.   Gastrointestinal: Negative for abdominal pain, constipation and diarrhea.   Endocrine: Negative for cold intolerance, heat intolerance and polyuria.   Genitourinary: Negative for frequency.   Musculoskeletal: Positive for back pain. Negative for joint swelling and neck stiffness.   Skin: Negative for color change and pallor.   Neurological: Negative for seizures, speech difficulty and headaches.   Hematological: Negative for adenopathy.   Psychiatric/Behavioral: Positive for decreased concentration, dysphoric mood and sleep disturbance. Negative for agitation.       Objective:   /60   Pulse 77   Ht 5' 4" (1.626 m)   Wt 95.6 kg (210 lb 12.2 oz)   SpO2 98%   BMI 36.18 kg/m²     Physical Exam   Constitutional: She is oriented to person, place, and time. She appears well-developed and well-nourished.   obese   HENT:   Head: Normocephalic and atraumatic.   prognathism   Neck: Normal range of motion.   Cardiovascular: Normal rate.   Pulmonary/Chest: Effort normal.   Musculoskeletal: She exhibits edema and deformity.   calf cirm 40.5cm, 16inches  Mild LE edema   Neurological: She is alert and oriented to person, place, and time.   Skin:   Ecchymoses and purpura on UE bilaterally  Pruritic dry skin on UE bilaterally   Psychiatric: She has a " normal mood and affect. Her behavior is normal.   Vitals reviewed.      Lab Results   Component Value Date    WBC 5.79 02/13/2019    HGB 11.8 (L) 02/13/2019    HCT 38.4 02/13/2019     02/13/2019    CHOL 139 02/13/2019    TRIG 72 02/13/2019    HDL 41 02/13/2019    ALT 14 02/13/2019    AST 19 02/13/2019     02/13/2019    K 3.9 02/13/2019     02/13/2019    CREATININE 1.2 02/13/2019    BUN 16 02/13/2019    CO2 27 02/13/2019    TSH 0.725 09/20/2018    HGBA1C 6.0 (H) 02/13/2019       RESULTS: Reviewed labs and images today    Assessment:   78 y.o. 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 Pain controlled with PRN prednisone  · Does not want surgery  · Continue steroids         Relevant Medications    predniSONE (DELTASONE) 5 MG tablet       Other    Other sleep apnea     Noncompliant with CPAP (unclear whether she has a machine)  · Refer to sleep medicine  · Advised to bring machine to appointment (if she has one??)  · Advised to use CPAP         Chronic fatigue     Likely related to gabapentin use and untreataed sleep apnea  · Avoid gapabentin  · Use CPAP  · Advised to sleep at night (and NOT work at the [facility] parking garage)           Other Visit Diagnoses     Disorder of bone         Relevant Orders    DXA Bone Density Spine And Hip          Health Maintenance       Date Due Completion Date     TETANUS VACCINE 11/19/1956 ---    DEXA SCAN 11/19/1978 ---TODAY    Influenza Vaccine 08/01/2019 9/20/2018    Lipid Panel 02/13/2024 2/13/2019          Follow up in about 3 months (around 8/7/2019). Total face-to-face time was 60 min, 50% of this was spent on counseling and coordination of care. The following issues were discussed: with HTN, HLD, CKD, anemia, preDM    Laurie Todd MD/MPH  Internal Medicine  [facility]  [phone]

## 2019-05-07 NOTE — ASSESSMENT & PLAN NOTE
Likely related to gabapentin use and untreataed sleep apnea  · Avoid gapabentin  · Use CPAP  · Advised to sleep at night (and NOT work at the [facility] parking garage)

## 2019-05-07 NOTE — PATIENT INSTRUCTIONS
TODAY:  - order compression stockings from [employer] (beige size C) and vitamind D3 5000U  - request refill of losartan  - DEXA scan  - low-dose prednisone sent to primary care pharmacy   + only use when needed  - podiatry appt  - increase vegetable and fruit intake

## 2019-05-07 NOTE — ASSESSMENT & PLAN NOTE
Noncompliant with CPAP (unclear whether she has a machine)  · Refer to sleep medicine  · Advised to bring machine to appointment (if she has one??)  · Advised to use CPAP

## 2019-05-07 NOTE — ASSESSMENT & PLAN NOTE
BP well-controlled on CCB and ARB  · Continue amlodipine 10mg and losartan 25mg  · PCP discontinued ASA  · Cardiology increased statin to atorvastatin 40mg

## 2019-05-07 NOTE — ASSESSMENT & PLAN NOTE
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 Pain controlled with PRN prednisone  · Does not want surgery  · Continue steroids

## 2019-05-16 ENCOUNTER — HOSPITAL ENCOUNTER (OUTPATIENT)
Dept: RADIOLOGY | Facility: CLINIC | Age: 79
Discharge: HOME | End: 2019-05-16
Attending: INTERNAL MEDICINE
Payer: MEDICARE

## 2019-05-16 ENCOUNTER — OFFICE VISIT (OUTPATIENT)
Dept: PODIATRY | Facility: CLINIC | Age: 79
End: 2019-05-16
Payer: MEDICARE

## 2019-05-16 VITALS
HEIGHT: 64 IN | HEART RATE: 70 BPM | WEIGHT: 210 LBS | SYSTOLIC BLOOD PRESSURE: 134 MMHG | DIASTOLIC BLOOD PRESSURE: 60 MMHG | BODY MASS INDEX: 35.85 KG/M2

## 2019-05-16 DIAGNOSIS — M89.9 DISORDER OF BONE: ICD-10-CM

## 2019-05-16 DIAGNOSIS — M20.41 HAMMER TOES OF BOTH FEET: ICD-10-CM

## 2019-05-16 DIAGNOSIS — B35.3 TINEA PEDIS OF BOTH FEET: ICD-10-CM

## 2019-05-16 DIAGNOSIS — L84 CORN OR CALLUS: Primary | ICD-10-CM

## 2019-05-16 DIAGNOSIS — M20.42 HAMMER TOES OF BOTH FEET: ICD-10-CM

## 2019-05-16 DIAGNOSIS — Z91.89 AT RISK FOR DECREASED BONE DENSITY: ICD-10-CM

## 2019-05-16 DIAGNOSIS — M79.675 TOE PAIN, LEFT: ICD-10-CM

## 2019-05-16 PROCEDURE — 99213 PR OFFICE/OUTPT VISIT, EST, LEVL III, 20-29 MIN: ICD-10-PCS | Mod: HCNC,S$GLB,, | Performed by: PODIATRIST

## 2019-05-16 PROCEDURE — 1101F PT FALLS ASSESS-DOCD LE1/YR: CPT | Mod: HCNC,CPTII,S$GLB, | Performed by: PODIATRIST

## 2019-05-16 PROCEDURE — 99213 OFFICE O/P EST LOW 20 MIN: CPT | Mod: HCNC,S$GLB,, | Performed by: PODIATRIST

## 2019-05-16 PROCEDURE — 99999 PR PBB SHADOW E&M-EST. PATIENT-LVL III: ICD-10-PCS | Mod: PBBFAC,HCNC,, | Performed by: PODIATRIST

## 2019-05-16 PROCEDURE — 3075F SYST BP GE 130 - 139MM HG: CPT | Mod: HCNC,CPTII,S$GLB, | Performed by: PODIATRIST

## 2019-05-16 PROCEDURE — 3078F PR MOST RECENT DIASTOLIC BLOOD PRESSURE < 80 MM HG: ICD-10-PCS | Mod: HCNC,CPTII,S$GLB, | Performed by: PODIATRIST

## 2019-05-16 PROCEDURE — 1101F PR PT FALLS ASSESS DOC 0-1 FALLS W/OUT INJ PAST YR: ICD-10-PCS | Mod: HCNC,CPTII,S$GLB, | Performed by: PODIATRIST

## 2019-05-16 PROCEDURE — 3078F DIAST BP <80 MM HG: CPT | Mod: HCNC,CPTII,S$GLB, | Performed by: PODIATRIST

## 2019-05-16 PROCEDURE — 77080 DXA BONE DENSITY AXIAL: CPT | Mod: 26,HCNC,, | Performed by: INTERNAL MEDICINE

## 2019-05-16 PROCEDURE — 3075F PR MOST RECENT SYSTOLIC BLOOD PRESS GE 130-139MM HG: ICD-10-PCS | Mod: HCNC,CPTII,S$GLB, | Performed by: PODIATRIST

## 2019-05-16 PROCEDURE — 99999 PR PBB SHADOW E&M-EST. PATIENT-LVL III: CPT | Mod: PBBFAC,HCNC,, | Performed by: PODIATRIST

## 2019-05-16 PROCEDURE — 77080 DXA BONE DENSITY AXIAL: CPT | Mod: TC,HCNC

## 2019-05-16 PROCEDURE — 77080 DEXA BONE DENSITY SPINE HIP: ICD-10-PCS | Mod: 26,HCNC,, | Performed by: INTERNAL MEDICINE

## 2019-05-16 RX ORDER — AMMONIUM LACTATE 12 G/100G
CREAM TOPICAL
Qty: 140 G | Refills: 11 | Status: SHIPPED | OUTPATIENT
Start: 2019-05-16 | End: 2022-02-15

## 2019-05-16 RX ORDER — CICLOPIROX OLAMINE 7.7 MG/G
CREAM TOPICAL 2 TIMES DAILY
Qty: 90 G | Refills: 2 | Status: SHIPPED | OUTPATIENT
Start: 2019-05-16

## 2019-05-16 NOTE — LETTER
May 16, 2019      Laurie Todd MD  [address]  [facility] [address]           [facility] - Podiat[facility]  [address]  [facility] [address]  Phone: [phone]          Patient: Kelsey Fields   MR Number: [id number]   YOB: 1940   Date of Visit: 5/16/2019       Dear Dr. Laurie Todd:    Thank you for referring Kelsey Fields to me for evaluation. Attached you will find relevant portions of my assessment and plan of care.    If you have questions, please do not hesitate to call me. I look forward to following Kelsey Fields along with you.    Sincerely,    Quinn Pena, CALLIE    Enclosure  CC:  No Recipients    If you would like to receive this communication electronically, please contact [email or web address] or [phone] to request more information on [email or web address] Link access.    For providers and/or their staff who would like to refer a patient to [facility], please contact us through our one-stop-shop provider referral line, [facility], at [phone].    If you feel you have received this communication in error or would no longer like to receive these types of communications, please e-mail [email or web address]

## 2019-05-16 NOTE — PROGRESS NOTES
Subjective:      Patient ID: Kelsey Fields is a 78 y.o. female.    Chief Complaint: Diabetes Mellitus (dr laurie bruner 5/7/19) and Ankle Pain    Kelsey is a 78 y.o. female who presents to the clinic for evaluation and treatment of high risk feet. Kelsey has a past medical history of Arthritis, GERD (gastroesophageal reflux disease), Hypertension, and Shingles. The patient's chief complaint is pain at tip 4th toe left.  Gradual onset, worsening over past several weeks, aggravated by increased weight bearing, shoe gear, pressure.  No previous medical treatment.  OTC pain med not helping. Denies trauma, surgery left foot/toes .    CC2 dry flaking itching skin bottom both feet.  Gradual onset, worsening over past several weeks, aggravated by increased weight bearing, shoe gear, pressure.  No previous medical treatment.  No self treatment.    PCP: Laurie Bruner MD    Date Last Seen by PCP:   Chief Complaint   Patient presents with    Diabetes Mellitus     dr laurie bruner 5/7/19    Ankle Pain         Current shoe gear:  Affected Foot: Casual shoes     Unaffected Foot: Casual shoes    Last encounter in this department: 3/26/2019    Hemoglobin A1C   Date Value Ref Range Status   02/13/2019 6.0 (H) 4.0 - 5.6 % Final     Comment:     ADA Screening Guidelines:  5.7-6.4%  Consistent with prediabetes  >or=6.5%  Consistent with diabetes  High levels of fetal hemoglobin interfere with the HbA1C  assay. Heterozygous hemoglobin variants (HbS, HgC, etc)do  not significantly interfere with this assay.   However, presence of multiple variants may affect accuracy.     09/20/2018 5.8 (H) 4.0 - 5.6 % Final     Comment:     ADA Screening Guidelines:  5.7-6.4%  Consistent with prediabetes  >or=6.5%  Consistent with diabetes  High levels of fetal hemoglobin interfere with the HbA1C  assay. Heterozygous hemoglobin variants (HbS, HgC, etc)do  not significantly interfere with this assay.   However, presence of  multiple variants may affect accuracy.         Review of Systems   Constitution: Negative for chills, diaphoresis, fever, malaise/fatigue and night sweats.   Cardiovascular: Negative for claudication, cyanosis, leg swelling and syncope.   Skin: Positive for dry skin, itching and nail changes. Negative for color change, rash, suspicious lesions and unusual hair distribution.   Musculoskeletal: Negative for falls, joint pain, joint swelling, muscle cramps, muscle weakness and stiffness.   Gastrointestinal: Negative for constipation, diarrhea, nausea and vomiting.   Neurological: Negative for brief paralysis, disturbances in coordination, focal weakness, numbness, paresthesias, sensory change and tremors.           Objective:      Physical Exam   Constitutional: She is oriented to person, place, and time. She appears well-developed and well-nourished. She is cooperative.   Oriented to time, place, and person.   Cardiovascular:   Pulses:       Dorsalis pedis pulses are 2+ on the right side, and 2+ on the left side.        Posterior tibial pulses are 1+ on the right side, and 1+ on the left side.   Capillary fill time 3-5 seconds.  All toes warm to touch.      Negative lower extremity edema bilateral.    Negative elevational pallor and dependent rubor bilateral.     Musculoskeletal:   Normal angle, base, station of gait. Decreased stride length, early heel off, moderately propulsive toe off bilateral.    All ten toes without clubbing, cyanosis, or signs of ischemia.      Patient has hammertoes of digits   2-5 bilateral                partially reducible with pain to palpation distal tip 4th toe without evidence of trauma or loss of function.     No pain to palpation bilateral lower extremities.      Range of motion, stability, muscle strength, and muscle tone are age and health appropriate normal bilateral feet and legs.       Lymphadenopathy:   Negative lymphadenopathy bilateral popliteal fossa and tarsal  tunnel.  Negative lymphangitic streaking bilateral foot/ankle bilateral.     Neurological: She is alert and oriented to person, place, and time. She has normal strength. She is not disoriented. She displays no atrophy and no tremor. A sensory deficit is present. She exhibits normal muscle tone.   Reflex Scores:       Patellar reflexes are 2+ on the right side and 2+ on the left side.       Achilles reflexes are 2+ on the right side and 2+ on the left side.  Decreased/absent vibratory sensation bilateral feet to 128Hz tuning fork.       Skin: Skin is warm, dry and intact. No abrasion, no bruising, no burn, no ecchymosis, no laceration, no lesion, no petechiae and no rash noted. She is not diaphoretic. No cyanosis or erythema. No pallor. Nails show no clubbing.   Skin thin, atrophic, with decreased density and distribution of pedal hair bilateral, but without hyperpigmentation, onofre discoloration,  ulcers, masses, nodules or cords palpated bilateral feet and legs.      Toenails 1st, 2nd, 3rd, 4th, 5th bilateral are hypertrophic thickened 2-3 mm, dystrophic, discolored tanish brown with tan, gray crumbly subungual debris.  Neatly trimmed and not tender to distal nail plate pressure, without periungual skin abnormality of each.               Assessment:       Encounter Diagnoses   Name Primary?    Corn or callus Yes    Hammer toes of both feet     Toe pain, left     Tinea pedis of both feet          Plan:       Kelsey was seen today for diabetes mellitus and ankle pain.    Diagnoses and all orders for this visit:    Corn or callus    Hammer toes of both feet    Toe pain, left    Tinea pedis of both feet    Other orders  -     ciclopirox (LOPROX) 0.77 % Crea; Apply topically 2 (two) times daily.  -     ammonium lactate 12 % Crea; Apply twice daily to affected parts both feet as needed.      I counseled the patient on her conditions, their implications and medical management.    Discussed conservative treatment with  shoes of adequate dimensions, material, and style to alleviate symptoms and delay or prevent surgical intervention.    Rx lac hydrin to callus bid.      Rx loprox cream bid soles of feet.    Declines  Xray.          Follow up if symptoms worsen or fail to improve.

## 2019-05-17 ENCOUNTER — TELEPHONE (OUTPATIENT)
Dept: SLEEP MEDICINE | Facility: CLINIC | Age: 79
End: 2019-05-17

## 2019-05-21 ENCOUNTER — TELEPHONE (OUTPATIENT)
Dept: SLEEP MEDICINE | Facility: CLINIC | Age: 79
End: 2019-05-21

## 2019-05-22 ENCOUNTER — TELEPHONE (OUTPATIENT)
Dept: PRIMARY CARE CLINIC | Facility: CLINIC | Age: 79
End: 2019-05-22

## 2019-05-22 NOTE — TELEPHONE ENCOUNTER
----- Message from Laurie Todd MD sent at 5/22/2019  2:27 PM CDT -----  Please let patient know that her bone density scan was normal. She needs to take calcium with vitamin D supplements to keep her bones strong. Exercise will help her maintain her bone strength, especially activities that are weight bearing (walking, jogging, weight lifting). We need to repeat the scan in 2 years.

## 2019-05-29 ENCOUNTER — TELEPHONE (OUTPATIENT)
Dept: PRIMARY CARE CLINIC | Facility: CLINIC | Age: 79
End: 2019-05-29

## 2019-05-29 DIAGNOSIS — H25.10 NUCLEAR SCLEROSIS, UNSPECIFIED LATERALITY: ICD-10-CM

## 2019-05-29 DIAGNOSIS — H26.9 CATARACT, UNSPECIFIED CATARACT TYPE, UNSPECIFIED LATERALITY: Primary | ICD-10-CM

## 2019-05-29 NOTE — TELEPHONE ENCOUNTER
Ophthalmology referral to Dr De Souza placed based on recommendation by patient's outside optometrist, Dr Jerilyn Hurst. Please schedule appt.    Also, scan notes into chart.    Thanks,  KJ

## 2019-06-12 ENCOUNTER — TELEPHONE (OUTPATIENT)
Dept: INTERNAL MEDICINE | Facility: CLINIC | Age: 79
End: 2019-06-12

## 2019-06-12 NOTE — TELEPHONE ENCOUNTER
----- Message from Prema Yu sent at 6/12/2019  2:01 PM CDT -----  Contact: Patient [phone]  At her last visit you ordered compression stockings and she never received them.    Please call and advise    Thank you

## 2019-06-21 ENCOUNTER — OFFICE VISIT (OUTPATIENT)
Dept: OPHTHALMOLOGY | Facility: CLINIC | Age: 79
End: 2019-06-21
Payer: MEDICARE

## 2019-06-21 DIAGNOSIS — H25.13 NUCLEAR SCLEROSIS, BILATERAL: Primary | ICD-10-CM

## 2019-06-21 PROCEDURE — 92014 COMPRE OPH EXAM EST PT 1/>: CPT | Mod: HCNC,S$GLB,, | Performed by: OPHTHALMOLOGY

## 2019-06-21 PROCEDURE — 92014 PR EYE EXAM, EST PATIENT,COMPREHESV: ICD-10-PCS | Mod: HCNC,S$GLB,, | Performed by: OPHTHALMOLOGY

## 2019-06-21 PROCEDURE — 99999 PR PBB SHADOW E&M-EST. PATIENT-LVL III: CPT | Mod: PBBFAC,HCNC,, | Performed by: OPHTHALMOLOGY

## 2019-06-21 PROCEDURE — 99999 PR PBB SHADOW E&M-EST. PATIENT-LVL III: ICD-10-PCS | Mod: PBBFAC,HCNC,, | Performed by: OPHTHALMOLOGY

## 2019-06-21 RX ORDER — TROPICAMIDE 10 MG/ML
1 SOLUTION/ DROPS OPHTHALMIC
Status: CANCELLED | OUTPATIENT
Start: 2019-06-21

## 2019-06-21 RX ORDER — MOXIFLOXACIN 5 MG/ML
1 SOLUTION/ DROPS OPHTHALMIC
Status: CANCELLED | OUTPATIENT
Start: 2019-06-21

## 2019-06-21 RX ORDER — PHENYLEPHRINE HYDROCHLORIDE 25 MG/ML
1 SOLUTION/ DROPS OPHTHALMIC
Status: CANCELLED | OUTPATIENT
Start: 2019-06-21

## 2019-06-21 RX ORDER — TETRACAINE HYDROCHLORIDE 5 MG/ML
1 SOLUTION OPHTHALMIC
Status: CANCELLED | OUTPATIENT
Start: 2019-06-21
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
## 2019-06-21 NOTE — LETTER
June 21, 2019      Laurie Todd MD  [address]  [facility] [address]           [facility] - Ophthalmology  [address]  [facility] [address]  Phone: [phone]  Fax: [phone]          Patient: Kelsey Fields   MR Number: [id number]   YOB: 1940   Date of Visit: 6/21/2019       Dear Dr. Laurie Todd:    Thank you for referring Kelsey Fields to me for evaluation. Attached you will find relevant portions of my assessment and plan of care.    If you have questions, please do not hesitate to call me. I look forward to following Kelsey Fields along with you.    Sincerely,    Alysa De Souza MD    Enclosure  CC:  No Recipients    If you would like to receive this communication electronically, please contact [email or web address] or [phone] to request more information on [employer] Link access.    For providers and/or their staff who would like to refer a patient to [facility], please contact us through our one-stop-shop provider referral line, [facility], at [phone].    If you feel you have received this communication in error or would no longer like to receive these types of communications, please e-mail [email or web address]

## 2019-06-27 RX ORDER — PREDNISOLONE ACETATE-GATIFLOXACIN-BROMFENAC .75; 5; 1 MG/ML; MG/ML; MG/ML
1 SUSPENSION/ DROPS OPHTHALMIC 3 TIMES DAILY
Qty: 7 ML | Refills: 3 | Status: SHIPPED | OUTPATIENT
Start: 2019-06-27 | End: 2019-07-10

## 2019-06-27 NOTE — TELEPHONE ENCOUNTER
Surgery scheduled for 09/16 and 09/30.  Paperwork place in mail to Helga Price's address.  Rx sent to [facility].

## 2019-06-27 NOTE — TELEPHONE ENCOUNTER
----- Message from Jeremy Dailey sent at 6/27/2019  9:13 AM CDT -----  Contact: hunter lobo MsSulaiman Lobo was calling to schedule Ms. Fields surgery. She can be reached at [phone]

## 2019-07-09 PROCEDURE — 99283 EMERGENCY DEPT VISIT LOW MDM: CPT | Mod: HCNC,,, | Performed by: EMERGENCY MEDICINE

## 2019-07-09 PROCEDURE — 99283 PR EMERGENCY DEPT VISIT,LEVEL III: ICD-10-PCS | Mod: HCNC,,, | Performed by: EMERGENCY MEDICINE

## 2019-07-09 PROCEDURE — 99283 EMERGENCY DEPT VISIT LOW MDM: CPT | Mod: HCNC

## 2019-07-10 ENCOUNTER — HOSPITAL ENCOUNTER (EMERGENCY)
Facility: HOSPITAL | Age: 79
Discharge: HOME | End: 2019-07-10
Attending: EMERGENCY MEDICINE
Payer: MEDICARE

## 2019-07-10 ENCOUNTER — TELEPHONE (OUTPATIENT)
Dept: OPHTHALMOLOGY | Facility: CLINIC | Age: 79
End: 2019-07-10

## 2019-07-10 ENCOUNTER — TELEPHONE (OUTPATIENT)
Dept: INTERNAL MEDICINE | Facility: CLINIC | Age: 79
End: 2019-07-10

## 2019-07-10 VITALS
BODY MASS INDEX: 32.99 KG/M2 | HEART RATE: 89 BPM | OXYGEN SATURATION: 97 % | WEIGHT: 198 LBS | SYSTOLIC BLOOD PRESSURE: 170 MMHG | DIASTOLIC BLOOD PRESSURE: 77 MMHG | TEMPERATURE: 99 F | RESPIRATION RATE: 15 BRPM | HEIGHT: 65 IN

## 2019-07-10 DIAGNOSIS — H57.10 PAIN IN EYE, UNSPECIFIED LATERALITY: Primary | ICD-10-CM

## 2019-07-10 DIAGNOSIS — H10.9 CONJUNCTIVITIS OF RIGHT EYE, UNSPECIFIED CONJUNCTIVITIS TYPE: Primary | ICD-10-CM

## 2019-07-10 PROCEDURE — 25000003 PHARM REV CODE 250: Mod: HCNC | Performed by: EMERGENCY MEDICINE

## 2019-07-10 RX ORDER — ERYTHROMYCIN 5 MG/G
OINTMENT OPHTHALMIC
Status: COMPLETED | OUTPATIENT
Start: 2019-07-10 | End: 2019-07-10

## 2019-07-10 RX ORDER — PROPARACAINE HYDROCHLORIDE 5 MG/ML
1 SOLUTION/ DROPS OPHTHALMIC
Status: COMPLETED | OUTPATIENT
Start: 2019-07-10 | End: 2019-07-10

## 2019-07-10 RX ORDER — ERYTHROMYCIN 5 MG/G
OINTMENT OPHTHALMIC
Qty: 1 TUBE | Refills: 0 | Status: SHIPPED | OUTPATIENT
Start: 2019-07-10 | End: 2019-07-11 | Stop reason: SINTOL

## 2019-07-10 RX ADMIN — PROPARACAINE HYDROCHLORIDE 1 DROP: 5 SOLUTION/ DROPS OPHTHALMIC at 01:07

## 2019-07-10 RX ADMIN — ERYTHROMYCIN 1 INCH: 5 OINTMENT OPHTHALMIC at 02:07

## 2019-07-10 RX ADMIN — FLUORESCEIN SODIUM 1 EACH: 1 STRIP OPHTHALMIC at 01:07
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
## 2019-07-10 NOTE — TELEPHONE ENCOUNTER
Aditya seen in ED for chronic eye issue last night. Please encourage her to come to clinic to address chronic issues. Why didn't she call us?    Does she need a referral to an eye doctor?    Thanks,  KJ

## 2019-07-10 NOTE — TELEPHONE ENCOUNTER
Spoke w/ pt, pt stated that her eye was in so much pain so she just decided to go to the ED and her sister is usually the person who does the calling if needed. She states she did not want a follow up, but she will like a referral to see the eye doctor the ED recommended her to, she stated that her right eye feels a little better but is still irritated she has some ointment they prescribed for her that is helping out

## 2019-07-10 NOTE — TELEPHONE ENCOUNTER
Optometry referral placed, please schedule.    If it is an issue that patient has had for months to years, then it should be addressed in clinic. Please advise her to call us!    Thanks,  KJ

## 2019-07-11 ENCOUNTER — OFFICE VISIT (OUTPATIENT)
Dept: OPTOMETRY | Facility: CLINIC | Age: 79
End: 2019-07-11
Payer: MEDICARE

## 2019-07-11 DIAGNOSIS — H15.101 EPISCLERITIS, RIGHT: Primary | ICD-10-CM

## 2019-07-11 PROCEDURE — 99999 PR PBB SHADOW E&M-EST. PATIENT-LVL III: ICD-10-PCS | Mod: PBBFAC,HCNC,, | Performed by: OPTOMETRIST

## 2019-07-11 PROCEDURE — 99999 PR PBB SHADOW E&M-EST. PATIENT-LVL III: CPT | Mod: PBBFAC,HCNC,, | Performed by: OPTOMETRIST

## 2019-07-11 PROCEDURE — 92012 PR EYE EXAM, EST PATIENT,INTERMED: ICD-10-PCS | Mod: HCNC,S$GLB,, | Performed by: OPTOMETRIST

## 2019-07-11 PROCEDURE — 92012 INTRM OPH EXAM EST PATIENT: CPT | Mod: HCNC,S$GLB,, | Performed by: OPTOMETRIST

## 2019-07-11 RX ORDER — FLUOROMETHOLONE 1 MG/ML
1 SUSPENSION/ DROPS OPHTHALMIC 3 TIMES DAILY
Qty: 5 ML | Refills: 1 | Status: SHIPPED | OUTPATIENT
Start: 2019-07-11 | End: 2019-07-15

## 2019-07-11 NOTE — PATIENT INSTRUCTIONS
Please discontinue the Erythromycin ointment.    Start the Fluorometholone eye drops: 1 drop, right eye, 3 times per day, for about 7-10 days. Shake bottle well before each use.
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
## 2019-07-11 NOTE — LETTER
July 11, 2019      Laurie Todd MD  [address]  [facility] [address]           [facility]  [address]  [facility] [address]  Phone: [phone]          Patient: Kelsey Fields   MR Number: [id number]   YOB: 1940   Date of Visit: 7/11/2019       Dear Dr. Laurie Todd:    Thank you for referring Kelsey Fields to me for evaluation. Attached you will find relevant portions of my assessment and plan of care.    If you have questions, please do not hesitate to call me. I look forward to following Kelsey Fields along with you.    Sincerely,    Nohelia Parikh, OD    Enclosure  CC:  No Recipients    If you would like to receive this communication electronically, please contact [email or web address] or [phone] to request more information on [employer] access.    For providers and/or their staff who would like to refer a patient to [facility], please contact us through our one-stop-shop provider referral line, [facility], at [phone].    If you feel you have received this communication in error or would no longer like to receive these types of communications, please e-mail [email or web address]

## 2019-07-15 ENCOUNTER — OFFICE VISIT (OUTPATIENT)
Dept: OPTOMETRY | Facility: CLINIC | Age: 79
End: 2019-07-15
Payer: MEDICARE

## 2019-07-15 DIAGNOSIS — H15.101 EPISCLERITIS, RIGHT: Primary | ICD-10-CM

## 2019-07-15 DIAGNOSIS — K21.9 GASTROESOPHAGEAL REFLUX DISEASE WITHOUT ESOPHAGITIS: ICD-10-CM

## 2019-07-15 PROCEDURE — 99999 PR PBB SHADOW E&M-EST. PATIENT-LVL III: ICD-10-PCS | Mod: PBBFAC,HCNC,, | Performed by: OPTOMETRIST

## 2019-07-15 PROCEDURE — 92012 PR EYE EXAM, EST PATIENT,INTERMED: ICD-10-PCS | Mod: HCNC,S$GLB,, | Performed by: OPTOMETRIST

## 2019-07-15 PROCEDURE — 92012 INTRM OPH EXAM EST PATIENT: CPT | Mod: HCNC,S$GLB,, | Performed by: OPTOMETRIST

## 2019-07-15 PROCEDURE — 99999 PR PBB SHADOW E&M-EST. PATIENT-LVL III: CPT | Mod: PBBFAC,HCNC,, | Performed by: OPTOMETRIST

## 2019-07-15 RX ORDER — PREDNISOLONE ACETATE 10 MG/ML
1 SUSPENSION/ DROPS OPHTHALMIC
Qty: 10 ML | Refills: 1 | Status: SHIPPED | OUTPATIENT
Start: 2019-07-15 | End: 2019-07-29

## 2019-07-15 RX ORDER — PREDNISOLONE ACETATE 10 MG/ML
1 SUSPENSION/ DROPS OPHTHALMIC EVERY 6 HOURS
Qty: 10 ML | Refills: 1 | Status: SHIPPED | OUTPATIENT
Start: 2019-07-15 | End: 2019-07-15 | Stop reason: SDUPTHER

## 2019-07-15 NOTE — PROGRESS NOTES
HPI     Ms. Kelsey Fields is her for a problem focused visit and f/u for   episcleritis of right eye.    Patient complains of deep pain in right eye she says is a 10 pain level.   She denies any discharge, irritation, sensitivity to light, or changes to   vision. She has been using Fluorometholone drops TID OD with no   improvement.     Would patient like a refraction today? No    (+)drops: FML TID OD  (-)flashes  (-)floaters  (-)diplopia    (-)Diabetes    OCULAR HISTORY  Last Eye Exam 07/11/19 Dr Parikh  (-)eye surgery   Peripheral ulcerative keratitis OD (2017)  Blepharitis OU  Cataracts OU (she says she will be having cataract surgery in a few   months)  Episcleritis, right     FAMILY HISTORY  (-)Glaucoma           Last edited by Nohelia Parikh, OD on 7/15/2019 12:03 PM. (History)            Assessment /Plan     For exam results, see Encounter Report.    Episcleritis, right   No improvement with FML drops TID OD x 4 days. Unable to take oral NSAIDS due to kidney disease.   Discontinue FML and switch to PredForte q2H while awake OD. Shake bottle well before each use. RTC in 3-4 days.    -     prednisoLONE acetate (PRED FORTE) 1 % DrpS; Place 1 drop into the right eye every 2 (two) hours. for 14 days  Dispense: 10 mL; Refill: 1      RTC 3-4 days for f/u (scheduled for 07/19/19)

## 2019-07-15 NOTE — PATIENT INSTRUCTIONS
Please stop the Fluorometholone drops.   Instead, use the Prednisolone drops. 1 drop every 2 hours while awake, in the right eye. Shake bottle well before each use.

## 2019-07-16 RX ORDER — CIMETIDINE 400 MG/1
400 TABLET, FILM COATED ORAL 2 TIMES DAILY
Qty: 180 TABLET | Refills: 3 | Status: SHIPPED | OUTPATIENT
Start: 2019-07-16 | End: 2020-09-09 | Stop reason: SDUPTHER

## 2019-07-19 ENCOUNTER — TELEPHONE (OUTPATIENT)
Dept: OPHTHALMOLOGY | Facility: CLINIC | Age: 79
End: 2019-07-19

## 2019-07-19 ENCOUNTER — TELEPHONE (OUTPATIENT)
Dept: INTERNAL MEDICINE | Facility: CLINIC | Age: 79
End: 2019-07-19

## 2019-07-19 ENCOUNTER — OFFICE VISIT (OUTPATIENT)
Dept: OPTOMETRY | Facility: CLINIC | Age: 79
End: 2019-07-19
Payer: MEDICARE

## 2019-07-19 DIAGNOSIS — H15.101 EPISCLERITIS, RIGHT: Primary | ICD-10-CM

## 2019-07-19 PROCEDURE — 99999 PR PBB SHADOW E&M-EST. PATIENT-LVL II: ICD-10-PCS | Mod: PBBFAC,HCNC,, | Performed by: OPTOMETRIST

## 2019-07-19 PROCEDURE — 99999 PR PBB SHADOW E&M-EST. PATIENT-LVL II: CPT | Mod: PBBFAC,HCNC,, | Performed by: OPTOMETRIST

## 2019-07-19 PROCEDURE — 92012 PR EYE EXAM, EST PATIENT,INTERMED: ICD-10-PCS | Mod: HCNC,S$GLB,, | Performed by: OPTOMETRIST

## 2019-07-19 PROCEDURE — 92012 INTRM OPH EXAM EST PATIENT: CPT | Mod: HCNC,S$GLB,, | Performed by: OPTOMETRIST

## 2019-07-19 NOTE — TELEPHONE ENCOUNTER
----- Message from Nohelia Parikh OD sent at 7/18/2019  3:37 PM CDT -----  Dear Dr. Todd,    I have been seeing Ms. Fields for episcleritis of her right eye, which I am treating with topical steroids. However, this is not working as effectively as I would like and she is very uncomfortable. Other treatment options include oral NSAIDS (which I am avoiding due to her kidney disease) and oral steroids. Do you think it would be okay if I increased her oral Prednisone to 60mg qDay for 1 week, followed by a slow taper over a few weeks?    Thank you very much for your help.    Sincerely,  Nohelia Parikh OD

## 2019-07-19 NOTE — TELEPHONE ENCOUNTER
Pt came in and needed paperwork signed for her up coming sx to take time off. Dr. De Souza signed both forms for each cataract sx she is having. SDF

## 2019-07-19 NOTE — PROGRESS NOTES
HPI     Ms. Kelsey Fields is here for an episcleritis follow-up.     She is feeling much better since her last visit (now 2 on pain scale).   However, OD still has mild occasional burning and irritation.    (+)drops: Pred forte OD q2h (last used a few minutes ago)  (-)flashes  (-)floaters  (-)diplopia    (-)Diabetes    OCULAR HISTORY  Last Eye Exam 07/15/19 Dr Parikh  (-)eye surgery   Peripheral ulcerative keratitis OD (2017)  Blepharitis OU  Cataracts OU (she says she will be having cataract surgery in September 2019 with Dr. De Souza)  Episcleritis, right      FAMILY HISTORY  (-)Glaucoma           Last edited by Nohelia Parikh, OD on 7/19/2019 10:18 AM. (History)            Assessment /Plan     For exam results, see Encounter Report.    Episcleritis, right   Improvement in pain, but minimal improvement in signs with PredForte q2H OD x 4 days (previously no improvement with FML x 4 days).   Focal injection with elevation showing minimal improvement. And pt is very concerned about how this will affect cataract surgery with Dr. De Souza in September. Will schedule her to see Dr. De Souza next week.    In the meantime, continue PF q2H OD. Unable to take oral NSAIDS due to kidney disease.        RTC within 1 week with Dr. De Souza

## 2019-07-19 NOTE — TELEPHONE ENCOUNTER
----- Message from Amy Parmar sent at 7/19/2019  9:21 AM CDT -----  Contact: RODRIGO AVELAR  Pt would like to speak with  nurse about the paperwork pt can be reached at reached at [phone] please thank you.

## 2019-07-26 ENCOUNTER — OFFICE VISIT (OUTPATIENT)
Dept: OPHTHALMOLOGY | Facility: CLINIC | Age: 79
End: 2019-07-26
Payer: MEDICARE

## 2019-07-26 DIAGNOSIS — H25.13 NUCLEAR SCLEROSIS, BILATERAL: Primary | ICD-10-CM

## 2019-07-26 PROCEDURE — 99999 PR PBB SHADOW E&M-EST. PATIENT-LVL II: CPT | Mod: PBBFAC,HCNC,, | Performed by: OPHTHALMOLOGY

## 2019-07-26 PROCEDURE — 92012 PR EYE EXAM, EST PATIENT,INTERMED: ICD-10-PCS | Mod: HCNC,S$GLB,, | Performed by: OPHTHALMOLOGY

## 2019-07-26 PROCEDURE — 99999 PR PBB SHADOW E&M-EST. PATIENT-LVL II: ICD-10-PCS | Mod: PBBFAC,HCNC,, | Performed by: OPHTHALMOLOGY

## 2019-07-26 PROCEDURE — 92012 INTRM OPH EXAM EST PATIENT: CPT | Mod: HCNC,S$GLB,, | Performed by: OPHTHALMOLOGY

## 2019-07-26 RX ORDER — PREDNISOLONE ACETATE 10 MG/ML
1 SUSPENSION/ DROPS OPHTHALMIC 2 TIMES DAILY
Qty: 5 ML | Refills: 0 | Status: SHIPPED | OUTPATIENT
Start: 2019-07-26 | End: 2019-11-08 | Stop reason: ALTCHOICE
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
## 2019-08-08 ENCOUNTER — TELEPHONE (OUTPATIENT)
Dept: OPHTHALMOLOGY | Facility: CLINIC | Age: 79
End: 2019-08-08

## 2019-08-08 DIAGNOSIS — H25.11 NUCLEAR SCLEROTIC CATARACT OF RIGHT EYE: Primary | ICD-10-CM

## 2019-08-08 DIAGNOSIS — H25.12 NUCLEAR SCLEROTIC CATARACT OF LEFT EYE: Primary | ICD-10-CM

## 2019-08-20 ENCOUNTER — TELEPHONE (OUTPATIENT)
Dept: OPHTHALMOLOGY | Facility: CLINIC | Age: 79
End: 2019-08-20

## 2019-08-20 NOTE — TELEPHONE ENCOUNTER
Patient advised we will call with arrival time for surgery the week prior.  Likely to be between 6 am and 12 noon.

## 2019-09-12 ENCOUNTER — TELEPHONE (OUTPATIENT)
Dept: OPHTHALMOLOGY | Facility: CLINIC | Age: 79
End: 2019-09-12

## 2019-09-12 NOTE — TELEPHONE ENCOUNTER
Confirmed surgery time with patient, which is 730am, on 9/16/19. Nothing to eat or drink after 9pm, the night before surgery. May have water, gatorade, or powerade after 9pm, until leaving home the morning of surgery. Start drops on Saturday, one drop TID into operative eye. 9/12/19 TR

## 2019-09-16 ENCOUNTER — TELEPHONE (OUTPATIENT)
Dept: OPHTHALMOLOGY | Facility: CLINIC | Age: 79
End: 2019-09-16

## 2019-09-16 ENCOUNTER — ANESTHESIA (OUTPATIENT)
Dept: SURGERY | Facility: OTHER | Age: 79
End: 2019-09-16
Payer: MEDICARE

## 2019-09-16 ENCOUNTER — ANESTHESIA EVENT (OUTPATIENT)
Dept: SURGERY | Facility: OTHER | Age: 79
End: 2019-09-16
Payer: MEDICARE

## 2019-09-16 ENCOUNTER — HOSPITAL ENCOUNTER (OUTPATIENT)
Facility: OTHER | Age: 79
Discharge: HOME | End: 2019-09-16
Attending: OPHTHALMOLOGY | Admitting: OPHTHALMOLOGY
Payer: MEDICARE

## 2019-09-16 ENCOUNTER — OFFICE VISIT (OUTPATIENT)
Dept: OPHTHALMOLOGY | Facility: CLINIC | Age: 79
End: 2019-09-16
Payer: MEDICARE

## 2019-09-16 VITALS
SYSTOLIC BLOOD PRESSURE: 137 MMHG | BODY MASS INDEX: 38.46 KG/M2 | OXYGEN SATURATION: 98 % | TEMPERATURE: 98 F | HEIGHT: 62 IN | WEIGHT: 209 LBS | DIASTOLIC BLOOD PRESSURE: 70 MMHG | HEART RATE: 78 BPM | RESPIRATION RATE: 16 BRPM

## 2019-09-16 DIAGNOSIS — H25.13 NUCLEAR SCLEROSIS, BILATERAL: ICD-10-CM

## 2019-09-16 DIAGNOSIS — Z98.890 POST-OPERATIVE STATE: Primary | ICD-10-CM

## 2019-09-16 PROCEDURE — 99999 PR PBB SHADOW E&M-EST. PATIENT-LVL II: CPT | Mod: PBBFAC,HCNC,, | Performed by: OPHTHALMOLOGY

## 2019-09-16 PROCEDURE — 99999 PR PBB SHADOW E&M-EST. PATIENT-LVL II: ICD-10-PCS | Mod: PBBFAC,HCNC,, | Performed by: OPHTHALMOLOGY

## 2019-09-16 PROCEDURE — 37000008 HC ANESTHESIA 1ST 15 MINUTES: Mod: HCNC | Performed by: OPHTHALMOLOGY

## 2019-09-16 PROCEDURE — 71000015 HC POSTOP RECOV 1ST HR: Mod: HCNC | Performed by: OPHTHALMOLOGY

## 2019-09-16 PROCEDURE — 25000003 PHARM REV CODE 250: Mod: HCNC | Performed by: OPHTHALMOLOGY

## 2019-09-16 PROCEDURE — 36000707: Mod: HCNC | Performed by: OPHTHALMOLOGY

## 2019-09-16 PROCEDURE — 63600175 PHARM REV CODE 636 W HCPCS: Mod: HCNC | Performed by: NURSE ANESTHETIST, CERTIFIED REGISTERED

## 2019-09-16 PROCEDURE — 66984 PR REMOVAL, CATARACT, W/INSRT INTRAOC LENS, W/O ENDO CYCLO: ICD-10-PCS | Mod: HCNC,LT,, | Performed by: OPHTHALMOLOGY

## 2019-09-16 PROCEDURE — 99024 POSTOP FOLLOW-UP VISIT: CPT | Mod: HCNC,S$GLB,, | Performed by: OPHTHALMOLOGY

## 2019-09-16 PROCEDURE — 37000009 HC ANESTHESIA EA ADD 15 MINS: Mod: HCNC | Performed by: OPHTHALMOLOGY

## 2019-09-16 PROCEDURE — 99024 PR POST-OP FOLLOW-UP VISIT: ICD-10-PCS | Mod: HCNC,S$GLB,, | Performed by: OPHTHALMOLOGY

## 2019-09-16 PROCEDURE — 36000706: Mod: HCNC | Performed by: OPHTHALMOLOGY

## 2019-09-16 PROCEDURE — V2632 POST CHMBR INTRAOCULAR LENS: HCPCS | Mod: HCNC | Performed by: OPHTHALMOLOGY

## 2019-09-16 PROCEDURE — 66984 XCAPSL CTRC RMVL W/O ECP: CPT | Mod: HCNC,LT,, | Performed by: OPHTHALMOLOGY

## 2019-09-16 DEVICE — LENS IOL ITEC PRELOAD 23.0D: Type: IMPLANTABLE DEVICE | Site: EYE | Status: FUNCTIONAL

## 2019-09-16 RX ORDER — PHENYLEPHRINE HYDROCHLORIDE 25 MG/ML
1 SOLUTION/ DROPS OPHTHALMIC
Status: COMPLETED | OUTPATIENT
Start: 2019-09-16 | End: 2019-09-16

## 2019-09-16 RX ORDER — TETRACAINE HYDROCHLORIDE 5 MG/ML
SOLUTION OPHTHALMIC
Status: DISCONTINUED | OUTPATIENT
Start: 2019-09-16 | End: 2019-09-16 | Stop reason: HOSPADM

## 2019-09-16 RX ORDER — TETRACAINE HYDROCHLORIDE 5 MG/ML
1 SOLUTION OPHTHALMIC
Status: COMPLETED | OUTPATIENT
Start: 2019-09-16 | End: 2019-09-16

## 2019-09-16 RX ORDER — MIDAZOLAM HYDROCHLORIDE 1 MG/ML
INJECTION INTRAMUSCULAR; INTRAVENOUS
Status: DISCONTINUED | OUTPATIENT
Start: 2019-09-16 | End: 2019-09-16

## 2019-09-16 RX ORDER — PREDNISOLONE ACETATE-GATIFLOXACIN-BROMFENAC .75; 5; 1 MG/ML; MG/ML; MG/ML
SUSPENSION/ DROPS OPHTHALMIC
COMMUNITY
End: 2019-11-08 | Stop reason: ALTCHOICE

## 2019-09-16 RX ORDER — MOXIFLOXACIN 5 MG/ML
1 SOLUTION/ DROPS OPHTHALMIC
Status: COMPLETED | OUTPATIENT
Start: 2019-09-16 | End: 2019-09-16

## 2019-09-16 RX ORDER — LIDOCAINE HYDROCHLORIDE 40 MG/ML
INJECTION, SOLUTION RETROBULBAR
Status: DISCONTINUED | OUTPATIENT
Start: 2019-09-16 | End: 2019-09-16 | Stop reason: HOSPADM

## 2019-09-16 RX ORDER — ACETAMINOPHEN 325 MG/1
650 TABLET ORAL EVERY 4 HOURS PRN
Status: DISCONTINUED | OUTPATIENT
Start: 2019-09-16 | End: 2019-09-16 | Stop reason: HOSPADM

## 2019-09-16 RX ORDER — MOXIFLOXACIN 5 MG/ML
SOLUTION/ DROPS OPHTHALMIC
Status: DISCONTINUED | OUTPATIENT
Start: 2019-09-16 | End: 2019-09-16 | Stop reason: HOSPADM

## 2019-09-16 RX ORDER — TROPICAMIDE 10 MG/ML
1 SOLUTION/ DROPS OPHTHALMIC
Status: COMPLETED | OUTPATIENT
Start: 2019-09-16 | End: 2019-09-16

## 2019-09-16 RX ORDER — PROPARACAINE HYDROCHLORIDE 5 MG/ML
1 SOLUTION/ DROPS OPHTHALMIC
Status: DISCONTINUED | OUTPATIENT
Start: 2019-09-16 | End: 2019-09-16 | Stop reason: HOSPADM

## 2019-09-16 RX ORDER — TETRACAINE HYDROCHLORIDE 5 MG/ML
1 SOLUTION OPHTHALMIC
Status: DISCONTINUED | OUTPATIENT
Start: 2019-09-16 | End: 2019-09-16

## 2019-09-16 RX ORDER — TROPICAMIDE 10 MG/ML
1 SOLUTION/ DROPS OPHTHALMIC
Status: DISCONTINUED | OUTPATIENT
Start: 2019-09-16 | End: 2019-09-16

## 2019-09-16 RX ORDER — PHENYLEPHRINE HYDROCHLORIDE 25 MG/ML
1 SOLUTION/ DROPS OPHTHALMIC
Status: DISCONTINUED | OUTPATIENT
Start: 2019-09-16 | End: 2019-09-16

## 2019-09-16 RX ORDER — MOXIFLOXACIN 5 MG/ML
1 SOLUTION/ DROPS OPHTHALMIC
Status: DISCONTINUED | OUTPATIENT
Start: 2019-09-16 | End: 2019-09-16

## 2019-09-16 RX ADMIN — MOXIFLOXACIN 1 DROP: 5 SOLUTION/ DROPS OPHTHALMIC at 08:09

## 2019-09-16 RX ADMIN — MOXIFLOXACIN 1 DROP: 5 SOLUTION/ DROPS OPHTHALMIC at 07:09

## 2019-09-16 RX ADMIN — MOXIFLOXACIN HYDROCHLORIDE 1 DROP: 5 SOLUTION/ DROPS OPHTHALMIC at 07:09

## 2019-09-16 RX ADMIN — MIDAZOLAM HYDROCHLORIDE 2 MG: 1 INJECTION, SOLUTION INTRAMUSCULAR; INTRAVENOUS at 08:09

## 2019-09-16 RX ADMIN — TROPICAMIDE 1 DROP: 10 SOLUTION/ DROPS OPHTHALMIC at 07:09

## 2019-09-16 RX ADMIN — PHENYLEPHRINE HYDROCHLORIDE 1 DROP: 25 SOLUTION/ DROPS OPHTHALMIC at 07:09

## 2019-09-16 RX ADMIN — TETRACAINE HYDROCHLORIDE 1 DROP: 5 SOLUTION OPHTHALMIC at 07:09

## 2019-09-16 NOTE — PLAN OF CARE
Kelsey Fields has met all discharge criteria from Phase II. Vital Signs are stable, ambulating  without difficulty. Discharge instructions given, patient verbalized understanding. Discharged from facility via wheelchair in stable condition.

## 2019-09-16 NOTE — TELEPHONE ENCOUNTER
Patient complaining of severe pain not relieved with Tylenol, drops or patching.  Will see Dr De Souza today at [facility] clinic.     Dr. Mcclain

## 2019-09-16 NOTE — ANESTHESIA POSTPROCEDURE EVALUATION
Anesthesia Post Evaluation    Patient: Kelsey Fields    Procedure(s) Performed: Procedure(s) (LRB):  EXTRACTION, CATARACT, WITH IOL INSERTION (Left)    Final Anesthesia Type: MAC  Patient location during evaluation: [facility]  Patient participation: Yes- Able to Participate  Level of consciousness: awake and alert, awake and oriented  Post-procedure vital signs: reviewed and stable  Pain management: adequate  Airway patency: patent  PONV status at discharge: No PONV  Anesthetic complications: no      Cardiovascular status: blood pressure returned to baseline  Respiratory status: unassisted  Hydration status: euvolemic  Follow-up not needed.          Vitals Value Taken Time   /70 9/16/2019  9:09 AM   Temp 36.6 °C (97.8 °F) 9/16/2019  8:48 AM   Pulse 78 9/16/2019  9:09 AM   Resp 16 9/16/2019  8:48 AM   SpO2 98 % 9/16/2019  9:09 AM         No case tracking events are documented in the log.      Pain/Carole Score: No data recorded

## 2019-09-16 NOTE — TELEPHONE ENCOUNTER
----- Message from Natalia Watson sent at 9/16/2019 12:24 PM CDT -----  Contact: Kelsey  Reji had surgery on this morning and complains of a lot of pain and would like to know what to do.  She has already taken two Tylenol.   She can be reached at [phone].

## 2019-09-16 NOTE — DISCHARGE SUMMARY
Outcome: Successful outpatient ophthalmic surgical procedure  Preprinted Instructions given to patient.  Regular diet.  Activity: No restrictions  Meds: see Med Rec  Condition: stable  Follow up: 1 day with Dr De Souza  Disposition: Home  Diagnosis: s/p eye surgery

## 2019-09-16 NOTE — PROGRESS NOTES
HPI     Same day post op CE with IOL OS (09/16/2019).    Pt states that before she left the hospital; she told the nurses she had a   scratchy feeling in her OS. Pt was told that was normal and when she gets   home to try tylenol. Pt took tylenol but still having pain in OS. Denies   any flashes or floaters OU.     Pt reports throwing up after she got home.  Pt confirms using  PGB TID OS only used once today    Last edited by Lorna Dillon on 9/16/2019  2:26 PM. (History)            Assessment /Plan     For exam results, see Encounter Report.    Post-operative state    Nuclear sclerosis, bilateral      IOP elevated, wound burped, Diamox given  Pt feeling much better after Tx  FU tomorrow

## 2019-09-16 NOTE — OP NOTE
SURGEON:  Alysa De Souza M.D.    PREOPERATIVE DIAGNOSIS:    Nuclear Sclerotic Cataract Left Eye    POSTOPERATIVE DIAGNOSIS:    Nuclear Sclerotic Cataract Left Eye    PROCEDURES:    Phacoemulsification with  intraocular lens, Left eye (05115)    DATE OF SURGERY: 09/16/2019    IMPLANT: pcboo 23.0    ANESTHESIA:  MAC with topical Lidocaine    COMPLICATIONS:  None    ESTIMATED BLOOD LOSS: None    SPECIMENS: None    INDICATIONS:    The patient has a history of painless progressive visual loss and  difficulty with activities of daily living secondary to cataract formation.  After a thorough discussion of the risks, benefits, and alternatives to cataract surgery, including, but not limited to, the rare risks of infection, retinal detachment, hemorrhage, need for additional surgery, loss of vision, and even loss of the eye, the patient voices understanding and desires to proceed.    DESCRIPTION OF PROCEDURE:    The patients IOL calculations were reviewed, and the lens selection confirmed.   After verification and marking of the proper eye in the preop holding area, the patient was brought to the operating room in supine position where the eye was prepped and draped in standard sterile fashion with 5% Betadine and a lid speculum placed in the eye.   Topical 4% Lidocaine was used in addition to the preoperative anesthesia and the procedure was begun by the creation of a paracentesis incision through which viscoelastic was used to fill the anterior chamber.  Next, a keratome blade was used to create a triplanar temporal clear corneal incision and a cystotome and Utrata forceps used to fashion a continuous curvilinear capsulorrhexis.  Hydrodissection was carried out using the Stanton hydrodissection cannula and the nucleus was found to be mobile.  Phacoemulsification of the nucleus was carried out using a quick chop technique, and all remaining epinuclear and cortical material was removed.  The eye was then reformed with  Viscoelastic and the  intraocular lens was implanted into the capsular bag.  All remaining viscoelastics were removed from the eye and at the end of the case the pupil was round, the lens was well-centered within the capsular bag and all wounds were found to be water tight.  Drops of Vigamox and Pred Forte were instilled and a shield was placed over the eye. The patient will follow up with Dr. De Souza in the morning.

## 2019-09-16 NOTE — ANESTHESIA PREPROCEDURE EVALUATION
09/16/2019  Kelsey Fields is a 78 y.o., female.    Pre-op Assessment    I have reviewed the Patient Summary Reports.     I have reviewed the Nursing Notes.   I have reviewed the Medications.     Review of Systems  Anesthesia Hx:  No problems with previous Anesthesia    Social:  Non-Smoker, No Alcohol Use    Hematology/Oncology:  Hematology Normal   Oncology Normal     EENT/Dental:EENT/Dental Normal   Cardiovascular:   Exercise tolerance: good Hypertension, well controlled    Pulmonary:   Asthma asymptomatic Sleep Apnea    Renal/:   Chronic Renal Disease    Hepatic/GI:   GERD, well controlled    Musculoskeletal:   Chronic LBP   Neurological:  Neurology Normal    Endocrine:  Endocrine Normal    Dermatological:  Skin Normal    Psych:  Psychiatric Normal           Physical Exam  General:  Obesity    Airway/Jaw/Neck:  Airway Findings: Mouth Opening: Normal Tongue: Normal  General Airway Assessment: Adult  Mallampati: II  TM Distance: Normal, at least 6 cm  Jaw/Neck Findings:  Neck ROM: Normal ROM      Dental:  Dental Findings: Upper Dentures, Lower Dentures             Anesthesia Plan  Type of Anesthesia, risks & benefits discussed:  Anesthesia Type:  general  Patient's Preference:   Intra-op Monitoring Plan: standard ASA monitors  Intra-op Monitoring Plan Comments:   Post Op Pain Control Plan: per primary service following discharge from PACU  Post Op Pain Control Plan Comments:   Induction:    Beta Blocker:         Informed Consent: Patient understands risks and agrees with Anesthesia plan.  Questions answered. Anesthesia consent signed with patient.  ASA Score: 3     Day of Surgery Review of History & Physical:    H&P update referred to the surgeon.     Anesthesia Plan Notes: Cat. Ext. L eye today.        Ready For Surgery From Anesthesia Perspective.

## 2019-09-16 NOTE — DISCHARGE INSTRUCTIONS
Alysa De Souza MD  [facility]  Department of Ophthalmology      AFTER: Cataract Surgery:  Relax at home and DO NOT exert yourself for the rest of the day.  Plan to see Dr. De Souza tomorrow at the eye clinic:   [facility] [facility] [address]  [place] [address]    Refer to attached eye drop instruction sheet     Precautions:  DO NOT rub your eye.  You may resume moderate activity the day after surgery.  Wear protective sunglasses during the day and a shield at night for 1(one) week.  If you have pain, redness and decreased vision, call Dr. De Souza (or the on-call doctor after hours) @[phone].            Home Care Instructions for Eye Surgeries    1. ACTIVITY:  Limit your activity today. Relax at home and DO NOT exert yourself for the rest of the day. Increase activity gradually. You may return to work or school as directed by your physician.    2. DIET:  Drink plenty of fluids. Resume your normal diet unless instructed otherwise.    3. PAIN:  Expect a moderate amount of pain. If a prescription for pain is not sent home with you, you may take your commonly used pain reliever as directed. If this is not sufficient, call your physician. You may resume any other prescription medication unless otherwise directed by your physician.     Discuss any problem with your physician as soon as it arises. Do not Delay.      EMERGENCY- If you are unable to contact your physician, please go to the nearest Emergency Room.       Anesthesia: Monitored Anesthesia Care (MAC)    Anesthesia Safety  · Have an adult family member or friend drive you home after the procedure.  · For the first 24 hours after your surgery:  · Do not drive or use heavy equipment.  · Do not make important decisions or sign documents.  · Avoid alcohol.  · Have someone stay with you, if possible. They can watch for problems and help keep you safe.

## 2019-09-17 ENCOUNTER — OFFICE VISIT (OUTPATIENT)
Dept: OPHTHALMOLOGY | Facility: CLINIC | Age: 79
End: 2019-09-17
Attending: OPHTHALMOLOGY
Payer: MEDICARE

## 2019-09-17 DIAGNOSIS — Z98.890 POST-OPERATIVE STATE: Primary | ICD-10-CM

## 2019-09-17 DIAGNOSIS — H25.12 NUCLEAR SCLEROTIC CATARACT OF LEFT EYE: ICD-10-CM

## 2019-09-17 PROCEDURE — 99999 PR PBB SHADOW E&M-EST. PATIENT-LVL II: CPT | Mod: PBBFAC,HCNC,, | Performed by: OPHTHALMOLOGY

## 2019-09-17 PROCEDURE — 99024 POSTOP FOLLOW-UP VISIT: CPT | Mod: HCNC,S$GLB,, | Performed by: OPHTHALMOLOGY

## 2019-09-17 PROCEDURE — 99024 PR POST-OP FOLLOW-UP VISIT: ICD-10-PCS | Mod: HCNC,S$GLB,, | Performed by: OPHTHALMOLOGY

## 2019-09-17 PROCEDURE — 99999 PR PBB SHADOW E&M-EST. PATIENT-LVL II: ICD-10-PCS | Mod: PBBFAC,HCNC,, | Performed by: OPHTHALMOLOGY

## 2019-09-17 NOTE — LETTER
September 17, 2019      [facility] [place] [address] [facility] [address]  [address]  [facility] [address]  Phone: [phone]  Fax: [phone]       Patient: Kelsey Fields   YOB: 1940  Date of Visit: 09/17/2019    To Whom It May Concern:    Christie Fields  was at [facility] on 09/17/2019. She may return to work/school on 09/18/2019 with no restrictions. If you have any questions or concerns, or if I can be of further assistance, please do not hesitate to contact me.    Sincerely,  MD Lorna Jackson, COA

## 2019-09-24 ENCOUNTER — TELEPHONE (OUTPATIENT)
Dept: PRIMARY CARE CLINIC | Facility: CLINIC | Age: 79
End: 2019-09-24

## 2019-09-24 ENCOUNTER — OFFICE VISIT (OUTPATIENT)
Dept: PRIMARY CARE CLINIC | Facility: CLINIC | Age: 79
End: 2019-09-24
Payer: MEDICARE

## 2019-09-24 ENCOUNTER — OFFICE VISIT (OUTPATIENT)
Dept: OPHTHALMOLOGY | Facility: CLINIC | Age: 79
End: 2019-09-24
Attending: OPHTHALMOLOGY
Payer: MEDICARE

## 2019-09-24 VITALS
WEIGHT: 216.69 LBS | HEIGHT: 63 IN | HEART RATE: 64 BPM | SYSTOLIC BLOOD PRESSURE: 138 MMHG | BODY MASS INDEX: 38.39 KG/M2 | OXYGEN SATURATION: 96 % | DIASTOLIC BLOOD PRESSURE: 60 MMHG

## 2019-09-24 DIAGNOSIS — E55.9 VITAMIN D DEFICIENCY: ICD-10-CM

## 2019-09-24 DIAGNOSIS — H25.11 NUCLEAR SCLEROSIS OF RIGHT EYE: ICD-10-CM

## 2019-09-24 DIAGNOSIS — R73.03 PREDIABETES: ICD-10-CM

## 2019-09-24 DIAGNOSIS — I10 ESSENTIAL HYPERTENSION: ICD-10-CM

## 2019-09-24 DIAGNOSIS — M54.41 CHRONIC BILATERAL LOW BACK PAIN WITH BILATERAL SCIATICA: ICD-10-CM

## 2019-09-24 DIAGNOSIS — M54.42 CHRONIC BILATERAL LOW BACK PAIN WITH BILATERAL SCIATICA: ICD-10-CM

## 2019-09-24 DIAGNOSIS — E78.2 MIXED HYPERLIPIDEMIA: ICD-10-CM

## 2019-09-24 DIAGNOSIS — G89.29 CHRONIC BILATERAL LOW BACK PAIN WITH BILATERAL SCIATICA: ICD-10-CM

## 2019-09-24 DIAGNOSIS — N18.30 ANEMIA DUE TO STAGE 3 CHRONIC KIDNEY DISEASE: ICD-10-CM

## 2019-09-24 DIAGNOSIS — D63.1 ANEMIA DUE TO STAGE 3 CHRONIC KIDNEY DISEASE: ICD-10-CM

## 2019-09-24 DIAGNOSIS — Z98.890 POST-OPERATIVE STATE: Primary | ICD-10-CM

## 2019-09-24 DIAGNOSIS — H25.12 NUCLEAR SCLEROTIC CATARACT OF LEFT EYE: ICD-10-CM

## 2019-09-24 PROCEDURE — 99215 OFFICE O/P EST HI 40 MIN: CPT | Mod: 25,HCNC,S$GLB, | Performed by: INTERNAL MEDICINE

## 2019-09-24 PROCEDURE — 3075F PR MOST RECENT SYSTOLIC BLOOD PRESS GE 130-139MM HG: ICD-10-PCS | Mod: HCNC,CPTII,S$GLB, | Performed by: INTERNAL MEDICINE

## 2019-09-24 PROCEDURE — G0008 ADMIN INFLUENZA VIRUS VAC: HCPCS | Mod: HCNC,S$GLB,, | Performed by: INTERNAL MEDICINE

## 2019-09-24 PROCEDURE — 3288F PR FALLS RISK ASSESSMENT DOCUMENTED: ICD-10-PCS | Mod: HCNC,CPTII,S$GLB, | Performed by: INTERNAL MEDICINE

## 2019-09-24 PROCEDURE — 92136 OPHTHALMIC BIOMETRY: CPT | Mod: 26,RT,S$GLB, | Performed by: OPHTHALMOLOGY

## 2019-09-24 PROCEDURE — 99215 PR OFFICE/OUTPT VISIT, EST, LEVL V, 40-54 MIN: ICD-10-PCS | Mod: 25,HCNC,S$GLB, | Performed by: INTERNAL MEDICINE

## 2019-09-24 PROCEDURE — 99999 PR PBB SHADOW E&M-EST. PATIENT-LVL II: ICD-10-PCS | Mod: PBBFAC,HCNC,, | Performed by: OPHTHALMOLOGY

## 2019-09-24 PROCEDURE — 99024 POSTOP FOLLOW-UP VISIT: CPT | Mod: HCNC,S$GLB,, | Performed by: OPHTHALMOLOGY

## 2019-09-24 PROCEDURE — 1100F PTFALLS ASSESS-DOCD GE2>/YR: CPT | Mod: HCNC,CPTII,S$GLB, | Performed by: INTERNAL MEDICINE

## 2019-09-24 PROCEDURE — 90662 IIV NO PRSV INCREASED AG IM: CPT | Mod: HCNC,S$GLB,, | Performed by: INTERNAL MEDICINE

## 2019-09-24 PROCEDURE — 3078F PR MOST RECENT DIASTOLIC BLOOD PRESSURE < 80 MM HG: ICD-10-PCS | Mod: HCNC,CPTII,S$GLB, | Performed by: INTERNAL MEDICINE

## 2019-09-24 PROCEDURE — 99999 PR PBB SHADOW E&M-EST. PATIENT-LVL II: CPT | Mod: PBBFAC,HCNC,, | Performed by: OPHTHALMOLOGY

## 2019-09-24 PROCEDURE — 1100F PR PT FALLS ASSESS DOC 2+ FALLS/FALL W/INJURY/YR: ICD-10-PCS | Mod: HCNC,CPTII,S$GLB, | Performed by: INTERNAL MEDICINE

## 2019-09-24 PROCEDURE — G0008 FLU VACCINE - HIGH DOSE (65+) PRESERVATIVE FREE IM: ICD-10-PCS | Mod: HCNC,S$GLB,, | Performed by: INTERNAL MEDICINE

## 2019-09-24 PROCEDURE — 3075F SYST BP GE 130 - 139MM HG: CPT | Mod: HCNC,CPTII,S$GLB, | Performed by: INTERNAL MEDICINE

## 2019-09-24 PROCEDURE — 92136 PR OPHTHAL BIOMETRY,INTRAOC LENS POW CALC: ICD-10-PCS | Mod: 26,RT,S$GLB, | Performed by: OPHTHALMOLOGY

## 2019-09-24 PROCEDURE — 99024 PR POST-OP FOLLOW-UP VISIT: ICD-10-PCS | Mod: HCNC,S$GLB,, | Performed by: OPHTHALMOLOGY

## 2019-09-24 PROCEDURE — 99499 UNLISTED E&M SERVICE: CPT | Mod: HCNC,S$GLB,, | Performed by: INTERNAL MEDICINE

## 2019-09-24 PROCEDURE — 99499 RISK ADDL DX/OHS AUDIT: ICD-10-PCS | Mod: HCNC,S$GLB,, | Performed by: INTERNAL MEDICINE

## 2019-09-24 PROCEDURE — 3288F FALL RISK ASSESSMENT DOCD: CPT | Mod: HCNC,CPTII,S$GLB, | Performed by: INTERNAL MEDICINE

## 2019-09-24 PROCEDURE — 3078F DIAST BP <80 MM HG: CPT | Mod: HCNC,CPTII,S$GLB, | Performed by: INTERNAL MEDICINE

## 2019-09-24 PROCEDURE — 90662 FLU VACCINE - HIGH DOSE (65+) PRESERVATIVE FREE IM: ICD-10-PCS | Mod: HCNC,S$GLB,, | Performed by: INTERNAL MEDICINE

## 2019-09-24 RX ORDER — MOXIFLOXACIN 5 MG/ML
1 SOLUTION/ DROPS OPHTHALMIC
Status: CANCELLED | OUTPATIENT
Start: 2019-09-24

## 2019-09-24 RX ORDER — TROPICAMIDE 10 MG/ML
1 SOLUTION/ DROPS OPHTHALMIC
Status: CANCELLED | OUTPATIENT
Start: 2019-09-24

## 2019-09-24 RX ORDER — ATORVASTATIN CALCIUM 40 MG/1
40 TABLET, FILM COATED ORAL DAILY
Qty: 90 TABLET | Refills: 3 | Status: SHIPPED | OUTPATIENT
Start: 2019-09-24 | End: 2020-08-10 | Stop reason: SDUPTHER

## 2019-09-24 RX ORDER — AMLODIPINE BESYLATE 10 MG/1
10 TABLET ORAL DAILY
Qty: 14 TABLET | Refills: 0 | Status: SHIPPED | OUTPATIENT
Start: 2019-09-24 | End: 2019-10-07 | Stop reason: SDUPTHER

## 2019-09-24 RX ORDER — TETRACAINE HYDROCHLORIDE 5 MG/ML
1 SOLUTION OPHTHALMIC
Status: CANCELLED | OUTPATIENT
Start: 2019-09-24

## 2019-09-24 RX ORDER — PHENYLEPHRINE HYDROCHLORIDE 25 MG/ML
1 SOLUTION/ DROPS OPHTHALMIC
Status: CANCELLED | OUTPATIENT
Start: 2019-09-24

## 2019-09-24 RX ORDER — ACETAMINOPHEN 500 MG
5000 TABLET ORAL DAILY
Qty: 90 TABLET | Refills: 3 | Status: SHIPPED | OUTPATIENT
Start: 2019-09-24 | End: 2019-12-13

## 2019-09-24 RX ORDER — AMLODIPINE BESYLATE 10 MG/1
10 TABLET ORAL DAILY
Qty: 90 TABLET | Refills: 3 | Status: SHIPPED | OUTPATIENT
Start: 2019-09-24 | End: 2020-01-23

## 2019-09-24 NOTE — PROGRESS NOTES
Primary Care Provider Appointment    Subjective:      Patient ID: Kelsey Fields is a 78 y.o. female with HTN, HLD, preDM    Chief Complaint: Follow-up (med refill)    Patient is out of BP meds. Wants short-term script for [facility]ine to [facility]. [facility]d to [facility].    Had DEXA in 2019, which was normal. Has low Vit D is supplementing. She had a fall on 19 in the parking lot of [facility]. She was given script for flexeril from Ortho. Wants more Ortho follow-up, but has not tried physical therapy.    She sees Dr De Souza for cataracts and recent eye injury. Has plans for surgery on 19.    Past Surgical History:   Procedure Laterality Date    blepharitis Bilateral 2017    Alessandra Grijalva OD    CATARACT EXTRACTION Bilateral 2017    Alessandra Grijalva MD    CATARACT EXTRACTION W/  INTRAOCULAR LENS IMPLANT Left 2019    Procedure: EXTRACTION, CATARACT, WITH IOL INSERTION;  Surgeon: Alysa De Souza MD;  Location: [facility];  Service: Ophthalmology;  Laterality: Left;     SECTION      FRACTURE SURGERY      JOINT REPLACEMENT      right knee        Past Medical History:   Diagnosis Date    Arthritis     GERD (gastroesophageal reflux disease)     Hypertension     Shingles     Sleep apnea        Social History     Socioeconomic History    Marital status: Single     Spouse name: Not on file    Number of children: Not on file    Years of education: Not on file    Highest education level: Not on file   Occupational History    Not on file   Social Needs    Financial resource strain: Not very hard    Food insecurity:     Worry: Never true     Inability: Never true    Transportation needs:     Medical: No     Non-medical: No   Tobacco Use    Smoking status: Never Smoker    Smokeless tobacco: Never Used   Substance and Sexual Activity    Alcohol use: No    Drug use: No    Sexual activity: Never   Lifestyle    Physical activity:     Days per week: Not on file     Minutes per session: Not on file  "   Stress: Not on file   Relationships    Social connections:     Talks on phone: Not on file     Gets together: Not on file     Attends [employer] service: Not on file     Active member of club or organization: Not on file     Attends meetings of clubs or organizations: Not on file     Relationship status: Not on file   Other Topics Concern    Not on file   Social History Narrative    Not on file       Review of Systems   Constitutional: Positive for activity change, appetite change and fatigue.   HENT: Negative for sneezing and sore throat.    Eyes: Negative for photophobia and pain.   Respiratory: Negative for cough, chest tightness and shortness of breath.    Cardiovascular: Negative for chest pain and leg swelling.   Gastrointestinal: Negative for abdominal pain, constipation and diarrhea.   Endocrine: Negative for cold intolerance, heat intolerance and polyuria.   Genitourinary: Negative for frequency.   Musculoskeletal: Positive for back pain. Negative for joint swelling and neck stiffness.   Skin: Negative for color change and pallor.   Neurological: Negative for seizures, speech difficulty and headaches.   Hematological: Negative for adenopathy.   Psychiatric/Behavioral: Positive for decreased concentration, dysphoric mood and sleep disturbance. Negative for agitation.       Objective:   /60   Pulse 64   Ht 5' 3" (1.6 m)   Wt 98.3 kg (216 lb 11.4 oz)   SpO2 96%   BMI 38.39 kg/m²     Physical Exam   Constitutional: She is oriented to person, place, and time. She appears well-developed and well-nourished.   obese   HENT:   Head: Normocephalic and atraumatic.   prognathism   Neck: Normal range of motion.   Cardiovascular: Normal rate.   Pulmonary/Chest: Effort normal.   Musculoskeletal: She exhibits edema and deformity.   calf cirm 40.5cm, 16inches  Mild LE edema   Neurological: She is alert and oriented to person, place, and time.   Skin:   Ecchymoses and purpura on UE bilaterally  Pruritic dry " skin on UE bilaterally   Psychiatric: She has a normal mood and affect. Her behavior is normal.   Vitals reviewed.      Lab Results   Component Value Date    WBC 5.79 02/13/2019    HGB 11.8 (L) 02/13/2019    HCT 38.4 02/13/2019     02/13/2019    CHOL 139 02/13/2019    TRIG 72 02/13/2019    HDL 41 02/13/2019    ALT 14 02/13/2019    AST 19 02/13/2019     02/13/2019    K 3.9 02/13/2019     02/13/2019    CREATININE 1.2 02/13/2019    BUN 16 02/13/2019    CO2 27 02/13/2019    TSH 0.725 09/20/2018    HGBA1C 6.0 (H) 02/13/2019       RESULTS: Reviewed labs and images today    Assessment:   78 y.o. female with multiple co-morbid illnesses here to continue work-up of chronic issues notably with HTN, HLD, preDM     Plan:     Problem List Items Addressed This Visit        Cardiac/Vascular    Essential hypertension     BP well-controlled on CCB and ARB  · Continue amlodipine 10mg and losartan 25mg  · PCP discontinued ASA  · Cardiology increased statin to atorvastatin 40mg         Relevant Medications    amLODIPine (NORVASC) 10 MG tablet    amLODIPine (NORVASC) 10 MG tablet    Other Relevant Orders    Ambulatory Referral to Outpatient Case Management    Mixed hyperlipidemia    Relevant Medications    atorvastatin (LIPITOR) 40 MG tablet    Other Relevant Orders    Ambulatory Referral to Outpatient Case Management       Oncology    Anemia due to stage 3 chronic kidney disease     GFR stable, Hg trending down  · Monitor  · Work-up for anemia         Relevant Orders    Ambulatory Referral to Outpatient Case Management       Endocrine    Prediabetes     A1c 5.8, poor dietary compliance  · Diabetes education  · Advised lifestyle changes         Relevant Medications    cholecalciferol, vitamin D3, (VITAMIN D3) 5,000 unit Tab    Other Relevant Orders    Ambulatory Referral to Outpatient Case Management    Vitamin D deficiency     Low Vit D, supplementing with Vit D3 5000U daily  · Monitor level  · Continue  supplementation         Relevant Medications    cholecalciferol, vitamin D3, (VITAMIN D3) 5,000 unit Tab       Orthopedic    Chronic bilateral low back pain with bilateral sciatica     Chronic low back pain, poor functional status  · Refer to PT/OT on [place]         Relevant Medications    cholecalciferol, vitamin D3, (VITAMIN D3) 5,000 unit Tab    Other Relevant Orders    Ambulatory Referral to Physical/Occupational Therapy          Health Maintenance       Date Due Completion Date    TETANUS VACCINE 11/19/1956 ---    Shingles Vaccine (2 of 3) 07/08/2016 5/13/2016    Influenza Vaccine (1) 09/01/2019 9/20/2018- TODAY    DEXA SCAN 05/16/2022 5/16/2019    Lipid Panel 02/13/2024 2/13/2019          Follow up in about 3 months (around 12/24/2019). Total face-to-face time was 60 min, 50% of this was spent on counseling and coordination of care. The following issues were discussed: with HTN, HLD, preDM    Laurie Todd MD/MPH  Internal Medicine  [facility]  [phone]

## 2019-09-24 NOTE — TELEPHONE ENCOUNTER
Used 2 pt identifiers and reviewed allergies prior to giving fluzone injection in the L/deltoid, VIS given  then asked pt to wait 15 mins post injection for s/sx of adverse reactions.

## 2019-09-24 NOTE — PROGRESS NOTES
HPI     POW1 CE w/IOL OS    Patient states she is doing well, no problems or complaints.     PGB TID OS    Last edited by Niya Bonds, PCT on 9/24/2019  2:19 PM. (History)            Assessment /Plan     For exam results, see Encounter Report.    Post-operative state    Nuclear sclerotic cataract of left eye      Slit lamp exam:  L/L: nl  K: clear, wound sealed  AC: trace cell  Iris/Lens: IOL centered and stable    POW1 s/p phaco: Surgery healing well with no signs of infection or abnormal inflammation.    Patient wishes to proceed with surgery in the second eye. Risks, benefits, alternatives reviewed. IOL selection reviewed.     Right eye  IOL: pcboo 23.0

## 2019-09-24 NOTE — PATIENT INSTRUCTIONS
TODAY:  - PT/OT on [place]  - meds refilled to [facility]  - short-term supply of amlodipine to [facility]  - flu vaccine

## 2019-09-25 DIAGNOSIS — M54.41 CHRONIC BILATERAL LOW BACK PAIN WITH BILATERAL SCIATICA: Primary | ICD-10-CM

## 2019-09-25 DIAGNOSIS — M54.42 CHRONIC BILATERAL LOW BACK PAIN WITH BILATERAL SCIATICA: Primary | ICD-10-CM

## 2019-09-25 DIAGNOSIS — G89.29 CHRONIC BILATERAL LOW BACK PAIN WITH BILATERAL SCIATICA: Primary | ICD-10-CM

## 2019-09-26 ENCOUNTER — TELEPHONE (OUTPATIENT)
Dept: OPTOMETRY | Facility: CLINIC | Age: 79
End: 2019-09-26

## 2019-09-26 NOTE — TELEPHONE ENCOUNTER
Spoke with patient. Confirmed sx arrival time, which is 1130am on 9/30/2019. Nothing to eat or drink after 9 pm the night before sx. May have water, gatorade, or powerade after 9 pm until leaving home the morning of surgery. Pt is to start eyedrops on Saturday, one drop TID into operative eye. TR 9/26/2019

## 2019-09-30 ENCOUNTER — ANESTHESIA (OUTPATIENT)
Dept: SURGERY | Facility: OTHER | Age: 79
End: 2019-09-30
Payer: MEDICARE

## 2019-09-30 ENCOUNTER — TELEPHONE (OUTPATIENT)
Dept: PRIMARY CARE CLINIC | Facility: CLINIC | Age: 79
End: 2019-09-30

## 2019-09-30 ENCOUNTER — HOSPITAL ENCOUNTER (OUTPATIENT)
Facility: OTHER | Age: 79
Discharge: HOME | End: 2019-09-30
Attending: OPHTHALMOLOGY | Admitting: OPHTHALMOLOGY
Payer: MEDICARE

## 2019-09-30 ENCOUNTER — ANESTHESIA EVENT (OUTPATIENT)
Dept: SURGERY | Facility: OTHER | Age: 79
End: 2019-09-30
Payer: MEDICARE

## 2019-09-30 VITALS
DIASTOLIC BLOOD PRESSURE: 70 MMHG | TEMPERATURE: 98 F | SYSTOLIC BLOOD PRESSURE: 142 MMHG | WEIGHT: 216 LBS | OXYGEN SATURATION: 98 % | RESPIRATION RATE: 16 BRPM | HEART RATE: 89 BPM | HEIGHT: 62 IN | BODY MASS INDEX: 39.75 KG/M2

## 2019-09-30 DIAGNOSIS — H25.11 NUCLEAR SCLEROTIC CATARACT OF RIGHT EYE: Primary | ICD-10-CM

## 2019-09-30 DIAGNOSIS — H25.11 NUCLEAR SCLEROSIS OF RIGHT EYE: ICD-10-CM

## 2019-09-30 DIAGNOSIS — Z98.890 POST-OPERATIVE STATE: ICD-10-CM

## 2019-09-30 PROCEDURE — 66984 PR REMOVAL, CATARACT, W/INSRT INTRAOC LENS, W/O ENDO CYCLO: ICD-10-PCS | Mod: HCNC,79,RT, | Performed by: OPHTHALMOLOGY

## 2019-09-30 PROCEDURE — V2632 POST CHMBR INTRAOCULAR LENS: HCPCS | Mod: HCNC | Performed by: OPHTHALMOLOGY

## 2019-09-30 PROCEDURE — 25000003 PHARM REV CODE 250: Mod: HCNC | Performed by: OPHTHALMOLOGY

## 2019-09-30 PROCEDURE — 37000009 HC ANESTHESIA EA ADD 15 MINS: Mod: HCNC | Performed by: OPHTHALMOLOGY

## 2019-09-30 PROCEDURE — 63600175 PHARM REV CODE 636 W HCPCS: Mod: HCNC | Performed by: NURSE ANESTHETIST, CERTIFIED REGISTERED

## 2019-09-30 PROCEDURE — 66984 XCAPSL CTRC RMVL W/O ECP: CPT | Mod: HCNC,79,RT, | Performed by: OPHTHALMOLOGY

## 2019-09-30 PROCEDURE — 71000015 HC POSTOP RECOV 1ST HR: Mod: HCNC | Performed by: OPHTHALMOLOGY

## 2019-09-30 PROCEDURE — 37000008 HC ANESTHESIA 1ST 15 MINUTES: Mod: HCNC | Performed by: OPHTHALMOLOGY

## 2019-09-30 PROCEDURE — 36000707: Mod: HCNC | Performed by: OPHTHALMOLOGY

## 2019-09-30 PROCEDURE — 36000706: Mod: HCNC | Performed by: OPHTHALMOLOGY

## 2019-09-30 DEVICE — LENS IOL ITEC PRELOAD 23.0D: Type: IMPLANTABLE DEVICE | Site: EYE | Status: FUNCTIONAL

## 2019-09-30 RX ORDER — TETRACAINE HYDROCHLORIDE 5 MG/ML
SOLUTION OPHTHALMIC
Status: DISCONTINUED | OUTPATIENT
Start: 2019-09-30 | End: 2019-09-30 | Stop reason: HOSPADM

## 2019-09-30 RX ORDER — MIDAZOLAM HYDROCHLORIDE 1 MG/ML
INJECTION INTRAMUSCULAR; INTRAVENOUS
Status: DISCONTINUED | OUTPATIENT
Start: 2019-09-30 | End: 2019-09-30

## 2019-09-30 RX ORDER — ACETAMINOPHEN 325 MG/1
650 TABLET ORAL EVERY 4 HOURS PRN
Status: DISCONTINUED | OUTPATIENT
Start: 2019-09-30 | End: 2019-09-30 | Stop reason: HOSPADM

## 2019-09-30 RX ORDER — MOXIFLOXACIN 5 MG/ML
1 SOLUTION/ DROPS OPHTHALMIC
Status: COMPLETED | OUTPATIENT
Start: 2019-09-30 | End: 2019-09-30

## 2019-09-30 RX ORDER — TROPICAMIDE 10 MG/ML
1 SOLUTION/ DROPS OPHTHALMIC
Status: COMPLETED | OUTPATIENT
Start: 2019-09-30 | End: 2019-09-30

## 2019-09-30 RX ORDER — PROPARACAINE HYDROCHLORIDE 5 MG/ML
1 SOLUTION/ DROPS OPHTHALMIC
Status: DISCONTINUED | OUTPATIENT
Start: 2019-09-30 | End: 2019-09-30 | Stop reason: HOSPADM

## 2019-09-30 RX ORDER — PHENYLEPHRINE HYDROCHLORIDE 25 MG/ML
1 SOLUTION/ DROPS OPHTHALMIC
Status: COMPLETED | OUTPATIENT
Start: 2019-09-30 | End: 2019-09-30

## 2019-09-30 RX ORDER — TETRACAINE HYDROCHLORIDE 5 MG/ML
1 SOLUTION OPHTHALMIC
Status: COMPLETED | OUTPATIENT
Start: 2019-09-30 | End: 2019-09-30

## 2019-09-30 RX ORDER — LIDOCAINE HYDROCHLORIDE 40 MG/ML
INJECTION, SOLUTION RETROBULBAR
Status: DISCONTINUED | OUTPATIENT
Start: 2019-09-30 | End: 2019-09-30 | Stop reason: HOSPADM

## 2019-09-30 RX ORDER — FENTANYL CITRATE 50 UG/ML
INJECTION, SOLUTION INTRAMUSCULAR; INTRAVENOUS
Status: DISCONTINUED | OUTPATIENT
Start: 2019-09-30 | End: 2019-09-30

## 2019-09-30 RX ORDER — MOXIFLOXACIN 5 MG/ML
SOLUTION/ DROPS OPHTHALMIC
Status: DISCONTINUED | OUTPATIENT
Start: 2019-09-30 | End: 2019-09-30 | Stop reason: HOSPADM

## 2019-09-30 RX ADMIN — MOXIFLOXACIN HYDROCHLORIDE 1 DROP: 5 SOLUTION/ DROPS OPHTHALMIC at 11:09

## 2019-09-30 RX ADMIN — MIDAZOLAM HYDROCHLORIDE 1 MG: 1 INJECTION, SOLUTION INTRAMUSCULAR; INTRAVENOUS at 12:09

## 2019-09-30 RX ADMIN — TROPICAMIDE 1 DROP: 10 SOLUTION/ DROPS OPHTHALMIC at 11:09

## 2019-09-30 RX ADMIN — MOXIFLOXACIN 1 DROP: 5 SOLUTION/ DROPS OPHTHALMIC at 01:09

## 2019-09-30 RX ADMIN — PHENYLEPHRINE HYDROCHLORIDE 1 DROP: 25 SOLUTION/ DROPS OPHTHALMIC at 11:09

## 2019-09-30 RX ADMIN — TETRACAINE HYDROCHLORIDE 1 DROP: 5 SOLUTION OPHTHALMIC at 11:09

## 2019-09-30 RX ADMIN — FENTANYL CITRATE 50 MCG: 50 INJECTION, SOLUTION INTRAMUSCULAR; INTRAVENOUS at 12:09

## 2019-09-30 NOTE — ANESTHESIA PREPROCEDURE EVALUATION
09/30/2019  Kelsey Fields is a 78 y.o., female.    Pre-op Assessment    I have reviewed the Patient Summary Reports.     I have reviewed the Nursing Notes.   I have reviewed the Medications.     Review of Systems  Anesthesia Hx:  No problems with previous Anesthesia    Social:  Non-Smoker, No Alcohol Use    Hematology/Oncology:  Hematology Normal   Oncology Normal     EENT/Dental:EENT/Dental Normal   Cardiovascular:   Exercise tolerance: good Hypertension, well controlled    Pulmonary:   Asthma asymptomatic Sleep Apnea    Renal/:   Chronic Renal Disease    Hepatic/GI:   GERD, well controlled    Musculoskeletal:   Chronic LBP   Neurological:  Neurology Normal    Endocrine:  Endocrine Normal    Dermatological:  Skin Normal    Psych:  Psychiatric Normal           Physical Exam  General:  Obesity    Airway/Jaw/Neck:  Airway Findings: Mouth Opening: Normal Tongue: Normal  General Airway Assessment: Adult  Mallampati: II  TM Distance: Normal, at least 6 cm  Jaw/Neck Findings:  Neck ROM: Normal ROM      Dental:  Dental Findings: Upper Dentures, Lower Dentures             Anesthesia Plan  Type of Anesthesia, risks & benefits discussed:  Anesthesia Type:  general  Patient's Preference:   Intra-op Monitoring Plan: standard ASA monitors  Intra-op Monitoring Plan Comments:   Post Op Pain Control Plan: per primary service following discharge from PACU  Post Op Pain Control Plan Comments:   Induction:    Beta Blocker:         Informed Consent: Patient understands risks and agrees with Anesthesia plan.  Questions answered. Anesthesia consent signed with patient.  ASA Score: 3     Day of Surgery Review of History & Physical:    H&P update referred to the surgeon.         Ready For Surgery From Anesthesia Perspective.

## 2019-09-30 NOTE — OP NOTE
SURGEON:  Alysa De Souza M.D.    PREOPERATIVE DIAGNOSIS:    Nuclear Sclerotic Cataract Right Eye    POSTOPERATIVE DIAGNOSIS:    Nuclear Sclerotic Cataract Right Eye    PROCEDURES:    Phacoemulsification with  intraocular lens, Right eye (97456)    DATE OF SURGERY: 09/30/2019    IMPLANT: pcboo 23.0    ANESTHESIA:  MAC with topical Lidocaine    COMPLICATIONS:  None    ESTIMATED BLOOD LOSS: None    SPECIMENS: None    INDICATIONS:    The patient has a history of painless progressive visual loss and  difficulty with activities of daily living secondary to cataract formation.  After a thorough discussion of the risks, benefits, and alternatives to cataract surgery, including, but not limited to, the rare risks of infection, retinal detachment, hemorrhage, need for additional surgery, loss of vision, and even loss of the eye, the patient voices understanding and desires to proceed.    DESCRIPTION OF PROCEDURE:    The patients IOL calculations were reviewed, and the lens selection confirmed.   After verification and marking of the proper eye in the preop holding area, the patient was brought to the operating room in supine position where the eye was prepped and draped in standard sterile fashion with 5% Betadine and a lid speculum placed in the eye.   Topical 4% Lidocaine was used in addition to the preoperative anesthesia and the procedure was begun by the creation of a paracentesis incision through which viscoelastic was used to fill the anterior chamber.  Next, a keratome blade was used to create a triplanar temporal clear corneal incision and a cystotome and Utrata forceps used to fashion a continuous curvilinear capsulorrhexis.  Hydrodissection was carried out using the Stanton hydrodissection cannula and the nucleus was found to be mobile.  Phacoemulsification of the nucleus was carried out using a quick chop technique, and all remaining epinuclear and cortical material was removed.  The eye was then reformed with  Viscoelastic and the  intraocular lens was implanted into the capsular bag.  All remaining viscoelastics were removed from the eye and at the end of the case the pupil was round, the lens was well-centered within the capsular bag and all wounds were found to be water tight.  Drops of Vigamox and Pred Forte were instilled and a shield was placed over the eye. The patient will follow up with Dr. De Souza in the morning.

## 2019-09-30 NOTE — DISCHARGE INSTRUCTIONS
Alysa De Souza MD  [facility]  Department of Ophthalmology      AFTER: Cataract Surgery:  Relax at home and DO NOT exert yourself for the rest of the day.  Plan to see Dr. De Souza tomorrow at the eye clinic:   [facility] [address]  [place] [address]    Refer to attached eye drop instruction sheet     Precautions:  DO NOT rub your eye.  You may resume moderate activity the day after surgery.  Wear protective sunglasses during the day and a shield at night for 1(one) week.  If you have pain, redness and decreased vision, call Dr. De Souza (or the on-call doctor after hours) @[phone].            Home Care Instructions for Eye Surgeries    1. ACTIVITY:  Limit your activity today. Relax at home and DO NOT exert yourself for the rest of the day. Increase activity gradually. You may return to work or school as directed by your physician.    2. DIET:  Drink plenty of fluids. Resume your normal diet unless instructed otherwise.    3. PAIN:  Expect a moderate amount of pain. If a prescription for pain is not sent home with you, you may take your commonly used pain reliever as directed. If this is not sufficient, call your physician. You may resume any other prescription medication unless otherwise directed by your physician.     Discuss any problem with your physician as soon as it arises. Do not Delay.      EMERGENCY- If you are unable to contact your physician, please go to the nearest Emergency Room.       Anesthesia: Monitored Anesthesia Care (MAC)    Anesthesia Safety  · Have an adult family member or friend drive you home after the procedure.  · For the first 24 hours after your surgery:  · Do not drive or use heavy equipment.  · Do not make important decisions or sign documents.  · Avoid alcohol.  · Have someone stay with you, if possible. They can watch for problems and help keep you safe.

## 2019-09-30 NOTE — ANESTHESIA POSTPROCEDURE EVALUATION
Anesthesia Post Evaluation    Patient: Kelsey Fields    Procedure(s) Performed: Procedure(s) (LRB):  EXTRACTION, CATARACT, WITH IOL INSERTION (Right)    Final Anesthesia Type: MAC  Patient location during evaluation: [facility]  Patient participation: Yes- Able to Participate  Level of consciousness: awake and alert, awake and oriented  Post-procedure vital signs: reviewed and stable  Pain management: adequate  Airway patency: patent  PONV status at discharge: No PONV  Anesthetic complications: no      Cardiovascular status: blood pressure returned to baseline  Respiratory status: unassisted, spontaneous ventilation and room air  Hydration status: euvolemic  Follow-up not needed.          Vitals Value Taken Time   /71 9/30/2019 11:57 AM   Temp 36.6 °C (97.9 °F) 9/30/2019 11:57 AM   Pulse 79 9/30/2019 11:57 AM   Resp 18 9/30/2019 11:57 AM   SpO2 99 % 9/30/2019 11:57 AM         No case tracking events are documented in the log.      Pain/Carole Score: No data recorded

## 2019-10-01 ENCOUNTER — OFFICE VISIT (OUTPATIENT)
Dept: OPHTHALMOLOGY | Facility: CLINIC | Age: 79
End: 2019-10-01
Attending: OPHTHALMOLOGY
Payer: MEDICARE

## 2019-10-01 ENCOUNTER — OUTPATIENT CASE MANAGEMENT (OUTPATIENT)
Dept: ADMINISTRATIVE | Facility: OTHER | Age: 79
End: 2019-10-01

## 2019-10-01 DIAGNOSIS — H25.11 NUCLEAR SCLEROSIS OF RIGHT EYE: ICD-10-CM

## 2019-10-01 DIAGNOSIS — Z98.890 POST-OPERATIVE STATE: Primary | ICD-10-CM

## 2019-10-01 PROCEDURE — 99999 PR PBB SHADOW E&M-EST. PATIENT-LVL III: CPT | Mod: PBBFAC,HCNC,, | Performed by: OPHTHALMOLOGY

## 2019-10-01 PROCEDURE — 99999 PR PBB SHADOW E&M-EST. PATIENT-LVL III: ICD-10-PCS | Mod: PBBFAC,HCNC,, | Performed by: OPHTHALMOLOGY

## 2019-10-01 PROCEDURE — 99024 POSTOP FOLLOW-UP VISIT: CPT | Mod: HCNC,S$GLB,, | Performed by: OPHTHALMOLOGY

## 2019-10-01 PROCEDURE — 99024 PR POST-OP FOLLOW-UP VISIT: ICD-10-PCS | Mod: HCNC,S$GLB,, | Performed by: OPHTHALMOLOGY

## 2019-10-01 NOTE — PROGRESS NOTES
HPI     POD1 CE w/IOL OD     Patient states he is doing well, no problems or complaints.     PGB OU TID     Last edited by Niya Bonds PCT on 10/1/2019  9:43 AM. (History)            Assessment /Plan     For exam results, see Encounter Report.    Post-operative state    Nuclear sclerosis of right eye      Slit lamp exam:  L/L: nl  K: clear, wound sealed  AC: 1+ cell  Lens: IOL centered and stable    POD1 s/p Phaco/IOL  Appropriate precautions and post op medications reviewed.  Patient instructed to call or come in if symptoms of redness, decreased vision, or pain are experienced.

## 2019-10-01 NOTE — LETTER
October 10, 2019    Kelsey Fields  [address] [facility] [place] [address]             [facility]  [address] [place] [address] Dear Ms. Kelsey CORREASulaiman Fields:    Welcome to [facility]s Complex Care Management Program.  It was a pleasure talking with you today.  My name is Dina Rodas RN and I look forward to being your Care Manager.  My goal is to help you function at the healthiest and highest level possible.  You can contact me directly at [phone].     As an [facility] patient with [employer], some of the services we may be able to provide include:      Development of an individualized care plan with a Registered Nurse    Connection with a    Connection with available resources and services     Coordinate communication among your care team members    Provide coaching and education    Help you understand your doctors treatment plan   Help you obtain information about your insurance coverage.     All services provided by [facility]s Complex Care Managers and other care team members are coordinated with and communicated to your primary care team.    As part of your enrollment, you will be receiving education materials and more information about these services in your [facility] account, by phone or through the mail.  If you do not wish to participate or receive information, please contact our office at [phone].      Sincerely,        Dina Rodas RN  [facility]   Out-patient RN Complex Care Manager                                                                              -2-      [facility]:    Dina Rodas RN, [facility]-    Tel: [phone], Mon-Fri, 8 am- 4:30 pm  Baltazar Jurado,  [facility]  Tel:   [phone], Mon-Fri, 8 am- 4:30 pm  [facility] Main Office- TEL:  [phone]     Office Hours Mon-Fri, 8 am - 4:30 pm   [facility] On Call, 24/7  Nurse Help Line (non emergent)- TEL:  [phone]        [employer]:    [employer] First 24 Hour Nurse Line--TEL:  [phone]  [employer]- Over-the-Counter Benefit-- TEL:  [phone]  Ciara Burrell, Cornelio Representative-- TEL:  [phone], ext [phone] can help to change [employer] plan to reflect either Diabetes or Heart Disease or both in order to lower co pay costs for medical appointments.   [employer] Marinelli Exercise- Nisha, Jignesh Cote [facility]  [employer] Patricio- Well Dine -TEL:  [phone].  Call once home within 30 days after a discharge from an Inpatient Stay at the Hospital, Rehab or Skilled Facility. 10 meals are delivered to your home from [employer]. All meals are Heart Healthy, Low Sodium.   [employer] Curry- Transportation Reservation-- TEL:  [phone]  Mon-Fri, 8 am-5 pm, 3 business days notice required.

## 2019-10-07 DIAGNOSIS — I10 ESSENTIAL HYPERTENSION: ICD-10-CM

## 2019-10-07 RX ORDER — AMLODIPINE BESYLATE 10 MG/1
10 TABLET ORAL DAILY
Qty: 14 TABLET | Refills: 0 | Status: SHIPPED | OUTPATIENT
Start: 2019-10-07 | End: 2019-12-13

## 2019-10-07 NOTE — TELEPHONE ENCOUNTER
Pt is here requesting another refill because her [employer] mail order has not arrived. She has 2 doses left .

## 2019-10-07 NOTE — TELEPHONE ENCOUNTER
Call [employer] to see if this script is being mailed to the patient and when will it arrive?    Additional 14d supply sent to [facility]    KJ

## 2019-10-10 NOTE — PROGRESS NOTES
Outpatient Care Management  Initial Patient Assessment    Patient: Kelsey Fields  MRN:  [id number]  Date of Service:  10/10/2019  Completed by:  Dina Rodas RN    Reason for Visit   Patient presents with    OPCM Chart Review     10/01/19    OPCM RN First Assessment Attempt     10/7/19 Pt requests a call back since she is currently at PCP clinic    Initial Assessment     10/10/19    Nursing Assessment    PHQ-9    Plan Of Care    OPCM Enrollment Call       Patient Summary     Involvement of Care:  Do I have permission to speak with other family members about your care?  Yes  Assessment completed by:  Date/Time: Thu Oct 10, 2019 10:48 AM  Value: Patient  ----------------      Patient Reported Insurance:  Patient-verified current insurance plan:  Date/Time: Thu Oct 10, 2019 12:15 PM  Value: [employer]age  Comment: Pt says it has been a long time since she ordered anything from the  OTC benefit. Agrees to copy of 2019 Catalog mailed to her.   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  Compared to other people your age, how would you rate your health?   Date/Time: Thu Oct 10, 2019 12:14 PM  Value: Good  Comment: Only concern is my leg swells at night,  in [facility] parking lot. Works 3-4 days a wk, 10 pm-6 am. Sometimes at home, I am sleeping and has ankle pain cramping  ----------------      Patient Reported Labs & Vitals:  Any Patient Reported Labs & Vitals?  Date/Time: Thu Oct 10, 2019 10:48 AM  Value: Yes  ----------------    Patient Reported Blood Pressure:  Date/Time: Thu Oct 10, 2019 10:48 AM  Value: 138/77  or 156/70  ----------------    Patient Reported Pulse:  No data was found  Patient Reported Weight (Kg):  No data was found  Patient Reported Blood Glucose (mg/dl):  No data was found    History:  Reviewed medical history with patient and/or caregiver    Social History:  Social history reviewed with patient and/or caregiver    Social Determinants     Advanced Care Planning  Do you have any of the following?  Date/Time: Thu Oct 10, 2019 10:48 AM  Value: Caregiver makes informed decisions on your behalf  Comment: Anyone could do it, my sister or daughter  ----------------    If yes, do we have a copy?  Date/Time: Thu Oct 10, 2019 10:48 AM  Value: No  ----------------    If no, would you like Advance Directive resources?  No data was found  Advance Care Planning resources provided?  Date/Time: Thu Oct 10, 2019 11:28 AM  Value: No  ----------------    Is Advance Care Planning an area of need?  Date/Time: Thu Oct 10, 2019 10:48 AM  Value: Yes  ----------------      Support  Caregiver presence?  Date/Time: Thu Oct 10, 2019 11:02 AM  Value: No  ----------------    Name of primary caregiver:  No data was found  Primary caregiver phone number:  No data was found  Primary caregiver:  No data was found  Present activity level:  Date/Time: Thu Oct 10, 2019 11:02 AM  Value: need help from person or special equipment to leave house  Comment: Amb with cane or walker for distances  ----------------    Who takes you to your medical appointments?  Date/Time: Thu Oct 10,  2019 11:02 AM  Value: patient  ----------------    Is the caregiver supporting a need?  Date/Time: Thu Oct 10, 2019 11:02 AM  Value: No  Comment: I think I can do it on my own. The only thing is I need to use my CPAP.   ----------------    Does the current primary caregiver meet the patient's needs?  Date/Time: Thu Oct 10, 2019 11:02 AM  Value: No  ----------------      Housing  Living arrangements:  Date/Time: Thu Oct 10, 2019 11:02 AM  Value: alone  ----------------    Number of people in home:  Date/Time: Thu Oct 10, 2019 11:02 AM  Value: 1  ----------------    Type of residence:  Date/Time: Thu Oct 10, 2019 11:02 AM  Value: single family home  ----------------    Own or rent?  Date/Time: Thu Oct 10, 2019 11:02 AM  Value: own  ----------------    Permanent residence?  Date/Time: Thu Oct 10, 2019 11:02 AM  Value: yes  ----------------    Does the patient have any of the following?  Date/Time: Thu Oct 10, 2019 11:02 AM  Value: grab bars in the bathroom  ----------------    Housing needs identified?  Date/Time: Thu Oct 10, 2019 11:28 AM  Value: no  ----------------      Access to [employer]  Does the patient have access to any of the following devices or technologies?  Date/Time: Thu Oct 10, 2019 11:02 AM  Value:   Comment: Flip phone only  ----------------      Clinical Assessment     Medications:  Active Medication List was reviewed and reconciled with patient and/or caregiver.    Medication Adherence:  How do you obtain your medications?  Date/Time: Thu Oct 10, 2019 11:02 AM  Value: patient drives  ----------------    How many days a week do you miss your medications?  Date/Time: Thu Oct 10, 2019 11:02 AM  Value: never  ----------------    Do you use a pill box or medication chart to help you manage your medications?  Date/Time: Thu Oct 10, 2019 11:28 AM  Value: other (see comment)  Comment: Pt has a container on her bed pt keeps her medications bottles in. Pt system--moves bottles out of the  container when med taken  ----------------    Do you sometimes have difficulty refilling your medications?  Date/Time: Thu Oct 10, 2019 12:14 PM  Value: no  ----------------    Medication reconciliation completed? medication reconciliation  Is medication adherence an area of need?  Date/Time: Thu Oct 10, 2019 11:28 AM  Value: No  ----------------      Nutritional Status  Diet:  Date/Time: Thu Oct 10, 2019 11:02 AM  Value: low sodium  ----------------    Change in appetite?  Date/Time: Thu Oct 10, 2019 11:02 AM  Value: no  ----------------    Dentition:  Date/Time: Thu Oct 10, 2019 11:02 AM  Value: wears dentures  ----------------    Recent changes in weight?  Date/Time: Thu Oct 10, 2019 11:28 AM  Value: no  ----------------    Was weight change intentional or unintentional?  No data was found  Is nutrition an area of need?  Date/Time: Thu Oct 10, 2019 11:28 AM  Value: yes  Comment: Need information on low na diet  ----------------      Labs:  Reviewed and discussed with patient and/or caregiver.  Do you have regular lab work to monitor your medications?  Date/Time: Thu Oct 10, 2019 11:28 AM  Value: no  ----------------    Type of regular lab work:  No data was found  Where do you get your labs drawn?  No data was found  Is lab adherence an area of need?  Date/Time: Thu Oct 10, 2019 11:28 AM  Value: no  ----------------      PHQ Depression Screening:  Results of PHQ Taken in last 30 days:  A PHQ screening has not been completed for the patient within the last 30 days.  The last PHQ2 score was:  No data recorded  The last PHQ9 score was:  No data recorded  PHQ Depression Screening indicates a barrier to meeting self-care needs?  No data was found  Action taken:  No data was found    Cognitive/Behavioral Health:  Alert and oriented?  Date/Time: Thu Oct 10, 2019 11:14 AM  Value: yes  ----------------    Difficulty thinking?  Date/Time: u Oct 10, 2019 11:28 AM  Value: no  ----------------    Requires prompting?   Date/Time: Thu Oct 10, 2019 11:28 AM  Value: no  ----------------    Requires assistance for routine expression?  Date/Time: Thu Oct 10, 2019 11:28 AM  Value: no  ----------------    Is Cognitive/Behavioral Health an area of need?  Date/Time: Thu Oct 10, 2019 11:28 AM  Value: no  ----------------      Culture/Presybeterian:  Cultural or Faith beliefs that may impact ability to access healthcare?  Date/Time: Thu Oct 10, 2019 11:28 AM  Value: no  ----------------    If yes:  No data was found    Communication:  Language preference:  Date/Time: Thu Oct 10, 2019 11:02 AM  Value: English  ----------------     needed:  Date/Time: Thu Oct 10, 2019 11:02 AM  Value: no  ----------------    Hearing problems:  Date/Time: Thu Oct 10, 2019 11:02 AM  Value: no  ----------------    Hearing assistance:  No data was found  Decreased vision:  Date/Time: Thu Oct 10, 2019 11:02 AM  Value: no  ----------------    Legally blind:  Date/Time: Thu Oct 10, 2019 11:02 AM  Value: no  ----------------    Vision assistance:  Date/Time: Thu Oct 10, 2019 11:02 AM  Value: glasses  Comment: Needs new rx glasses  ----------------    Is there a communication need?  Date/Time: Thu Oct 10, 2019 11:28 AM  Value: no  ----------------      Health Literacy:  Preferred Learning Method:  Date/Time: Thu Oct 10, 2019 11:28 AM  Value: reading materials  ----------------    How often do you need to have someone help you read instructions, pamphlets, or other written material from your doctor or pharmacy?  Date/Time: Thu Oct 10, 2019 11:28 AM  Value: occasionally  ----------------    Health literacy need?  Date/Time: Thu Oct 10, 2019 11:28 AM  Value: yes  ----------------      Activities of Daily Living:  ADL's were assessed today with the patient and/or caregiver.  Ambulation:  Date/Time: Thu Oct 10, 2019 11:28 AM  Value: independent  ----------------    Dressing:  Date/Time: Thu Oct 10, 2019 11:28 AM  Value: independent  ----------------    Bathing:   Date/Time: Thu Oct 10, 2019 11:28 AM  Value: independent  ----------------    Transfers:  Date/Time: Thu Oct 10, 2019 11:28 AM  Value: independent  ----------------    Toileting:  Date/Time: Thu Oct 10, 2019 11:28 AM  Value: independent  ----------------    Feeding:  Date/Time: Thu Oct 10, 2019 11:28 AM  Value: independent  ----------------    Cleaning home/chores:  Date/Time: Thu Oct 10, 2019 11:28 AM  Value: independent  ----------------    Telephone use:  Date/Time: Thu Oct 10, 2019 11:28 AM  Value: independent  ----------------    Shopping/Attending Doctors' Appointments:  Date/Time: Thu Oct 10, 2019 11:28 AM  Value: independent  ----------------    Paying bills:  Date/Time: Thu Oct 10, 2019 11:28 AM  Value: independent  ----------------    Taking meds:  Date/Time: Thu Oct 10, 2019 11:28 AM  Value: independent  ----------------    Climbing stairs:  Date/Time: Thu Oct 10, 2019 11:28 AM  Value: independent  ----------------      Fall Risk:  Patient mobility status:  Date/Time: Thu Oct 10, 2019 11:28 AM  Value: ambulatory with assistance  ----------------    Number of falls in past 12 months:  No data was found  Fall risk?  No data was found    Equipment / Current Services:  Equipment/Supplies used in home:  Date/Time: Thu Oct 10, 2019 12:14 PM  Value: walker,cane,tub seat,CPAP/BPAP machine,other (see comment)  Comment: Pt says she has not been using her CPAP but says she will plan to start  ----------------    CPAP/BPAP Supplies used?  No data was found  Glucometer meter supplies used?  No data was found  Current services?  No data was found  Is Equipment/Supplies/Services an area of need?  Date/Time: Thu Oct 10, 2019 11:14 AM  Value: no  ----------------      Community & Government Programs:  Support:  Date/Time: Thu Oct 10, 2019 12:14 PM  Value: other (see comments)  Comment: Pt reports going to the [employer] [facility]   ----------------    Government support assistance:  Date/Time: Thu Oct 10,  2019 11:28 AM  Value: none  ----------------    [facility]:  Date/Time: Thu Oct 10, 2019 11:14 AM  Value: None  ----------------      Community Resource Assessment:  Based on the assessment of needs:  Date/Time: Thu Oct 10, 2019 11:28 AM  Value: Patient is in need of community resources  ----------------    [facility]  consulted to assist with the following:  Date/Time: Thu Oct 10, 2019 11:28 AM  Value: other (see comment)  Comment: Advanced Care Planning,   ----------------      Reviewed and provided basic information on available community resources for mental health, transportation, wellness resources, and palliative care programs with patient and/or caregiver.      Complex Care Plan     Care plan was discussed and completed today with input from patient and/or caregiver.    Goals      Patient/caregiver will have an action plan in place to manage and prevent complications of  HTN prior to discharge from [facility]. - Priority: HIGH       Overall Time to Completion  2 months from 10/10/2019    [facility] Identified Patient Barriers:  Health Literacy  -Care Plan Created  Nutrition-----Care Plan Created  Advanced Care Planning-- -Referred to [facility]        [facility] Identified Education Barriers:  Education Barrier for Hypertension        Short Term Goals  Patient will have appropriate health related knowledge and understanding of nutritional requirements within 1 month.   Interventions   · Mail Blood pressure logs for home use.  · Recognize and provide educational material (KIRSTIN).      Status  · Partially met        Patient/caregiver will measure and record the blood pressure 1 times per day for 1 month.  Interventions   · Assess for availability of working blood pressure cuff in home setting.   10/10--Patient/Caregiver agrees to get new battery for existing BP machine by one week.  · 10/10--Patient/Caregiver agrees to OPCM follow up within one week to assess progress to goal.    ·      Status  · Partially met      Patient/caregiver will verbalize 3 food items, 3 healthy food preparation methods, 3 Seasoning alternatives for  Low Sodium within 2 weeks.  Interventions   · Recognize and provide educational material (KIRSTIN).   · 10/10-- Mailed KRAMES education materials.   · Foods----Fresh fruits, fresh vegs, lean meats (chicken, fish, turkey)  · Cooking -- Bake, Broil, Boil foods.------------------Began providing education 10/10  · Seasonings- Mrs. Dash, Pepper, Lemon Juice, onions---Began providing education 10/10  ·      Status  · Partially met      Patient/caregiver will verbalize 3 ways of preventing complications due to disease process within 1 month.  Interventions   · Empower patient/caregiver to discuss treatment plan with Physician/care team.  · Encourage compliance with Physician follow-ups.  · Encourage Dietary Compliance.  · Encourage Exercise.  · Encourage Medication Compliance.   · Daily BP checks and logging.  · Report persistently elevated BPs greater than 130/80.      Status  · Partially met            Clinical Reference Documents Added to Patient Instructions       Document    FALLS IN THE HOME, PREVENTING (ENGLISH)    HIGH BLOOD PRESSURE, WHAT IS?  (ENGLISH)    HYPERTENSION, ESTABLISHED (ENGLISH)    SALT, TIPS FOR USING LESS (ENGLISH)    STROKE AND HIGH BLOOD PRESSURE, UNDERSTANDING THE LINK BETWEEN (ENGLISH)             Patient/caregiver will have an action plan in place to manage and prevent complications of Sleep Apnea prior to discharge from OPCM. - Priority: HIGH      Overall Time to Completion  2 months from 10/10/2019    [facility] Identified Patient Barriers:  Health Literacy  Nutrition  Advanced Care Planning      OPCM Identified Education Barriers:  Education Barrier for Hypertension        Short Term Goals  Patient/caregiver will obtain CPAP to support disease process within 1 month.  Interventions                  Collaborate with Physician as appropriate to meet  patient needs as found necessary in promoting pt adherence to CPAP use.                10/10-- Explained the importance of using the CPAP-- to be sure pt is getting needed O2 while sleeps, needed to restore body's energy, to                            prevent neg affects on body with the CPAP ie heart disease, obesity, compromised functioning due to fatigue/sleepiness.  Done 10/10              10/10-Patient/Caregiver agrees to start wearing her CPAP within 2 wks.                10/10--Patient/Caregiver agrees to OPCM follow up within 2 weeks to assess progress to goal.        Status  · Partially met            Clinical Reference Documents Added to Patient Instructions       Document    FALLS IN THE HOME, PREVENTING (ENGLISH)    HIGH BLOOD PRESSURE, WHAT IS?  (ENGLISH)    HYPERTENSION, ESTABLISHED (ENGLISH)    SALT, TIPS FOR USING LESS (ENGLISH)    STROKE AND HIGH BLOOD PRESSURE, UNDERSTANDING THE LINK BETWEEN (ENGLISH)             Patient/caregiver will have knowledge of resources available in order to obtain the services that are needed prior to discharge from OPCM. - Priority: MOD      Overall Time to Completion  2 months from 10/10/2019    OPCM Identified Patient Barriers:  Health Literacy  Nutrition  Advanced Care Planning       OPCM Identified Education Barriers:  Education Barrier for Hypertension      Short Term Goals  Patient/caregiver will have contact information for identified community resources IE:OPCM  for follow-up within 1 month.  Interventions   · Mail [employer] O[employer] Benefit Contact Information.     Done 10/10  · Refer to Outpatient Case Management Social Worker.  Done 10/10  Attempted to provide pt with contact for this RN CM and OOC however pt very sleepy and pen not writing.  Mailed contact information along with Welcome Letter, education material, [facility] OTC 2019 catalog, BP logs.   10/10-- Omitted PHQ2. Assess on next encounter.       Status  · Partially met            Clinical Reference  "Documents Added to Patient Instructions       Document    FALLS IN THE HOME, PREVENTING (ENGLISH)    HIGH BLOOD PRESSURE, WHAT IS?  (ENGLISH)    HYPERTENSION, ESTABLISHED (ENGLISH)    SALT, TIPS FOR USING LESS (ENGLISH)    STROKE AND HIGH BLOOD PRESSURE, UNDERSTANDING THE LINK BETWEEN (ENGLISH)                  Patient Instructions     Instructions were provided via the [email or web address] patient resources and are available for the patient to view on the patient portal.    Follow up in 8 days (on 10/9/2019) for OPCM Initial Assessment.     Summary:  Pt underwent riya cat extractions, latest 9/30/19 with next post op appt 11/8. Pt is aware of PCP referral to OP PT/OT however at this point, pt states plans to wait until she is discharged from Ophthalm first. Pt states as well, "I don't know where I am suppose to go". Referral appears to include either [place] or [facility] sites. More clarification can be made as pt determines she is ready to participate in OP therapy. Pt aware of need to have her BP machine in working condition for daily BP checks. Pt to replace battery. Pt aware that she should resume CPAP use.   Pt gives this RN CM permission to add her daughter's name to contacts in Dem[employer]--done. Pt admits to falling asleep during encounter and this RN CM failed to complete PHQ2 or fall check. Will f/u with pt.   Pt agrees to f/u call from RN CM in one week and agrees to be contacted by OPC LCSW for advance care planning.       Todays OPC Self-Management Care Plan was developed with the patients/caregivers input and was based on identified barriers from todays assessment.  Goals were written today with the patient/caregiver and the patient has agreed to work towards these goals to improve his/her overall well-being. Patient verbalized understanding of the care plan, goals, and all of today's instructions. Encouraged patient/caregiver to communicate with his/her physician and health care team about health " conditions and the treatment plan.  Provided my contact information today and encouraged patient/caregiver to call me with any questions as needed.

## 2019-10-14 ENCOUNTER — OUTPATIENT CASE MANAGEMENT (OUTPATIENT)
Dept: ADMINISTRATIVE | Facility: OTHER | Age: 79
End: 2019-10-14

## 2019-10-14 NOTE — PROGRESS NOTES
Summary:  Reviewed chart prior to contact.  She is a [facility] Patient.  Forwarded referral to Monae Angulo LMSW.    Interventions:  None    Plan:  None

## 2019-10-25 ENCOUNTER — OUTPATIENT CASE MANAGEMENT (OUTPATIENT)
Dept: ADMINISTRATIVE | Facility: OTHER | Age: 79
End: 2019-10-25

## 2019-10-25 NOTE — PROGRESS NOTES
Outpatient Care Management  Plan of Care Follow Up Visit    Patient: Kelsey Fields  MRN: [id number]  Date of Service: 10/25/2019  Completed by: Dina Rodas RN  Referral Date: 09/24/2019  Program: Case Management (High Risk)    No chief complaint on file.    Summary:  Incoming call from pt to report receipt of mailings and to state her appreciation. Pt rambles as she asks about where to locate the [facility]. Pt actually referring to OP PT/OT ordered 9/25/19 by PCP. Pt remains undecided about OP therapy due to the question of the exact location. Pt's night work schedule alternates 4 nights one week/3 nights the next. Pt continues to drive. Pt was advised on the exact address for the [facility] site [facility]. Pt prefers to visit the location in person & possibly schedule her appt herself. Pt denies further falls. C/o stooped posture, questions use of a back brace. Pt advised to direct such questions to a PT or to her PCP.   Pt to benefit from continue education on HTN/PreDiabetes management.       Patient Summary     Involvement of Care:  Do I have permission to speak with other family members about your care?       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    Patient Reported Labs & Vitals:  1.  Any Patient Reported Labs & Vitals?     2.  Patient Reported Blood Pressure:     3.  Patient Reported Pulse:     4.  Patient Reported Weight (Kg):     5.  Patient Reported Blood Glucose (mg/dl):       Medical History:  Reviewed medical history with patient and/or caregiver    Social History:  Reviewed social history with patient and/or caregiver    Clinical Assessment     Reviewed and provided basic information on available community resources for mental health, transportation, wellness resources, and palliative care programs with patient and/or caregiver.    Complex Care Plan     Care plan was discussed and completed today with input from patient and/or caregiver.    Goals        Outpatient Case Management     Patient/caregiver will have an action plan in place to manage and prevent complications of  HTN prior to discharge from OPCM. - Priority: HIGH       Overall Time to Completion  2 months from 10/10/2019    OPCM Identified Patient Barriers:  Health Literacy  -Care Plan Created  Nutrition-----Care Plan Created  Advanced Care Planning-- -Referred to OPCM        OPCM Identified Education Barriers:  Education Barrier for Hypertension   -Care Plan Created        Short Term Goals  Patient will have appropriate health related knowledge and understanding of nutritional requirements within 1 month.   Interventions   · Mail Blood pressure logs for home use.  · Recognize and provide educational material (KIRSTIN).      Status  · Partially met        Patient/caregiver will measure and record the blood pressure 1 times per day for 1 month.  Interventions   · Assess for availability of working blood pressure cuff in home setting.   10/10--Patient/Caregiver agrees to get new battery for existing BP machine by one week.  · 10/10--Patient/Caregiver agrees to OPCM follow up within one week to assess progress to  goal.   · 10/25-- Pt reports replacing the batteries to BP machine and compared her BP reading to the reading from a friend's BP and found a difference in her reading being higher on hers--- 151/76 vs 135/70 on friend's.  ·  Pt plans to put in another battery.   · 10/25---Provided reinforcement to pt's efforts to get BP machine working to begin daily BP checks.   10/25- Patient/Caregiver agrees to try different batteries for BP machine within one week.  · Patient/Caregiver agrees to OPCM follow up within one week to assess progress to goal.      Status  · Partially met      Patient/caregiver will verbalize 3 food items, 3 healthy food preparation methods, 3 Seasoning alternatives for  Low Sodium within 2 weeks.  Interventions   · Recognize and provide educational material (KIRSTIN).   · 10/10-- Mailed KRAMES education materials.   · Foods----Fresh fruits, fresh vegs, lean meats (chicken, fish, turkey)  · Cooking -- Bake, Broil, Boil foods.------------------Began providing education 10/10  · Seasonings- Mrs. Dash, Pepper, Lemon Juice, onions---Began providing education-- 10/10  ·      Status  · Partially met      Patient/caregiver will verbalize 3 ways of preventing complications due to disease process within 1 month.  Interventions   · Empower patient/caregiver to discuss treatment plan with Physician/care team.  · Encourage compliance with Physician follow-ups.  · Encourage Dietary Compliance.  · Encourage Exercise.  · Encourage Medication Compliance.   · Daily BP checks and logging.  · Report persistently elevated BPs greater than 130/80.      Status  · Partially met            Clinical Reference Documents Added to Patient Instructions       Document    FALLS IN THE HOME, PREVENTING (ENGLISH)    HIGH BLOOD PRESSURE, WHAT IS?  (ENGLISH)        SALT, TIPS FOR USING LESS (ENGLISH)    STROKE AND HIGH BLOOD PRESSURE, UNDERSTANDING THE LINK BETWEEN (ENGLISH)             Patient/caregiver will have an action plan in  place to manage and prevent complications of PreDiabetes  prior to discharge from OPCM. - Priority: HIGH      Overall Time to Completion  1 month from 10/25/2019     [facility] Identified Patient Barriers:  Health Literacy  -Care Plan Created  Nutrition-----Care Plan Created  Advanced Care Planning-- -Referred to [facility]        OPCM Identified Education Barriers:  Education Barrier for Hypertension   -Care Plan Created         Short Term Goals  Patient/caregiver will verbalize food required to create a well-balanced food plate within 1 month.  Interventions   · Encourage Dietary Compliance.  · Recognize and provide educational material (KIRSTIN).  · Review eating/nutrition habits.   ·  10/25- Pt c/o her toe hurting after she ate a cobbler. Pt relates her pain to information she learned from PCP regarding diabetes & potential complications. Pt says I really need to watch the salt and the sugar.   · 10/25- Mailed education material on Pre Diabetes and MyFood Plate.      Status  · Partially met           Clinical Reference Documents Added to Patient Instructions       Document    PREDIABETES (ENGLISH)    USDA MYPLATE, UNDERSTANDING (ENGLISH)             Patient/caregiver will have an action plan in place to manage and prevent complications of Sleep Apnea prior to discharge from OPCM. - Priority: HIGH      Overall Time to Completion  2 months from 10/10/2019    [facility] Identified Patient Barriers:  Health Literacy  -Care Plan Created  Nutrition-----Care Plan Created  Advanced Care Planning-- -Referred to [facility]        [facility] Identified Education Barriers:  Education Barrier for Hypertension   -Care Plan Created        Short Term Goals  Patient/caregiver will obtain CPAP to support disease process within 1 month.  Interventions                  Collaborate with Physician as appropriate to meet patient needs as found necessary in promoting pt adherence to CPAP use.                10/10-- Explained the importance  of using the CPAP-- to be sure pt is getting needed O2 while sleeps, needed to restore body's energy, to                            prevent neg affects on body with the CPAP ie heart disease, obesity, compromised functioning due to fatigue/sleepiness.  Done 10/10              10/10-Patient/Caregiver agrees to start wearing her CPAP within 2 wks.                10/10--Patient/Caregiver agrees to OPCM follow up within 2 weeks to assess progress to goal.        Status  · Partially met            Clinical Reference Documents Added to Patient Instructions       Document    FALLS IN THE HOME, PREVENTING (ENGLISH)    HIGH BLOOD PRESSURE, WHAT IS?  (ENGLISH)        SALT, TIPS FOR USING LESS (ENGLISH)    STROKE AND HIGH BLOOD PRESSURE, UNDERSTANDING THE LINK BETWEEN (ENGLISH)             Patient/caregiver will have knowledge of resources available in order to obtain the services that are needed prior to discharge from OPCM. - Priority: MOD      Overall Time to Completion  2 months from 10/10/2019      OPCM Identified Patient Barriers:  Health Literacy  -Care Plan Created  Nutrition-----Care Plan Created  Advanced Care Planning-- -Referred to OPCM      OPCM Identified Education Barriers:  Education Barrier for Hypertension    Short Term Goals  Patient/caregiver will have contact information for identified community resources IE:[facility]  for follow-up within 1 month.  Interventions   · Mail [employer] OTC Benefit Contact Information.     Done 10/10  · Refer to Outpatient Case Management Social Worker.  Done 10/10  Attempted to provide pt with contact for this RN CM and OOC however pt very sleepy and pen not writing.  Mailed contact information along with Welcome Letter, education material, [facility] OTC 2019 catalog, BP logs.   10/10-- Omitted PHQ2. Assess on next encounter.   10/25- Completed PHQ2=0.   10/25-- . Incoming call from pt today who wanted to let YANETH DOWNS know that she received the mailings and that she  is very thankful to receive them, still looking through them and will look at the 2019 [facility] OTC catalog. Pt tammie, asks about [facility] location.   Provided explanation of the location being the [facility], [address], [address]-- (not the fitness center across street). . Pt plans to drive by to learn the location & stop in, in person for an appt possibly. Pt undecided about attending OP therapy,referral placed 9/25/19 for PT/OT. Pt does not wish to be contacted to schedule initial appt.       Status  · Partially met            Clinical Reference Documents Added to Patient Instructions       Document    FALLS IN THE HOME, PREVENTING (ENGLISH)    HIGH BLOOD PRESSURE, WHAT IS?  (ENGLISH)        SALT, TIPS FOR USING LESS (ENGLISH)    STROKE AND HIGH BLOOD PRESSURE, UNDERSTANDING THE LINK BETWEEN (ENGLISH)                  Patient Instructions     Instructions were provided via the [employer] patient resources and are available for the patient to view on the patient portal.    No follow-ups on file.      Todays OPCM Self-Management Care Plan was developed with the patients/caregivers input and was based on identified barriers from todays assessment.  Goals were written today with the patient/caregiver and the patient has agreed to work towards these goals to improve his/her overall well-being. Patient verbalized understanding of the care plan, goals, and all of today's instructions. Encouraged patient/caregiver to communicate with his/her physician and health care team about health conditions and the treatment plan.  Provided my contact information today and encouraged patient/caregiver to call me with any questions as needed.

## 2019-11-08 ENCOUNTER — OFFICE VISIT (OUTPATIENT)
Dept: OPTOMETRY | Facility: CLINIC | Age: 79
End: 2019-11-08
Payer: MEDICARE

## 2019-11-08 DIAGNOSIS — Z98.42 S/P BILATERAL CATARACT EXTRACTION: Primary | ICD-10-CM

## 2019-11-08 DIAGNOSIS — Z98.41 S/P BILATERAL CATARACT EXTRACTION: Primary | ICD-10-CM

## 2019-11-08 PROCEDURE — 99024 PR POST-OP FOLLOW-UP VISIT: ICD-10-PCS | Mod: HCNC,S$GLB,, | Performed by: OPTOMETRIST

## 2019-11-08 PROCEDURE — 99999 PR PBB SHADOW E&M-EST. PATIENT-LVL II: CPT | Mod: PBBFAC,HCNC,, | Performed by: OPTOMETRIST

## 2019-11-08 PROCEDURE — 99999 PR PBB SHADOW E&M-EST. PATIENT-LVL II: ICD-10-PCS | Mod: PBBFAC,HCNC,, | Performed by: OPTOMETRIST

## 2019-11-08 PROCEDURE — 99024 POSTOP FOLLOW-UP VISIT: CPT | Mod: HCNC,S$GLB,, | Performed by: OPTOMETRIST

## 2019-11-08 NOTE — PROGRESS NOTES
HPI     Post-op Evaluation      Additional comments: 5 wk phaco OD              Comments     Last eye exam was 10/1/19 with Dr. De Souza.  Patient states vision is better since cataract sx's. OD still feels sore   since the surgery-wanted to know if she need to use the PF again. Also   wanted to know what caused eyebrows and eyelids to droop.    09/30/2019 IMPLANT: pcboo 23.0 OD  09/16/2019 IMPLANT: pcboo 23.0 OS          Last edited by Little Dunlap on 11/8/2019 10:35 AM. (History)            Assessment /Plan     For exam results, see Encounter Report.    S/P bilateral cataract extraction  -     OCT- Retina          1.  Pt doing well.  Bifocal rx given.  Artificial tears as needed.  No CME OU.  Retina flat and intact OU--no holes, tears, breaks, or RDs.    RTC 1 year for routine exam.

## 2019-11-21 ENCOUNTER — PATIENT OUTREACH (OUTPATIENT)
Dept: ADMINISTRATIVE | Facility: HOSPITAL | Age: 79
End: 2019-11-21

## 2019-11-21 NOTE — PROGRESS NOTES
Chart Reviewed- VM left, made aware to take scheduled Am blood pressure medications and to bring blood pressure readings and monitor to PCP visit.

## 2019-12-02 ENCOUNTER — OUTPATIENT CASE MANAGEMENT (OUTPATIENT)
Dept: ADMINISTRATIVE | Facility: OTHER | Age: 79
End: 2019-12-02

## 2019-12-02 ENCOUNTER — CLINICAL SUPPORT (OUTPATIENT)
Dept: REHABILITATION | Facility: HOSPITAL | Age: 79
End: 2019-12-02
Attending: INTERNAL MEDICINE
Payer: MEDICARE

## 2019-12-02 DIAGNOSIS — R26.9 GAIT ABNORMALITY: ICD-10-CM

## 2019-12-02 DIAGNOSIS — M54.41 BILATERAL LOW BACK PAIN WITH BILATERAL SCIATICA: Primary | ICD-10-CM

## 2019-12-02 DIAGNOSIS — G89.29 CHRONIC PAIN: ICD-10-CM

## 2019-12-02 DIAGNOSIS — R29.898 BILATERAL LEG WEAKNESS: ICD-10-CM

## 2019-12-02 DIAGNOSIS — R29.3 POOR POSTURE: ICD-10-CM

## 2019-12-02 DIAGNOSIS — M54.42 BILATERAL LOW BACK PAIN WITH BILATERAL SCIATICA: Primary | ICD-10-CM

## 2019-12-02 DIAGNOSIS — G89.29 CHRONIC BILATERAL LOW BACK PAIN WITHOUT SCIATICA: ICD-10-CM

## 2019-12-02 DIAGNOSIS — M54.50 CHRONIC BILATERAL LOW BACK PAIN WITHOUT SCIATICA: ICD-10-CM

## 2019-12-02 PROCEDURE — 97161 PT EVAL LOW COMPLEX 20 MIN: CPT | Mod: HCNC

## 2019-12-02 PROCEDURE — G8978 MOBILITY CURRENT STATUS: HCPCS | Mod: CK,HCNC

## 2019-12-02 PROCEDURE — G8979 MOBILITY GOAL STATUS: HCPCS | Mod: CJ,HCNC

## 2019-12-03 PROBLEM — M54.9 BACK PAIN: Chronic | Status: ACTIVE | Noted: 2019-12-03

## 2019-12-03 PROBLEM — R29.898 BILATERAL LEG WEAKNESS: Status: ACTIVE | Noted: 2019-12-03

## 2019-12-03 PROBLEM — M54.9 BACK PAIN: Status: RESOLVED | Noted: 2019-12-03 | Resolved: 2019-12-03

## 2019-12-03 PROBLEM — M54.9 BACK PAIN: Status: ACTIVE | Noted: 2019-12-03

## 2019-12-03 PROBLEM — R26.9 GAIT ABNORMALITY: Status: ACTIVE | Noted: 2019-12-03

## 2019-12-03 PROBLEM — R29.3 POOR POSTURE: Status: ACTIVE | Noted: 2019-12-03

## 2019-12-03 NOTE — PROGRESS NOTES
Outpatient Care Management  Plan of Care Follow Up Visit    Patient: Kelsey Fields  MRN: [id number]  Date of Service: 12/3/2019  Completed by: Dina Rodas RN  Referral Date: 09/24/2019  Program: Case Management (High Risk)    Reason for Visit   Patient presents with    OPCM RN First Follow-Up Attempt     12/2/19  Pt driving back from OP PT; agrees to be called back in AM.     Update Plan Of Care     12/3/19       Brief Summary:   F/u call to pt. The following in basket message was sent to Dr. Todd and Monae Angulo, OPCM LMSW as result of today's findings:  Pt's situation is a concern when making my follow up call to Ms. Fields today. Pt always sounds slow and tired. She continues to work nights 10 pm-6 am at the [employer], alternating 4 x/3x wk schedule. She admits to being so tired when she comes home that she falls asleep without using her CPAP, without checking her BP, eating whatever she can, and letting her mail pile up. Pt would like PCP to know about her left foot 4th toe pain she blames on eating sweet food. Pt has questions about her diet and asks for a Nutrition Consult. I have fallen behind in my pt contact and will address healthy meals low in Na and Low in sugar/carbs.   Pt admits to some depression related to recent conversations her sister has had following a recent death in the family. Pt lacks adequate support and may not be incline to want assistance. Pt admits to not feeling motivated.   It seems like perhaps a home visit or Palliative Care may be a plan. Pt is walking a fine line with her health.   Pt tells me she will check her BPs daily this week &  bring her BP machine (pt not sure if it is accurate) along with her BP log to upcoming PCP appt 12/10.    Pt mentions not wanting to go to a NH. Pt doesn't recall receiving education materials mailed.     Patient Summary     Involvement of Care:  Do I have permission to speak with other family members about your care?        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    Anemia due to stage 3 chronic kidney disease    Stage 3 chronic kidney disease    Acute right ankle pain    Vitamin D deficiency    Slow transit constipation    At risk for decreased bone density    Wrist pain, chronic, right    Nuclear sclerosis, bilateral    Chronic bilateral low back pain with bilateral sciatica    Post-operative state    Nuclear sclerotic cataract of right eye    Back pain    Poor posture    Bilateral leg weakness    Gait abnormality       Reviewed Active Problem List with patient and/or Caregiver.    Patient Reported Labs & Vitals:  1.  Any Patient Reported Labs & Vitals?     2.  Patient Reported Blood Pressure:     3.  Patient Reported Pulse:     4.  Patient Reported Weight (Kg):     5.  Patient Reported Blood Glucose (mg/dl):       Medical History:  Reviewed medical history with patient and/or caregiver    Social History:  Reviewed social history with patient and/or caregiver    Clinical Assessment     Reviewed and provided basic information on available community resources for mental health, transportation, wellness resources, and palliative care programs with patient and/or caregiver.    Complex Care Plan     Care plan was discussed and completed today with input from patient and/or caregiver.    Goals        Outpatient Case Management     Patient/caregiver will have an action plan in place to manage and prevent complications of  HTN prior to discharge from OPCM. - Priority: HIGH       Overall Time to Completion  2 months from  10/10/2019    OPCM Identified Patient Barriers:  Health Literacy  -Care Plan Created  Nutrition-----Care Plan Created  Advanced Care Planning-- -Referred to OPCM        OPCM Identified Education Barriers:  Education Barrier for Hypertension   -Care Plan Created        Short Term Goals  Patient will have appropriate health related knowledge and understanding of nutritional requirements within 1 month.   Interventions   · Mail Blood pressure logs for home use.  · Recognize and provide educational material (KIRSTIN).   · 12/3-- Pt is not sure about her dietary needs and expresses interest in Nutrition Consult.       Status  · Partially met        Patient/caregiver will measure and record the blood pressure 1 times per day for 1 month.  Interventions   · Assess for availability of working blood pressure cuff in home setting.   10/10--Patient/Caregiver agrees to get new battery for existing BP machine by one week.  · 10/10--Patient/Caregiver agrees to OPCM follow up within one week to assess progress to goal.   · 10/25-- Pt reports replacing the batteries to BP machine and compared her BP reading to the reading from a friend's BP and found a difference in her reading being higher on hers--- 151/76 vs 135/70 on friend's.  ·  Pt plans to put in another battery.   · 10/25---Provided reinforcement to pt's efforts to get BP machine working to begin daily BP checks.   10/25- Patient/Caregiver agrees to try different batteries for BP machine within one week.  · Patient/Caregiver agrees to OPCM follow up within one week to assess progress to goal.   ·   · 12/3- Pt states she doesn't think her BP is not working quite right. Pt report attending the [facility] regularly and got a raffled off BP machine some time ago. 10/22= 149/71, 136/69, 135/71, 11/19= 131/74. Pt expresses concern over family h/o mother having a stroke.   · 12/3- Instructed pt to bring her BP machine and log to upcoming PCP appt with   Peyton.  ·  12/3-- In basket message sent to PCP today includes mention of pt not checking BPs --agrees to do so this week and to bring her log and BP mach to be checked at 12/10 PCP appt .  · 12/3-  Patient/Caregiver agrees to check/log BPs daily and bring to PCP 12/10.   · Patient/Caregiver agrees to OPCM follow up within one week to assess progress to goal.   ·      Status  · Partially met      Patient/caregiver will verbalize 3 food items, 3 healthy food preparation methods, 3 Seasoning alternatives for  Low Sodium within 2 weeks.  Interventions   · Recognize and provide educational material ([employer]roland).   · 10/10-- Mailed [email or web address] education materials.   · Foods----Fresh fruits, fresh vegs, lean meats (chicken, fish, turkey)  · Cooking -- Bake, Broil, Boil foods.------------------Began providing education 10/10  · Seasonings- Mrs. Dash, Pepper, Lemon Juice, onions---Began providing education-- 10/10  ·      Status  · Partially met      Patient/caregiver will verbalize 3 ways of preventing complications due to disease process within 1 month.  Interventions   · Empower patient/caregiver to discuss treatment plan with Physician/care team.  · Encourage compliance with Physician follow-ups.  · Encourage Dietary Compliance.  · Encourage Exercise.  · Encourage Medication Compliance.   · Daily BP checks and logging.  · Report persistently elevated BPs greater than 130/80.   · 12/3--Pt admits to eating foods that are sugary and that she needs to watch the salt intake more.      Status  · Partially met            Clinical Reference Documents Added to Patient Instructions       Document    FALLS IN THE HOME, PREVENTING (ENGLISH)    HIGH BLOOD PRESSURE, WHAT IS?  (ENGLISH)        SALT, TIPS FOR USING LESS (ENGLISH)    STROKE AND HIGH BLOOD PRESSURE, UNDERSTANDING THE LINK BETWEEN (ENGLISH)             Patient/caregiver will have an action plan in place to manage and prevent complications of PreDiabetes  prior to discharge  "from [facility]. - Priority: HIGH      Overall Time to Completion  1 month from 10/25/2019     OPCM Identified Patient Barriers:  Health Literacy  -Care Plan Created  Nutrition-----Care Plan Created  Advanced Care Planning-- -Referred to OPCM        OPCM Identified Education Barriers:  Education Barrier for Hypertension   -Care Plan Created         Short Term Goals  Patient/caregiver will verbalize food required to create a well-balanced food plate within 1 month.  Interventions   · Encourage Dietary Compliance.  · Recognize and provide educational material (KIRSTIN).  · Review eating/nutrition habits.   ·  10/25- Pt c/o her toe hurting after she ate a cobbler. Pt relates her pain to information she learned from PCP regarding diabetes & potential complications. Pt says I really need to watch the salt and the sugar.   · 10/25- Mailed education material on Pre Diabetes and MyFood Plate.   · 12/3- Pt is not sure if she received the mailings. Pt states not looking through her mail and letting it pile up. Pt states however, getting the pill box mailed to pt by this RN CM.   · 12/3- Pt c/o left 4th toe hurting after she ate something sweet. Pt states, I guess I will have to stop eating sweets because the pain is not worth it". Pt c/o the pain associated with prior finger sticks for BG checks. Pt states, she couldn't check her BGs because of the pain. Pt is prediabetic, does not possess own glucometer at this time.     12/3-- In basket message sent to Dr. Todd with concerns -- pt in need of Nutrition Consult and yet at the same time, pt is overwhelmed/exhausted from working nights to properly focus and care for her daily health needs.      Status  · Partially met           Clinical Reference Documents Added to Patient Instructions       Document    PREDIABETES (ENGLISH)    USDA MYPLATE, UNDERSTANDING (ENGLISH)             Patient/caregiver will have an action plan in place to manage and prevent complications " of Sleep Apnea prior to discharge from OPCM. - Priority: HIGH      Overall Time to Completion  2 months from 10/10/2019    OPCM Identified Patient Barriers:  Health Literacy  -Care Plan Created  Nutrition-----Care Plan Created  Advanced Care Planning-- -Referred to OPCM        OPCM Identified Education Barriers:  Education Barrier for Hypertension   -Care Plan Created        Short Term Goals  Patient/caregiver will obtain CPAP to support disease process within 1 month.  Interventions                  Collaborate with Physician as appropriate to meet patient needs as found necessary in promoting pt adherence to CPAP use.                10/10-- Explained the importance of using the CPAP-- to be sure pt is getting needed O2 while sleeps, needed to restore body's energy, to                            prevent neg affects on body with the CPAP ie heart disease, obesity, compromised functioning due to fatigue/sleepiness.  Done 10/10              10/10-Patient/Caregiver agrees to start wearing her CPAP within 2 wks.    Patient/Caregiver agrees to OPCM follow up within 2 weeks to assess progress to goal.     12/3-  Pt states she hasn't been using the CPAP but that she will start. Pt explains how she comes home so tired she lays down on the bed and falls asleep. Pt confirms receiving the needed tubing CPAP supplies in the mail.   Instructed pt to use and keep clean tubing/CPAP supplies.  12/3-  Patient/Caregiver agrees to start using her CPAP by one week.   Patient/Caregiver agrees to OPCM follow up within one week to assess progress to goal.   12/3- In basket message to PCP sent today includes pt's lack of using CPAP.        Status  · Partially met            Clinical Reference Documents Added to Patient Instructions       Document    FALLS IN THE HOME, PREVENTING (ENGLISH)    HIGH BLOOD PRESSURE, WHAT IS?  (ENGLISH)        SALT, TIPS FOR USING LESS (ENGLISH)    STROKE AND HIGH BLOOD PRESSURE, UNDERSTANDING THE  LINK BETWEEN (ENGLISH)             Patient/caregiver will have knowledge of resources available in order to obtain the services that are needed prior to discharge from [facility]. - Priority: MOD      Overall Time to Completion  2 months from 10/10/2019      [facility] Identified Patient Barriers:  Health Literacy  -Care Plan Created  Nutrition-----Care Plan Created  Advanced Care Planning-- -Referred to [facility]      OPC Identified Education Barriers:  Education Barrier for Hypertension    Short Term Goals  Patient/caregiver will have contact information for identified community resources IE:[facility]  for follow-up within 1 month.  Interventions   · Mail [employer] OTC Benefit Contact Information.     Done 10/10  · Refer to Outpatient Case Management Social Worker.  Done 10/10  Attempted to provide pt with contact for this RN CM and OOC however pt very sleepy and pen not writing.  Mailed contact information along with Welcome Letter, education material, [facility] OTC 2019 catalog, BP logs.   10/10-- Omitted PHQ2. Assess on next encounter.   10/25- Completed PHQ2=0.   10/25-- Pt confirms receipt of mailings as reason for her incoming call. Pt is thankful, still looking through them and will look at the 2019 [employer] OTC catalog.    10/25- OPC LCSW assessment pending. Done 12/2  10/25-- Pt confirms receipt of mailings calling this RN CM with the purpose of informing this.     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       Status  · Partially met            Clinical Reference Documents Added to Patient Instructions       Document    FALLS IN THE HOME, PREVENTING (ENGLISH)    HIGH BLOOD PRESSURE, WHAT IS?  (ENGLISH)        SALT, TIPS FOR USING LESS (ENGLISH)    STROKE AND HIGH BLOOD PRESSURE, UNDERSTANDING THE LINK  BETWEEN (ENGLISH)                  Patient Instructions     Instructions were provided via the [employer] patient resources and are available for the patient to view on the patient portal.    Next Steps:  F/u on outcome of PCP appt 12/10. Continue to provide pt with education on low na/low carb foods, BP monitoring, CPAP use, adequate sleep, collaborate with OPCM LMSW.     Follow up in about 10 days (around 12/12/2019) for OPCM RN Follow Up to update care plan.      Todays OPCM Self-Management Care Plan was developed with the patients/caregivers input and was based on identified barriers from todays assessment.  Goals were written today with the patient/caregiver and the patient has agreed to work towards these goals to improve his/her overall well-being. Patient verbalized understanding of the care plan, goals, and all of today's instructions. Encouraged patient/caregiver to communicate with his/her physician and health care team about health conditions and the treatment plan.  Provided my contact information today and encouraged patient/caregiver to call me with any questions as needed.
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
## 2019-12-12 ENCOUNTER — OUTPATIENT CASE MANAGEMENT (OUTPATIENT)
Dept: ADMINISTRATIVE | Facility: OTHER | Age: 79
End: 2019-12-12

## 2019-12-12 NOTE — PROGRESS NOTES
Outpatient Care Management  Plan of Care Follow Up Visit    Patient: Kelsey Fields  MRN: [id number]  Date of Service: 12/12/2019  Completed by: Dina Rodas RN  Referral Date: 09/24/2019  Program: Case Management (High Risk)    Reason for Visit   Patient presents with    OPCM RN First Follow-Up Attempt     12/12/19 Pt reports she is at [facility].       Brief Summary:   Return call from pt this AM. Pt busy getting her breakfast. Pt assures RN CM that she is aware of her rescheduled PCP appt 12/13 and plans to attend appt. Pt with c/o leg cramps. Reinforced pt going to her PCP appt with added reason to address her leg cramp complaint.   Pt agrees to f/u RN CM call in next week.     Patient Summary     Involvement of Care:  Do I have permission to speak with other family members about your care?       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    Mild intermittent asthma without complication    Chronic fatigue    Prediabetes    Anemia due to stage 3 chronic kidney disease    Stage 3 chronic kidney disease    Acute right ankle pain    Vitamin D deficiency    Slow transit constipation    At risk for decreased bone density    Wrist pain, chronic, right    Nuclear sclerosis, bilateral    Chronic bilateral low back pain with bilateral sciatica    Post-operative state    Nuclear sclerotic cataract of right eye    Back pain    Poor posture    Bilateral leg weakness    Gait abnormality       Reviewed Active Problem List with patient and/or Caregiver.    Patient Reported Labs & Vitals:  1.  Any Patient Reported Labs & Vitals?     2.  Patient Reported Blood Pressure:     3.  Patient Reported Pulse:     4.  Patient Reported Weight (Kg):     5.  Patient  Reported Blood Glucose (mg/dl):       Medical History:  Reviewed medical history with patient and/or caregiver    Social History:  Reviewed social history with patient and/or caregiver    Clinical Assessment     Reviewed and provided basic information on available community resources for mental health, transportation, wellness resources, and palliative care programs with patient and/or caregiver.    Complex Care Plan     Care plan was discussed and completed today with input from patient and/or caregiver.    Goals        Outpatient Case Management     Patient/caregiver will have an action plan in place to manage and prevent complications of  HTN prior to discharge from OPCM. - Priority: HIGH       Overall Time to Completion  2 months from 10/10/2019    OPCM Identified Patient Barriers:  Health Literacy  -Care Plan Created  Nutrition-----Care Plan Created  Advanced Care Planning-- -Referred to OPCM        OPCM Identified Education Barriers:  Education Barrier for Hypertension   -Care Plan Created        Short Term Goals  Patient will have appropriate health related knowledge and understanding of nutritional requirements within 1 month.   Interventions   · Mail Blood pressure logs for home use.  · Recognize and provide educational material (KIRSTIN).   · 12/3-- Pt is not sure about her dietary needs and expresses interest in Nutrition Consult.       Status  · Partially met        Patient/caregiver will measure and record the blood pressure 1 times per day for 1 month.  Interventions   · Assess for availability of working blood pressure cuff in home setting.   10/10--Patient/Caregiver agrees to get new battery for existing BP machine by one week.  · 10/10--Patient/Caregiver agrees to OPCM follow up within one week to assess progress to goal.   · 10/25-- Pt reports replacing the batteries to BP machine and compared her BP reading to the reading from a friend's BP and found a difference in her reading being  higher on hers--- 151/76 vs 135/70 on friend's.  ·  Pt plans to put in another battery.   · 10/25---Provided reinforcement to pt's efforts to get BP machine working to begin daily BP checks.   10/25- Patient/Caregiver agrees to try different batteries for BP machine within one week.  · Patient/Caregiver agrees to OPCM follow up within one week to assess progress to goal.   ·   · 12/3- Pt states she doesn't think her BP is not working quite right. Pt report attending the [facility] regularly and got a raffled off BP machine some time ago. 10/22= 149/71, 136/69, 135/71, 11/19= 131/74. Pt expresses concern over family h/o mother having a stroke.   · 12/3- Instructed pt to bring her BP machine and log to upcoming PCP appt with Dr Todd.  ·  12/3-- In basket message sent to PCP today includes mention of pt not checking BPs --agrees to do so this week and to bring her log and BP mach to be checked at 12/10 PCP appt .  · 12/3-  Patient/Caregiver agrees to check/log BPs daily and bring to PCP 12/10.   · Patient/Caregiver agrees to OPCM follow up within one week to assess progress to goal.   · 12/12-- PCP appt rescheduled from 12/10 to 12/13. Attempt contact with pt finds pt at [facility].   · 12/12--Call back message received from pt. Upon calling pt soon thereafter, pt reports busy having her breakfast. Phone contact brief. Reinforced pt attending tomorrow's PCP appt 12/13 at 10:30 am for a f/u BP check. Pt c/o leg cramps, states she plans to go to appt.   Patient/Caregiver agrees to attend PCP appt 12/13.  · Patient/Caregiver agrees to OPCM follow up within one week to assess progress to goal.   ·      Status  · Partially met      Patient/caregiver will verbalize 3 food items, 3 healthy food preparation methods, 3 Seasoning alternatives for  Low Sodium within 2 weeks.  Interventions   · Recognize and provide educational material (KIRSTIN).   · 10/10-- Mailed KRAMES education materials.   · Foods----Fresh  fruits, fresh vegs, lean meats (chicken, fish, turkey)  · Cooking -- Bake, Broil, Boil foods.------------------Began providing education 10/10  · Seasonings- Mrs. Dash, Pepper, Lemon Juice, onions---Began providing education-- 10/10  ·      Status  · Partially met      Patient/caregiver will verbalize 3 ways of preventing complications due to disease process within 1 month.  Interventions   · Empower patient/caregiver to discuss treatment plan with Physician/care team.  · Encourage compliance with Physician follow-ups.  · Encourage Dietary Compliance.  · Encourage Exercise.  · Encourage Medication Compliance.   · Daily BP checks and logging.  · Report persistently elevated BPs greater than 130/80.   · 12/3--Pt admits to eating foods that are sugary and that she needs to watch the salt intake more.      Status  · Partially met            Clinical Reference Documents Added to Patient Instructions       Document    FALLS IN THE HOME, PREVENTING (ENGLISH)    HIGH BLOOD PRESSURE, WHAT IS?  (ENGLISH)        SALT, TIPS FOR USING LESS (ENGLISH)    STROKE AND HIGH BLOOD PRESSURE, UNDERSTANDING THE LINK BETWEEN (ENGLISH)             Patient/caregiver will have an action plan in place to manage and prevent complications of PreDiabetes  prior to discharge from [facility]. - Priority: HIGH      Overall Time to Completion  1 month from 10/25/2019     [facility]:  Health Literacy  -Care Plan Created  Nutrition-----Care Plan Created  Advanced Care Planning-- [facility]        [facility] Identified Education Barriers:  Education Barrier for Hypertension   -Care Plan Created         Short Term Goals  Patient/caregiver will verbalize food required to create a well-balanced food plate within 1 month.  Interventions   · Encourage Dietary Compliance.  · Recognize and provide educational material (KIRSTIN).  · Review eating/nutrition habits.   ·  10/25- Pt c/o her toe hurting after she ate a cobbler. Pt  "relates her pain to information she learned from PCP regarding diabetes & potential complications. Pt says I really need to watch the salt and the sugar.   · 10/25- Mailed education material on Pre Diabetes and MyFood Plate.   · 12/3- Pt is not sure if she received the mailings. Pt states not looking through her mail and letting it pile up. Pt states however, getting the pill box mailed to pt by this RN CM.   · 12/3- Pt c/o left 4th toe hurting after she ate something sweet. Pt states, I guess I will have to stop eating sweets because the pain is not worth it". Pt c/o the pain associated with prior finger sticks for BG checks. Pt states, she couldn't check her BGs because of the pain. Pt is prediabetic, does not possess own glucometer at this time.     12/3-- In basket message sent to Dr. Todd with concerns -- pt in need of Nutrition Consult and yet at the same time, pt is overwhelmed/exhausted from working nights to properly focus and care for her daily health needs.      Status  · Partially met           Clinical Reference Documents Added to Patient Instructions       Document    PREDIABETES (ENGLISH)    USDA MYPLATE, UNDERSTANDING (ENGLISH)             Patient/caregiver will have an action plan in place to manage and prevent complications of Sleep Apnea prior to discharge from OPCM. - Priority: HIGH      Overall Time to Completion  2 months from 10/10/2019    [facility] Identified Patient Barriers:  Health Literacy  -Care Plan Created  Nutrition-----Care Plan Created  Advanced Care Planning-- -Referred to OPCM        OPCM Identified Education Barriers:  Education Barrier for Hypertension   -Care Plan Created        Short Term Goals  Patient/caregiver will obtain CPAP to support disease process within 1 month.  Interventions                  Collaborate with Physician as appropriate to meet patient needs as found necessary in promoting pt adherence to CPAP use.                10/10-- Explained the " importance of using the CPAP-- to be sure pt is getting needed O2 while sleeps, needed to restore body's energy, to                            prevent neg affects on body with the CPAP ie heart disease, obesity, compromised functioning due to fatigue/sleepiness.  Done 10/10              10/10-Patient/Caregiver agrees to start wearing her CPAP within 2 wks.    Patient/Caregiver agrees to OPCM follow up within 2 weeks to assess progress to goal.     12/3-  Pt states she hasn't been using the CPAP but that she will start. Pt explains how she comes home so tired she lays down on the bed and falls asleep. Pt confirms receiving the needed tubing CPAP supplies in the mail.   Instructed pt to use and keep clean tubing/CPAP supplies.  12/3-  Patient/Caregiver agrees to start using her CPAP by one week.   Patient/Caregiver agrees to OPCM follow up within one week to assess progress to goal.   12/3- In basket message to PCP sent today includes pt's lack of using CPAP.        Status  · Partially met            Clinical Reference Documents Added to Patient Instructions       Document    FALLS IN THE HOME, PREVENTING (ENGLISH)    HIGH BLOOD PRESSURE, WHAT IS?  (ENGLISH)        SALT, TIPS FOR USING LESS (ENGLISH)    STROKE AND HIGH BLOOD PRESSURE, UNDERSTANDING THE LINK BETWEEN (ENGLISH)             Patient/caregiver will have knowledge of resources available in order to obtain the services that are needed prior to discharge from OPCM. - Priority: Medium      Overall Time to Completion  2 months from 12/03/2019    Social Work Identified Patient Barriers:   Advanced Care Planning:  Care plan created           Short Term Goals  Patient/caregiver will discuss Advanced Care Planning with family, Physician and/or Power of  within 2 weeks.  Interventions   · Collaborate with OPCM RN as appropriate to meet patient needs.  · Discuss and provide [facility] Advance Directive.  · Empower patient/caregiver to discuss End of life issues  with family, Physician and/or Power of .  · Provide education on advance care planning.     Status  · Partially met             Patient/caregiver will have knowledge of resources available in order to obtain the services that are needed prior to discharge from [facility]. - Priority: MOD      Overall Time to Completion  2 months from 10/10/2019      [facility] Identified Patient Barriers:  Health Literacy  -Care Plan Created  Nutrition-----Care Plan Created  Advanced Care Planning-- -Referred to OPCM      OPCM Identified Education Barriers:  Education Barrier for Hypertension    Short Term Goals  Patient/caregiver will have contact information for identified community resources IE:[facility]  for follow-up within 1 month.  Interventions   · Mail [employer] O[employer] Benefit Contact Information.     Done 10/10  · Refer to Outpatient Case Management Social Worker.  Done 10/10  Attempted to provide pt with contact for this RN CM and OOC however pt very sleepy and pen not writing.  Mailed contact information along with Welcome Letter, education material, [facility] OTC 2019 catalog, BP logs.   10/10-- Omitted PHQ2. Assess on next encounter.   10/25- Completed PHQ2=0.   10/25-- Pt confirms receipt of mailings as reason for her incoming call. Pt is thankful, still looking through them and will look at the 2019 [facility] OTC catalog.    10/25- OPC LCSW assessment pending. Done 12/2  10/25-- Pt confirms receipt of mailings calling this RN CM with the purpose of informing this.     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       Status  · Partially met            Clinical Reference Documents Added to Patient Instructions       Document    FALLS IN THE HOME, PREVENTING (ENGLISH)    HIGH BLOOD PRESSURE, WHAT IS?  (ENGLISH)         SALT, TIPS FOR USING LESS (ENGLISH)    STROKE AND HIGH BLOOD PRESSURE, UNDERSTANDING THE LINK BETWEEN (ENGLISH)                  Patient Instructions     Instructions were provided via the [employer] patient resources and are available for the patient to view on the patient portal.    Next Steps:  F/u on outcome of PCP appt 12/13--- and MD response to RN CM concerns messaged previously.     No follow-ups on file.      Todays OPCM Self-Management Care Plan was developed with the patients/caregivers input and was based on identified barriers from todays assessment.  Goals were written today with the patient/caregiver and the patient has agreed to work towards these goals to improve his/her overall well-being. Patient verbalized understanding of the care plan, goals, and all of today's instructions. Encouraged patient/caregiver to communicate with his/her physician and health care team about health conditions and the treatment plan.  Provided my contact information today and encouraged patient/caregiver to call me with any questions as needed.

## 2019-12-13 ENCOUNTER — OFFICE VISIT (OUTPATIENT)
Dept: PRIMARY CARE CLINIC | Facility: CLINIC | Age: 79
End: 2019-12-13
Payer: MEDICARE

## 2019-12-13 ENCOUNTER — LAB VISIT (OUTPATIENT)
Dept: LAB | Facility: HOSPITAL | Age: 79
End: 2019-12-13
Attending: INTERNAL MEDICINE
Payer: MEDICARE

## 2019-12-13 ENCOUNTER — IMMUNIZATION (OUTPATIENT)
Dept: PHARMACY | Facility: CLINIC | Age: 79
End: 2019-12-13
Payer: MEDICARE

## 2019-12-13 VITALS
BODY MASS INDEX: 36.45 KG/M2 | HEART RATE: 77 BPM | WEIGHT: 213.5 LBS | SYSTOLIC BLOOD PRESSURE: 118 MMHG | HEIGHT: 64 IN | DIASTOLIC BLOOD PRESSURE: 72 MMHG | OXYGEN SATURATION: 99 %

## 2019-12-13 DIAGNOSIS — R73.03 PREDIABETES: ICD-10-CM

## 2019-12-13 DIAGNOSIS — E21.3 HYPERPARATHYROIDISM: ICD-10-CM

## 2019-12-13 DIAGNOSIS — M54.42 CHRONIC BILATERAL LOW BACK PAIN WITH BILATERAL SCIATICA: ICD-10-CM

## 2019-12-13 DIAGNOSIS — I10 ESSENTIAL HYPERTENSION: ICD-10-CM

## 2019-12-13 DIAGNOSIS — I87.8 VENOUS STASIS: ICD-10-CM

## 2019-12-13 DIAGNOSIS — G89.29 CHRONIC BILATERAL LOW BACK PAIN WITH BILATERAL SCIATICA: ICD-10-CM

## 2019-12-13 DIAGNOSIS — E55.9 VITAMIN D DEFICIENCY: ICD-10-CM

## 2019-12-13 DIAGNOSIS — M54.41 CHRONIC BILATERAL LOW BACK PAIN WITH BILATERAL SCIATICA: ICD-10-CM

## 2019-12-13 LAB
ANION GAP SERPL CALC-SCNC: 9 MMOL/L (ref 8–16)
BUN SERPL-MCNC: 23 MG/DL (ref 8–23)
CALCIUM SERPL-MCNC: 10 MG/DL (ref 8.7–10.5)
CHLORIDE SERPL-SCNC: 102 MMOL/L (ref 95–110)
CO2 SERPL-SCNC: 27 MMOL/L (ref 23–29)
CREAT SERPL-MCNC: 1.4 MG/DL (ref 0.5–1.4)
EST. GFR  (AFRICAN AMERICAN): 41 ML/MIN/1.73 M^2
EST. GFR  (NON AFRICAN AMERICAN): 35 ML/MIN/1.73 M^2
ESTIMATED AVG GLUCOSE: 128 MG/DL (ref 68–131)
GLUCOSE SERPL-MCNC: 75 MG/DL (ref 70–110)
HBA1C MFR BLD HPLC: 6.1 % (ref 4–5.6)
POTASSIUM SERPL-SCNC: 4 MMOL/L (ref 3.5–5.1)
SODIUM SERPL-SCNC: 138 MMOL/L (ref 136–145)

## 2019-12-13 PROCEDURE — 3074F PR MOST RECENT SYSTOLIC BLOOD PRESSURE < 130 MM HG: ICD-10-PCS | Mod: HCNC,CPTII,S$GLB, | Performed by: INTERNAL MEDICINE

## 2019-12-13 PROCEDURE — 36415 COLL VENOUS BLD VENIPUNCTURE: CPT | Mod: HCNC

## 2019-12-13 PROCEDURE — 80048 BASIC METABOLIC PNL TOTAL CA: CPT | Mod: HCNC

## 2019-12-13 PROCEDURE — 3074F SYST BP LT 130 MM HG: CPT | Mod: HCNC,CPTII,S$GLB, | Performed by: INTERNAL MEDICINE

## 2019-12-13 PROCEDURE — 3078F DIAST BP <80 MM HG: CPT | Mod: HCNC,CPTII,S$GLB, | Performed by: INTERNAL MEDICINE

## 2019-12-13 PROCEDURE — 1101F PT FALLS ASSESS-DOCD LE1/YR: CPT | Mod: HCNC,CPTII,S$GLB, | Performed by: INTERNAL MEDICINE

## 2019-12-13 PROCEDURE — 1101F PR PT FALLS ASSESS DOC 0-1 FALLS W/OUT INJ PAST YR: ICD-10-PCS | Mod: HCNC,CPTII,S$GLB, | Performed by: INTERNAL MEDICINE

## 2019-12-13 PROCEDURE — 99499 UNLISTED E&M SERVICE: CPT | Mod: HCNC,S$GLB,, | Performed by: INTERNAL MEDICINE

## 2019-12-13 PROCEDURE — 3078F PR MOST RECENT DIASTOLIC BLOOD PRESSURE < 80 MM HG: ICD-10-PCS | Mod: HCNC,CPTII,S$GLB, | Performed by: INTERNAL MEDICINE

## 2019-12-13 PROCEDURE — 1126F PR PAIN SEVERITY QUANTIFIED, NO PAIN PRESENT: ICD-10-PCS | Mod: HCNC,S$GLB,, | Performed by: INTERNAL MEDICINE

## 2019-12-13 PROCEDURE — 1126F AMNT PAIN NOTED NONE PRSNT: CPT | Mod: HCNC,S$GLB,, | Performed by: INTERNAL MEDICINE

## 2019-12-13 PROCEDURE — 99215 PR OFFICE/OUTPT VISIT, EST, LEVL V, 40-54 MIN: ICD-10-PCS | Mod: HCNC,S$GLB,, | Performed by: INTERNAL MEDICINE

## 2019-12-13 PROCEDURE — 1159F MED LIST DOCD IN RCRD: CPT | Mod: HCNC,S$GLB,, | Performed by: INTERNAL MEDICINE

## 2019-12-13 PROCEDURE — 1159F PR MEDICATION LIST DOCUMENTED IN MEDICAL RECORD: ICD-10-PCS | Mod: HCNC,S$GLB,, | Performed by: INTERNAL MEDICINE

## 2019-12-13 PROCEDURE — 99499 RISK ADDL DX/OHS AUDIT: ICD-10-PCS | Mod: HCNC,S$GLB,, | Performed by: INTERNAL MEDICINE

## 2019-12-13 PROCEDURE — 99215 OFFICE O/P EST HI 40 MIN: CPT | Mod: HCNC,S$GLB,, | Performed by: INTERNAL MEDICINE

## 2019-12-13 PROCEDURE — 83036 HEMOGLOBIN GLYCOSYLATED A1C: CPT | Mod: HCNC

## 2019-12-13 RX ORDER — ACETAMINOPHEN 500 MG
5000 TABLET ORAL
Qty: 90 TABLET | Refills: 3
Start: 2019-12-13

## 2019-12-13 NOTE — ASSESSMENT & PLAN NOTE
A1c 6, poor dietary compliance  · Diabetes education completed  · Advised lifestyle changes  · Monitor A1c

## 2019-12-13 NOTE — ASSESSMENT & PLAN NOTE
BP well-controlled on CCB and ARB  · Continue amlodipine 10mg and losartan 25mg  · PCP discontinued ASA  · Cardiology increased statin to atorvastatin 40mg  · HCTZ discarded due to patient confusion over its PRN use

## 2019-12-13 NOTE — ASSESSMENT & PLAN NOTE
Therapeutic Vit D, supplementing with Vit D3 5000U daily  · Monitor level  · Decrease supplementation to q72 hrs

## 2019-12-13 NOTE — PATIENT INSTRUCTIONS
TODAY:  - increase fiber intake (fruits and vegetables)  - can take fiber supplements or stool softeners  - cream sent to [facility]  - order XL compression stockings from [email or web address] (beige)  - labs today  - shingrix dose #1

## 2019-12-13 NOTE — ASSESSMENT & PLAN NOTE
Leg swelling, does not understand PRN HCTZ instructions  · Discontinue HCTZ  · Order size XL compression stockings

## 2019-12-13 NOTE — ASSESSMENT & PLAN NOTE
Chronic low back pain, poor functional status  · Did not attend PT/OT on [place]  · Will do medical fitness on [place]

## 2019-12-13 NOTE — PROGRESS NOTES
"Primary Care Provider Appointment    Subjective:      Patient ID: Kelsey Fields is a 79 y.o. female with HTN, HLD, MSK pain, hyperPTH    Chief Complaint: Follow-up and Joint Swelling    Patient was >1 hour late for her appointment, "I fell asleep!" She is getting ready for her nephew's wedding, but she's worried she will get lost along the way to [place].    Seen by optometry. Had cataract surgeries in 2019. Had normal follow-up, now only needs annual follow-up.    BP well-controlled today. She is compliant with statin. Lipids are better controlled. A1c is trending up.    She has constipation. She is not eating many fruits or vegetables.    She is taking an unknown syrup from the [employer] store for sleep in addition to flexeril.     Her Vit D is therapeutic. DEXA was normal.     Past Surgical History:   Procedure Laterality Date    blepharitis Bilateral 2017    Alessandra Grijalva OD    CATARACT EXTRACTION Bilateral 2017    Alessandra Grijalva MD    CATARACT EXTRACTION W/  INTRAOCULAR LENS IMPLANT Left 2019    Procedure: EXTRACTION, CATARACT, WITH IOL INSERTION;  Surgeon: Alysa De Souza MD;  Location: [facility];  Service: Ophthalmology;  Laterality: Left;    CATARACT EXTRACTION W/  INTRAOCULAR LENS IMPLANT Right 2019    Procedure: EXTRACTION, CATARACT, WITH IOL INSERTION;  Surgeon: Alysa De Souza MD;  Location: [facility];  Service: Ophthalmology;  Laterality: Right;     SECTION      FRACTURE SURGERY      JOINT REPLACEMENT      right knee     KNEE SURGERY Right        Past Medical History:   Diagnosis Date    Arthritis     Episcleritis of right eye     GERD (gastroesophageal reflux disease)     Hypertension     Shingles     Sleep apnea        Social History     Socioeconomic History    Marital status: Single     Spouse name: Not on file    Number of children: Not on file    Years of education: Not on file    Highest education level: Not on file   Occupational History    Not " "on file   Social Needs    Financial resource strain: Not very hard    Food insecurity:     Worry: Never true     Inability: Never true    Transportation needs:     Medical: No     Non-medical: No   Tobacco Use    Smoking status: Never Smoker    Smokeless tobacco: Never Used   Substance and Sexual Activity    Alcohol use: No    Drug use: No    Sexual activity: Never   Lifestyle    Physical activity:     Days per week: 0 days     Minutes per session: 0 min    Stress: Not at all   Relationships    Social connections:     Talks on phone: Once a week     Gets together: Once a week     Attends Restoration service: More than 4 times per year     Active member of club or organization: Yes     Attends meetings of clubs or organizations: More than 4 times per year     Relationship status: Never    Other Topics Concern    Not on file   Social History Narrative    Not on file       Review of Systems   Constitutional: Positive for activity change, appetite change and fatigue.   HENT: Negative for sneezing and sore throat.    Eyes: Negative for photophobia and pain.   Respiratory: Negative for cough, chest tightness and shortness of breath.    Cardiovascular: Negative for chest pain and leg swelling.   Gastrointestinal: Negative for abdominal pain, constipation and diarrhea.   Endocrine: Negative for cold intolerance, heat intolerance and polyuria.   Genitourinary: Negative for frequency.   Musculoskeletal: Positive for back pain. Negative for joint swelling and neck stiffness.   Skin: Negative for color change and pallor.   Neurological: Negative for seizures, speech difficulty and headaches.   Hematological: Negative for adenopathy.   Psychiatric/Behavioral: Positive for decreased concentration, dysphoric mood and sleep disturbance. Negative for agitation.       Objective:   /72   Pulse 77   Ht 5' 4" (1.626 m)   Wt 96.9 kg (213 lb 8.3 oz)   SpO2 99%   BMI 36.65 kg/m²     Physical Exam "   Constitutional: She is oriented to person, place, and time. She appears well-developed and well-nourished.   obese   HENT:   Head: Normocephalic and atraumatic.   prognathism   Neck: Normal range of motion.   Cardiovascular: Normal rate.   Pulmonary/Chest: Effort normal.   Musculoskeletal: She exhibits edema and deformity.   calf cirm 40.5cm, 16inches  Mild LE edema   Neurological: She is alert and oriented to person, place, and time.   Skin:   Ecchymoses and purpura on UE bilaterally  Pruritic dry skin on UE bilaterally   Psychiatric: She has a normal mood and affect. Her behavior is normal.   Vitals reviewed.      Lab Results   Component Value Date    WBC 5.79 02/13/2019    HGB 11.8 (L) 02/13/2019    HCT 38.4 02/13/2019     02/13/2019    CHOL 139 02/13/2019    TRIG 72 02/13/2019    HDL 41 02/13/2019    ALT 14 02/13/2019    AST 19 02/13/2019     02/13/2019    K 3.9 02/13/2019     02/13/2019    CREATININE 1.2 02/13/2019    BUN 16 02/13/2019    CO2 27 02/13/2019    TSH 0.725 09/20/2018    HGBA1C 6.0 (H) 02/13/2019                     RESULTS: Reviewed labs and images today    Assessment:   79 y.o. female with multiple co-morbid illnesses here to continue work-up of chronic issues notably with HTN, HLD, MSK pain, hyperPTH     Plan:     Problem List Items Addressed This Visit        Cardiac/Vascular    Essential hypertension     BP well-controlled on CCB and ARB  · Continue amlodipine 10mg and losartan 25mg  · PCP discontinued ASA  · Cardiology increased statin to atorvastatin 40mg  · HCTZ discarded due to patient confusion over its PRN use         Relevant Orders    Hemoglobin A1c    Basic metabolic panel    Venous stasis     Leg swelling, does not understand PRN HCTZ instructions  · Discontinue HCTZ  · Order size XL compression stockings            Endocrine    Prediabetes     A1c 6, poor dietary compliance  · Diabetes education completed  · Advised lifestyle changes  · Monitor A1c          Relevant Medications    cholecalciferol, vitamin D3, (VITAMIN D3) 125 mcg (5,000 unit) Tab    Other Relevant Orders    Hemoglobin A1c    Basic metabolic panel    Vitamin D deficiency     Therapeutic Vit D, supplementing with Vit D3 5000U daily  · Monitor level  · Decrease supplementation to q72 hrs         Relevant Medications    cholecalciferol, vitamin D3, (VITAMIN D3) 125 mcg (5,000 unit) Tab    Hyperparathyroidism     Elevated PTH, likely related to CKD  · Reduce nephrotoxins            Orthopedic    Chronic bilateral low back pain with bilateral sciatica     Chronic low back pain, poor functional status  · Did not attend PT/OT on [place]  · Will do medical fitness on [place]         Relevant Medications    CMPD Lidocaine 2%, Prilocaine 2%, Lamotrigine 2.5%, Meloxicam 0.09% in Transdermal Gel    cholecalciferol, vitamin D3, (VITAMIN D3) 125 mcg (5,000 unit) Tab          Health Maintenance       Date Due Completion Date    TETANUS VACCINE 11/19/1958 ---    Shingles Vaccine (2 of 3) 07/08/2016 5/13/2016    DEXA SCAN 05/16/2021 5/16/2019    Lipid Panel 02/13/2024 2/13/2019          Follow up in about 3 months (around 3/13/2020).  Total face-to-face time was 60 min, 50% of this was spent on counseling and coordination of care. The following issues were discussed: with HTN, HLD, MSK pain, hyperPTH    Laurie Todd MD/MPH  Internal Medicine  [facility]  [phone]

## 2019-12-16 ENCOUNTER — TELEPHONE (OUTPATIENT)
Dept: INTERNAL MEDICINE | Facility: CLINIC | Age: 79
End: 2019-12-16

## 2019-12-16 NOTE — TELEPHONE ENCOUNTER
Please let patient know that her A1c still shows that she has prediabetes and her kidney function is normal.    Thanks,  KJ

## 2019-12-19 ENCOUNTER — CLINICAL SUPPORT (OUTPATIENT)
Dept: REHABILITATION | Facility: HOSPITAL | Age: 79
End: 2019-12-19
Attending: INTERNAL MEDICINE
Payer: MEDICARE

## 2019-12-19 DIAGNOSIS — R29.3 POOR POSTURE: ICD-10-CM

## 2019-12-19 DIAGNOSIS — R26.9 GAIT ABNORMALITY: ICD-10-CM

## 2019-12-19 DIAGNOSIS — R29.898 BILATERAL LEG WEAKNESS: ICD-10-CM

## 2019-12-19 PROCEDURE — 97110 THERAPEUTIC EXERCISES: CPT | Mod: HCNC

## 2019-12-19 NOTE — PROGRESS NOTES
Physical Therapy Daily Treatment Note     Name: Kelsey Fields  Clinic Number: [id number]    Therapy Diagnosis:   Encounter Diagnoses   Name Primary?    Poor posture     Bilateral leg weakness     Gait abnormality      Physician: Laurie Todd, *    Visit Date: 12/19/2019    Physician Orders: PT Eval and Treat   Medical Diagnosis from Referral:   M54.42,M54.41,G89.29 (ICD-10-CM) - Chronic bilateral low back pain with bilateral sciatica  Evaluation Date: 12/2/2019  Authorization Period Expiration: 12/31/2019  Plan of Care Expiration: 1/31/2019  Visit # / Visits authorized: 2/ 20     Time In: 310  Time Out: 345  Total Billable Time: 23 minutes     Visit: 60.64  Total: 142.88     Precautions: Standard and Fall       Subjective     Pt reports: she feels stiff and does not think her pain has worsened since her last visit.  She was compliant with home exercise program.  Response to previous treatment: No adverse effect  Functional change: None    Pain: 7/10  Location: bilateral back      Objective     Kelsey received therapeutic exercises to develop strength, endurance, ROM, flexibility, posture and core stabilization for 35 minutes including:    Plan to initiate extension based exercises: prone lying, standing extensions, scap squeezes, bridging, SLR    Seated Ham Stretch 30 sec x 2  Seated Marches 20x   Seated hip abd RTB 30x  Bridging 20x  SAQ 30x  LTR 20x  Ball Squeezes 5 sec x 20  Standing Extensions by bar 20x        Home Exercises Provided and Patient Education Provided     Education provided:   - Purpose of PT    Written Home Exercises Provided: None.  Exercises were reviewed and Kelsey was able to demonstrate them prior to the end of the session.  Kelsey demonstrated good  understanding of the education provided.     See EMR under Patient Instructions for exercises provided prior visit.    Assessment     Patient was unable to attempt prone lying to promote lumbar extension. Patient tolerated  seated and standing extensions to promote upright posture and improved range of motion of spine. Patient did not have any adverse effects from any therex as of today.   Kelsey is progressing well towards her goals.   Pt prognosis is Good.     Pt will continue to benefit from skilled outpatient physical therapy to address the deficits listed in the problem list box on initial evaluation, provide pt/family education and to maximize pt's level of independence in the home and community environment.     Pt's spiritual, cultural and educational needs considered and pt agreeable to plan of care and goals.     Anticipated barriers to physical therapy: Chronic poor posture    Goals:   Short Term Goals (3 Weeks):  1. Pt will be compliant with HEP to supplement PT in decreasing pain with functional mobility.  2. Pt will improve lumbar ROM by 5 degrees in ext/R side bend/L side bend to improve functional mobility.  4. Pt will improve impaired LE MMTs by 1/2 score to improve strength for functional tasks.  Long Term Goals (6 Weeks):   1. Pt will improve FOTO score to </= 6% limited to decrease perceived limitation with maintaining/changing body position.   2. Pt will perform 8 sit to stand during 30 sec sit to stand without the use of hands for support to improve functional LE strength   3. Pt will improve impaired LE MMTs by 1 score to improve strength for functional tasks.  4. Patient will improve lumbar ROM by 10 degrees in ext/R side bend/L side bend to improve functional mobility    Plan     Plan of care Certification: 12/2/2019 to 1/31/2019.     Outpatient Physical Therapy 2 times weekly for 8 weeks to include the following interventions: Cervical/Lumbar Traction, Gait Training, Manual Therapy, Moist Heat/ Ice, Neuromuscular Re-ed, Patient Education, Self Care, Therapeutic Activites and Therapeutic Exercise.     Arnav Kilpatrick, PT

## 2019-12-20 ENCOUNTER — TELEPHONE (OUTPATIENT)
Dept: OPHTHALMOLOGY | Facility: CLINIC | Age: 79
End: 2019-12-20

## 2019-12-20 ENCOUNTER — PATIENT OUTREACH (OUTPATIENT)
Dept: ADMINISTRATIVE | Facility: OTHER | Age: 79
End: 2019-12-20

## 2019-12-20 ENCOUNTER — OFFICE VISIT (OUTPATIENT)
Dept: OPHTHALMOLOGY | Facility: CLINIC | Age: 79
End: 2019-12-20
Payer: MEDICARE

## 2019-12-20 DIAGNOSIS — H04.123 TEAR FILM INSUFFICIENCY, BILATERAL: Primary | ICD-10-CM

## 2019-12-20 PROCEDURE — 92012 PR EYE EXAM, EST PATIENT,INTERMED: ICD-10-PCS | Mod: HCNC,S$GLB,, | Performed by: OPHTHALMOLOGY

## 2019-12-20 PROCEDURE — 99999 PR PBB SHADOW E&M-EST. PATIENT-LVL II: ICD-10-PCS | Mod: PBBFAC,HCNC,, | Performed by: OPHTHALMOLOGY

## 2019-12-20 PROCEDURE — 99999 PR PBB SHADOW E&M-EST. PATIENT-LVL II: CPT | Mod: PBBFAC,HCNC,, | Performed by: OPHTHALMOLOGY

## 2019-12-20 PROCEDURE — 92012 INTRM OPH EXAM EST PATIENT: CPT | Mod: HCNC,S$GLB,, | Performed by: OPHTHALMOLOGY

## 2019-12-20 NOTE — TELEPHONE ENCOUNTER
----- Message from Ghazala Breaux sent at 12/20/2019 11:27 AM CST -----  Contact: pt      ----- Message -----  From: Ishan Jurado  Sent: 12/20/2019   9:44 AM CST  To: Nolvia FLORES Staff    Calling in regards to eye pain and needs to set up appt to see Dr. De Souza    Call Back: [phone]

## 2019-12-20 NOTE — TELEPHONE ENCOUNTER
----- Message from Melly Henderson sent at 12/20/2019 11:07 AM CST -----  Contact: Kelsey  Pt stated she had surgery on September 16th and September 30th  pt stated she's having some pain in her right eye. Pt stated she needed to speak with someone in the office about her pain. Pt stated this have been going on for a while now. Pt contact number is [phone].

## 2019-12-20 NOTE — TELEPHONE ENCOUNTER
Ongoing issue with pain and redness.   Dr. De Souza and Valentine Bryson are out of the office.   Pt booked to see Dr. Lewis at the insistence of pt

## 2019-12-20 NOTE — PROGRESS NOTES
HPI     Last eye exam was 11/8/19 with Dr. Grijalva.  Patient states OU have been scratchy/gritty since before cataract sx in   September. AT's aren't working and the irritation is getting worse. Was   told it would get better but it hasn't. Wanted to know if there are   stronger drops she can use.     Blink TID-QID OU    I have personally interviewed the patient, reviewed the history and   examined the patient and agree with the technician's exam.    Last edited by Twin Lewis MD on 12/20/2019  1:36 PM. (History)            Assessment /Plan     For exam results, see Encounter Report.    Tear film insufficiency, bilateral      Continue artificial tears. Use lubricating ointment or gel in both eyes at bedtime. Appointment with Dr. De Souza.

## 2019-12-20 NOTE — TELEPHONE ENCOUNTER
I spoke to patient.  She is requesting same day appointment is having severe pain and redness OU.  Using artifical tears qid with no improvement.  Offered appointment on Monday 12/22.  Wants sooner.  Message sent to triage to schedule.

## 2019-12-20 NOTE — PATIENT INSTRUCTIONS
Use the artificial tears four times a day in both eyes.  Lubricating ointment at bedtime in both eyes.  Appointment with Dr. De Souza.

## 2019-12-24 ENCOUNTER — OFFICE VISIT (OUTPATIENT)
Dept: PRIMARY CARE CLINIC | Facility: CLINIC | Age: 79
End: 2019-12-24
Payer: MEDICARE

## 2019-12-24 VITALS
OXYGEN SATURATION: 98 % | HEART RATE: 106 BPM | WEIGHT: 208.25 LBS | BODY MASS INDEX: 35.55 KG/M2 | HEIGHT: 64 IN | DIASTOLIC BLOOD PRESSURE: 60 MMHG | SYSTOLIC BLOOD PRESSURE: 110 MMHG

## 2019-12-24 DIAGNOSIS — J39.9 UPPER RESPIRATORY DISEASE: ICD-10-CM

## 2019-12-24 DIAGNOSIS — M25.511 ACUTE PAIN OF RIGHT SHOULDER: ICD-10-CM

## 2019-12-24 DIAGNOSIS — G89.29 WRIST PAIN, CHRONIC, RIGHT: ICD-10-CM

## 2019-12-24 DIAGNOSIS — M25.531 WRIST PAIN, CHRONIC, RIGHT: ICD-10-CM

## 2019-12-24 PROCEDURE — 1101F PT FALLS ASSESS-DOCD LE1/YR: CPT | Mod: HCNC,CPTII,S$GLB, | Performed by: INTERNAL MEDICINE

## 2019-12-24 PROCEDURE — 93005 ELECTROCARDIOGRAM TRACING: CPT | Mod: HCNC,S$GLB,, | Performed by: INTERNAL MEDICINE

## 2019-12-24 PROCEDURE — 93010 EKG 12-LEAD: ICD-10-PCS | Mod: HCNC,S$GLB,, | Performed by: INTERNAL MEDICINE

## 2019-12-24 PROCEDURE — 1126F AMNT PAIN NOTED NONE PRSNT: CPT | Mod: HCNC,S$GLB,, | Performed by: INTERNAL MEDICINE

## 2019-12-24 PROCEDURE — 1159F MED LIST DOCD IN RCRD: CPT | Mod: HCNC,S$GLB,, | Performed by: INTERNAL MEDICINE

## 2019-12-24 PROCEDURE — 3078F DIAST BP <80 MM HG: CPT | Mod: HCNC,CPTII,S$GLB, | Performed by: INTERNAL MEDICINE

## 2019-12-24 PROCEDURE — 1101F PR PT FALLS ASSESS DOC 0-1 FALLS W/OUT INJ PAST YR: ICD-10-PCS | Mod: HCNC,CPTII,S$GLB, | Performed by: INTERNAL MEDICINE

## 2019-12-24 PROCEDURE — 3074F PR MOST RECENT SYSTOLIC BLOOD PRESSURE < 130 MM HG: ICD-10-PCS | Mod: HCNC,CPTII,S$GLB, | Performed by: INTERNAL MEDICINE

## 2019-12-24 PROCEDURE — 93010 ELECTROCARDIOGRAM REPORT: CPT | Mod: HCNC,S$GLB,, | Performed by: INTERNAL MEDICINE

## 2019-12-24 PROCEDURE — 99215 PR OFFICE/OUTPT VISIT, EST, LEVL V, 40-54 MIN: ICD-10-PCS | Mod: HCNC,S$GLB,, | Performed by: INTERNAL MEDICINE

## 2019-12-24 PROCEDURE — 99215 OFFICE O/P EST HI 40 MIN: CPT | Mod: HCNC,S$GLB,, | Performed by: INTERNAL MEDICINE

## 2019-12-24 PROCEDURE — 1126F PR PAIN SEVERITY QUANTIFIED, NO PAIN PRESENT: ICD-10-PCS | Mod: HCNC,S$GLB,, | Performed by: INTERNAL MEDICINE

## 2019-12-24 PROCEDURE — 1159F PR MEDICATION LIST DOCUMENTED IN MEDICAL RECORD: ICD-10-PCS | Mod: HCNC,S$GLB,, | Performed by: INTERNAL MEDICINE

## 2019-12-24 PROCEDURE — 93005 EKG 12-LEAD: ICD-10-PCS | Mod: HCNC,S$GLB,, | Performed by: INTERNAL MEDICINE

## 2019-12-24 PROCEDURE — 3078F PR MOST RECENT DIASTOLIC BLOOD PRESSURE < 80 MM HG: ICD-10-PCS | Mod: HCNC,CPTII,S$GLB, | Performed by: INTERNAL MEDICINE

## 2019-12-24 PROCEDURE — 3074F SYST BP LT 130 MM HG: CPT | Mod: HCNC,CPTII,S$GLB, | Performed by: INTERNAL MEDICINE

## 2019-12-24 RX ORDER — LORATADINE 10 MG/1
10 TABLET ORAL DAILY
Qty: 30 TABLET | Refills: 11 | Status: SHIPPED | OUTPATIENT
Start: 2019-12-24 | End: 2020-12-23 | Stop reason: SDUPTHER

## 2019-12-24 RX ORDER — FLUTICASONE PROPIONATE 50 MCG
1 SPRAY, SUSPENSION (ML) NASAL DAILY
Qty: 16 G | Refills: 2 | Status: SHIPPED | OUTPATIENT
Start: 2019-12-24 | End: 2020-12-23 | Stop reason: SDUPTHER

## 2019-12-24 NOTE — ASSESSMENT & PLAN NOTE
R shoulder pain, seems to be MSK, has no injury history, but is impairing her sleep. Unlikely cardiac in origin, but has PHILLIP>4  · EKG  · Shoulder XR

## 2019-12-24 NOTE — PROGRESS NOTES
Primary Care Provider Appointment- URGENT CARE    Subjective:      Patient ID: Kelsey Fields is a 79 y.o. female with preDM, HTN, HLD, asthma, anemia    Chief Complaint: Headache; Shoulder Pain (right shoulder); and Nasal Congestion (runny nose )    Patient here for acute R shoulder pain. It is continuous pain and stiffness. Pain is a 7/10. Does not start in the chest. On the R shoulder, posterior area. She had no shoulder injury. Symptoms started on , symptoms come and go but last for hours.    She also has post-nasal drip. Has runny nose, no cough, some sore throat. No upper respiratory congestion.    The ASCVD Risk score (Apacheheather RENE Jr., et al., 2013) failed to calculate for the following reasons:    The 2013 ASCVD risk score is only valid for ages 40 to 79    PHILLIP score: 1 for age,  (EKG with no abnormalities aside from PVCs, HR 81)    Past Surgical History:   Procedure Laterality Date    blepharitis Bilateral 2017    Alessandra Grijalva OD    CATARACT EXTRACTION Bilateral 2017    Alessandra Grijalva MD    CATARACT EXTRACTION W/  INTRAOCULAR LENS IMPLANT Left 2019    Procedure: EXTRACTION, CATARACT, WITH IOL INSERTION;  Surgeon: Alysa De Souza MD;  Location: [facility] OR;  Service: Ophthalmology;  Laterality: Left;    CATARACT EXTRACTION W/  INTRAOCULAR LENS IMPLANT Right 2019    Procedure: EXTRACTION, CATARACT, WITH IOL INSERTION;  Surgeon: Alysa De Souza MD;  Location: [facility] OR;  Service: Ophthalmology;  Laterality: Right;     SECTION      FRACTURE SURGERY      JOINT REPLACEMENT      right knee     KNEE SURGERY Right        Past Medical History:   Diagnosis Date    Arthritis     Episcleritis of right eye     GERD (gastroesophageal reflux disease)     Hypertension     Shingles     Sleep apnea        Social History     Socioeconomic History    Marital status: Single     Spouse name: Not on file    Number of children: Not on file    Years of education: Not on file    Highest  education level: Not on file   Occupational History    Not on file   Social Needs    Financial resource strain: Not very hard    Food insecurity:     Worry: Never true     Inability: Never true    Transportation needs:     Medical: No     Non-medical: No   Tobacco Use    Smoking status: Never Smoker    Smokeless tobacco: Never Used   Substance and Sexual Activity    Alcohol use: No    Drug use: No    Sexual activity: Never   Lifestyle    Physical activity:     Days per week: 0 days     Minutes per session: 0 min    Stress: Not at all   Relationships    Social connections:     Talks on phone: Once a week     Gets together: Once a week     Attends Mu-ism service: More than 4 times per year     Active member of club or organization: Yes     Attends meetings of clubs or organizations: More than 4 times per year     Relationship status: Never    Other Topics Concern    Not on file   Social History Narrative    Not on file       Review of Systems   Constitutional: Positive for activity change, appetite change and fatigue.   HENT: Positive for congestion, postnasal drip, rhinorrhea and sore throat. Negative for sneezing.    Eyes: Negative for photophobia and pain.   Respiratory: Negative for cough, chest tightness and shortness of breath.    Cardiovascular: Negative for chest pain and leg swelling.   Gastrointestinal: Negative for abdominal pain, constipation and diarrhea.   Endocrine: Negative for cold intolerance, heat intolerance and polyuria.   Genitourinary: Negative for frequency.   Musculoskeletal: Positive for arthralgias, back pain and myalgias. Negative for joint swelling and neck stiffness.   Skin: Negative for color change and pallor.   Neurological: Negative for seizures, speech difficulty and headaches.   Hematological: Negative for adenopathy.   Psychiatric/Behavioral: Positive for decreased concentration, dysphoric mood and sleep disturbance. Negative for agitation.       Objective:  "  /60   Pulse 106   Ht 5' 4" (1.626 m)   Wt 94.4 kg (208 lb 3.6 oz)   SpO2 98%   BMI 35.74 kg/m²     Physical Exam   Constitutional: She is oriented to person, place, and time. She appears well-developed and well-nourished.   obese   HENT:   Head: Normocephalic and atraumatic.   prognathism   Neck: Normal range of motion.   Cardiovascular: Normal rate.   Pulmonary/Chest: Effort normal.   Musculoskeletal: She exhibits edema and deformity.   Decreased ROM of R shoulder, TTP, no erythema  Mild LE edema   Neurological: She is alert and oriented to person, place, and time.   Skin:   Ecchymoses and purpura on UE bilaterally  Pruritic dry skin on UE bilaterally   Psychiatric: She has a normal mood and affect. Her behavior is normal.   Vitals reviewed.      Lab Results   Component Value Date    WBC 5.79 02/13/2019    HGB 11.8 (L) 02/13/2019    HCT 38.4 02/13/2019     02/13/2019    CHOL 139 02/13/2019    TRIG 72 02/13/2019    HDL 41 02/13/2019    ALT 14 02/13/2019    AST 19 02/13/2019     12/13/2019    K 4.0 12/13/2019     12/13/2019    CREATININE 1.4 12/13/2019    BUN 23 12/13/2019    CO2 27 12/13/2019    TSH 0.725 09/20/2018    HGBA1C 6.1 (H) 12/13/2019       RESULTS: Reviewed labs and images today    Assessment:   79 y.o. 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 Pain controlled with PRN prednisone  · Does not want surgery  · Continue steroids  · Refer to Hand Surgery   "       Relevant Orders    Ambulatory referral to Hand Surgery    Acute pain of right shoulder     R shoulder pain, seems to be MSK, has no injury history, but is impairing her sleep. Unlikely cardiac in origin, but has PHILLIP>4  · EKG  · Shoulder XR         Relevant Orders    EKG 12-lead    X-ray Shoulder 2 or More Views Right          Health Maintenance       Date Due Completion Date    TETANUS VACCINE 11/19/1958 ---    Shingles Vaccine (3 of 3) 02/07/2020 12/13/2019    DEXA SCAN 05/16/2021 5/16/2019    Lipid Panel 02/13/2024 2/13/2019          Follow up in about 2 days (around 12/26/2019). Total face-to-face time was 60 min, 50% of this was spent on counseling and coordination of care. The following issues were discussed: with preDM, HTN, HLD, asthma, anemia    Laurie Todd MD/MPH  Internal Medicine  [facility]  [phone]

## 2019-12-26 ENCOUNTER — TELEPHONE (OUTPATIENT)
Dept: PRIMARY CARE CLINIC | Facility: CLINIC | Age: 79
End: 2019-12-26

## 2019-12-27 ENCOUNTER — HOSPITAL ENCOUNTER (OUTPATIENT)
Dept: RADIOLOGY | Facility: HOSPITAL | Age: 79
Discharge: HOME | End: 2019-12-27
Attending: INTERNAL MEDICINE
Payer: MEDICARE

## 2019-12-27 ENCOUNTER — OFFICE VISIT (OUTPATIENT)
Dept: PRIMARY CARE CLINIC | Facility: CLINIC | Age: 79
End: 2019-12-27
Payer: MEDICARE

## 2019-12-27 ENCOUNTER — TELEPHONE (OUTPATIENT)
Dept: INTERNAL MEDICINE | Facility: CLINIC | Age: 79
End: 2019-12-27

## 2019-12-27 VITALS
SYSTOLIC BLOOD PRESSURE: 120 MMHG | DIASTOLIC BLOOD PRESSURE: 60 MMHG | HEART RATE: 85 BPM | BODY MASS INDEX: 35.95 KG/M2 | OXYGEN SATURATION: 98 % | WEIGHT: 210.56 LBS | HEIGHT: 64 IN

## 2019-12-27 DIAGNOSIS — M25.511 ACUTE PAIN OF RIGHT SHOULDER: ICD-10-CM

## 2019-12-27 DIAGNOSIS — N18.30 STAGE 3 CHRONIC KIDNEY DISEASE: ICD-10-CM

## 2019-12-27 DIAGNOSIS — I10 ESSENTIAL HYPERTENSION: ICD-10-CM

## 2019-12-27 DIAGNOSIS — M25.511 ACUTE PAIN OF RIGHT SHOULDER: Primary | ICD-10-CM

## 2019-12-27 DIAGNOSIS — R73.03 PREDIABETES: ICD-10-CM

## 2019-12-27 PROCEDURE — 99499 RISK ADDL DX/OHS AUDIT: ICD-10-PCS | Mod: HCNC,S$GLB,, | Performed by: INTERNAL MEDICINE

## 2019-12-27 PROCEDURE — 3078F PR MOST RECENT DIASTOLIC BLOOD PRESSURE < 80 MM HG: ICD-10-PCS | Mod: HCNC,CPTII,S$GLB, | Performed by: INTERNAL MEDICINE

## 2019-12-27 PROCEDURE — 3078F DIAST BP <80 MM HG: CPT | Mod: HCNC,CPTII,S$GLB, | Performed by: INTERNAL MEDICINE

## 2019-12-27 PROCEDURE — 1125F PR PAIN SEVERITY QUANTIFIED, PAIN PRESENT: ICD-10-PCS | Mod: HCNC,S$GLB,, | Performed by: INTERNAL MEDICINE

## 2019-12-27 PROCEDURE — 73030 X-RAY EXAM OF SHOULDER: CPT | Mod: TC,HCNC,RT

## 2019-12-27 PROCEDURE — 1101F PR PT FALLS ASSESS DOC 0-1 FALLS W/OUT INJ PAST YR: ICD-10-PCS | Mod: HCNC,CPTII,S$GLB, | Performed by: INTERNAL MEDICINE

## 2019-12-27 PROCEDURE — 3074F PR MOST RECENT SYSTOLIC BLOOD PRESSURE < 130 MM HG: ICD-10-PCS | Mod: HCNC,CPTII,S$GLB, | Performed by: INTERNAL MEDICINE

## 2019-12-27 PROCEDURE — 73030 X-RAY EXAM OF SHOULDER: CPT | Mod: 26,HCNC,RT, | Performed by: RADIOLOGY

## 2019-12-27 PROCEDURE — 73030 XR SHOULDER COMPLETE 2 OR MORE VIEWS RIGHT: ICD-10-PCS | Mod: 26,HCNC,RT, | Performed by: RADIOLOGY

## 2019-12-27 PROCEDURE — 99215 OFFICE O/P EST HI 40 MIN: CPT | Mod: HCNC,S$GLB,, | Performed by: INTERNAL MEDICINE

## 2019-12-27 PROCEDURE — 99499 UNLISTED E&M SERVICE: CPT | Mod: HCNC,S$GLB,, | Performed by: INTERNAL MEDICINE

## 2019-12-27 PROCEDURE — 99215 PR OFFICE/OUTPT VISIT, EST, LEVL V, 40-54 MIN: ICD-10-PCS | Mod: HCNC,S$GLB,, | Performed by: INTERNAL MEDICINE

## 2019-12-27 PROCEDURE — 3074F SYST BP LT 130 MM HG: CPT | Mod: HCNC,CPTII,S$GLB, | Performed by: INTERNAL MEDICINE

## 2019-12-27 PROCEDURE — 1125F AMNT PAIN NOTED PAIN PRSNT: CPT | Mod: HCNC,S$GLB,, | Performed by: INTERNAL MEDICINE

## 2019-12-27 PROCEDURE — 1101F PT FALLS ASSESS-DOCD LE1/YR: CPT | Mod: HCNC,CPTII,S$GLB, | Performed by: INTERNAL MEDICINE

## 2019-12-27 PROCEDURE — 1159F MED LIST DOCD IN RCRD: CPT | Mod: HCNC,S$GLB,, | Performed by: INTERNAL MEDICINE

## 2019-12-27 PROCEDURE — 1159F PR MEDICATION LIST DOCUMENTED IN MEDICAL RECORD: ICD-10-PCS | Mod: HCNC,S$GLB,, | Performed by: INTERNAL MEDICINE

## 2019-12-27 RX ORDER — LOSARTAN POTASSIUM 25 MG/1
25 TABLET ORAL DAILY
Qty: 90 TABLET | Refills: 3 | Status: SHIPPED | OUTPATIENT
Start: 2019-12-27 | End: 2020-10-27

## 2019-12-27 RX ORDER — LOSARTAN POTASSIUM 25 MG/1
25 TABLET ORAL DAILY
Qty: 14 TABLET | Refills: 0 | Status: SHIPPED | OUTPATIENT
Start: 2019-12-27 | End: 2020-03-24

## 2019-12-27 RX ORDER — DICLOFENAC SODIUM 10 MG/G
2 GEL TOPICAL 2 TIMES DAILY
Qty: 3 TUBE | Refills: 3 | Status: SHIPPED | OUTPATIENT
Start: 2019-12-27 | End: 2021-10-25 | Stop reason: SDUPTHER

## 2019-12-27 RX ORDER — DICLOFENAC SODIUM 10 MG/G
2 GEL TOPICAL 2 TIMES DAILY
Qty: 3 TUBE | Refills: 3 | Status: SHIPPED | OUTPATIENT
Start: 2019-12-27 | End: 2019-12-27 | Stop reason: SDUPTHER

## 2019-12-27 NOTE — ASSESSMENT & PLAN NOTE
A1c 6.1, poor dietary compliance  · Diabetes education completed  · Advised lifestyle changes  · Monitor A1c  · Advised portion control, avoidance of sweets

## 2019-12-27 NOTE — TELEPHONE ENCOUNTER
Please let patient know that her shoulder XR did not show any obvious abnormalities, but she should see EVE Ash to see if there's anything non-invasive we can do to help her with the pain.    Thanks,  KJ

## 2019-12-27 NOTE — PATIENT INSTRUCTIONS
TODAY:  - schedule shoulder XR today  - Meghann will call pharmacy to check on your cream   + voltaren cream sent to primary care pharmacy until your compounded cream arrives  - participate in physical therapy for your back and your shoulder   + order for occupational therapy for your shoulder  - sports medicine appt with EVE Ash  - hand doctor appointment on 1/3/20  - go to primary care pharmacy to    + voltaren   + 14d supply of losartan  - go to the restroom every 1-2 hours

## 2019-12-27 NOTE — ASSESSMENT & PLAN NOTE
R shoulder pain,most likely to be MSK, has injury history in 8/2019 with a fall, but is impairing her sleep. Unlikely cardiac in origin, EKG stable  · Shoulder XR today  · Refer to sports medicine  · EVE Ash

## 2019-12-27 NOTE — PROGRESS NOTES
Primary Care Provider Appointment    Subjective:      Patient ID: Kelsey Fields is a 79 y.o. female with HTN, HLD, MSK pain    Chief Complaint: Generalized Body Aches and Shoulder Pain (right )    Patient is here for follow-up of her acute shoulder pain, that could be related to post-herpetic neuralgia versus MSK pain. She starts PT soon. She takes duloxetine and gabapentin PRN. Shoulder XR planned for today.    She is not exercising regularly, has deconditioning. 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    She is using antihistamines for her URI. Is newly compliant with nasal steroid.    She has stress incontinence. Urinates on herself when she doesn't go the bathroom frequently enough.    She has prediabetes, A1c trending up.      Past Surgical History:   Procedure Laterality Date    blepharitis Bilateral 2017    Alessandra Grijalva OD    CATARACT EXTRACTION Bilateral 2017    Alessandra Grijalva MD    CATARACT EXTRACTION W/  INTRAOCULAR LENS IMPLANT Left 2019    Procedure: EXTRACTION, CATARACT, WITH IOL INSERTION;  Surgeon: Alysa De Souza MD;  Location: [facility];  Service: Ophthalmology;  Laterality: Left;    CATARACT EXTRACTION W/  INTRAOCULAR LENS IMPLANT Right 2019    Procedure: EXTRACTION, CATARACT, WITH IOL INSERTION;  Surgeon: Alysa De Souza MD;  Location: [facility];  Service: Ophthalmology;  Laterality: Right;     SECTION      FRACTURE SURGERY      JOINT REPLACEMENT      right knee     KNEE SURGERY Right        Past Medical History:   Diagnosis Date    Arthritis     Episcleritis of right eye     GERD (gastroesophageal reflux disease)     Hypertension     Shingles     Sleep apnea        Social History     Socioeconomic History    Marital status: Single     Spouse name: Not on file    Number of children: Not on file    Years of education: Not on file    Highest education level: Not on file   Occupational History    Not on file   Social  "Needs    Financial resource strain: Not very hard    Food insecurity:     Worry: Never true     Inability: Never true    Transportation needs:     Medical: No     Non-medical: No   Tobacco Use    Smoking status: Never Smoker    Smokeless tobacco: Never Used   Substance and Sexual Activity    Alcohol use: No    Drug use: No    Sexual activity: Never   Lifestyle    Physical activity:     Days per week: 0 days     Minutes per session: 0 min    Stress: Not at all   Relationships    Social connections:     Talks on phone: Once a week     Gets together: Once a week     Attends Religion service: More than 4 times per year     Active member of club or organization: Yes     Attends meetings of clubs or organizations: More than 4 times per year     Relationship status: Never    Other Topics Concern    Not on file   Social History Narrative    Not on file       Review of Systems   Constitutional: Positive for activity change, appetite change and fatigue.   HENT: Positive for congestion, postnasal drip, rhinorrhea and sore throat. Negative for sneezing.    Eyes: Negative for photophobia and pain.   Respiratory: Negative for cough, chest tightness and shortness of breath.    Cardiovascular: Negative for chest pain and leg swelling.   Gastrointestinal: Negative for abdominal pain, constipation and diarrhea.   Endocrine: Negative for cold intolerance, heat intolerance and polyuria.   Genitourinary: Negative for frequency.   Musculoskeletal: Positive for arthralgias, back pain and myalgias. Negative for joint swelling and neck stiffness.   Skin: Negative for color change and pallor.   Neurological: Negative for seizures, speech difficulty and headaches.   Hematological: Negative for adenopathy.   Psychiatric/Behavioral: Positive for decreased concentration, dysphoric mood and sleep disturbance. Negative for agitation.       Objective:   /60   Pulse 85   Ht 5' 4" (1.626 m)   Wt 95.5 kg (210 lb 8.6 oz)  "  SpO2 98%   BMI 36.14 kg/m²     Physical Exam   Constitutional: She is oriented to person, place, and time. She appears well-developed and well-nourished.   obese   HENT:   Head: Normocephalic and atraumatic.   prognathism   Neck: Normal range of motion.   Cardiovascular: Normal rate.   Pulmonary/Chest: Effort normal.   Musculoskeletal: She exhibits edema and deformity.   Decreased ROM of R shoulder, TTP, no erythema  Mild LE edema   Neurological: She is alert and oriented to person, place, and time.   Skin:   Ecchymoses and purpura on UE bilaterally  Pruritic dry skin on UE bilaterally   Psychiatric: She has a normal mood and affect. Her behavior is normal.   Vitals reviewed.      Lab Results   Component Value Date    WBC 5.79 02/13/2019    HGB 11.8 (L) 02/13/2019    HCT 38.4 02/13/2019     02/13/2019    CHOL 139 02/13/2019    TRIG 72 02/13/2019    HDL 41 02/13/2019    ALT 14 02/13/2019    AST 19 02/13/2019     12/13/2019    K 4.0 12/13/2019     12/13/2019    CREATININE 1.4 12/13/2019    BUN 23 12/13/2019    CO2 27 12/13/2019    TSH 0.725 09/20/2018    HGBA1C 6.1 (H) 12/13/2019             RESULTS: Reviewed labs and images today    Assessment:   79 y.o. female with multiple co-morbid illnesses here to continue work-up of chronic issues notably with HTN, HLD, MSK pain.     Plan:     Problem List Items Addressed This Visit        Cardiac/Vascular    Essential hypertension     BP well-controlled on CCB and ARB  · Continue amlodipine 10mg and losartan 25mg  · PCP discontinued ASA  · Cardiology increased statin to atorvastatin 40mg  · HCTZ discarded due to patient confusion over its PRN use         Relevant Medications    losartan (COZAAR) 25 MG tablet       Renal/    Stage 3 chronic kidney disease    Relevant Medications    losartan (COZAAR) 25 MG tablet       Endocrine    Prediabetes     A1c 6.1, poor dietary compliance  · Diabetes education completed  · Advised lifestyle changes  · Monitor  A1c  · Advised portion control, avoidance of sweets         Relevant Medications    losartan (COZAAR) 25 MG tablet    losartan (COZAAR) 25 MG tablet       Orthopedic    Acute pain of right shoulder - Primary     R shoulder pain,most likely to be MSK, has injury history in 8/2019 with a fall, but is impairing her sleep. Unlikely cardiac in origin, EKG stable  · Shoulder XR today  · Refer to sports medicine  · EVE Ash         Relevant Medications    diclofenac sodium (VOLTAREN) 1 % Gel    Other Relevant Orders    Ambulatory referral/consult to Sports Medicine    Ambulatory Referral to Physical/Occupational Therapy          Health Maintenance       Date Due Completion Date    TETANUS VACCINE 11/19/1958 ---    Shingles Vaccine (3 of 3) 02/07/2020 12/13/2019    DEXA SCAN 05/16/2021 5/16/2019    Lipid Panel 02/13/2024 2/13/2019          Follow up in about 2 months (around 2/27/2020). Total face-to-face time was 60 min, 50% of this was spent on counseling and coordination of care. The following issues were discussed: with HTN, HLD, MSK pain    Laurie Todd MD/MPH  Internal Medicine  [facility]  [phone]

## 2019-12-30 ENCOUNTER — TELEPHONE (OUTPATIENT)
Dept: ORTHOPEDICS | Facility: CLINIC | Age: 79
End: 2019-12-30

## 2019-12-30 ENCOUNTER — OUTPATIENT CASE MANAGEMENT (OUTPATIENT)
Dept: ADMINISTRATIVE | Facility: OTHER | Age: 79
End: 2019-12-30

## 2019-12-30 NOTE — PROGRESS NOTES
Outpatient Care Management   - High Risk Patient Assessment    Patient: Kelsey Fields  MRN:  [id number]  Date of Service:  12/30/2019  Completed by:  Monae Angulo LMSW  Referral Date: 09/24/2019  Program: Case Management (High Risk)    Reason for Visit   Patient presents with    OPCM Chart Review     12/2/19    Social Work Assessment - High Risk     12/2/19    Plan Of Care     12/2/19    PHQ-9     12/2/19       Patient Summary     OPCM Social Work Assessment (High Risk)    Involvement of Care  Do I have permission to speak with other family members about your care?:  Yes  Assessment completed by:  Patient  Cognitive  Which of the following can you state?:  Name, Date of birth, Address, Year, President  Cognitive barriers?:  No  General  Marital status:  Single  Current employment status:  Part Time  Support  Level of Caregiver support:  Member independent and does not need caregiver assistance  Support system:  Family  Is the caregiver reporting burnout?:  No  Support Barriers?:  No  Advanced Care Planning  Do you have any of the following?:  None  If yes, do we have a copy?:  N/A  If no, would you like Advance Directive resources?:  Yes  Advance Care Planning resources provided?:  Yes  Is Advance Care Planning an area of need?:  Yes  Financial  Current medical coverage:  Medicare Advantage (Comment: [facility] Medicare O)  Have you applied for government assistance programs?:  No  Are you unable to pay any of the following?:  None  Financial Support Barriers?:  No  Safety  Significant change in functioning?:  Disease progression  Safety barriers?:  No   History  Do you or your spouse currently or formerly serve in the ?:  No  Disaster Plan  Established evacuation plan?:  Yes  [place] residence:  [place]  Evacuation location:  Pt denies  Registered for evacuation?:  No  Ability to evacuate:  N/A  Mental Health Status  Emotional status:  Stable  Have you recenetly lost a loved  one?:  Yes  Psychiatric diagnosis:  Pt denies  Current mental health treatment:  No  Would you like mental health resources?:  No  Current symptoms:  None  Mental/Behavioral/Environmental risk:  None  Mental Health Barriers?:  No  Addictive Behaviors  Current alcohol consumption?:  No  Current substance abuse?:  No  Gambling habits?:  No  Was the PHQ depression screening completed?:  Yes  Was the YELITZA-7 completed?:  No  Resources         Complex Care Plan     Care plan was discussed and completed today with input from patient and/or caregiver.    Goals      Patient/caregiver will have an action plan in place to manage and prevent complications of  HTN prior to discharge from OPCM. - Priority: HIGH       Overall Time to Completion  2 months from 10/10/2019    OPCM Identified Patient Barriers:  Health Literacy  -Care Plan Created  Nutrition-----Care Plan Created  Advanced Care Planning-- -Referred to OPCM        OPCM Identified Education Barriers:  Education Barrier for Hypertension   -Care Plan Created        Short Term Goals  Patient will have appropriate health related knowledge and understanding of nutritional requirements within 1 month.   Interventions   · Mail Blood pressure logs for home use.  · Recognize and provide educational material (KIRSTIN).   · 12/3-- Pt is not sure about her dietary needs and expresses interest in Nutrition Consult.       Status  · Partially met        Patient/caregiver will measure and record the blood pressure 1 times per day for 1 month.  Interventions   · Assess for availability of working blood pressure cuff in home setting.   10/10--Patient/Caregiver agrees to get new battery for existing BP machine by one week.  · 10/10--Patient/Caregiver agrees to OPCM follow up within one week to assess progress to goal.   · 10/25-- Pt reports replacing the batteries to BP machine and compared her BP reading to the reading from a friend's BP and found a difference in her reading  being higher on hers--- 151/76 vs 135/70 on friend's.  ·  Pt plans to put in another battery.   · 10/25---Provided reinforcement to pt's efforts to get BP machine working to begin daily BP checks.   10/25- Patient/Caregiver agrees to try different batteries for BP machine within one week.  · Patient/Caregiver agrees to OPCM follow up within one week to assess progress to goal.   ·   · 12/3- Pt states she doesn't think her BP is not working quite right. Pt report attending the [facility] regularly and got a raffled off BP machine some time ago. 10/22= 149/71, 136/69, 135/71, 11/19= 131/74. Pt expresses concern over family h/o mother having a stroke.   · 12/3- Instructed pt to bring her BP machine and log to upcoming PCP appt with Dr Todd.  ·  12/3-- In basket message sent to PCP today includes mention of pt not checking BPs --agrees to do so this week and to bring her log and BP mach to be checked at 12/10 PCP appt .  · 12/3-  Patient/Caregiver agrees to check/log BPs daily and bring to PCP 12/10.   · Patient/Caregiver agrees to OPCM follow up within one week to assess progress to goal.   · 12/12-- PCP appt rescheduled from 12/10 to 12/13. Attempt contact with pt finds pt at [facility].   · 12/12--Call back message received from pt. Upon calling pt soon thereafter, pt reports busy having her breakfast. Phone contact brief. Reinforced pt attending tomorrow's PCP appt 12/13 at 10:30 am for a f/u BP check. Pt c/o leg cramps, states she plans to go to appt.   Patient/Caregiver agrees to attend PCP appt 12/13.  · Patient/Caregiver agrees to OPCM follow up within one week to assess progress to goal.   ·      Status  · Partially met      Patient/caregiver will verbalize 3 food items, 3 healthy food preparation methods, 3 Seasoning alternatives for  Low Sodium within 2 weeks.  Interventions   · Recognize and provide educational material (KIRSTIN).   · 10/10-- Mailed KRAMES education materials.    · Foods----Fresh fruits, fresh vegs, lean meats (chicken, fish, turkey)  · Cooking -- Bake, Broil, Boil foods.------------------Began providing education 10/10  · Seasonings- Mrs. Dash, Pepper, Lemon Juice, onions---Began providing education-- 10/10  ·      Status  · Partially met      Patient/caregiver will verbalize 3 ways of preventing complications due to disease process within 1 month.  Interventions   · Empower patient/caregiver to discuss treatment plan with Physician/care team.  · Encourage compliance with Physician follow-ups.  · Encourage Dietary Compliance.  · Encourage Exercise.  · Encourage Medication Compliance.   · Daily BP checks and logging.  · Report persistently elevated BPs greater than 130/80.   · 12/3--Pt admits to eating foods that are sugary and that she needs to watch the salt intake more.      Status  · Partially met            Clinical Reference Documents Added to Patient Instructions       Document    FALLS IN THE HOME, PREVENTING (ENGLISH)    HIGH BLOOD PRESSURE, WHAT IS?  (ENGLISH)        SALT, TIPS FOR USING LESS (ENGLISH)    STROKE AND HIGH BLOOD PRESSURE, UNDERSTANDING THE LINK BETWEEN (ENGLISH)             Patient/caregiver will have an action plan in place to manage and prevent complications of PreDiabetes  prior to discharge from [facility]. - Priority: HIGH      Overall Time to Completion  1 month from 10/25/2019     [facility] Identified Patient Barriers:  Health Literacy  -Care Plan Created  Nutrition-----Care Plan Created  Advanced Care Planning-- -Referred to [facility]        OPCM Identified Education Barriers:  Education Barrier for Hypertension   -Care Plan Created         Short Term Goals  Patient/caregiver will verbalize food required to create a well-balanced food plate within 1 month.  Interventions   · Encourage Dietary Compliance.  · Recognize and provide educational material (KIRSTIN).  · Review eating/nutrition habits.   ·  10/25- Pt c/o her toe hurting after she ate  "a cobbler. Pt relates her pain to information she learned from PCP regarding diabetes & potential complications. Pt says I really need to watch the salt and the sugar.   · 10/25- Mailed education material on Pre Diabetes and MyFood Plate.   · 12/3- Pt is not sure if she received the mailings. Pt states not looking through her mail and letting it pile up. Pt states however, getting the pill box mailed to pt by this RN CM.   · 12/3- Pt c/o left 4th toe hurting after she ate something sweet. Pt states, I guess I will have to stop eating sweets because the pain is not worth it". Pt c/o the pain associated with prior finger sticks for BG checks. Pt states, she couldn't check her BGs because of the pain. Pt is prediabetic, does not possess own glucometer at this time.     12/3-- In basket message sent to Dr. Todd with concerns -- pt in need of Nutrition Consult and yet at the same time, pt is overwhelmed/exhausted from working nights to properly focus and care for her daily health needs.      Status  · Partially met           Clinical Reference Documents Added to Patient Instructions       Document    PREDIABETES (ENGLISH)    USDA MYPLATE, UNDERSTANDING (ENGLISH)             Patient/caregiver will have an action plan in place to manage and prevent complications of Sleep Apnea prior to discharge from OPCM. - Priority: HIGH      Overall Time to Completion  2 months from 10/10/2019    [facility] [place] Identified Patient Barriers:  Health Literacy  -Care Plan Created  Nutrition-----Care Plan Created  Advanced Care Planning-- -Referred to OPCM        OPCM Identified Education Barriers:  Education Barrier for Hypertension   -Care Plan Created        Short Term Goals  Patient/caregiver will obtain CPAP to support disease process within 1 month.  Interventions                  Collaborate with Physician as appropriate to meet patient needs as found necessary in promoting pt adherence to CPAP use.                10/10-- " Explained the importance of using the CPAP-- to be sure pt is getting needed O2 while sleeps, needed to restore body's energy, to                            prevent neg affects on body with the CPAP ie heart disease, obesity, compromised functioning due to fatigue/sleepiness.  Done 10/10              10/10-Patient/Caregiver agrees to start wearing her CPAP within 2 wks.    Patient/Caregiver agrees to OPCM follow up within 2 weeks to assess progress to goal.     12/3-  Pt states she hasn't been using the CPAP but that she will start. Pt explains how she comes home so tired she lays down on the bed and falls asleep. Pt confirms receiving the needed tubing CPAP supplies in the mail.   Instructed pt to use and keep clean tubing/CPAP supplies.  12/3-  Patient/Caregiver agrees to start using her CPAP by one week.   Patient/Caregiver agrees to OPCM follow up within one week to assess progress to goal.   12/3- In basket message to PCP sent today includes pt's lack of using CPAP.        Status  · Partially met            Clinical Reference Documents Added to Patient Instructions       Document    FALLS IN THE HOME, PREVENTING (ENGLISH)    HIGH BLOOD PRESSURE, WHAT IS?  (ENGLISH)        SALT, TIPS FOR USING LESS (ENGLISH)    STROKE AND HIGH BLOOD PRESSURE, UNDERSTANDING THE LINK BETWEEN (ENGLISH)             Patient/caregiver will have knowledge of resources available in order to obtain the services that are needed prior to discharge from OPCM. - Priority: Medium      Overall Time to Completion  2 months from 12/03/2019    Social Work Identified Patient Barriers:   Advanced Care Planning:  Care plan created           Short Term Goals  Patient/caregiver will discuss Advanced Care Planning with family, Physician and/or Power of  within 2 weeks.  Interventions   · Collaborate with OPCM RN as appropriate to meet patient needs.  · Discuss and provide [facility] Advance Directive.  · Empower patient/caregiver to discuss End  of life issues with family, Physician and/or Power of .  · Provide education on advance care planning.     Status  · Partially met     Patient agrees to follow up by  12/30/19.  Patient agrees to OPCM follow up within two weeks to assess progress to goal.              Patient/caregiver will have knowledge of resources available in order to obtain the services that are needed prior to discharge from OPCM. - Priority: MOD      Overall Time to Completion  2 months from 10/10/2019      [facility] Identified Patient Barriers:  Health Literacy  -Care Plan Created  Nutrition-----Care Plan Created  Advanced Care Planning-- -Referred to OPCM      OPCM Identified Education Barriers:  Education Barrier for Hypertension    Short Term Goals  Patient/caregiver will have contact information for identified community resources IE:[facility]  for follow-up within 1 month.  Interventions   · Mail [employer]it Contact Information.     Done 10/10  · Refer to Outpatient Case Management Social Worker.  Done 10/10  Attempted to provide pt with contact for this RN CM and OOC however pt very sleepy and pen not writing.  Mailed contact information along with Welcome Letter, education material, [facility] OTC 2019 catalog, BP logs.   10/10-- Omitted PHQ2. Assess on next encounter.   10/25- Completed PHQ2=0.   10/25-- Pt confirms receipt of mailings as reason for her incoming call. Pt is thankful, still looking through them and will look at the 2019 appweevr OTC catalog.    10/25- OPC LCSW assessment pending. Done 12/2  10/25-- Pt confirms receipt of mailings calling this RN CM with the purpose of informing this.     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       Status  · Partially met            Clinical Reference  Documents Added to Patient Instructions       Document    FALLS IN THE HOME, PREVENTING (ENGLISH)    HIGH BLOOD PRESSURE, WHAT IS?  (ENGLISH)        SALT, TIPS FOR USING LESS (ENGLISH)    STROKE AND HIGH BLOOD PRESSURE, UNDERSTANDING THE LINK BETWEEN (ENGLISH)                  Patient Instructions     No follow-ups on file.    Todays OPCM Self-Management Care Plan was developed with the patients/caregivers input and was based on identified barriers from todays assessment.  Goals were written today with the patient/caregiver and the patient has agreed to work towards these goals to improve his/her overall well-being. Patient verbalized understanding of the care plan, goals, and all of today's instructions. Encouraged patient/caregiver to communicate with his/her physician and health care team about health conditions and the treatment plan.  Provided my contact information today and encouraged patient/caregiver to call me with any questions as needed.

## 2019-12-30 NOTE — PROGRESS NOTES
1st Attempt to complete SW follow-up for Outpatient Care Management; left message requesting return call.  LMSW will reattempt at a later date.

## 2019-12-30 NOTE — TELEPHONE ENCOUNTER
Left a voicemail for patient to return my phone call in regards to rescheduling an appointment/x-ray at their convenience. Provided the clinic's phone number.

## 2019-12-31 ENCOUNTER — TELEPHONE (OUTPATIENT)
Dept: ORTHOPEDICS | Facility: CLINIC | Age: 79
End: 2019-12-31

## 2019-12-31 ENCOUNTER — OUTPATIENT CASE MANAGEMENT (OUTPATIENT)
Dept: ADMINISTRATIVE | Facility: OTHER | Age: 79
End: 2019-12-31

## 2019-12-31 ENCOUNTER — PATIENT OUTREACH (OUTPATIENT)
Dept: ADMINISTRATIVE | Facility: OTHER | Age: 79
End: 2019-12-31

## 2019-12-31 DIAGNOSIS — R52 PAIN: Primary | ICD-10-CM

## 2019-12-31 NOTE — TELEPHONE ENCOUNTER
Patient rescheduled for an appointment. Patient verbalized understanding.    Patient still needs x-rays scheduled.

## 2019-12-31 NOTE — PROGRESS NOTES
Outpatient Care Management  Plan of Care Follow Up Visit    Patient: Kelsey Fields  MRN: [id number]  Date of Service: 12/31/2019  Completed by: Dina Rodas RN  Referral Date: 09/24/2019  Program: Case Management (High Risk)    No chief complaint on file.      Brief Summary:   F/u call to pt. Pt s/p PCP appts on 12/13 and 12/27. BP logs shared with PCP --BPs wnl. Peripheral edema continued- pt confirms receipt of compression stockings but has not put on yet. Instructed pt to start using the stockings to help with circulation and reduce periph edema. Pt's A1Cs trending up, pt aware of Pre Diabetes dx and need to eat healthy food plate. Providing education to pt over phone is difficult due to pt's usual state of being tired from working nights and general state of being overwhelmed with everything she has to do. Pt says she has to clean her house of a lot of papers that are cluttering her pathways.   Nutrition consult was mentioned to PCP in past- no referral noted. New request sent to PCP. (Noted 11/20/18 Nutrition Consult visit for Pre Diabetes). Pt has not started to use her CPAP despite knowledge of its benefits with Pre Diabetes and having more energy.   Reviewed all upcoming appts with pt. Pt does not use Patient Portal. Numerous upcoming appts to Physical Therapy at [facility]. All 11 appts for Jan and 1 appt in Feb to PCP were mailed to pt today.   Pt agrees to f/u RN CM call in 2 wks.     Patient Summary     Involvement of Care:  Do I have permission to speak with other family members about your care?       Patient Reported Labs & Vitals:  1.  Any Patient Reported Labs & Vitals?     2.  Patient Reported Blood Pressure:     3.  Patient Reported Pulse:     4.  Patient Reported Weight (Kg):     5.  Patient Reported Blood Glucose (mg/dl):       Medical History:  Reviewed medical history with patient and/or caregiver    Social History:  Reviewed social history with patient and/or  caregiver    Clinical Assessment     Reviewed and provided basic information on available community resources for mental health, transportation, wellness resources, and palliative care programs with patient and/or caregiver.    Complex Care Plan     Care plan was discussed and completed today with input from patient and/or caregiver.    Goals        Outpatient Case Management     Patient/caregiver will have an action plan in place to manage and prevent complications of  HTN prior to discharge from [facility]. - Priority: HIGH       Overall Time to Completion  2 months from 10/10/2019    [facility] Identified Patient Barriers:  Health Literacy  -Care Plan Created  Nutrition-----Care Plan Created  Advanced Care Planning-- -Referred to [facility]        [facility] Identified Education Barriers:  Education Barrier for Hypertension   -Care Plan Created        Short Term Goals  Patient will have appropriate health related knowledge and understanding of nutritional requirements within 1 month.   Interventions   · Mail Blood pressure logs for home use.  · Recognize and provide educational material (KIRSTIN).   · 12/3-- Pt is not sure about her dietary needs and expresses interest in Nutrition Consult.   · 12/31- Pt presents BP log to PCP on 12/27---- /60 at appt.       Status  · Met   12/31        Patient/caregiver will measure and record the blood pressure 1 times per day for 1 month.  Interventions   · Assess for availability of working blood pressure cuff in home setting.   10/10--Patient/Caregiver agrees to get new battery for existing BP machine by one week.  · 10/10--Patient/Caregiver agrees to [facility] follow up within one week to assess progress to goal.   · 10/25-- Pt reports replacing the batteries to BP machine and compared her BP reading to the reading from a friend's BP and found a difference in her reading being higher on hers--- 151/76 vs 135/70 on friend's.  ·  Pt plans to put in another battery.   · 10/25---Provided  reinforcement to pt's efforts to get BP machine working to begin daily BP checks.   10/25- Patient/Caregiver agrees to try different batteries for BP machine within one week.  · Patient/Caregiver agrees to OPCM follow up within one week to assess progress to goal.   ·   · 12/3- Pt states she doesn't think her BP is not working quite right. Pt report attending the [facility] regularly and got a raffled off BP machine some time ago. 10/22= 149/71, 136/69, 135/71, 11/19= 131/74. Pt expresses concern over family h/o mother having a stroke.   · 12/3- Instructed pt to bring her BP machine and log to upcoming PCP appt with Dr Todd.  ·  12/3-- In basket message sent to PCP today includes mention of pt not checking BPs --agrees to do so this week and to bring her log and BP mach to be checked at 12/10 PCP appt .  · 12/3-  Patient/Caregiver agrees to check/log BPs daily and bring to PCP 12/10.   · Patient/Caregiver agrees to OPCM follow up within one week to assess progress to goal.   · 12/12-- PCP appt rescheduled from 12/10 to 12/13. Attempt contact with pt finds pt at [facility].   · 12/12--Call back message received from pt. Upon calling pt soon thereafter, pt reports busy having her breakfast. Phone contact brief. Reinforced pt attending tomorrow's PCP appt 12/13 at 10:30 am for a f/u BP check. Pt c/o leg cramps, states she plans to go to appt.   Patient/Caregiver agrees to attend PCP appt 12/13.  · Patient/Caregiver agrees to OPCM follow up within one week to assess progress to goal.   · 12/31- Pt s/p PCP appts 12/13 and 12/27. Pt acknowledges HCTZ discontinued.   ·      Status  · Met  12/31      Patient/caregiver will verbalize 3 food items, 3 healthy food preparation methods, 3 Seasoning alternatives for  Low Sodium within 2 weeks.  Interventions   · Recognize and provide educational material (KIRSTIN).   · 10/10-- Mailed KRAMES education materials.   · Foods----Fresh fruits, fresh vegs, lean meats  (chicken, fish, turkey)  · Cooking -- Bake, Broil, Boil foods.------------------Began providing education 10/10  · Seasonings- Mrs. Dash, Pepper, Lemon Juice, onions---Began providing education-- 10/10  ·      Status  · Partially met      Patient/caregiver will verbalize 3 ways of preventing complications due to disease process within 1 month.  Interventions   · Empower patient/caregiver to discuss treatment plan with Physician/care team.  · Encourage compliance with Physician follow-ups.  · Encourage Dietary Compliance.  · Encourage Exercise.  · Encourage Medication Compliance.   · Daily BP checks and logging.  · Report persistently elevated BPs greater than 130/80.   · 12/3--Pt admits to eating foods that are sugary and that she needs to watch the salt intake more.   · 12/31- Pt to benefit from further instructions in eating healthy low na, low carb food plate however pt is not receptive or unable to focus on changes. Pt is usually physically tired and overwhelmed.      Status  · Partially met            Clinical Reference Documents Added to Patient Instructions       Document    FALLS IN THE HOME, PREVENTING (ENGLISH)    HIGH BLOOD PRESSURE, WHAT IS?  (ENGLISH)        SALT, TIPS FOR USING LESS (ENGLISH)    STROKE AND HIGH BLOOD PRESSURE, UNDERSTANDING THE LINK BETWEEN (ENGLISH)             Patient/caregiver will have an action plan in place to manage and prevent complications of PreDiabetes  prior to discharge from [facility]. - Priority: HIGH      Overall Time to Completion  1 month from 10/25/2019     [facility] Identified Patient Barriers:  Health Literacy  -Care Plan Created  Nutrition-----Care Plan Created  Advanced Care Planning-- -Referred to [facility]        [facility] Identified Education Barriers:  Education Barrier for Hypertension   -Care Plan Created         Short Term Goals  Patient/caregiver will verbalize food required to create a well-balanced food plate within 1 month.  Interventions   · Encourage  "Dietary Compliance.  · Recognize and provide educational material (KIRSTIN).  · Review eating/nutrition habits.   ·  10/25- Pt c/o her toe hurting after she ate a cobbler. Pt relates her pain to information she learned from PCP regarding diabetes & potential complications. Pt says I really need to watch the salt and the sugar.   · 10/25- Mailed education material on Pre Diabetes and MyFood Plate.   · 12/3- Pt is not sure if she received the mailings. Pt states not looking through her mail and letting it pile up. Pt states however, getting the pill box mailed to pt by this RN CM.   · 12/3- Pt c/o left 4th toe hurting after she ate something sweet. Pt states, I guess I will have to stop eating sweets because the pain is not worth it". Pt c/o the pain associated with prior finger sticks for BG checks. Pt states, she couldn't check her BGs because of the pain. Pt is prediabetic, does not possess own glucometer at this time.     12/3-- In basket message sent to Dr. Todd with concerns -- pt in need of Nutrition Consult and yet at the same time, pt is overwhelmed/exhausted from working nights to properly focus and care for her daily health needs.   12/31- Pt s/p PCP appt 12/13. Pt verbalizes being told she has PreDiabetes. Last A1C=6.1 (128) on 12/13/19.   12/31- Began to explain healthy food 9" plate --1/2 of Vegs/salads and 1/4 lean meat + 1/4 starch--- pt cuts off conversation to report on other family members who have had DM. Provided reinforcement to pt that she can prevent progression to DM by eating a balanced diet and exercise ie walking and to include foods high in fiber as provided to her by PCP pt states to help her with c/o constipation.  Discouraged pt from riding bicycle for exercise-- recommended walking. Reviewed upcoming appts to [facility] and to Sports Medicine.     12/31--Patient/Caregiver agrees to attend 1/3/20 for Hands at [facility] followed by 1/6/20 [facility]- Sciatica & " 1/6/20 Sports Med- Shoulder/   Patient/Caregiver agrees to [facility] follow up in 2 wks to assess progress to goal.               12/31----Numerous appts upcoming-- as pt requests-- mailed out all Jan 2020 appts and then Feb PCP appt.   12/31-- new request sent to PCP for Nutrition Consult. (Noted 11/20/18 Nutrition Consult visit for PreDiabetes)     Status  · Partially met           Clinical Reference Documents Added to Patient Instructions       Document    PREDIABETES (ENGLISH)    USDA MYPLATE, UNDERSTANDING (ENGLISH)             Patient/caregiver will have an action plan in place to manage and prevent complications of Sleep Apnea prior to discharge from [facility]. - Priority: HIGH      Overall Time to Completion  2 months from 10/10/2019    [facility] Identified Patient Barriers:  Health Literacy  -Care Plan Created  Nutrition-----Care Plan Created  Advanced Care Planning-- -Referred to [facility]        [facility] Identified Education Barriers:  Education Barrier for Hypertension   -Care Plan Created        Short Term Goals  Patient/caregiver will obtain CPAP to support disease process within 1 month.  Interventions                  Collaborate with Physician as appropriate to meet patient needs as found necessary in promoting pt adherence to CPAP use.                10/10-- Explained the importance of using the CPAP-- to be sure pt is getting needed O2 while sleeps, needed to restore body's energy, to                            prevent neg affects on body with the CPAP ie heart disease, obesity, compromised functioning due to fatigue/sleepiness.  Done 10/10              10/10-Patient/Caregiver agrees to start wearing her CPAP within 2 wks.    Patient/Caregiver agrees to OPCM follow up within 2 weeks to assess progress to goal.     12/3-  Pt states she hasn't been using the CPAP but that she will start. Pt explains how she comes home so tired she lays down on the bed and falls asleep. Pt confirms receiving the needed tubing  CPAP supplies in the mail.   Instructed pt to use and keep clean tubing/CPAP supplies.  12/3-  Patient/Caregiver agrees to start using her CPAP by one week.   Patient/Caregiver agrees to OPCM follow up within one week to assess progress to goal.   12/3- In basket message to PCP sent today includes pt's lack of using CPAP.  12/31- pt states she still hasn't started to use CPAP but says she will have to start in order to decrease feeling so tired all of the time.   12/31-- Patient/Caregiver agrees to start using her CPAP within 2 wks.   Patient/Caregiver agrees to OPCM follow up in 2 wks to assess progress to goal. .         Status  · Partially met            Clinical Reference Documents Added to Patient Instructions       Document    FALLS IN THE HOME, PREVENTING (ENGLISH)    HIGH BLOOD PRESSURE, WHAT IS?  (ENGLISH)        SALT, TIPS FOR USING LESS (ENGLISH)    STROKE AND HIGH BLOOD PRESSURE, UNDERSTANDING THE LINK BETWEEN (ENGLISH)             Patient/caregiver will have knowledge of resources available in order to obtain the services that are needed prior to discharge from [facility]. - Priority: Medium      Overall Time to Completion  2 months from 12/03/2019    Social Work Identified Patient Barriers:   Advanced Care Planning:  Care plan created           Short Term Goals  Patient/caregiver will discuss Advanced Care Planning with family, Physician and/or Power of  within 2 weeks.  Interventions   · Collaborate with OPCM RN as appropriate to meet patient needs.  · Discuss and provide [facility] Advance Directive.  · Empower patient/caregiver to discuss End of life issues with family, Physician and/or Power of .  · Provide education on advance care planning.     Status  · Partially met     Patient agrees to follow up by  12/30/19.  Patient agrees to OPCM follow up within two weeks to assess progress to goal.              Patient/caregiver will have knowledge of resources available in order to obtain the  services that are needed prior to discharge from [facility]. - Priority: MOD      Overall Time to Completion  2 months from 10/10/2019      [facility] Identified Patient Barriers:  Health Literacy  -Care Plan Created  Nutrition-----Care Plan Created  Advanced Care Planning-- -Referred to [facility]      [facility] Identified Education Barriers:  Education Barrier for Hypertension    Short Term Goals  Patient/caregiver will have contact information for identified community resources IE:[facility]  for follow-up within 1 month.  Interventions   · Mail [employer] OTC Benefit Contact Information.     Done 10/10  · Refer to Outpatient Case Management Social Worker.  Done 10/10  Attempted to provide pt with contact for this RN CM and OOC however pt very sleepy and pen not writing.  Mailed contact information along with Welcome Letter, education material, [facility] OTC 2019 catalog, BP logs.   10/10-- Omitted PHQ2. Assess on next encounter.   10/25- Completed PHQ2=0.   10/25-- Pt confirms receipt of mailings as reason for her incoming call. Pt is thankful, still looking through them and will look at the 2019 [employer] OTC catalog.    10/25- O[facility] LCSW assessment pending. Done 12/2  10/25-- Pt confirms receipt of mailings calling this RN CM with the purpose of informing this.     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       Status  · Partially met            Clinical Reference Documents Added to Patient Instructions       Document    FALLS IN THE HOME, PREVENTING (ENGLISH)    HIGH BLOOD PRESSURE, WHAT IS?  (ENGLISH)        SALT, TIPS FOR USING LESS (ENGLISH)    STROKE AND HIGH BLOOD PRESSURE, UNDERSTANDING THE LINK BETWEEN (ENGLISH)                  Patient Instructions     Instructions were provided via the [employer] patient Client24  and are available for the patient to view on the patient portal.    Next Steps:   F/u on PCP referral to Nutrition Consult.  F/u with pt on appt adherence.   F/u with pt on use of CPAP  F/u with OPCM LCSW regarding Advanced Directives and pt support.     No follow-ups on file.      Todays OPCM Self-Management Care Plan was developed with the patients/caregivers input and was based on identified barriers from todays assessment.  Goals were written today with the patient/caregiver and the patient has agreed to work towards these goals to improve his/her overall well-being. Patient verbalized understanding of the care plan, goals, and all of today's instructions. Encouraged patient/caregiver to communicate with his/her physician and health care team about health conditions and the treatment plan.  Provided my contact information today and encouraged patient/caregiver to call me with any questions as needed.

## 2020-01-06 ENCOUNTER — TELEPHONE (OUTPATIENT)
Dept: ORTHOPEDICS | Facility: CLINIC | Age: 80
End: 2020-01-06

## 2020-01-06 ENCOUNTER — CLINICAL SUPPORT (OUTPATIENT)
Dept: REHABILITATION | Facility: HOSPITAL | Age: 80
End: 2020-01-06
Attending: INTERNAL MEDICINE
Payer: MEDICARE

## 2020-01-06 ENCOUNTER — PATIENT OUTREACH (OUTPATIENT)
Dept: ADMINISTRATIVE | Facility: OTHER | Age: 80
End: 2020-01-06

## 2020-01-06 DIAGNOSIS — R29.3 POOR POSTURE: ICD-10-CM

## 2020-01-06 DIAGNOSIS — R26.9 GAIT ABNORMALITY: ICD-10-CM

## 2020-01-06 DIAGNOSIS — R29.898 BILATERAL LEG WEAKNESS: ICD-10-CM

## 2020-01-06 PROCEDURE — 97110 THERAPEUTIC EXERCISES: CPT | Mod: HCNC

## 2020-01-06 NOTE — TELEPHONE ENCOUNTER
Spoke with patient and reminded them of appointment tomorrow with xrays before being seen by the MD.  Patient verbalized understanding.     Patricia Grossman LPN

## 2020-01-06 NOTE — PROGRESS NOTES
Physical Therapy Daily Treatment Note     Name: Kelsey Fields  Clinic Number: [id number]    Therapy Diagnosis:   Encounter Diagnoses   Name Primary?    Poor posture     Bilateral leg weakness     Gait abnormality      Physician: Laurie Todd, *    Visit Date: 1/6/2020    Physician Orders: PT Eval and Treat   Medical Diagnosis from Referral:   M54.42,M54.41,G89.29 (ICD-10-CM) - Chronic bilateral low back pain with bilateral sciatica  Evaluation Date: 12/2/2019  Authorization Period Expiration: 12/31/2019  Plan of Care Expiration: 1/31/2019  Visit # / Visits authorized: 3/ 20     Time In: 220  Time Out: 300  Total Billable Time: 25 minutes     Visit: 60.64   Total: 202.88     Precautions: Standard and Fall       Subjective     Pt reports:  She is feeling alright today with no new complaints since last visit.   She was compliant with home exercise program.  Response to previous treatment: No adverse effect  Functional change: None    Pain: 7/10  Location: bilateral back      Objective     Kelsey received therapeutic exercises to develop strength, endurance, ROM, flexibility, posture and core stabilization for 40 minutes including:        Seated Ham Stretch 30 sec x 3  Seated Marches 30x   Seated hip abd RTB 30x  Bridging 20x  SAQ 30x  LTR 20x  Ball Squeezes 5 sec x 20  Standing Extensions by bar 20x        Home Exercises Provided and Patient Education Provided     Education provided:   - Purpose of PT    Written Home Exercises Provided: None.  Exercises were reviewed and Kelsey was able to demonstrate them prior to the end of the session.  Kelsey demonstrated good  understanding of the education provided.     See EMR under Patient Instructions for exercises provided prior visit.    Assessment   Patient continued to tolerate therex today without any pain or discomfort. Patient needed verbal and tactile cueing for lumbar extensions to avoid bending at her knees. Continue to progress patient to prone  exercises.   Kelsey is progressing well towards her goals.   Pt prognosis is Good.     Pt will continue to benefit from skilled outpatient physical therapy to address the deficits listed in the problem list box on initial evaluation, provide pt/family education and to maximize pt's level of independence in the home and community environment.     Pt's spiritual, cultural and educational needs considered and pt agreeable to plan of care and goals.     Anticipated barriers to physical therapy: Chronic poor posture    Goals:   Short Term Goals (3 Weeks):  1. Pt will be compliant with HEP to supplement PT in decreasing pain with functional mobility.  2. Pt will improve lumbar ROM by 5 degrees in ext/R side bend/L side bend to improve functional mobility.  4. Pt will improve impaired LE MMTs by 1/2 score to improve strength for functional tasks.  Long Term Goals (6 Weeks):   1. Pt will improve FOTO score to </= 6% limited to decrease perceived limitation with maintaining/changing body position.   2. Pt will perform 8 sit to stand during 30 sec sit to stand without the use of hands for support to improve functional LE strength   3. Pt will improve impaired LE MMTs by 1 score to improve strength for functional tasks.  4. Patient will improve lumbar ROM by 10 degrees in ext/R side bend/L side bend to improve functional mobility    Plan     Plan of care Certification: 12/2/2019 to 1/31/2019.     Outpatient Physical Therapy 2 times weekly for 8 weeks to include the following interventions: Cervical/Lumbar Traction, Gait Training, Manual Therapy, Moist Heat/ Ice, Neuromuscular Re-ed, Patient Education, Self Care, Therapeutic Activites and Therapeutic Exercise.     Arnav Kilpatrick, PT

## 2020-01-07 ENCOUNTER — OFFICE VISIT (OUTPATIENT)
Dept: SPORTS MEDICINE | Facility: CLINIC | Age: 80
End: 2020-01-07
Payer: MEDICARE

## 2020-01-07 VITALS
HEART RATE: 74 BPM | BODY MASS INDEX: 35.85 KG/M2 | WEIGHT: 210 LBS | DIASTOLIC BLOOD PRESSURE: 66 MMHG | HEIGHT: 64 IN | SYSTOLIC BLOOD PRESSURE: 126 MMHG

## 2020-01-07 DIAGNOSIS — M25.511 CHRONIC RIGHT SHOULDER PAIN: ICD-10-CM

## 2020-01-07 DIAGNOSIS — G89.29 CHRONIC RIGHT SHOULDER PAIN: ICD-10-CM

## 2020-01-07 DIAGNOSIS — M75.101 ROTATOR CUFF SYNDROME OF RIGHT SHOULDER: Primary | ICD-10-CM

## 2020-01-07 PROCEDURE — 1159F MED LIST DOCD IN RCRD: CPT | Mod: HCNC,S$GLB,, | Performed by: PHYSICIAN ASSISTANT

## 2020-01-07 PROCEDURE — 1101F PR PT FALLS ASSESS DOC 0-1 FALLS W/OUT INJ PAST YR: ICD-10-PCS | Mod: HCNC,CPTII,S$GLB, | Performed by: PHYSICIAN ASSISTANT

## 2020-01-07 PROCEDURE — 3074F SYST BP LT 130 MM HG: CPT | Mod: HCNC,CPTII,S$GLB, | Performed by: PHYSICIAN ASSISTANT

## 2020-01-07 PROCEDURE — 99999 PR PBB SHADOW E&M-EST. PATIENT-LVL IV: CPT | Mod: PBBFAC,HCNC,, | Performed by: PHYSICIAN ASSISTANT

## 2020-01-07 PROCEDURE — 1126F PR PAIN SEVERITY QUANTIFIED, NO PAIN PRESENT: ICD-10-PCS | Mod: HCNC,S$GLB,, | Performed by: PHYSICIAN ASSISTANT

## 2020-01-07 PROCEDURE — 1101F PT FALLS ASSESS-DOCD LE1/YR: CPT | Mod: HCNC,CPTII,S$GLB, | Performed by: PHYSICIAN ASSISTANT

## 2020-01-07 PROCEDURE — 3078F DIAST BP <80 MM HG: CPT | Mod: HCNC,CPTII,S$GLB, | Performed by: PHYSICIAN ASSISTANT

## 2020-01-07 PROCEDURE — 3078F PR MOST RECENT DIASTOLIC BLOOD PRESSURE < 80 MM HG: ICD-10-PCS | Mod: HCNC,CPTII,S$GLB, | Performed by: PHYSICIAN ASSISTANT

## 2020-01-07 PROCEDURE — 1159F PR MEDICATION LIST DOCUMENTED IN MEDICAL RECORD: ICD-10-PCS | Mod: HCNC,S$GLB,, | Performed by: PHYSICIAN ASSISTANT

## 2020-01-07 PROCEDURE — 99204 OFFICE O/P NEW MOD 45 MIN: CPT | Mod: HCNC,S$GLB,, | Performed by: PHYSICIAN ASSISTANT

## 2020-01-07 PROCEDURE — 99204 PR OFFICE/OUTPT VISIT, NEW, LEVL IV, 45-59 MIN: ICD-10-PCS | Mod: HCNC,S$GLB,, | Performed by: PHYSICIAN ASSISTANT

## 2020-01-07 PROCEDURE — 99999 PR PBB SHADOW E&M-EST. PATIENT-LVL IV: ICD-10-PCS | Mod: PBBFAC,HCNC,, | Performed by: PHYSICIAN ASSISTANT

## 2020-01-07 PROCEDURE — 1126F AMNT PAIN NOTED NONE PRSNT: CPT | Mod: HCNC,S$GLB,, | Performed by: PHYSICIAN ASSISTANT

## 2020-01-07 PROCEDURE — 3074F PR MOST RECENT SYSTOLIC BLOOD PRESSURE < 130 MM HG: ICD-10-PCS | Mod: HCNC,CPTII,S$GLB, | Performed by: PHYSICIAN ASSISTANT

## 2020-01-07 NOTE — PROGRESS NOTES
Subjective:          Chief Complaint: Kelsey Fields is a 79 y.o. female who had concerns including Pain of the Right Shoulder.    Kelsey Fields is a right handed retiree referred by Dr. Todd.The gradual pain started 2-3 months ago after a fall and is becoming progressively worse last few weeks after suffering shingles. She does report pain has improved with CMDP oil. She is able to reach over head with minimal pain. Pain was located over (points to) posterior arm and radiating down bicep muscle. She reports that the pain is a 0 /10 aching pain today and responding adequately to conservative measures which have included activity modifications, ice, rest, and oral medication. Is affecting ADLs and limiting desired level of activity. Denies numbness, tingling, radiation, and neck pain or radicular symptoms.  Pain is 3 /10 at its worst    Mechanical symptoms: none  Subjective instability: -  Worse with palpation over herpetic lesions.   Better with rest.   Nocturnal symptoms: +    No previous surgeries or trauma on shoulder            Review of Systems   Constitution: Negative for chills and fever.   HENT: Negative for congestion and sore throat.    Eyes: Negative for discharge and double vision.   Cardiovascular: Negative for chest pain, palpitations and syncope.   Respiratory: Negative for cough and shortness of breath.    Endocrine: Negative for cold intolerance and heat intolerance.   Skin: Negative for dry skin and rash.   Musculoskeletal: Positive for joint pain.   Gastrointestinal: Negative for abdominal pain, nausea and vomiting.   Neurological: Negative for focal weakness, numbness and paresthesias.       Pain Related Questions  Over the past 3 days, what was your average pain during activity? (I.e. running, jogging, walking, climbing stairs, getting dressed, ect.): 10  Over the past 3 days, what was your highest pain level?: 10  Over the past 3 days, what was your lowest pain level? :  0    Other  Was the patient's HEIGHT measured or patient reported?: Patient Reported  Was the patient's WEIGHT measured or patient reported?: Measured      Objective:        General: Kelsey is well-developed, well-nourished, appears stated age, in no acute distress, alert and oriented to time, place and person.     General    Vitals reviewed.  Constitutional: She is oriented to person, place, and time. She appears well-developed and well-nourished. No distress.   HENT:   Head: Normocephalic and atraumatic.   Nose: Nose normal.   Eyes: Conjunctivae and EOM are normal. Pupils are equal, round, and reactive to light.   Cardiovascular: Normal rate, regular rhythm and intact distal pulses.    Pulmonary/Chest: Effort normal and breath sounds normal.   Abdominal: Soft. Bowel sounds are normal. She exhibits no distension.   Neurological: She is alert and oriented to person, place, and time. She has normal reflexes.   Psychiatric: She has a normal mood and affect. Her behavior is normal. Judgment and thought content normal.         Right Shoulder Exam     Inspection/Observation   Swelling: absent  Bruising: absent  Scars: absent  Deformity: absent  Scapular Winging: absent  Scapular Dyskinesia: positive  Atrophy: absent    Crepitus   The patient has crepitus of the AC joint.    Range of Motion   Active abduction: 150   Passive abduction: 150   Extension: 50   Forward Flexion: 180   Forward Elevation: 180Adduction: 30   Internal rotation 0 degrees: T6   Internal rotation 90 degrees: 90     Tests & Signs   Apprehension: negative  Cross arm: negative  Drop arm: negative  Moreno test: negative  Impingement: negative  Lift Off Sign: negative  Belly Press: negative  Active Compression Test (Sauk's Sign): negative  Yergason's Test: negative  Speed's Test: negative  Relocation 90 degrees: negative  Jerk Test: negative    Other   Sensation: normal    Left Shoulder Exam     Inspection/Observation   Swelling: absent  Bruising:  absent  Scars: absent  Deformity: absent  Scapular Winging: absent  Scapular Dyskinesia: negative  Atrophy: absent    Crepitus   The patient has crepitus of the AC joint    Range of Motion   Active abduction: 150   Passive abduction: 150   Extension: 50   Forward Flexion: 180   Forward Elevation: 180Adduction: 30   Internal rotation 0 degrees: T6   Internal rotation 90 degrees: 90     Tests & Signs   Apprehension: negative  Cross arm: negative  Drop arm: negative  Moreno test: negative  Impingement: negative  Lift Off Sign: negative  Belly Press: negative  Active Compression test (Ochiltree's Sign): negative  Yergasons's Test: negative  Speed's Test: negative  Relocation 90 degrees: negative  Jerk Test: negative    Other   Sensation: normal       Muscle Strength   Right Upper Extremity   Shoulder Abduction: 5/5   Shoulder Internal Rotation: 5/5   Shoulder External Rotation: 5/5   Supraspinatus: 5/5/5   Subscapularis: 5/5/5   Biceps: 5/5/5   Left Upper Extremity  Shoulder Abduction: 5/5   Shoulder Internal Rotation: 5/5   Shoulder External Rotation: 5/5   Supraspinatus: 5/5/5   Subscapularis: 5/5/5   Biceps: 5/5/5     Vascular Exam     Right Pulses      Radial:                    2+      Left Pulses      Radial:                    2+      Capillary Refill  Right Hand: normal capillary refill  Left Hand: normal capillary refill    Radiographic Findings:    Mild DJD.  No fracture or dislocation.  No bone destruction identified.    Xrays of the right shoulder were reviewed by me today. These findings were discussed and reviewed with the patient.        Assessment:       Encounter Diagnoses   Name Primary?    Rotator cuff syndrome of right shoulder Yes    Chronic right shoulder pain           Plan:       We have discussed a variety of treatment options including medications, injections, physical therapy and other alternative treatments. I also explained the indications, risks and benefits of surgery. Given the patients  hx and examination today, I believe she would benefit from physical therapy. Pt agrees and would like to continue with physical therapy.    I made the decision to obtain old records of the patient including previous notes and imaging. I independently reviewed and interpreted lab results today as well as prior imaging.     1. Ice compress to the affected area 2-3x a day for 15-20 minutes as needed for pain management.  2. Continue CMPD gel over shoulder  3. Continue physical therapy for periscapular/rotator cuff strengthening. Guzman protocol for scapular dyskinesis.  4. Consider CSI if symptoms worsen.  5. RTC to see London Ash PA-C in 8 weeks for follow-up.    All of the patient's questions were answered and the patient will contact us if they have any questions or concerns in the interim.          Patient questionnaires may have been collected.

## 2020-01-07 NOTE — LETTER
January 7, 2020      Laurie Todd MD  [address]  [facility] [address]           [facility]  [address]  [facility] [address]  Phone: [phone]          Patient: Kelsey Fields   MR Number: [id number]   YOB: 1940   Date of Visit: 1/7/2020       Dear Dr. Laurie Todd:    Thank you for referring Kelsey Fields to me for evaluation. Attached you will find relevant portions of my assessment and plan of care.    If you have questions, please do not hesitate to call me. I look forward to following Kelsey Fields along with you.    Sincerely,    Shyam Ash PA-C    Enclosure  CC:  No Recipients    If you would like to receive this communication electronically, please contact [email or web address] or [phone] to request more information on [employer] Link access.    For providers and/or their staff who would like to refer a patient to [facility], please contact us through our one-stop-shop provider referral line, [facility], at [phone].    If you feel you have received this communication in error or would no longer like to receive these types of communications, please e-mail [email or web address]

## 2020-01-08 ENCOUNTER — HOSPITAL ENCOUNTER (OUTPATIENT)
Dept: RADIOLOGY | Facility: OTHER | Age: 80
Discharge: HOME | End: 2020-01-08
Attending: PHYSICIAN ASSISTANT
Payer: MEDICARE

## 2020-01-08 ENCOUNTER — OFFICE VISIT (OUTPATIENT)
Dept: ORTHOPEDICS | Facility: CLINIC | Age: 80
End: 2020-01-08
Payer: MEDICARE

## 2020-01-08 VITALS
WEIGHT: 210 LBS | BODY MASS INDEX: 35.85 KG/M2 | HEIGHT: 64 IN | SYSTOLIC BLOOD PRESSURE: 135 MMHG | DIASTOLIC BLOOD PRESSURE: 75 MMHG | HEART RATE: 80 BPM

## 2020-01-08 DIAGNOSIS — R52 PAIN: ICD-10-CM

## 2020-01-08 DIAGNOSIS — M18.11 ARTHRITIS OF CARPOMETACARPAL (CMC) JOINT OF RIGHT THUMB: Primary | ICD-10-CM

## 2020-01-08 PROCEDURE — 73110 X-RAY EXAM OF WRIST: CPT | Mod: 26,HCNC,RT, | Performed by: INTERNAL MEDICINE

## 2020-01-08 PROCEDURE — 1159F MED LIST DOCD IN RCRD: CPT | Mod: HCNC,S$GLB,, | Performed by: PHYSICIAN ASSISTANT

## 2020-01-08 PROCEDURE — 1100F PTFALLS ASSESS-DOCD GE2>/YR: CPT | Mod: HCNC,CPTII,S$GLB, | Performed by: PHYSICIAN ASSISTANT

## 2020-01-08 PROCEDURE — 3075F PR MOST RECENT SYSTOLIC BLOOD PRESS GE 130-139MM HG: ICD-10-PCS | Mod: HCNC,CPTII,S$GLB, | Performed by: PHYSICIAN ASSISTANT

## 2020-01-08 PROCEDURE — 73110 X-RAY EXAM OF WRIST: CPT | Mod: TC,HCNC,FY,RT

## 2020-01-08 PROCEDURE — 1126F PR PAIN SEVERITY QUANTIFIED, NO PAIN PRESENT: ICD-10-PCS | Mod: HCNC,S$GLB,, | Performed by: PHYSICIAN ASSISTANT

## 2020-01-08 PROCEDURE — 99999 PR PBB SHADOW E&M-EST. PATIENT-LVL III: ICD-10-PCS | Mod: PBBFAC,HCNC,, | Performed by: PHYSICIAN ASSISTANT

## 2020-01-08 PROCEDURE — 3288F PR FALLS RISK ASSESSMENT DOCUMENTED: ICD-10-PCS | Mod: HCNC,CPTII,S$GLB, | Performed by: PHYSICIAN ASSISTANT

## 2020-01-08 PROCEDURE — 3078F PR MOST RECENT DIASTOLIC BLOOD PRESSURE < 80 MM HG: ICD-10-PCS | Mod: HCNC,CPTII,S$GLB, | Performed by: PHYSICIAN ASSISTANT

## 2020-01-08 PROCEDURE — 3288F FALL RISK ASSESSMENT DOCD: CPT | Mod: HCNC,CPTII,S$GLB, | Performed by: PHYSICIAN ASSISTANT

## 2020-01-08 PROCEDURE — 99999 PR PBB SHADOW E&M-EST. PATIENT-LVL III: CPT | Mod: PBBFAC,HCNC,, | Performed by: PHYSICIAN ASSISTANT

## 2020-01-08 PROCEDURE — 1100F PR PT FALLS ASSESS DOC 2+ FALLS/FALL W/INJURY/YR: ICD-10-PCS | Mod: HCNC,CPTII,S$GLB, | Performed by: PHYSICIAN ASSISTANT

## 2020-01-08 PROCEDURE — 3075F SYST BP GE 130 - 139MM HG: CPT | Mod: HCNC,CPTII,S$GLB, | Performed by: PHYSICIAN ASSISTANT

## 2020-01-08 PROCEDURE — 1159F PR MEDICATION LIST DOCUMENTED IN MEDICAL RECORD: ICD-10-PCS | Mod: HCNC,S$GLB,, | Performed by: PHYSICIAN ASSISTANT

## 2020-01-08 PROCEDURE — 99203 PR OFFICE/OUTPT VISIT, NEW, LEVL III, 30-44 MIN: ICD-10-PCS | Mod: HCNC,S$GLB,, | Performed by: PHYSICIAN ASSISTANT

## 2020-01-08 PROCEDURE — 1126F AMNT PAIN NOTED NONE PRSNT: CPT | Mod: HCNC,S$GLB,, | Performed by: PHYSICIAN ASSISTANT

## 2020-01-08 PROCEDURE — 99203 OFFICE O/P NEW LOW 30 MIN: CPT | Mod: HCNC,S$GLB,, | Performed by: PHYSICIAN ASSISTANT

## 2020-01-08 PROCEDURE — 73110 XR WRIST COMPLETE 3 VIEWS RIGHT: ICD-10-PCS | Mod: 26,HCNC,RT, | Performed by: INTERNAL MEDICINE

## 2020-01-08 PROCEDURE — 3078F DIAST BP <80 MM HG: CPT | Mod: HCNC,CPTII,S$GLB, | Performed by: PHYSICIAN ASSISTANT

## 2020-01-08 NOTE — LETTER
January 8, 2020      Laurie Todd MD  [address]  [facility] [address]           [facility] [place] [address] [facility] [address]  [address], [address]  [facility] [address]  Phone: [phone]          Patient: Kelsey Fields   MR Number: [id number]   YOB: 1940   Date of Visit: 1/8/2020       Dear Dr. Laurie Todd:    Thank you for referring Kelsey Fields to me for evaluation. Attached you will find relevant portions of my assessment and plan of care.    If you have questions, please do not hesitate to call me. I look forward to following Kelsey Fields along with you.    Sincerely,    Poonam Thurston PA-C    Enclosure  CC:  No Recipients    If you would like to receive this communication electronically, please contact [email or web address] or [phone] to request more information on [employer] Link access.    For providers and/or their staff who would like to refer a patient to [facility], please contact us through our one-stop-shop provider referral line, [facility], at [phone].    If you feel you have received this communication in error or would no longer like to receive these types of communications, please e-mail [email or web address]

## 2020-01-08 NOTE — PROGRESS NOTES
Subjective:      Patient ID: Kelsey Fields is a 79 y.o. female.    Chief Complaint: Pain of the Right Wrist      HPI  Kelsey Fields is a 79 y.o. female presenting today for right thumb pain. This is chronic, has been present intermittently for a while. She denies injury. Pain is located at the base of the right thumb. It is increased with use. She reports she has had injections for this years ago with relief. She has not tried anything recently. She states today her pain is not very severe.     Review of patient's allergies indicates:   Allergen Reactions    Codeine          Current Outpatient Medications   Medication Sig Dispense Refill    albuterol (PROVENTIL/VENTOLIN HFA) 90 mcg/actuation inhaler Inhale 2 puffs into the lungs every 6 (six) hours as needed for Wheezing. Rescue 3 Inhaler 3    amLODIPine (NORVASC) 10 MG tablet Take 1 tablet (10 mg total) by mouth once daily. 90 tablet 3    ammonium lactate 12 % Crea Apply twice daily to affected parts both feet as needed. 140 g 11    atorvastatin (LIPITOR) 40 MG tablet Take 1 tablet (40 mg total) by mouth once daily. 90 tablet 3    cholecalciferol, vitamin D3, (VITAMIN D3) 125 mcg (5,000 unit) Tab Take 1 tablet (5,000 Units total) by mouth every 48 hours. 90 tablet 3    ciclopirox (LOPROX) 0.77 % Crea Apply topically 2 (two) times daily. 90 g 2    cimetidine (TAGAMET) 400 MG tablet Take 1 tablet (400 mg total) by mouth 2 (two) times daily. 180 tablet 3    CMPD Lidocaine 2%, Prilocaine 2%, Lamotrigine 2.5%, Meloxicam 0.09% in Transdermal Gel Apply 1 Pump (1.6 g total) topically 4 (four) times daily. 240 g 3    diclofenac sodium (VOLTAREN) 1 % Gel Apply 2 g topically 2 (two) times daily. 3 Tube 3    fluticasone propionate (FLONASE) 50 mcg/actuation nasal spray 1 spray (50 mcg total) by Each Nostril route once daily. 16 g 2    gabapentin (NEURONTIN) 300 MG capsule Take 1 capsule (300 mg total) by mouth 2 (two) times daily as needed. 180  "capsule 3    loratadine (CLARITIN) 10 mg tablet Take 1 tablet (10 mg total) by mouth once daily. 30 tablet 11    losartan (COZAAR) 25 MG tablet Take 1 tablet (25 mg total) by mouth once daily. 90 tablet 3    losartan (COZAAR) 25 MG tablet Take 1 tablet (25 mg total) by mouth once daily. 14 tablet 0    triamcinolone acetonide 0.025 % Lotn AAA to itching on arms and legs BID 2 Bottle 3     No current facility-administered medications for this visit.        Past Medical History:   Diagnosis Date    Arthritis     Episcleritis of right eye     GERD (gastroesophageal reflux disease)     Hypertension     Shingles     Sleep apnea        Past Surgical History:   Procedure Laterality Date    blepharitis Bilateral 2017    Alessandra Grijalva OD    CATARACT EXTRACTION Bilateral 2017    Alessandra Grijalva MD    CATARACT EXTRACTION W/  INTRAOCULAR LENS IMPLANT Left 2019    Procedure: EXTRACTION, CATARACT, WITH IOL INSERTION;  Surgeon: Alysa De Souza MD;  Location: [facility];  Service: Ophthalmology;  Laterality: Left;    CATARACT EXTRACTION W/  INTRAOCULAR LENS IMPLANT Right 2019    Procedure: EXTRACTION, CATARACT, WITH IOL INSERTION;  Surgeon: Alysa De Souza MD;  Location: [facility];  Service: Ophthalmology;  Laterality: Right;     SECTION      FRACTURE SURGERY      JOINT REPLACEMENT      right knee     KNEE SURGERY Right        Review of Systems:  Constitutional: Negative for chills and fever.   Respiratory: Negative for cough and shortness of breath.    Gastrointestinal: Negative for nausea and vomiting.   Skin: Negative for rash.   Neurological: Negative for dizziness and headaches.   Psychiatric/Behavioral: Negative for depression.   MSK as in HPI       OBJECTIVE:     PHYSICAL EXAM:  /75   Pulse 80   Ht 5' 4" (1.626 m)   Wt 95.3 kg (210 lb)   BMI 36.05 kg/m²     GEN:  NAD, well-developed, well-groomed.  NEURO: Awake, alert, and oriented. Normal attention and concentration.    PSYCH: " Normal mood and affect. Behavior is normal.  HEENT: No cervical lymphadenopathy noted.  CARDIOVASCULAR: Radial pulses 2+ bilaterally. No LE edema noted.  PULMONARY: Breath sounds normal. No respiratory distress.  SKIN: Intact, no rashes.      MSK:   RUE:  Good active ROM of the wrist and fingers. ttp over the thumb CMC joint, mild edema. AIN/PIN/Radial/Median/Ulnar Nerves assessed in isolation without deficit. Radial & Ulnar arteries palpated 2+. Capillary Refill <3s.    RADIOGRAPHS:  Xray right wrist 1/8/20  FINDINGS:  There are moderate to advanced degenerative changes at the 1st carpal/metacarpal joint space.  There is no evidence of fracture.  Comments: I have personally reviewed the imaging and I agree with the above radiologist's report.    ASSESSMENT/PLAN:       ICD-10-CM ICD-9-CM   1. Arthritis of carpometacarpal (CMC) joint of right thumb M18.11 716.94     Plan:   -Treatment options discussed. She wishes to try her Voltaren gel for this- she has this at home. She was also given a thumb brace today. She is not interested in injections or therapy at this time.   -RTC prn       The patient indicates understanding of these issues and agrees to the plan.    Poonam Thurston PA-C  Hand Clinic   [facility]

## 2020-01-09 ENCOUNTER — CLINICAL SUPPORT (OUTPATIENT)
Dept: REHABILITATION | Facility: HOSPITAL | Age: 80
End: 2020-01-09
Attending: INTERNAL MEDICINE
Payer: MEDICARE

## 2020-01-09 DIAGNOSIS — R29.898 BILATERAL LEG WEAKNESS: ICD-10-CM

## 2020-01-09 DIAGNOSIS — R29.3 POOR POSTURE: ICD-10-CM

## 2020-01-09 DIAGNOSIS — R26.9 GAIT ABNORMALITY: ICD-10-CM

## 2020-01-09 PROCEDURE — 97110 THERAPEUTIC EXERCISES: CPT | Mod: HCNC

## 2020-01-09 NOTE — PROGRESS NOTES
Physical Therapy Daily Treatment Note     Name: Kelsey Fields  Clinic Number: [id number]    Therapy Diagnosis:   Encounter Diagnoses   Name Primary?    Poor posture     Bilateral leg weakness     Gait abnormality      Physician: Laurie Todd, *    Visit Date: 1/9/2020    Physician Orders: PT Eval and Treat   Medical Diagnosis from Referral:   M54.42,M54.41,G89.29 (ICD-10-CM) - Chronic bilateral low back pain with bilateral sciatica  Evaluation Date: 12/2/2019  Authorization Period Expiration: 12/31/2019  Plan of Care Expiration: 1/31/2019  Visit # / Visits authorized: 4/ 20     Time In: 205  Time Out: 300  Total Billable Time: 25 minutes     Visit: 60.64  Total: 262.88     Precautions: Standard and Fall       Subjective     Pt reports:  She has slight back pain today. Patient reports her pain is a 7/10.   She was compliant with home exercise program.  Response to previous treatment: No adverse effect  Functional change: None    Pain: 7/10  Location: bilateral back      Objective     Kelsey received therapeutic exercises to develop strength, endurance, ROM, flexibility, posture and core stabilization for 45 minutes including:        Seated Ham Stretch 30 sec x 3  Seated Marches 30x   Seated hip abd RTB 30x  Bridging 20x  SAQ 30x  SLR 20x  LTR 20x  Ball Squeezes 5 sec x 20  Standing Extensions by bar 20x    Heat on low back for 10 minutes         Home Exercises Provided and Patient Education Provided     Education provided:   - Purpose of PT    Written Home Exercises Provided: None.  Exercises were reviewed and Kelsey was able to demonstrate them prior to the end of the session.  Kelsey demonstrated good  understanding of the education provided.     See EMR under Patient Instructions for exercises provided prior visit.    Assessment   Patient continues to tolerate all therex without increased pain or adverse effects. Patient was unable to tolerate full supine positioning secondary to pain.   Kelsey  is progressing well towards her goals.   Pt prognosis is Good.     Pt will continue to benefit from skilled outpatient physical therapy to address the deficits listed in the problem list box on initial evaluation, provide pt/family education and to maximize pt's level of independence in the home and community environment.     Pt's spiritual, cultural and educational needs considered and pt agreeable to plan of care and goals.     Anticipated barriers to physical therapy: Chronic poor posture    Goals:   Short Term Goals (3 Weeks):  1. Pt will be compliant with HEP to supplement PT in decreasing pain with functional mobility.  2. Pt will improve lumbar ROM by 5 degrees in ext/R side bend/L side bend to improve functional mobility.  4. Pt will improve impaired LE MMTs by 1/2 score to improve strength for functional tasks.  Long Term Goals (6 Weeks):   1. Pt will improve FOTO score to </= 6% limited to decrease perceived limitation with maintaining/changing body position.   2. Pt will perform 8 sit to stand during 30 sec sit to stand without the use of hands for support to improve functional LE strength   3. Pt will improve impaired LE MMTs by 1 score to improve strength for functional tasks.  4. Patient will improve lumbar ROM by 10 degrees in ext/R side bend/L side bend to improve functional mobility    Plan     Plan of care Certification: 12/2/2019 to 1/31/2019.     Outpatient Physical Therapy 2 times weekly for 8 weeks to include the following interventions: Cervical/Lumbar Traction, Gait Training, Manual Therapy, Moist Heat/ Ice, Neuromuscular Re-ed, Patient Education, Self Care, Therapeutic Activites and Therapeutic Exercise.     Arnav Kilpatrick, PT

## 2020-01-13 ENCOUNTER — PATIENT OUTREACH (OUTPATIENT)
Dept: ADMINISTRATIVE | Facility: OTHER | Age: 80
End: 2020-01-13

## 2020-01-13 ENCOUNTER — CLINICAL SUPPORT (OUTPATIENT)
Dept: REHABILITATION | Facility: HOSPITAL | Age: 80
End: 2020-01-13
Attending: INTERNAL MEDICINE
Payer: MEDICARE

## 2020-01-13 DIAGNOSIS — R29.3 POOR POSTURE: ICD-10-CM

## 2020-01-13 DIAGNOSIS — R29.898 BILATERAL LEG WEAKNESS: ICD-10-CM

## 2020-01-13 DIAGNOSIS — R26.9 GAIT ABNORMALITY: ICD-10-CM

## 2020-01-13 PROCEDURE — 97110 THERAPEUTIC EXERCISES: CPT | Mod: HCNC

## 2020-01-13 NOTE — PROGRESS NOTES
Physical Therapy Daily Treatment Note     Name: Kelsey Fields  Clinic Number: [id number]    Therapy Diagnosis:   Encounter Diagnoses   Name Primary?    Poor posture     Bilateral leg weakness     Gait abnormality      Physician: Laurie Todd, *    Visit Date: 1/13/2020    Physician Orders: PT Eval and Treat   Medical Diagnosis from Referral:   M54.42,M54.41,G89.29 (ICD-10-CM) - Chronic bilateral low back pain with bilateral sciatica  Evaluation Date: 12/2/2019  Authorization Period Expiration: 12/31/2019, TBD  Plan of Care Expiration: 1/31/2019  Visit # / Visits authorized: 5/ 20     Time In: 210  Time Out: ***  Total Billable Time: *** minutes     Visit: ***  Total: 262.88     Precautions: Standard and Fall       Subjective     Pt reports: *** She has slight back pain today. Patient reports her pain is a 7/10.   She was compliant with home exercise program.  Response to previous treatment: No adverse effect  Functional change: None    Pain: 7/10  Location: bilateral back      Objective     Kelsey received therapeutic exercises to develop strength, endurance, ROM, flexibility, posture and core stabilization for *** minutes including:        Seated Ham Stretch 30 sec x 3  Seated Marches 30x   Seated hip abd RTB 30x  Bridging 20x  SAQ 30x  SLR 20x  LTR 20x  Ball Squeezes 5 sec x 20  Standing Extensions by bar 20x    Heat on low back for *** minutes         Home Exercises Provided and Patient Education Provided     Education provided:   - Purpose of PT    Written Home Exercises Provided: None.  Exercises were reviewed and Kelsey was able to demonstrate them prior to the end of the session.  Kelsey demonstrated good  understanding of the education provided.     See EMR under Patient Instructions for exercises provided prior visit.    Assessment   ***  Patient continues to tolerate all therex without increased pain or adverse effects. Patient was unable to tolerate full supine positioning secondary to  pain.   Kelsey is progressing well towards her goals.   Pt prognosis is Good.     Pt will continue to benefit from skilled outpatient physical therapy to address the deficits listed in the problem list box on initial evaluation, provide pt/family education and to maximize pt's level of independence in the home and community environment.     Pt's spiritual, cultural and educational needs considered and pt agreeable to plan of care and goals.     Anticipated barriers to physical therapy: Chronic poor posture    Goals:   Short Term Goals (3 Weeks):  1. Pt will be compliant with HEP to supplement PT in decreasing pain with functional mobility.  2. Pt will improve lumbar ROM by 5 degrees in ext/R side bend/L side bend to improve functional mobility.  4. Pt will improve impaired LE MMTs by 1/2 score to improve strength for functional tasks.  Long Term Goals (6 Weeks):   1. Pt will improve FOTO score to </= 6% limited to decrease perceived limitation with maintaining/changing body position.   2. Pt will perform 8 sit to stand during 30 sec sit to stand without the use of hands for support to improve functional LE strength   3. Pt will improve impaired LE MMTs by 1 score to improve strength for functional tasks.  4. Patient will improve lumbar ROM by 10 degrees in ext/R side bend/L side bend to improve functional mobility    Plan     Plan of care Certification: 12/2/2019 to 1/31/2019.     Outpatient Physical Therapy 2 times weekly for 8 weeks to include the following interventions: Cervical/Lumbar Traction, Gait Training, Manual Therapy, Moist Heat/ Ice, Neuromuscular Re-ed, Patient Education, Self Care, Therapeutic Activites and Therapeutic Exercise.     Arnav Kilpatrick, PT

## 2020-01-13 NOTE — PROGRESS NOTES
"  Physical Therapy Daily Treatment Note     Name: Kelsey Fields  Clinic Number: [id number]    Therapy Diagnosis:   Encounter Diagnoses   Name Primary?    Poor posture     Bilateral leg weakness     Gait abnormality      Physician: Laurie Todd, *    Visit Date: 1/13/2020    Physician Orders: PT Eval and Treat   Medical Diagnosis from Referral:   M54.42,M54.41,G89.29 (ICD-10-CM) - Chronic bilateral low back pain with bilateral sciatica  Evaluation Date: 12/2/2019  Authorization Period Expiration: 12/31/2019  Plan of Care Expiration: 1/31/2019  Visit # / Visits authorized: 5/ 20 FOTO NEXT     Visit:  90.96  Total: 353.84    Time In: 2:10 pm   Time Out: 2:57 pm   Total Billable Time: 47 minutes    Precautions: Standard and Fall    Subjective     Pt reports: no pain in her back today.  She was not compliant with home exercise program.  Response to previous treatment: no adverse effects  Functional change: none    Pain: 0/10  Location: bilateral back      Objective       Kelsey received therapeutic exercises to develop strength, endurance, ROM, flexibility, posture and core stabilization for 47 minutes including:  Seated Ham Stretch 30 sec x 3  Seated Marches 30x   Seated hip abd RTB 30x  Bridging 20x  SAQ 30x  SLR 20x - not performed   LTR 5" x10 ea  Ball Squeezes 5 sec x 20  Standing Extensions by bar 20x  Seated scap retractions 2x10 3" hold   Seated TA 5"x10      Home Exercises Provided and Patient Education Provided     Education provided:   - HEP     Written Home Exercises Provided: Patient instructed to cont prior HEP.  Exercises were reviewed and Kelsey was able to demonstrate them prior to the end of the session.  Kelsey demonstrated good  understanding of the education provided.       Assessment     Pt was able to tolerate additional periscapular and core strengthening therex without any adverse reactions. Pt received VC/TC for upright posture and to not lean back when performing scap " retractions. Pt demo/verbalized understanding. Pt was able to tolerate all therex without c/o increased pain during or after therapy session.   Kelsey is progressing well towards her goals.   Pt prognosis is Good.     Pt will continue to benefit from skilled outpatient physical therapy to address the deficits listed in the problem list box on initial evaluation, provide pt/family education and to maximize pt's level of independence in the home and community environment.     Pt's spiritual, cultural and educational needs considered and pt agreeable to plan of care and goals.    Anticipated barriers to physical therapy: Chronic poor posture     Goals:   Short Term Goals (3 Weeks):  1. Pt will be compliant with HEP to supplement PT in decreasing pain with functional mobility.  2. Pt will improve lumbar ROM by 5 degrees in ext/R side bend/L side bend to improve functional mobility.  4. Pt will improve impaired LE MMTs by 1/2 score to improve strength for functional tasks.  Long Term Goals (6 Weeks):   1. Pt will improve FOTO score to </= 6% limited to decrease perceived limitation with maintaining/changing body position.   2. Pt will perform 8 sit to stand during 30 sec sit to stand without the use of hands for support to improve functional LE strength   3. Pt will improve impaired LE MMTs by 1 score to improve strength for functional tasks.  4. Patient will improve lumbar ROM by 10 degrees in ext/R side bend/L side bend to improve functional mobility    Plan     Continue per POC, progress as tolerated    Louisa Silva, PT

## 2020-01-15 ENCOUNTER — OFFICE VISIT (OUTPATIENT)
Dept: OPHTHALMOLOGY | Facility: CLINIC | Age: 80
End: 2020-01-15
Payer: MEDICARE

## 2020-01-15 DIAGNOSIS — H16.8 MARGINAL KERATITIS: Primary | ICD-10-CM

## 2020-01-15 PROCEDURE — 99999 PR PBB SHADOW E&M-EST. PATIENT-LVL II: CPT | Mod: PBBFAC,HCNC,, | Performed by: OPHTHALMOLOGY

## 2020-01-15 PROCEDURE — 92014 PR EYE EXAM, EST PATIENT,COMPREHESV: ICD-10-PCS | Mod: HCNC,S$GLB,, | Performed by: OPHTHALMOLOGY

## 2020-01-15 PROCEDURE — 92014 COMPRE OPH EXAM EST PT 1/>: CPT | Mod: HCNC,S$GLB,, | Performed by: OPHTHALMOLOGY

## 2020-01-15 PROCEDURE — 99999 PR PBB SHADOW E&M-EST. PATIENT-LVL II: ICD-10-PCS | Mod: PBBFAC,HCNC,, | Performed by: OPHTHALMOLOGY

## 2020-01-15 RX ORDER — NEOMYCIN SULFATE, POLYMYXIN B SULFATE AND DEXAMETHASONE 3.5; 10000; 1 MG/ML; [USP'U]/ML; MG/ML
1 SUSPENSION/ DROPS OPHTHALMIC 4 TIMES DAILY
Qty: 5 ML | Refills: 3 | Status: SHIPPED | OUTPATIENT
Start: 2020-01-15 | End: 2020-02-14

## 2020-01-15 NOTE — PROGRESS NOTES
HPI     78 YO female presents today for a follow-up sent by Dr. Lewis. She   states that her right eye feels like it has something in it all the time.   Dr. Lewis told her to get some Genteal gel which seems to be helping.     Genteal tonya OD BID     Last edited by Niya Bonds, PCT on 1/15/2020  3:04 PM. (History)            Assessment /Plan     For exam results, see Encounter Report.    Marginal keratitis    Other orders  -     neomycin-polymyxin-dexamethasone (MAXITROL) 3.5mg/mL-10,000 unit/mL-0.1 % DrpS; Place 1 drop into the right eye 4 (four) times daily.  Dispense: 5 mL; Refill: 3      The above diagnosis was explained to the patient and treatment initiated as prescribed.  All questions were answered and the patient is instructed to follow up if symptoms do not resolve or worsen.

## 2020-01-16 ENCOUNTER — CLINICAL SUPPORT (OUTPATIENT)
Dept: REHABILITATION | Facility: HOSPITAL | Age: 80
End: 2020-01-16
Attending: INTERNAL MEDICINE
Payer: MEDICARE

## 2020-01-16 DIAGNOSIS — R29.898 BILATERAL LEG WEAKNESS: ICD-10-CM

## 2020-01-16 DIAGNOSIS — R29.3 POOR POSTURE: ICD-10-CM

## 2020-01-16 DIAGNOSIS — R26.9 GAIT ABNORMALITY: ICD-10-CM

## 2020-01-16 PROCEDURE — 97110 THERAPEUTIC EXERCISES: CPT | Mod: HCNC

## 2020-01-16 NOTE — PROGRESS NOTES
"  Physical Therapy Daily Treatment Note     Name: Kelsey Fields  Clinic Number: [id number]    Therapy Diagnosis:   Encounter Diagnoses   Name Primary?    Poor posture     Bilateral leg weakness     Gait abnormality      Physician: Laurie Todd, *    Visit Date: 1/16/2020    Physician Orders: PT Eval and Treat   Medical Diagnosis from Referral:   M54.42,M54.41,G89.29 (ICD-10-CM) - Chronic bilateral low back pain with bilateral sciatica  Evaluation Date: 12/2/2019  Authorization Period Expiration: 12/31/2019  Plan of Care Expiration: 1/31/2019  Visit # / Visits authorized: 6/20 FOTO NEXT     Visit:  60.64  Total: 393.84    Time In: 210  Time Out: 305   Total Billable Time: 30 minutes    Precautions: Standard and Fall    Subjective     Pt reports: she is doing alright with no back pain today. Patient reports she is walking without a walker at home, which causes her to hunch over more. Patient reports she does not sleep on her back, but on her side.   She was not compliant with home exercise program.  Response to previous treatment: no adverse effects  Functional change: none    Pain: 0/10  Location: bilateral back      Objective       Kelsey received therapeutic exercises to develop strength, endurance, ROM, flexibility, posture and core stabilization for 55 minutes including:    Bike 5 min  Seated Ham Stretch 30 sec x 3   Seated Marches 30x   Seated hip abd BTB 30x   Seated scap retractions 2x10 3" hold   Seated TA 5"x10  Bridging 30x  SAQ 30x 2#  SLR 20x 2#  LTR 5" x10 ea  Ball Squeezes 5 sec x 20  Standing Extensions by bar 20x        Home Exercises Provided and Patient Education Provided     Education provided:   - HEP     Written Home Exercises Provided: Patient instructed to cont prior HEP.  Exercises were reviewed and Kelsey was able to demonstrate them prior to the end of the session.  Kelsey demonstrated good  understanding of the education provided.       Assessment   Patient is getting " discourage from the lack of improvement with therapy. Patient was educated to walk with her walker more often to promote upright posture and combat the hunched over posture. Patient is showing minimal improvements secondary to chronic condition.    Kelsey is progressing well towards her goals.   Pt prognosis is Good.     Pt will continue to benefit from skilled outpatient physical therapy to address the deficits listed in the problem list box on initial evaluation, provide pt/family education and to maximize pt's level of independence in the home and community environment.     Pt's spiritual, cultural and educational needs considered and pt agreeable to plan of care and goals.    Anticipated barriers to physical therapy: Chronic poor posture     Goals:   Short Term Goals (3 Weeks):  1. Pt will be compliant with HEP to supplement PT in decreasing pain with functional mobility.  2. Pt will improve lumbar ROM by 5 degrees in ext/R side bend/L side bend to improve functional mobility.  4. Pt will improve impaired LE MMTs by 1/2 score to improve strength for functional tasks.  Long Term Goals (6 Weeks):   1. Pt will improve FOTO score to </= 6% limited to decrease perceived limitation with maintaining/changing body position.   2. Pt will perform 8 sit to stand during 30 sec sit to stand without the use of hands for support to improve functional LE strength   3. Pt will improve impaired LE MMTs by 1 score to improve strength for functional tasks.  4. Patient will improve lumbar ROM by 10 degrees in ext/R side bend/L side bend to improve functional mobility    Plan     Continue per POC, progress as tolerated    Arnav Kilpatrick, PT

## 2020-01-22 DIAGNOSIS — I10 ESSENTIAL HYPERTENSION: ICD-10-CM

## 2020-01-23 ENCOUNTER — CLINICAL SUPPORT (OUTPATIENT)
Dept: REHABILITATION | Facility: HOSPITAL | Age: 80
End: 2020-01-23
Attending: INTERNAL MEDICINE
Payer: MEDICARE

## 2020-01-23 DIAGNOSIS — R29.898 BILATERAL LEG WEAKNESS: ICD-10-CM

## 2020-01-23 DIAGNOSIS — R26.9 GAIT ABNORMALITY: ICD-10-CM

## 2020-01-23 DIAGNOSIS — R29.3 POOR POSTURE: ICD-10-CM

## 2020-01-23 PROCEDURE — 97110 THERAPEUTIC EXERCISES: CPT | Mod: HCNC

## 2020-01-23 NOTE — PROGRESS NOTES
"  Physical Therapy Daily Treatment Note     Name: Kelsey Fields  Clinic Number: [id number]    Therapy Diagnosis:   Encounter Diagnoses   Name Primary?    Poor posture     Bilateral leg weakness     Gait abnormality      Physician: Laurie Todd, *    Visit Date: 1/23/2020    Physician Orders: PT Eval and Treat   Medical Diagnosis from Referral:   M54.42,M54.41,G89.29 (ICD-10-CM) - Chronic bilateral low back pain with bilateral sciatica  Evaluation Date: 12/2/2019  Authorization Period Expiration: 12/31/2019  Plan of Care Expiration: 1/31/2019  Visit # / Visits authorized: 7/20 FOTO NEXT     Visit:  90.94  Total: 483.84     Time In: 305  Time Out: 400   Total Billable Time: 45 minutes    Precautions: Standard and Fall    Subjective     Pt reports: she is doing alright today. Patient reports she will try to sleep flatter on her back at night    She was not compliant with home exercise program.  Response to previous treatment: no adverse effects  Functional change: none    Pain: 0/10  Location: bilateral back      Objective       Kelsey received therapeutic exercises to develop strength, endurance, ROM, flexibility, posture and core stabilization for 45 minutes including:    Bike 5 min  Seated Ham Stretch 30 sec x 3   Supine Lying w/ bolster under legs 10 min (17 degrees head of bed w/ doubled pillow)  Seated Marches 30x   Seated hip abd BTB 30x   Seated scap retractions 2x10 3" hold   Seated TA 5"x10  Bridging 30x  SAQ 30x 2#  SLR 20x 2#  LTR x20 ea  Ball Squeezes 5 sec x 20  Standing Extensions by bar 20x - not performed     MH for 10 min with head of bed at 15 degrees w/ pillow doubled and bolster under legs        Home Exercises Provided and Patient Education Provided     Education provided:   - HEP     Written Home Exercises Provided: Patient instructed to cont prior HEP.  Exercises were reviewed and Kelsey was able to demonstrate them prior to the end of the session.  Kelsey demonstrated good  " understanding of the education provided.       Assessment   Patient was able to tolerate supine lying for increased time to promote global spinal extension. Patient agreed to sleep on her back while trying to lay flat with only 2 pillows behind her head at night.   Kelsey is progressing well towards her goals.   Pt prognosis is Good.     Pt will continue to benefit from skilled outpatient physical therapy to address the deficits listed in the problem list box on initial evaluation, provide pt/family education and to maximize pt's level of independence in the home and community environment.     Pt's spiritual, cultural and educational needs considered and pt agreeable to plan of care and goals.    Anticipated barriers to physical therapy: Chronic poor posture     Goals:   Short Term Goals (3 Weeks):  1. Pt will be compliant with HEP to supplement PT in decreasing pain with functional mobility.  2. Pt will improve lumbar ROM by 5 degrees in ext/R side bend/L side bend to improve functional mobility.  4. Pt will improve impaired LE MMTs by 1/2 score to improve strength for functional tasks.  Long Term Goals (6 Weeks):   1. Pt will improve FOTO score to </= 6% limited to decrease perceived limitation with maintaining/changing body position.   2. Pt will perform 8 sit to stand during 30 sec sit to stand without the use of hands for support to improve functional LE strength   3. Pt will improve impaired LE MMTs by 1 score to improve strength for functional tasks.  4. Patient will improve lumbar ROM by 10 degrees in ext/R side bend/L side bend to improve functional mobility    Plan     Continue per POC, progress as tolerated    Arnav Kilpatrick, PT

## 2020-01-24 RX ORDER — AMLODIPINE BESYLATE 10 MG/1
TABLET ORAL
Qty: 90 TABLET | Refills: 3 | Status: SHIPPED | OUTPATIENT
Start: 2020-01-24 | End: 2020-08-10 | Stop reason: SDUPTHER

## 2020-01-24 NOTE — PROGRESS NOTES
Refill Authorization Note     is requesting a refill authorization.    Brief assessment and rationale for refill: APPROVE: prr           Medication Therapy Plan: approve 12 more     Medication reconciliation completed: No                       Blood Pressure Readings: <139/89mmHg 12 months   BP Readings from Last 3 Encounters:   01/08/20 135/75   01/07/20 126/66   12/27/19 120/60         Last Labs: 6 months: Diuretics-(Cr/K/Na)  or 12 months: non-diuretics (Cr/K)   Lab Results   Component Value Date    CREATININE 1.4 12/13/2019    CREATININE 1.2 02/13/2019    CREATININE 1.3 12/10/2018       Lab Results   Component Value Date    K 4.0 12/13/2019    K 3.9 02/13/2019    K 4.2 12/10/2018    Lab Results   Component Value Date     12/13/2019     02/13/2019     12/10/2018        Digital Hypertension Data: values will display if enrolled   Last 5 Patient Entered Readings                                      Current 30 Day Average:      There is no flowsheet data to display.           Appointments     Date Provider   Last Visit   12/27/2019 Laurie Todd MD   Next Visit   2/26/2020 Laurie Todd MD

## 2020-01-27 ENCOUNTER — CLINICAL SUPPORT (OUTPATIENT)
Dept: REHABILITATION | Facility: HOSPITAL | Age: 80
End: 2020-01-27
Attending: INTERNAL MEDICINE
Payer: MEDICARE

## 2020-01-27 DIAGNOSIS — R26.9 GAIT ABNORMALITY: ICD-10-CM

## 2020-01-27 DIAGNOSIS — R29.3 POOR POSTURE: ICD-10-CM

## 2020-01-27 DIAGNOSIS — R29.898 BILATERAL LEG WEAKNESS: ICD-10-CM

## 2020-01-27 PROCEDURE — 97110 THERAPEUTIC EXERCISES: CPT | Mod: HCNC

## 2020-01-27 NOTE — PROGRESS NOTES
"  Physical Therapy Daily Treatment Note     Name: Kelsey Fields  Clinic Number: [id number]    Therapy Diagnosis:   Encounter Diagnoses   Name Primary?    Poor posture     Bilateral leg weakness     Gait abnormality      Physician: Laurie Todd, *    Visit Date: 1/27/2020    Physician Orders: PT Eval and Treat   Medical Diagnosis from Referral:   M54.42,M54.41,G89.29 (ICD-10-CM) - Chronic bilateral low back pain with bilateral sciatica  Evaluation Date: 12/2/2019  Authorization Period Expiration: 12/31/2019  Plan of Care Expiration: 1/31/2019  Visit # / Visits authorized: 8/20 FOTO NEXT     Visit:  90.94  Total: 483.84     Time In: 305  Time Out: 400   Total Billable Time: 45 minutes    Precautions: Standard and Fall    Subjective     Pt reports: she is doing well, just very tired.  Would like to get heat on her back when she is done with all the exercises.  Feels a little tired form all the walking today.    She was not compliant with home exercise program.  Response to previous treatment: no adverse effects  Functional change: none    Pain: 0/10  Location: bilateral back      Objective       Kelsey received therapeutic exercises to develop strength, endurance, ROM, flexibility, posture and core stabilization for 45 minutes including:    Bike 5 min  Seated Ham Stretch 30 sec x 3   Supine Lying w/ bolster under legs 10 min (17 degrees head of bed w/ doubled pillow)  Seated Marches 30x   Seated hip abd BTB 30x   Seated scap retractions 2x10 3" hold   Seated TA 5"x10  Bridging 30x  SAQ 30x 2#  SLR 20x 2#  LTR x20 ea  Ball Squeezes 5 sec x 20  Standing Extensions by bar 20x - not performed     MH for 10 min with head of bed at 15 degrees w/ pillow doubled and bolster under legs        Home Exercises Provided and Patient Education Provided     Education provided:   - HEP     Written Home Exercises Provided: Patient instructed to cont prior HEP.  Exercises were reviewed and Kelsey was able to demonstrate " them prior to the end of the session.  Kelsey demonstrated good  understanding of the education provided.       Assessment   Patient was able to tolerate supine lying for increased time to promote global spinal extension, did practice short duration of lumbar extension in standing without pain. Discussed continuing to use pillow to progress sleeping posture.  Kelsey is progressing well towards her goals.   Pt prognosis is Good.     Pt will continue to benefit from skilled outpatient physical therapy to address the deficits listed in the problem list box on initial evaluation, provide pt/family education and to maximize pt's level of independence in the home and community environment.     Pt's spiritual, cultural and educational needs considered and pt agreeable to plan of care and goals.    Anticipated barriers to physical therapy: Chronic poor posture     Goals:   Short Term Goals (3 Weeks):  1. Pt will be compliant with HEP to supplement PT in decreasing pain with functional mobility.  2. Pt will improve lumbar ROM by 5 degrees in ext/R side bend/L side bend to improve functional mobility.  4. Pt will improve impaired LE MMTs by 1/2 score to improve strength for functional tasks.  Long Term Goals (6 Weeks):   1. Pt will improve FOTO score to </= 6% limited to decrease perceived limitation with maintaining/changing body position.   2. Pt will perform 8 sit to stand during 30 sec sit to stand without the use of hands for support to improve functional LE strength   3. Pt will improve impaired LE MMTs by 1 score to improve strength for functional tasks.  4. Patient will improve lumbar ROM by 10 degrees in ext/R side bend/L side bend to improve functional mobility    Plan     Continue per POC, progress as tolerated    Baltazar Motta PT

## 2020-02-04 ENCOUNTER — OUTPATIENT CASE MANAGEMENT (OUTPATIENT)
Dept: ADMINISTRATIVE | Facility: OTHER | Age: 80
End: 2020-02-04

## 2020-02-12 ENCOUNTER — PATIENT OUTREACH (OUTPATIENT)
Dept: ADMINISTRATIVE | Facility: HOSPITAL | Age: 80
End: 2020-02-12

## 2020-02-26 ENCOUNTER — OFFICE VISIT (OUTPATIENT)
Dept: PRIMARY CARE CLINIC | Facility: CLINIC | Age: 80
End: 2020-02-26
Payer: MEDICARE

## 2020-02-26 VITALS
SYSTOLIC BLOOD PRESSURE: 126 MMHG | HEIGHT: 64 IN | HEART RATE: 85 BPM | BODY MASS INDEX: 37.19 KG/M2 | DIASTOLIC BLOOD PRESSURE: 60 MMHG | OXYGEN SATURATION: 98 % | RESPIRATION RATE: 18 BRPM | WEIGHT: 217.81 LBS

## 2020-02-26 DIAGNOSIS — E66.01 MORBID OBESITY: ICD-10-CM

## 2020-02-26 DIAGNOSIS — R21 RASH: ICD-10-CM

## 2020-02-26 DIAGNOSIS — J45.20 MILD INTERMITTENT ASTHMA WITHOUT COMPLICATION: ICD-10-CM

## 2020-02-26 DIAGNOSIS — D69.2 SENILE PURPURA: ICD-10-CM

## 2020-02-26 DIAGNOSIS — B02.23 NEUROPATHY DUE TO HERPES ZOSTER: ICD-10-CM

## 2020-02-26 DIAGNOSIS — B37.2 CANDIDAL INTERTRIGO: ICD-10-CM

## 2020-02-26 DIAGNOSIS — E21.3 HYPERPARATHYROIDISM: ICD-10-CM

## 2020-02-26 DIAGNOSIS — N18.30 STAGE 3 CHRONIC KIDNEY DISEASE: ICD-10-CM

## 2020-02-26 PROBLEM — B35.6 TINEA CRURIS: Status: ACTIVE | Noted: 2020-02-26

## 2020-02-26 PROCEDURE — 1101F PT FALLS ASSESS-DOCD LE1/YR: CPT | Mod: HCNC,CPTII,S$GLB, | Performed by: INTERNAL MEDICINE

## 2020-02-26 PROCEDURE — 1159F PR MEDICATION LIST DOCUMENTED IN MEDICAL RECORD: ICD-10-PCS | Mod: HCNC,S$GLB,, | Performed by: INTERNAL MEDICINE

## 2020-02-26 PROCEDURE — 3074F PR MOST RECENT SYSTOLIC BLOOD PRESSURE < 130 MM HG: ICD-10-PCS | Mod: HCNC,CPTII,S$GLB, | Performed by: INTERNAL MEDICINE

## 2020-02-26 PROCEDURE — 1126F AMNT PAIN NOTED NONE PRSNT: CPT | Mod: HCNC,S$GLB,, | Performed by: INTERNAL MEDICINE

## 2020-02-26 PROCEDURE — 99215 OFFICE O/P EST HI 40 MIN: CPT | Mod: HCNC,S$GLB,, | Performed by: INTERNAL MEDICINE

## 2020-02-26 PROCEDURE — 1101F PR PT FALLS ASSESS DOC 0-1 FALLS W/OUT INJ PAST YR: ICD-10-PCS | Mod: HCNC,CPTII,S$GLB, | Performed by: INTERNAL MEDICINE

## 2020-02-26 PROCEDURE — 99499 UNLISTED E&M SERVICE: CPT | Mod: HCNC,S$GLB,, | Performed by: INTERNAL MEDICINE

## 2020-02-26 PROCEDURE — 1159F MED LIST DOCD IN RCRD: CPT | Mod: HCNC,S$GLB,, | Performed by: INTERNAL MEDICINE

## 2020-02-26 PROCEDURE — 3078F PR MOST RECENT DIASTOLIC BLOOD PRESSURE < 80 MM HG: ICD-10-PCS | Mod: HCNC,CPTII,S$GLB, | Performed by: INTERNAL MEDICINE

## 2020-02-26 PROCEDURE — 99499 RISK ADDL DX/OHS AUDIT: ICD-10-PCS | Mod: HCNC,S$GLB,, | Performed by: INTERNAL MEDICINE

## 2020-02-26 PROCEDURE — 3074F SYST BP LT 130 MM HG: CPT | Mod: HCNC,CPTII,S$GLB, | Performed by: INTERNAL MEDICINE

## 2020-02-26 PROCEDURE — 99215 PR OFFICE/OUTPT VISIT, EST, LEVL V, 40-54 MIN: ICD-10-PCS | Mod: HCNC,S$GLB,, | Performed by: INTERNAL MEDICINE

## 2020-02-26 PROCEDURE — 3078F DIAST BP <80 MM HG: CPT | Mod: HCNC,CPTII,S$GLB, | Performed by: INTERNAL MEDICINE

## 2020-02-26 PROCEDURE — 1126F PR PAIN SEVERITY QUANTIFIED, NO PAIN PRESENT: ICD-10-PCS | Mod: HCNC,S$GLB,, | Performed by: INTERNAL MEDICINE

## 2020-02-26 RX ORDER — KETOCONAZOLE 20 MG/G
CREAM TOPICAL DAILY
Qty: 60 G | Refills: 0 | Status: SHIPPED | OUTPATIENT
Start: 2020-02-26 | End: 2020-02-26 | Stop reason: SDUPTHER

## 2020-02-26 RX ORDER — HYDROCORTISONE 25 MG/G
CREAM TOPICAL
Qty: 28 G | Refills: 0 | Status: SHIPPED | OUTPATIENT
Start: 2020-02-26 | End: 2022-09-20

## 2020-02-26 RX ORDER — KETOCONAZOLE 20 MG/G
CREAM TOPICAL
Qty: 60 G | Refills: 0 | Status: SHIPPED | OUTPATIENT
Start: 2020-02-26

## 2020-02-26 RX ORDER — TRIAMCINOLONE ACETONIDE 0.25 MG/G
OINTMENT TOPICAL
Qty: 80 G | Refills: 1 | Status: SHIPPED | OUTPATIENT
Start: 2020-02-26 | End: 2021-08-02 | Stop reason: SDUPTHER

## 2020-02-26 NOTE — PATIENT INSTRUCTIONS
TODAY:  - apply hydrocortisone to your leg rash  - apply ketoconazole under breast   + keep breast dry  - can take gabapentin for pain    + but not when you go to work  - use the pain cream (CMPD and voltaren for the knees)

## 2020-02-26 NOTE — ASSESSMENT & PLAN NOTE
Numerous skin lesions, variable locations  · Diagnosed as xerosis cutis - on leg  · Continue topical steroid  · Fungal rash - under breast  · Advised antifungal

## 2020-02-26 NOTE — PROGRESS NOTES
"Primary Care Provider Appointment  SHARED NOTE: Adonis Stanton (MS-4), Dr Todd (Attending)    Subjective:      Patient ID: Kelsey Fields is a 79 y.o. female with HTN, HLD, MSK pain    Chief Complaint: Follow-up (2 month F/U) and Rash (Under Right breast and on Left calf for one month )    Pt here today for f/u.  Last seen in PCP clinic on 12/27/2019 with complaints of R shoulder pain.  Able to move, still a "little stiff" and "isn't better or worse than before - it's just there". Says PT hasn't helped.  Not as painful as before, just more limited ROM.     Ambulates with walker and cane. Lives alone at home, drives independently to appointment.      "feeling exhausted" recently. Denies sadness but "seen better days".  Physically feels good but just "feeling tired sometimes". Appetite good.  Still enjoys bike riding in the park- "just needs to get it fixed". Endorses good sleep.  Likes going to [place].     Rash underneath both breasts and pain on R side along the lateral edge thoracic wall 2/2 to shingles.  Attributes it to not wearing a bra due to discomfort from the shingles.  Rash follows outline of breast folds.  Non-pruritic, non erythematous, likely due to candida. Recommend antifungal emollients    Still endorses shingles pain. Been applying Voltaren gel Prn it seems to help. Wondering if she needs can get anymore pain control.  Advised to apply more Voltaren gel as needed to control the pain. Has gabapentin which she doesn't take due to concerns over her "bad kidneys"- advised that she can take it BID to help with pain.     Pt also has chronic bilateral low back pain with bilateral sciatica  Been working with PT but stopped 3 weeks ago because "it was too much exertion".  Didn't believe it was helping her again due to "too much exertion"    Dry eyes, followed by ophthalmalogy. Last seen on 1/15/2020.  Had a recent Dx of marginal keratitis.  Denies eye pain.  Was recently prescribed Maxitrol QID " "by Dr. De Souza.  However, only does it PRN.  Advised to follow recommendations.     Dry skin on posterior edge of L shin.  Denies itchiness.  Applying Aquaphor, says that it helps.     Says she has difficulty swallowing and has "to be very careful with her food" otherwise chokes and "has to go to ED".       Past Surgical History:   Procedure Laterality Date    blepharitis Bilateral 2017    Alessandra Grijalva OD    CATARACT EXTRACTION Bilateral 2017    Alessandra Grijalva MD    CATARACT EXTRACTION W/  INTRAOCULAR LENS IMPLANT Left 2019    Procedure: EXTRACTION, CATARACT, WITH IOL INSERTION;  Surgeon: Alysa De Souza MD;  Location: [facility];  Service: Ophthalmology;  Laterality: Left;    CATARACT EXTRACTION W/  INTRAOCULAR LENS IMPLANT Right 2019    Procedure: EXTRACTION, CATARACT, WITH IOL INSERTION;  Surgeon: Alysa De Souza MD;  Location: [facility];  Service: Ophthalmology;  Laterality: Right;     SECTION      FRACTURE SURGERY      JOINT REPLACEMENT      right knee     KNEE SURGERY Right        Past Medical History:   Diagnosis Date    Arthritis     Episcleritis of right eye     GERD (gastroesophageal reflux disease)     Hypertension     Shingles     Sleep apnea        Social History     Socioeconomic History    Marital status: Single     Spouse name: Not on file    Number of children: Not on file    Years of education: Not on file    Highest education level: Not on file   Occupational History    Not on file   Social Needs    Financial resource strain: Not very hard    Food insecurity:     Worry: Never true     Inability: Never true    Transportation needs:     Medical: No     Non-medical: No   Tobacco Use    Smoking status: Never Smoker    Smokeless tobacco: Never Used   Substance and Sexual Activity    Alcohol use: No    Drug use: No    Sexual activity: Never   Lifestyle    Physical activity:     Days per week: 0 days     Minutes per session: 0 min    Stress: Not at all " "  Relationships    Social connections:     Talks on phone: Once a week     Gets together: Once a week     Attends [employer] service: More than 4 times per year     Active member of club or organization: Yes     Attends meetings of clubs or organizations: More than 4 times per year     Relationship status: Never    Other Topics Concern    Not on file   Social History Narrative    Not on file       Review of Systems   Constitutional: Positive for activity change and fatigue. Negative for appetite change.   HENT: Positive for congestion, postnasal drip, rhinorrhea and trouble swallowing. Negative for sneezing and sore throat.    Eyes: Negative for photophobia and pain.   Respiratory: Negative for cough, chest tightness and shortness of breath.    Cardiovascular: Negative for chest pain and leg swelling.   Gastrointestinal: Negative for abdominal pain, constipation and diarrhea.   Endocrine: Negative for cold intolerance, heat intolerance and polyuria.   Genitourinary: Negative for frequency.   Musculoskeletal: Positive for arthralgias, back pain and myalgias. Negative for joint swelling and neck stiffness.   Skin: Negative for color change and pallor.   Neurological: Negative for seizures, speech difficulty and headaches.   Hematological: Negative for adenopathy.   Psychiatric/Behavioral: Positive for decreased concentration and dysphoric mood. Negative for agitation and sleep disturbance.       Objective:   /60 (BP Location: Left arm, Patient Position: Sitting, BP Method: Large (Manual))   Pulse 85   Resp 18   Ht 5' 4" (1.626 m)   Wt 98.8 kg (217 lb 13 oz)   SpO2 98%   BMI 37.39 kg/m²     Physical Exam   Constitutional: She is oriented to person, place, and time. She appears well-developed and well-nourished.   obese   HENT:   Head: Normocephalic and atraumatic.   prognathism   Neck: Normal range of motion.   Cardiovascular: Normal rate.   Murmur heard.  Pulmonary/Chest: Effort normal. "   Musculoskeletal: She exhibits edema (2+ bilateral) and deformity.   Decreased ROM of R shoulder, TTP, no erythema  Mild LE edema   Neurological: She is alert and oriented to person, place, and time.   Skin: Rash noted.        Ecchymoses and purpura on UE bilaterally  Pruritic dry skin on UE bilaterally   Psychiatric: She has a normal mood and affect. Her behavior is normal.   Vitals reviewed.        BREAST RASH      LEG RASH      Lab Results   Component Value Date    WBC 5.79 02/13/2019    HGB 11.8 (L) 02/13/2019    HCT 38.4 02/13/2019     02/13/2019    CHOL 139 02/13/2019    TRIG 72 02/13/2019    HDL 41 02/13/2019    ALT 14 02/13/2019    AST 19 02/13/2019     12/13/2019    K 4.0 12/13/2019     12/13/2019    CREATININE 1.4 12/13/2019    BUN 23 12/13/2019    CO2 27 12/13/2019    TSH 0.725 09/20/2018    HGBA1C 6.1 (H) 12/13/2019       Current Outpatient Medications on File Prior to Visit   Medication Sig Dispense Refill    albuterol (PROVENTIL/VENTOLIN HFA) 90 mcg/actuation inhaler Inhale 2 puffs into the lungs every 6 (six) hours as needed for Wheezing. Rescue 3 Inhaler 3    amLODIPine (NORVASC) 10 MG tablet TAKE 1 TABLET BY MOUTH ONCE DAILY 90 tablet 3    ammonium lactate 12 % Crea Apply twice daily to affected parts both feet as needed. 140 g 11    atorvastatin (LIPITOR) 40 MG tablet Take 1 tablet (40 mg total) by mouth once daily. 90 tablet 3    cholecalciferol, vitamin D3, (VITAMIN D3) 125 mcg (5,000 unit) Tab Take 1 tablet (5,000 Units total) by mouth every 48 hours. 90 tablet 3    ciclopirox (LOPROX) 0.77 % Crea Apply topically 2 (two) times daily. 90 g 2    cimetidine (TAGAMET) 400 MG tablet Take 1 tablet (400 mg total) by mouth 2 (two) times daily. 180 tablet 3    CMPD Lidocaine 2%, Prilocaine 2%, Lamotrigine 2.5%, Meloxicam 0.09% in Transdermal Gel Apply 1 Pump (1.6 g total) topically 4 (four) times daily. 240 g 3    diclofenac sodium (VOLTAREN) 1 % Gel Apply 2 g topically 2  (two) times daily. 3 Tube 3    fluticasone propionate (FLONASE) 50 mcg/actuation nasal spray 1 spray (50 mcg total) by Each Nostril route once daily. 16 g 2    gabapentin (NEURONTIN) 300 MG capsule Take 1 capsule (300 mg total) by mouth 2 (two) times daily as needed. 180 capsule 3    loratadine (CLARITIN) 10 mg tablet Take 1 tablet (10 mg total) by mouth once daily. 30 tablet 11    losartan (COZAAR) 25 MG tablet Take 1 tablet (25 mg total) by mouth once daily. 90 tablet 3    losartan (COZAAR) 25 MG tablet Take 1 tablet (25 mg total) by mouth once daily. 14 tablet 0    [DISCONTINUED] triamcinolone acetonide 0.025 % Lotn AAA to itching on arms and legs BID 2 Bottle 3     No current facility-administered medications on file prior to visit.          Assessment:   79 y.o. female with multiple co-morbid illnesses to continue work-up of chronic issues notably HTN, HLD, MSK pain.     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 Previously followed by [facility]  · Monitor labs  · Nephro following            ID    Candidal intertrigo     Rash under breast, likely candida intertrigo  · Start ketoconazole         Relevant Medications    ketoconazole (NIZORAL) 2 % cream       Hematology    Senile purpura     Ecchymoses and purpura on UE bilaterally  · monitor            Endocrine    Morbid obesity     BMI>35, HTN, HLD, OA  · Advised to increase activity  · Refuses PT due to  fatigue with exertion         Hyperparathyroidism     Elevated PTH, likely related to CKD  · Reduce nephrotoxins               Health Maintenance       Date Due Completion Date    TETANUS VACCINE 11/19/1958 ---    Shingles Vaccine (3 of 3) 02/07/2020 12/13/2019    DEXA SCAN 05/16/2021 5/16/2019    Lipid Panel 02/13/2024 2/13/2019          Follow up has follow-up in 2 weeks. Total face-to-face time was 60 min, >50% of this was spent on counseling and coordination of care. The following issues were discussed: HTN, HLD, MSK pain    Adonis Stanton, MS-4    Laurie Todd MD/MPH  Internal Medicine  [facility]  [phone] spectral[employer]

## 2020-02-27 ENCOUNTER — TELEPHONE (OUTPATIENT)
Dept: PRIMARY CARE CLINIC | Facility: CLINIC | Age: 80
End: 2020-02-27

## 2020-02-27 DIAGNOSIS — D63.1 ANEMIA DUE TO STAGE 3 CHRONIC KIDNEY DISEASE: ICD-10-CM

## 2020-02-27 DIAGNOSIS — E66.01 MORBID OBESITY: ICD-10-CM

## 2020-02-27 DIAGNOSIS — B02.23 NEUROPATHY DUE TO HERPES ZOSTER: ICD-10-CM

## 2020-02-27 DIAGNOSIS — M54.41 CHRONIC BILATERAL LOW BACK PAIN WITH BILATERAL SCIATICA: Primary | ICD-10-CM

## 2020-02-27 DIAGNOSIS — N18.30 ANEMIA DUE TO STAGE 3 CHRONIC KIDNEY DISEASE: ICD-10-CM

## 2020-02-27 DIAGNOSIS — M54.42 CHRONIC BILATERAL LOW BACK PAIN WITH BILATERAL SCIATICA: Primary | ICD-10-CM

## 2020-02-27 DIAGNOSIS — G89.29 CHRONIC BILATERAL LOW BACK PAIN WITH BILATERAL SCIATICA: Primary | ICD-10-CM

## 2020-02-27 DIAGNOSIS — N18.30 STAGE 3 CHRONIC KIDNEY DISEASE: ICD-10-CM

## 2020-02-27 NOTE — TELEPHONE ENCOUNTER
----- Message from Adonis Stanton sent at 2/27/2020 12:17 PM CST -----  Called Pt to follow-up on her understanding of the creams. Pt stated that she hasn't picked up her creams yet as it wasn't available yesterday.  Communicated to Pt how to use her creams (e.g. Hydrocortisone for leg rash, ketoconazole for breast rash).  Explained that the tramcinolone is for itchiness on the legs and to not confuse that with the hydrocortisone.    Recommend perhaps one more f/u call tomorrow after Pt picks up her creams to ensure compliance.

## 2020-02-27 NOTE — TELEPHONE ENCOUNTER
----- Message from Dina Rodas RN sent at 2/27/2020 12:45 PM CST -----  Contact: YANETH Moreau Dr./Staff:    In making a follow up call to pt I see pt just saw you yesterday.     Ms. Fields is requesting a Rollator walker (pt has no provider preference) to replace using her cane that pt states is no longer providing her with enough support and a Nutrition Consult due to Pre Diabetets and CKD 3.     Please advise.    Thank you,  ENDER Moreau, RN, CCM [facility]  [email or web address]  TEL:  [phone]

## 2020-02-27 NOTE — PROGRESS NOTES
"Outpatient Care Management  Plan of Care Follow Up Visit    Patient: Kelsey Fields  MRN: [id number]  Date of Service: 02/04/2020  Completed by: Dina Rodas RN  Referral Date: 09/24/2019  Program: Case Management (High Risk)    Reason for Visit   Patient presents with    OPCM RN First Follow-Up Attempt     2/4/2020    Update Plan Of Care     2/27/2020       Brief Summary:   Incoming call from pt. Pt is s/p PCP appt yest as scheduled-- rash beneath breasts addressed. Pt plans to start wearing her bra again to reduce skin to skin contact/moisture. Pt plans to use the cream prescribed. Pt discontinued her OP PT. Pt continues to work at night as  at [employer]. Pt reports PCP ordered a pair of compression stockings from [employer] for pt. At first pt states she needs a size larger than the "Large" that she has and then with further questioning, pt states she has the compression stockings but has not tried to put them on yet. Pt states she will try the stockings on.   Instructed pt to wear the stockings while at work where she sits with feet hanging down and to remove once she is home sleeping. Wear while up and about. Pt states her plan to try to wear the stockings. Pt c/o her str cane not providing her with enough support with ambulation and request a rollator walker. Request for DME sent to Dr. Todd along with Nutrition Consult. Pt expresses concerns over dxs PreDiabetes and CKD3. Pt states she wants to do what she can to preserve her health. Discussed eating low na foods (2000 mg/day) and focused on eating fruits that pt enjoys, lean meats- fish, chicken, turkey, small portions,  eating soft foods that are easy to chew and swallow, baking, broiling, boiling and seasoning alternatives to salt. Pt states, "I don't eat much salt or meats".  When asking pt whether she must continue to work, pt expresses the burden of maintaining her home but that she would not concern living elsewhere. " Pt c/o feeling tired but that she must go to [facility] to  rxs.   Advised pt on next 2 wk f/u PCP appt 3/13.   Collaborated with hospitals LMSW regarding 3/13 appt and plans to close case with next f/u.     Patient Summary     Involvement of Care:  Do I have permission to speak with other family members about your care?       Patient Reported Labs & Vitals:  1.  Any Patient Reported Labs & Vitals?     2.  Patient Reported Blood Pressure:     3.  Patient Reported Pulse:     4.  Patient Reported Weight (Kg):     5.  Patient Reported Blood Glucose (mg/dl):       Medical History:  Reviewed medical history with patient and/or caregiver    Social History:  Reviewed social history with patient and/or caregiver    Clinical Assessment     Reviewed and provided basic information on available community resources for mental health, transportation, wellness resources, and palliative care programs with patient and/or caregiver.    Complex Care Plan     Care plan was discussed and completed today with input from patient and/or caregiver.    Goals        Outpatient Case Management     Patient/caregiver will have an action plan in place to manage and prevent complications of  HTN prior to discharge from hospitals. - Priority: HIGH       Overall Time to Completion  2 months from 10/10/2019    hospitals Identified Patient Barriers:  Health Literacy  -Care Plan Created  Nutrition-----Care Plan Created  Advanced Care Planning-- -Referred to hospitals        hospitals Identified Education Barriers:  Education Barrier for Hypertension   -Care Plan Created        Short Term Goals  Patient will have appropriate health related knowledge and understanding of nutritional requirements within 1 month.   Interventions   · Mail Blood pressure logs for home use.  · Recognize and provide educational material (KIRSTIN).   · 12/3-- Pt is not sure about her dietary needs and expresses interest in Nutrition Consult.   · 12/31- Pt presents BP log to PCP on  12/27---- /60 at appt.       Status  · Met   12/31        Patient/caregiver will measure and record the blood pressure 1 times per day for 1 month.  Interventions   · Assess for availability of working blood pressure cuff in home setting.   10/10--Patient/Caregiver agrees to get new battery for existing BP machine by one week.  · 10/10--Patient/Caregiver agrees to OPCM follow up within one week to assess progress to goal.   · 10/25-- Pt reports replacing the batteries to BP machine and compared her BP reading to the reading from a friend's BP and found a difference in her reading being higher on hers--- 151/76 vs 135/70 on friend's.  ·  Pt plans to put in another battery.   · 10/25---Provided reinforcement to pt's efforts to get BP machine working to begin daily BP checks.   10/25- Patient/Caregiver agrees to try different batteries for BP machine within one week.  · Patient/Caregiver agrees to OPCM follow up within one week to assess progress to goal.   ·   · 12/3- Pt states she doesn't think her BP is not working quite right. Pt report attending the [facility] regularly and got a raffled off BP machine some time ago. 10/22= 149/71, 136/69, 135/71, 11/19= 131/74. Pt expresses concern over family h/o mother having a stroke.   · 12/3- Instructed pt to bring her BP machine and log to upcoming PCP appt with Dr Todd.  ·  12/3-- In basket message sent to PCP today includes mention of pt not checking BPs --agrees to do so this week and to bring her log and BP mach to be checked at 12/10 PCP appt .  · 12/3-  Patient/Caregiver agrees to check/log BPs daily and bring to PCP 12/10.   · Patient/Caregiver agrees to OPCM follow up within one week to assess progress to goal.   · 12/12-- PCP appt rescheduled from 12/10 to 12/13. Attempt contact with pt finds pt at [facility].   · 12/12--Call back message received from pt. Upon calling pt soon thereafter, pt reports busy having her breakfast. Phone contact  brief. Reinforced pt attending tomorrow's PCP appt 12/13 at 10:30 am for a f/u BP check. Pt c/o leg cramps, states she plans to go to appt.   Patient/Caregiver agrees to attend PCP appt 12/13.  · Patient/Caregiver agrees to OPCM follow up within one week to assess progress to goal.   · 12/31- Pt s/p PCP appts 12/13 and 12/27. Pt acknowledges HCTZ discontinued.   ·      Status  · Met  12/31      Patient/caregiver will verbalize 3 food items, 3 healthy food preparation methods, 3 Seasoning alternatives for  Low Sodium within 2 weeks.  Interventions   · Recognize and provide educational material ([employer]).   · 10/10-- Mailed [employer] education materials.   · Foods----Fresh fruits, fresh vegs, lean meats (chicken, fish, turkey)  · Cooking -- Bake, Broil, Boil foods.------------------Began providing education 10/10  · Seasonings- Mrs. Dash, Pepper, Lemon Juice, onions---Began providing education-- 10/10  ·   · 2/27- Pt states she likes to eat peaches, pears & watermelon. Pt states she has to be very careful with what she eats due to her esophagus-- h/o food being caught & went to ED x2. Pt reports feeling afraid to eat and strangle on the food. Pt avoids heavy gravies,   · Instructed pt in taking small portions at a time, eat soft foods that do not require much chewing before swallowing, take her time when eating. Instructed pt in seasoning options ie Mrs. Dash, Pepper, Lemon Juice, onions. Pt states she avoids acidic foods due to its affects on her stomach. Pt states she likes to eat at the [place] sometimes.   · Pt reports not eating much meats and has problems with her dentures. Wears old dentures with cotton. Pt does not want to go to dentist because of the treatment proposed by dentist about her gums wearing down. States she started wearing dentures at age 12-- the iodine meds ruined her teeth. Pt denies desire to see dentist at this time. Pt refers to an ongoing issue with her dentures.   ·   ·      Status  · Met    2/27      Patient/caregiver will verbalize 3 ways of preventing complications due to disease process within 1 month.  Interventions   · Empower patient/caregiver to discuss treatment plan with Physician/care team.  · Encourage compliance with Physician follow-ups.  · Encourage Dietary Compliance.  · Encourage Exercise.  · Encourage Medication Compliance.   · Daily BP checks and logging.  · Report persistently elevated BPs greater than 130/80.   · 12/3--Pt admits to eating foods that are sugary and that she needs to watch the salt intake more.   · 12/31- Pt to benefit from further instructions in eating healthy low na, low carb food plate however pt is not receptive or unable to focus on changes. Pt is usually physically tired and overwhelmed.   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 Pt is agreeable to a Nutrition Consult--message sent to Dr. Todd to request. Pt is s/p PCP appt 2/26-- Pt adhering to med appts. Pt has stopped going to OP PT however. Pt did not see the benefit to her back. /60 yest. Pt continues to check her BP at home.      Status  · Met   2/27            Clinical Reference Documents Added to Patient Instructions       Document    FALLS IN THE HOME, PREVENTING (ENGLISH)    HIGH BLOOD PRESSURE, WHAT IS?  (ENGLISH)        SALT, TIPS FOR USING LESS (ENGLISH)    STROKE AND HIGH BLOOD PRESSURE, UNDERSTANDING THE LINK BETWEEN (ENGLISH)             Patient/caregiver will have an action plan in place to manage and prevent complications of PreDiabetes  prior to discharge from OPCM. - Priority: HIGH      Overall Time to Completion  1 month from 10/25/2019     OPCM Identified Patient Barriers:  Health Literacy  -Care Plan Created  Nutrition-----Care Plan Created  [facility]        OPCM Identified Education Barriers:  Education Barrier for Hypertension   -Care Plan Created         Short  "Term Goals  Patient/caregiver will verbalize food required to create a well-balanced food plate within 1 month.  Interventions   · Encourage Dietary Compliance.  · Recognize and provide educational material (KIRSTIN).  · Review eating/nutrition habits.   ·  10/25- Pt c/o her toe hurting after she ate a cobbler. Pt relates her pain to information she learned from PCP regarding diabetes & potential complications. Pt says I really need to watch the salt and the sugar.   · 10/25- Mailed education material on Pre Diabetes and MyFood Plate.   · 12/3- Pt is not sure if she received the mailings. Pt states not looking through her mail and letting it pile up. Pt states however, getting the pill box mailed to pt by this RN CM.   · 12/3- Pt c/o left 4th toe hurting after she ate something sweet. Pt states, I guess I will have to stop eating sweets because the pain is not worth it". Pt c/o the pain associated with prior finger sticks for BG checks. Pt states, she couldn't check her BGs because of the pain. Pt is prediabetic, does not possess own glucometer at this time.     12/3-- In basket message sent to Dr. Todd with concerns -- pt in need of Nutrition Consult and yet at the same time, pt is overwhelmed/exhausted from working nights to properly focus and care for her daily health needs.   12/31- Pt s/p PCP appt 12/13. Pt verbalizes being told she has PreDiabetes. Last A1C=6.1 (128) on 12/13/19.   12/31- Began to explain healthy food 9" plate --1/2 of Vegs/salads and 1/4 lean meat + 1/4 starch--- pt cuts off conversation to report on other family members who have had DM. Provided reinforcement to pt that she can prevent progression to DM by eating a balanced diet and exercise ie walking and to include foods high in fiber as provided to her by PCP pt states to help her with c/o constipation.  Discouraged pt from riding bicycle for exercise-- recommended walking. Reviewed upcoming appts to [facility] and to Sports " Medicine.     12/31--Patient/Caregiver agrees to attend 1/3/20 for Hands at [facility] followed by 1/6/20 [facility]- Sciatica & 1/6/20 Sports Med- Shoulder/   Patient/Caregiver agrees to OPCM follow up in 2 wks to assess progress to goal.               12/31----Numerous appts upcoming-- as pt requests-- mailed out all Jan 2020 appts and then Feb PCP appt.   12/31-- New request sent to PCP for Nutrition Consult. (Noted 11/20/18 Nutrition Consult visit for PreDiabetes). No referral noted.   2/27- New request for Nutrition Consult sent to Dr. Todd per pt's request due to her worries over Pre Diabetes and CKD 3.      Status  · Partially met           Clinical Reference Documents Added to Patient Instructions       Document    PREDIABETES (ENGLISH)    USDA MYPLATE, UNDERSTANDING (ENGLISH)             Patient/caregiver will have an action plan in place to manage and prevent complications of Sleep Apnea prior to discharge from OPCM. - Priority: HIGH      Overall Time to Completion  2 months from 10/10/2019    OPCM Identified Patient Barriers:  Health Literacy  -Care Plan Created  Nutrition-----Care Plan Created  Advanced Care Planning-- -Referred to OPCM        OPCM Identified Education Barriers:  Education Barrier for Hypertension   -Care Plan Created        Short Term Goals  Patient/caregiver will obtain CPAP to support disease process within 1 month.  Interventions                  Collaborate with Physician as appropriate to meet patient needs as found necessary in promoting pt adherence to CPAP use.                10/10-- Explained the importance of using the CPAP-- to be sure pt is getting needed O2 while sleeps, needed to restore body's energy, to                            prevent neg affects on body with the CPAP ie heart disease, obesity, compromised functioning due to fatigue/sleepiness.  Done 10/10              10/10-Patient/Caregiver agrees to start wearing her CPAP within 2  wks.    Patient/Caregiver agrees to OPCM follow up within 2 weeks to assess progress to goal.     12/3-  Pt states she hasn't been using the CPAP but that she will start. Pt explains how she comes home so tired she lays down on the bed and falls asleep. Pt confirms receiving the needed tubing CPAP supplies in the mail.   Instructed pt to use and keep clean tubing/CPAP supplies.  12/3-  Patient/Caregiver agrees to start using her CPAP by one week.   Patient/Caregiver agrees to OPCM follow up within one week to assess progress to goal.   12/3- In basket message to PCP sent today includes pt's lack of using CPAP.  12/31- pt states she still hasn't started to use CPAP but says she will have to start in order to decrease feeling so tired all of the time.   12/31-- Patient/Caregiver agrees to start using her CPAP within 2 wks.   Patient/Caregiver agrees to OPCM follow up in 2 wks to assess progress to goal. .    2/27- Pt reports using CPAP once in awhile and claims to have all needed CPAP tubing supplies.        Status  · Partially met  2/27/2020            Clinical Reference Documents Added to Patient Instructions       Document    FALLS IN THE HOME, PREVENTING (ENGLISH)    HIGH BLOOD PRESSURE, WHAT IS?  (ENGLISH)        SALT, TIPS FOR USING LESS (ENGLISH)    STROKE AND HIGH BLOOD PRESSURE, UNDERSTANDING THE LINK BETWEEN (ENGLISH)             Patient/caregiver will have knowledge of resources available in order to obtain the services that are needed prior to discharge from OPCM. - Priority: Medium      Overall Time to Completion  2 months from 12/03/2019    Social Work Identified Patient Barriers:   Advanced Care Planning:  Care plan created           Short Term Goals  Patient/caregiver will discuss Advanced Care Planning with family, Physician and/or Power of  within 2 weeks.  Interventions   · Collaborate with OPCM RN as appropriate to meet patient needs.  · Discuss and provide [facility] Advance  Directive.  · Empower patient/caregiver to discuss End of life issues with family, Physician and/or Power of .  · Provide education on advance care planning.     Status  · Partially met     Patient agrees to follow up by  12/30/19.  Patient agrees to OPCM follow up within two weeks to assess progress to goal.              Patient/caregiver will have knowledge of resources available in order to obtain the services that are needed prior to discharge from OPCM. - Priority: MOD      Overall Time to Completion  2 months from 10/10/2019      OPCM Identified Patient Barriers:  Health Literacy  -Care Plan Created  Nutrition-----Care Plan Created  Advanced Care Planning-- -Referred to OPCM      OPCM Identified Education Barriers:  Education Barrier for Hypertension    Short Term Goals  Patient/caregiver will have contact information for identified community resources IE:[facility]  for follow-up within 1 month.  Interventions   · Mail [employer] O[employer] Benefit Contact Information.     Done 10/10  · Refer to Outpatient Case Management Social Worker.  Done 10/10  Attempted to provide pt with contact for this RN CM and OOC however pt very sleepy and pen not writing.  Mailed contact information along with Welcome Letter, education material, [facility] OTC 2019 catalog, BP logs.   10/10-- Omitted PHQ2. Assess on next encounter.   10/25- Completed PHQ2=0.   10/25-- Pt confirms receipt of mailings as reason for her incoming call. Pt is thankful, still looking through them and will look at the 2019 [place] OTC catalog.    10/25- [facility] LCSW assessment pending. 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   1/15/2020- collaborated with OPC LMSW in person to discuss pt case.       Status  · Met  1/15/2020            Clinical Reference Documents Added to Patient Instructions       Document    FALLS IN THE HOME, PREVENTING (ENGLISH)    HIGH BLOOD PRESSURE, WHAT IS?  (ENGLISH)        SALT, TIPS FOR USING LESS (ENGLISH)    STROKE AND HIGH BLOOD PRESSURE, UNDERSTANDING THE LINK BETWEEN (ENGLISH)                  Patient Instructions     Instructions were provided via the [facility] patient resources and are available for the patient to view on the patient portal.    Next Steps:   F/u on PCP response to ordering a rollator and Nutrition Consult.   F/u on PCP appt 3/13.   Plan to close case.     Follow up in about 5 weeks (around 3/10/2020) for OPCM RN Follow Up to update care plan.      [facility] OPCM Self-Management Care Plan was developed with the patients/caregivers input and was based on identified barriers from [facility] assessment.  Goals were written today with the patient/caregiver and the patient has agreed to work towards these goals to improve his/her overall well-being. Patient verbalized understanding of the care plan, goals, and all of today's instructions. Encouraged patient/caregiver to communicate with his/her physician and health care team about health conditions and the treatment plan.  Provided my contact information today and encouraged patient/caregiver to call me with any questions as needed.

## 2020-03-03 ENCOUNTER — PATIENT OUTREACH (OUTPATIENT)
Dept: ADMINISTRATIVE | Facility: OTHER | Age: 80
End: 2020-03-03

## 2020-03-05 ENCOUNTER — OUTPATIENT CASE MANAGEMENT (OUTPATIENT)
Dept: ADMINISTRATIVE | Facility: OTHER | Age: 80
End: 2020-03-05

## 2020-03-09 ENCOUNTER — OUTPATIENT CASE MANAGEMENT (OUTPATIENT)
Dept: ADMINISTRATIVE | Facility: OTHER | Age: 80
End: 2020-03-09

## 2020-03-09 ENCOUNTER — TELEPHONE (OUTPATIENT)
Dept: PRIMARY CARE CLINIC | Facility: CLINIC | Age: 80
End: 2020-03-09

## 2020-03-09 NOTE — PROGRESS NOTES
Outpatient Care Management  Plan of Care Follow Up Visit    Patient: Kelsey Fields  MRN: [id number]  Date of Service: 03/09/2020  Completed by: Dina Rodas RN  Referral Date: 09/24/2019  Program: Case Management (High Risk)    Reason for Visit   Patient presents with    Update Plan Of Care     3/9/2020  Rollator received.        Brief Summary:   F/u with pt. Pt confirms receipt of rollator walker. 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 Pt unable to talk. Message to PCP clinic to get clarification that pt will believe on the date of upcoming PCP appt. PCP and Nutrition appts reviewed with pt .    Patient Summary     Involvement of Care:  Do I have permission to speak with other family members about your care?       Patient Reported Labs & Vitals:  1.  Any Patient Reported Labs & Vitals?     2.  Patient Reported Blood Pressure:     3.  Patient Reported Pulse:     4.  Patient Reported Weight (Kg):     5.  Patient Reported Blood Glucose (mg/dl):       Medical History:  Reviewed medical history with patient and/or caregiver    Social History:  Reviewed social history with patient and/or caregiver    Clinical Assessment     Reviewed and provided basic information on available community resources for mental health, transportation, wellness resources, and palliative care programs with patient and/or caregiver.    Complex Care Plan     Care plan was discussed and completed today with input from patient and/or caregiver.    Goals        Outpatient Case Management     Patient/caregiver will have an action plan in place to manage and prevent complications of  HTN prior to discharge from OPCM. - Priority: HIGH       Overall Time to Completion  2 months from 10/10/2019    OPCM Identified Patient Barriers:  Health Literacy  -Care Plan Created  Nutrition-----Care Plan Created  Advanced Care Planning-- -Referred to OPCM        OPCM Identified Education Barriers:  Education Barrier for  Hypertension   -Care Plan Created        Short Term Goals  Patient will have appropriate health related knowledge and understanding of nutritional requirements within 1 month.   Interventions   · Mail Blood pressure logs for home use.  · Recognize and provide educational material (KIRSTIN).   · 12/3-- Pt is not sure about her dietary needs and expresses interest in Nutrition Consult.   · 12/31- Pt presents BP log to PCP on 12/27---- /60 at appt.       Status  · Met   12/31        Patient/caregiver will measure and record the blood pressure 1 times per day for 1 month.  Interventions   · Assess for availability of working blood pressure cuff in home setting.   10/10--Patient/Caregiver agrees to get new battery for existing BP machine by one week.  · 10/10--Patient/Caregiver agrees to OPCM follow up within one week to assess progress to goal.   · 10/25-- Pt reports replacing the batteries to BP machine and compared her BP reading to the reading from a friend's BP and found a difference in her reading being higher on hers--- 151/76 vs 135/70 on friend's.  ·  Pt plans to put in another battery.   · 10/25---Provided reinforcement to pt's efforts to get BP machine working to begin daily BP checks.   10/25- Patient/Caregiver agrees to try different batteries for BP machine within one week.  · Patient/Caregiver agrees to OPCM follow up within one week to assess progress to goal.   ·   · 12/3- Pt states she doesn't think her BP is not working quite right. Pt report attending the [facility] regularly and got a raffled off BP machine some time ago. 10/22= 149/71, 136/69, 135/71, 11/19= 131/74. Pt expresses concern over family h/o mother having a stroke.   · 12/3- Instructed pt to bring her BP machine and log to upcoming PCP appt with Dr Todd.  ·  12/3-- In basket message sent to PCP today includes mention of pt not checking BPs --agrees to do so this week and to bring her log and BP mach to be checked  at 12/10 PCP appt .  · 12/3-  Patient/Caregiver agrees to check/log BPs daily and bring to PCP 12/10.   · Patient/Caregiver agrees to OPCM follow up within one week to assess progress to goal.   · 12/12-- PCP appt rescheduled from 12/10 to 12/13. Attempt contact with pt finds pt at [facility].   · 12/12--Call back message received from pt. Upon calling pt soon thereafter, pt reports busy having her breakfast. Phone contact brief. Reinforced pt attending tomorrow's PCP appt 12/13 at 10:30 am for a f/u BP check. Pt c/o leg cramps, states she plans to go to appt.   Patient/Caregiver agrees to attend PCP appt 12/13.  · Patient/Caregiver agrees to OPCM follow up within one week to assess progress to goal.   · 12/31- Pt s/p PCP appts 12/13 and 12/27. Pt acknowledges HCTZ discontinued.   ·      Status  · Met  12/31      Patient/caregiver will verbalize 3 food items, 3 healthy food preparation methods, 3 Seasoning alternatives for  Low Sodium within 2 weeks.  Interventions   · Recognize and provide educational material (KIRSTIN).   · 10/10-- Mailed [employer] education materials.   · Foods----Fresh fruits, fresh vegs, lean meats (chicken, fish, turkey)  · Cooking -- Bake, Broil, Boil foods.------------------Began providing education 10/10  · Seasonings- Mrs. Dash, Pepper, Lemon Juice, onions---Began providing education-- 10/10  ·   · 2/27- Pt states she likes to eat peaches, pears & watermelon. Pt states she has to be very careful with what she eats due to her esophagus-- h/o food being caught & went to ED x2. Pt reports feeling afraid to eat and strangle on the food. Pt avoids heavy gravies,   · Instructed pt in taking small portions at a time, eat soft foods that do not require much chewing before swallowing, take her time when eating. Instructed pt in seasoning options ie Mrs. Dash, Pepper, Lemon Juice, onions. Pt states she avoids acidic foods due to its affects on her stomach. Pt states she likes to eat at the [facility]  sometimes.   · Pt reports not eating much meats and has problems with her dentures. Wears old dentures with cotton. Pt does not want to go to dentist because of the treatment proposed by dentist about her gums wearing down. States she started wearing dentures at age 12-- the iodine meds ruined her teeth. Pt denies desire to see dentist at this time. Pt refers to an ongoing issue with her dentures.   ·   ·      Status  · Met   2/27      Patient/caregiver will verbalize 3 ways of preventing complications due to disease process within 1 month.  Interventions   · Empower patient/caregiver to discuss treatment plan with Physician/care team.  · Encourage compliance with Physician follow-ups.  · Encourage Dietary Compliance.  · Encourage Exercise.  · Encourage Medication Compliance.   · Daily BP checks and logging.  · Report persistently elevated BPs greater than 130/80.   · 12/3--Pt admits to eating foods that are sugary and that she needs to watch the salt intake more.   · 12/31- Pt to benefit from further instructions in eating healthy low na, low carb food plate however pt is not receptive or unable to focus on changes. Pt is usually physically tired and overwhelmed.   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 Pt is agreeable to a Nutrition Consult--message sent to Dr. Todd to request. Pt is s/p PCP appt 2/26-- Pt adhering to med appts. Pt has stopped going to OP PT however. Pt did not see the benefit to her back. /60 yest. Pt continues to check her BP at home.      Status  · Met   2/27            Clinical Reference Documents Added to Patient Instructions       Document    FALLS IN THE HOME, PREVENTING (ENGLISH)    HIGH BLOOD PRESSURE, WHAT IS?  (ENGLISH)        SALT, TIPS FOR USING LESS (ENGLISH)    STROKE AND HIGH BLOOD PRESSURE, UNDERSTANDING THE LINK BETWEEN (ENGLISH)             Patient/caregiver will have an action plan in place to  "manage and prevent complications of PreDiabetes  prior to discharge from OPC. - Priority: HIGH      Overall Time to Completion  1 month from 10/25/2019     OPCM Identified Patient Barriers:  Health Literacy  -Care Plan Created  Nutrition-----Care Plan Created  Advanced Care Planning-- -Referred to OPCM        OPCM Identified Education Barriers:  Education Barrier for Hypertension   -Care Plan Created         Short Term Goals  Patient/caregiver will verbalize food required to create a well-balanced food plate within 1 month.  Interventions   · Encourage Dietary Compliance.  · Recognize and provide educational material (KIRSTIN).  · Review eating/nutrition habits.   ·  10/25- Pt c/o her toe hurting after she ate a cobbler. Pt relates her pain to information she learned from PCP regarding diabetes & potential complications. Pt says I really need to watch the salt and the sugar.   · 10/25- Mailed education material on Pre Diabetes and MyFood Plate.   · 12/3- Pt is not sure if she received the mailings. Pt states not looking through her mail and letting it pile up. Pt states however, getting the pill box mailed to pt by this RN CM.   · 12/3- Pt c/o left 4th toe hurting after she ate something sweet. Pt states, I guess I will have to stop eating sweets because the pain is not worth it". Pt c/o the pain associated with prior finger sticks for BG checks. Pt states, she couldn't check her BGs because of the pain. Pt is prediabetic, does not possess own glucometer at this time.     12/3-- In basket message sent to Dr. Todd with concerns -- pt in need of Nutrition Consult and yet at the same time, pt is overwhelmed/exhausted from working nights to properly focus and care for her daily health needs.   12/31- Pt s/p PCP appt 12/13. Pt verbalizes being told she has PreDiabetes. Last A1C=6.1 (128) on 12/13/19.   12/31- Began to explain healthy food 9" plate --1/2 of Vegs/salads and 1/4 lean meat + 1/4 starch--- " pt cuts off conversation to report on other family members who have had DM. Provided reinforcement to pt that she can prevent progression to DM by eating a balanced diet and exercise ie walking and to include foods high in fiber as provided to her by PCP pt states to help her with c/o constipation.  Discouraged pt from riding bicycle for exercise-- recommended walking. Reviewed upcoming appts to [facility] and to Sports Medicine.     12/31--Patient/Caregiver agrees to attend 1/3/20 for Hands at [facility] followed by 1/6/20 [facility]- Sciatica & 1/6/20 Sports Med- Shoulder/   Patient/Caregiver agrees to OPCM follow up in 2 wks to assess progress to goal.               12/31----Numerous appts upcoming-- as pt requests-- mailed out all Jan 2020 appts and then Feb PCP appt.   12/31-- New request sent to PCP for Nutrition Consult. (Noted 11/20/18 Nutrition Consult visit for PreDiabetes). No referral noted.   2/27- New request for Nutrition Consult sent to Dr. Todd per pt's request due to her worries over Pre Diabetes and CKD 3.   3/9- Informed pt of upcoming Nutrition consult appt on 3/19 at 1:30 pm. Pt was aware of a pending appt.   3/9- Pt confirms receipt of rollator. C/o a toe hurting her. Pt informed of existing PCP appt 3/13. Pt believes appt is for 3/11. Message to PCP clinic to clarify with pt.      Status  · Partially met           Clinical Reference Documents Added to Patient Instructions       Document    PREDIABETES (ENGLISH)    USDA MYPLATE, UNDERSTANDING (ENGLISH)             Patient/caregiver will have an action plan in place to manage and prevent complications of Sleep Apnea prior to discharge from OPCM. - Priority: HIGH      Overall Time to Completion  2 months from 10/10/2019    OPC Identified Patient Barriers:  Health Literacy  -Care Plan Created  Nutrition-----Care Plan Created  Advanced Care Planning-- -Referred to OPCM        OPCM Identified Education  Barriers:  Education Barrier for Hypertension   -Care Plan Created        Short Term Goals  Patient/caregiver will obtain CPAP to support disease process within 1 month.  Interventions                  Collaborate with Physician as appropriate to meet patient needs as found necessary in promoting pt adherence to CPAP use.                10/10-- Explained the importance of using the CPAP-- to be sure pt is getting needed O2 while sleeps, needed to restore body's energy, to                            prevent neg affects on body with the CPAP ie heart disease, obesity, compromised functioning due to fatigue/sleepiness.  Done 10/10              10/10-Patient/Caregiver agrees to start wearing her CPAP within 2 wks.    Patient/Caregiver agrees to OPCM follow up within 2 weeks to assess progress to goal.     12/3-  Pt states she hasn't been using the CPAP but that she will start. Pt explains how she comes home so tired she lays down on the bed and falls asleep. Pt confirms receiving the needed tubing CPAP supplies in the mail.   Instructed pt to use and keep clean tubing/CPAP supplies.  12/3-  Patient/Caregiver agrees to start using her CPAP by one week.   Patient/Caregiver agrees to OPCM follow up within one week to assess progress to goal.   12/3- In basket message to PCP sent today includes pt's lack of using CPAP.  12/31- pt states she still hasn't started to use CPAP but says she will have to start in order to decrease feeling so tired all of the time.   12/31-- Patient/Caregiver agrees to start using her CPAP within 2 wks.   Patient/Caregiver agrees to OPCM follow up in 2 wks to assess progress to goal. .    2/27- Pt reports using CPAP once in awhile and claims to have all needed CPAP tubing supplies.        Status  · Partially met  2/27/2020            Clinical Reference Documents Added to Patient Instructions       Document    FALLS IN THE HOME, PREVENTING (ENGLISH)    HIGH BLOOD PRESSURE, WHAT IS?  (ENGLISH)         SALT, TIPS FOR USING LESS (ENGLISH)    STROKE AND HIGH BLOOD PRESSURE, UNDERSTANDING THE LINK BETWEEN (ENGLISH)             Patient/caregiver will have knowledge of resources available in order to obtain the services that are needed prior to discharge from OPC. - Priority: Medium      Overall Time to Completion  2 months from 12/03/2019    Social Work Identified Patient Barriers:   Advanced Care Planning:  Care plan created           Short Term Goals  Patient/caregiver will discuss Advanced Care Planning with family, Physician and/or Power of  within 2 weeks.  Interventions   · Collaborate with OPCM RN as appropriate to meet patient needs.  · Discuss and provide [facility] Advance Directive.  · Empower patient/caregiver to discuss End of life issues with family, Physician and/or Power of .  · Provide education on advance care planning.     Status  · Partially met     Patient agrees to follow up by  12/30/19.  Patient agrees to [facility] follow up within two weeks to assess progress to goal.     2/27/2020 [facility] LMSW followed up with pt regarding Advance Care planning. [facility] LMSW re educated pt about advance care planning; pt verbalized understanding. Pt reports that she did not receive the packet that [facility] LMSW mailed. Pt also reports that she noticed that she has not received other mail. Pt reports that she will contact the post office about her missing mail. [facility] LMSW will remail advance care packet. Also LMSW will [facility] in [facility] during her 3/13/2020 appointment and provide her with the advance care planning packet. During visit pt will discuss case closure and possibly close case.              Patient/caregiver will have knowledge of resources available in order to obtain the services that are needed prior to discharge from [facility]. - Priority: MOD      Overall Time to Completion  2 months from 10/10/2019      [facility] Identified Patient Barriers:  Health Literacy  -Care Plan  Created  Nutrition-----Care Plan Created  Advanced Care Planning-- -Referred to [facility]      [facility] Identified Education Barriers:  Education Barrier for Hypertension    Short Term Goals  Patient/caregiver will have contact information for identified community resources IE:[facility]  for follow-up within 1 month.  Interventions   · Mail [employer] Benefit Contact Information.     Done 10/10  · Refer to Outpatient Case Management Social Worker.  Done 10/10  Attempted to provide pt with contact for this RN CM and OOC however pt very sleepy and pen not writing.  Mailed contact information along with Welcome Letter, education material, [facility] OTC 2019 catalog, BP logs.   10/10-- Omitted PHQ2. Assess on next encounter.   10/25- Completed PHQ2=0.   10/25-- Pt confirms receipt of mailings as reason for her incoming call. Pt is thankful, still looking through them and will look at the 2019 [facility] OTC catalog.    10/25- OPC LCSW assessment pending. 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   1/15/2020- collaborated with [facility] LMSW in person to discuss pt case.       Status  · Met 1/15/2020            Clinical Reference Documents Added to Patient Instructions       Document    FALLS IN THE HOME, PREVENTING (ENGLISH)    HIGH BLOOD PRESSURE, WHAT IS?  (ENGLISH)        SALT, TIPS FOR USING LESS (ENGLISH)    STROKE AND HIGH BLOOD PRESSURE, UNDERSTANDING THE LINK BETWEEN (ENGLISH)                  Patient Instructions     Instructions were provided via the [employer] patient resources and are available for the patient to view on the patient portal.    Next Steps:  Ensure pt is clear on her next PCP appt.   F/u on outcome to PCP appt and Nutrition consult-- review with pt.   Begin case closure.     No  follow-ups on file.      Todays OPCM Self-Management Care Plan was developed with the patients/caregivers input and was based on identified barriers from todays assessment.  Goals were written today with the patient/caregiver and the patient has agreed to work towards these goals to improve his/her overall well-being. Patient verbalized understanding of the care plan, goals, and all of today's instructions. Encouraged patient/caregiver to communicate with his/her physician and health care team about health conditions and the treatment plan.  Provided my contact information today and encouraged patient/caregiver to call me with any questions as needed.

## 2020-03-09 NOTE — TELEPHONE ENCOUNTER
----- Message from Dina Rodas RN sent at 3/9/2020  2:46 PM CDT -----  Contact: YANETH Moreau Dr./Staff:    Ms. Fields is of the belief that her next PCP appt is on 3/11. Pt would like clarification that it is indeed as in Epic for 3/13 at 11 am.     Thank you,  ENDER Moreau, RN, CCM [facility]  [email or web address]  TEL:  [phone]

## 2020-03-13 ENCOUNTER — OFFICE VISIT (OUTPATIENT)
Dept: PRIMARY CARE CLINIC | Facility: CLINIC | Age: 80
End: 2020-03-13
Payer: MEDICARE

## 2020-03-13 ENCOUNTER — HOSPITAL ENCOUNTER (OUTPATIENT)
Dept: RADIOLOGY | Facility: HOSPITAL | Age: 80
Discharge: HOME | End: 2020-03-13
Attending: INTERNAL MEDICINE
Payer: MEDICARE

## 2020-03-13 VITALS
HEIGHT: 64 IN | HEART RATE: 88 BPM | OXYGEN SATURATION: 97 % | BODY MASS INDEX: 36.32 KG/M2 | DIASTOLIC BLOOD PRESSURE: 54 MMHG | SYSTOLIC BLOOD PRESSURE: 100 MMHG | WEIGHT: 212.75 LBS

## 2020-03-13 DIAGNOSIS — B37.2 CANDIDAL INTERTRIGO: ICD-10-CM

## 2020-03-13 DIAGNOSIS — M79.675 PAIN OF TOE OF LEFT FOOT: ICD-10-CM

## 2020-03-13 DIAGNOSIS — M62.838 MUSCLE SPASM: ICD-10-CM

## 2020-03-13 PROCEDURE — 3078F DIAST BP <80 MM HG: CPT | Mod: HCNC,CPTII,S$GLB, | Performed by: INTERNAL MEDICINE

## 2020-03-13 PROCEDURE — 1101F PT FALLS ASSESS-DOCD LE1/YR: CPT | Mod: HCNC,CPTII,S$GLB, | Performed by: INTERNAL MEDICINE

## 2020-03-13 PROCEDURE — 99215 OFFICE O/P EST HI 40 MIN: CPT | Mod: HCNC,S$GLB,, | Performed by: INTERNAL MEDICINE

## 2020-03-13 PROCEDURE — 73630 X-RAY EXAM OF FOOT: CPT | Mod: 26,HCNC,LT, | Performed by: RADIOLOGY

## 2020-03-13 PROCEDURE — 3074F PR MOST RECENT SYSTOLIC BLOOD PRESSURE < 130 MM HG: ICD-10-PCS | Mod: HCNC,CPTII,S$GLB, | Performed by: INTERNAL MEDICINE

## 2020-03-13 PROCEDURE — 1159F PR MEDICATION LIST DOCUMENTED IN MEDICAL RECORD: ICD-10-PCS | Mod: HCNC,S$GLB,, | Performed by: INTERNAL MEDICINE

## 2020-03-13 PROCEDURE — 73630 X-RAY EXAM OF FOOT: CPT | Mod: TC,HCNC,LT

## 2020-03-13 PROCEDURE — 1159F MED LIST DOCD IN RCRD: CPT | Mod: HCNC,S$GLB,, | Performed by: INTERNAL MEDICINE

## 2020-03-13 PROCEDURE — 1101F PR PT FALLS ASSESS DOC 0-1 FALLS W/OUT INJ PAST YR: ICD-10-PCS | Mod: HCNC,CPTII,S$GLB, | Performed by: INTERNAL MEDICINE

## 2020-03-13 PROCEDURE — 99499 UNLISTED E&M SERVICE: CPT | Mod: HCNC,S$GLB,, | Performed by: INTERNAL MEDICINE

## 2020-03-13 PROCEDURE — 3078F PR MOST RECENT DIASTOLIC BLOOD PRESSURE < 80 MM HG: ICD-10-PCS | Mod: HCNC,CPTII,S$GLB, | Performed by: INTERNAL MEDICINE

## 2020-03-13 PROCEDURE — 1126F AMNT PAIN NOTED NONE PRSNT: CPT | Mod: HCNC,S$GLB,, | Performed by: INTERNAL MEDICINE

## 2020-03-13 PROCEDURE — 99215 PR OFFICE/OUTPT VISIT, EST, LEVL V, 40-54 MIN: ICD-10-PCS | Mod: HCNC,S$GLB,, | Performed by: INTERNAL MEDICINE

## 2020-03-13 PROCEDURE — 1126F PR PAIN SEVERITY QUANTIFIED, NO PAIN PRESENT: ICD-10-PCS | Mod: HCNC,S$GLB,, | Performed by: INTERNAL MEDICINE

## 2020-03-13 PROCEDURE — 99499 RISK ADDL DX/OHS AUDIT: ICD-10-PCS | Mod: HCNC,S$GLB,, | Performed by: INTERNAL MEDICINE

## 2020-03-13 PROCEDURE — 73630 XR FOOT COMPLETE 3 VIEW LEFT: ICD-10-PCS | Mod: 26,HCNC,LT, | Performed by: RADIOLOGY

## 2020-03-13 PROCEDURE — 3074F SYST BP LT 130 MM HG: CPT | Mod: HCNC,CPTII,S$GLB, | Performed by: INTERNAL MEDICINE

## 2020-03-13 RX ORDER — FERROUS SULFATE 325(65) MG
325 TABLET ORAL
Qty: 90 TABLET | Refills: 0
Start: 2020-03-13

## 2020-03-13 NOTE — PATIENT INSTRUCTIONS
TODAY:  - XR toes  - podiatry appt  - labs today  - use gabapentin for your face pain and muscle spasms  - take iron 3 days per week

## 2020-03-13 NOTE — PROGRESS NOTES
"Primary Care Provider Appointment   SHARED NOTE: Jos Melton (MS4), Dr Todd (Attending)    Subjective:      Patient ID: Kelsey Fields is a 79 y.o. female with HTN, HLD, MSK pain, psoriasis.     Chief Complaint: Follow-up and Toe Pain    Pt has a long history of L 4th metatarsal pain that is sometimes "horrible when something touches it". Pt says her toe looks like there is nothing wrong with it. Denies ambulating difficulty, but the pain is worsened with walking. Pt has tried ciclopirox given to her by podiatry last May with no relief. Has not seen podiatry in one year.    R shoulder pain has resolved. When asked what helped she stated that it "just went away". Pt denies any falls or trauma recently. Pt says that her ROM and power is fine and is "enough to cut the grass." Pt says that both of her hands are intermittently numb due to a prior fall in the 1970s. Has a history of steroid injections in the area that provided relief. Pt ambulates today with walker.     Pt's energy has continued to be low, but with rest can perform ADLs. Pt says she "doesn't enjoy too much" and goes out to eat sometimes for entertainment. Pt has "too much appetite". Denies trouble sleeping, guilt, concentration, and thoughts of suicide.    Pt still has residual pain from shingles. Pt uses voltaren gel and gabapentin for pain, but only uses gabapentin when her pain is severe because she says her nephrologist said it can "damage her kidneys." Advised patient to continue taking her gabapentin twice a day as prescribed. Pt has a history of "muscle spasms" in her ankles that hurts so bad it wakes her up from sleep. This pain will happen during the day, but is particularly troubling at night. Pt attempted apple cider vinegar and water with no relief of symptoms.     The rash underneath her breasts has improved and pt reports no itchiness. Pt says that this has improved with the use of ketoconzaole and two shampoos and soaps she " "brought in today.     Chronic back pain still present. Pt says that PT has made it worse and no longer wishes to do anything about it.    Eye pain has not been present for the last 3 weeks. Dx of marginal keratitis and dry eyes, pt says she uses her Maxitrol eye drops from Dr. De Souza "when her eyes get real bad".    Pt still has plaques from psoriasis. Uses triamcinolone and hydrocortisone creams with relief. Pt says no itchiness, redness, or any acute flares worse than her baseline.    Pt has occasional sharp, stabbing pain in her face once or twice a month on both sides of her face anterior to her ears. The pain may last "five seconds at most". Reports no triggers, nothing that helps or worsens.         Past Surgical History:   Procedure Laterality Date    blepharitis Bilateral 2017    Alessandra Grijalva OD    CATARACT EXTRACTION Bilateral 2017    Alessandra Grijalva MD    CATARACT EXTRACTION W/  INTRAOCULAR LENS IMPLANT Left 2019    Procedure: EXTRACTION, CATARACT, WITH IOL INSERTION;  Surgeon: Alysa De Souza MD;  Location: [facility];  Service: Ophthalmology;  Laterality: Left;    CATARACT EXTRACTION W/  INTRAOCULAR LENS IMPLANT Right 2019    Procedure: EXTRACTION, CATARACT, WITH IOL INSERTION;  Surgeon: Alysa De Souza MD;  Location: [facility];  Service: Ophthalmology;  Laterality: Right;     SECTION      FRACTURE SURGERY      JOINT REPLACEMENT      right knee     KNEE SURGERY Right        Past Medical History:   Diagnosis Date    Arthritis     Episcleritis of right eye     GERD (gastroesophageal reflux disease)     Hypertension     Shingles     Sleep apnea        Social History     Socioeconomic History    Marital status: Single     Spouse name: Not on file    Number of children: Not on file    Years of education: Not on file    Highest education level: Not on file   Occupational History    Not on file   Social Needs    Financial resource strain: Not very hard    Food insecurity: "     Worry: Never true     Inability: Never true    Transportation needs:     Medical: No     Non-medical: No   Tobacco Use    Smoking status: Never Smoker    Smokeless tobacco: Never Used   Substance and Sexual Activity    Alcohol use: No    Drug use: No    Sexual activity: Never   Lifestyle    Physical activity:     Days per week: 0 days     Minutes per session: 0 min    Stress: Not at all   Relationships    Social connections:     Talks on phone: Once a week     Gets together: Once a week     Attends Orthodox service: More than 4 times per year     Active member of club or organization: Yes     Attends meetings of clubs or organizations: More than 4 times per year     Relationship status: Never    Other Topics Concern    Not on file   Social History Narrative    Not on file       Review of Systems   Constitutional: Positive for activity change and fatigue. Negative for appetite change.   HENT: Positive for congestion, postnasal drip, rhinorrhea and trouble swallowing. Negative for sinus pressure, sinus pain, sneezing and sore throat.    Eyes: Negative for photophobia and pain.   Respiratory: Negative for cough, chest tightness, shortness of breath and wheezing.    Cardiovascular: Negative for chest pain, palpitations and leg swelling.   Gastrointestinal: Negative for abdominal pain, constipation and diarrhea.   Endocrine: Negative for cold intolerance, heat intolerance and polyuria.   Genitourinary: Negative for dyspareunia, frequency and hematuria.   Musculoskeletal: Positive for arthralgias, back pain and myalgias. Negative for joint swelling and neck stiffness.   Skin: Negative for color change and pallor.   Neurological: Negative for dizziness, seizures, speech difficulty, weakness and headaches.   Hematological: Negative for adenopathy.   Psychiatric/Behavioral: Positive for decreased concentration and dysphoric mood. Negative for agitation, sleep disturbance and suicidal ideas.  "      Objective:   BP (!) 100/54   Pulse 88   Ht 5' 4" (1.626 m)   Wt 96.5 kg (212 lb 11.9 oz)   SpO2 97%   BMI 36.52 kg/m²     Physical Exam   Constitutional: She is oriented to person, place, and time. She appears well-developed and well-nourished.   obese   HENT:   Head: Normocephalic and atraumatic.   prognathism   Neck: Normal range of motion.   Cardiovascular: Normal rate.   Murmur heard.  Pulmonary/Chest: Effort normal and breath sounds normal.   Musculoskeletal: She exhibits edema (2+ bilateral) and deformity.   Decreased ROM of R shoulder. No TTP.  Mild LE edema   Neurological: She is alert and oriented to person, place, and time.   Skin: Rash noted.        Ecchymoses and purpura on UE bilaterally  Pruritic dry skin on UE bilaterally   Psychiatric: She has a normal mood and affect. Her behavior is normal.   Vitals reviewed.            Lab Results   Component Value Date    WBC 5.79 02/13/2019    HGB 11.8 (L) 02/13/2019    HCT 38.4 02/13/2019     02/13/2019    CHOL 139 02/13/2019    TRIG 72 02/13/2019    HDL 41 02/13/2019    ALT 14 02/13/2019    AST 19 02/13/2019     12/13/2019    K 4.0 12/13/2019     12/13/2019    CREATININE 1.4 12/13/2019    BUN 23 12/13/2019    CO2 27 12/13/2019    TSH 0.725 09/20/2018    HGBA1C 6.1 (H) 12/13/2019       Current Outpatient Medications on File Prior to Visit   Medication Sig Dispense Refill    albuterol (PROVENTIL/VENTOLIN HFA) 90 mcg/actuation inhaler Inhale 2 puffs into the lungs every 6 (six) hours as needed for Wheezing. Rescue 3 Inhaler 3    amLODIPine (NORVASC) 10 MG tablet TAKE 1 TABLET BY MOUTH ONCE DAILY 90 tablet 3    ammonium lactate 12 % Crea Apply twice daily to affected parts both feet as needed. 140 g 11    atorvastatin (LIPITOR) 40 MG tablet Take 1 tablet (40 mg total) by mouth once daily. 90 tablet 3    cholecalciferol, vitamin D3, (VITAMIN D3) 125 mcg (5,000 unit) Tab Take 1 tablet (5,000 Units total) by mouth every 48 hours. 90 " tablet 3    ciclopirox (LOPROX) 0.77 % Crea Apply topically 2 (two) times daily. 90 g 2    cimetidine (TAGAMET) 400 MG tablet Take 1 tablet (400 mg total) by mouth 2 (two) times daily. 180 tablet 3    CMPD Lidocaine 2%, Prilocaine 2%, Lamotrigine 2.5%, Meloxicam 0.09% in Transdermal Gel Apply 1 Pump (1.6 g total) topically 4 (four) times daily. 240 g 3    diclofenac sodium (VOLTAREN) 1 % Gel Apply 2 g topically 2 (two) times daily. 3 Tube 3    fluticasone propionate (FLONASE) 50 mcg/actuation nasal spray 1 spray (50 mcg total) by Each Nostril route once daily. 16 g 2    gabapentin (NEURONTIN) 300 MG capsule Take 1 capsule (300 mg total) by mouth 2 (two) times daily as needed. 180 capsule 3    hydrocortisone 2.5 % cream Apply topically to rash on legs twice daily 28 g 0    ketoconazole (NIZORAL) 2 % cream Apply under breast topically twice daily for rash 60 g 0    loratadine (CLARITIN) 10 mg tablet Take 1 tablet (10 mg total) by mouth once daily. 30 tablet 11    losartan (COZAAR) 25 MG tablet Take 1 tablet (25 mg total) by mouth once daily. 90 tablet 3    losartan (COZAAR) 25 MG tablet Take 1 tablet (25 mg total) by mouth once daily. 14 tablet 0    triamcinolone acetonide 0.025% (KENALOG) 0.025 % Oint Apply topically once daily to rash on arms and legs as needed 80 g 1    [] varicella-zoster [id number], PF, (SHINGRIX, PF,) 50 mcg/0.5 mL injection Inject 0.5 mLs into the muscle once. For one dose. for 1 dose 0.5 mL 0     No current facility-administered medications on file prior to visit.      Assessment:   79 y.o. female with multiple co-morbid illnesses here to follow-up with PCP and continue work-up of chronic issues notably with HTN, HLD, MSK pain, psoriasis. .     Plan:     Problem List Items Addressed This Visit        ID    Candidal intertrigo     Rash under breast, likely candida intertrigo  · Continue ketoconazole with improvement            Orthopedic    Pain of toe of left foot     L toe  pain, chronic. Is now wearing compression stockings  · Monitor  · XR today         Relevant Orders    X-Ray Foot Complete 3 view Left (Completed)    Ambulatory referral/consult to Podiatry    Muscle spasm     Nocturnal muscle spasms. May be iron-def anemia  · Check mag  · Start iron supplement         Relevant Medications    ferrous sulfate (FEOSOL) 325 mg (65 mg iron) Tab tablet    Other Relevant Orders    Magnesium (Completed)          Health Maintenance       Date Due Completion Date    TETANUS VACCINE 11/19/1958 ---    Shingles Vaccine (3 of 3) 02/07/2020 12/13/2019    DEXA SCAN 05/16/2021 5/16/2019    Lipid Panel 02/13/2024 2/13/2019          Follow up in about 2 months (around 5/13/2020). Total face-to-face time was 60 min, >50% of this was spent on counseling and coordination of care. The following issues were discussed: with HTN, HLD, MSK pain, psoriasis.     Jos Melton (MSY3)    Laurie Todd MD/MPH  Internal Medicine  [facility]  [phone] [facility]

## 2020-03-19 ENCOUNTER — IMMUNIZATION (OUTPATIENT)
Dept: PHARMACY | Facility: CLINIC | Age: 80
End: 2020-03-19
Payer: MEDICARE

## 2020-03-23 ENCOUNTER — OUTPATIENT CASE MANAGEMENT (OUTPATIENT)
Dept: ADMINISTRATIVE | Facility: OTHER | Age: 80
End: 2020-03-23

## 2020-03-24 ENCOUNTER — OFFICE VISIT (OUTPATIENT)
Dept: PRIMARY CARE CLINIC | Facility: CLINIC | Age: 80
End: 2020-03-24
Payer: MEDICARE

## 2020-03-24 ENCOUNTER — LAB VISIT (OUTPATIENT)
Dept: LAB | Facility: HOSPITAL | Age: 80
End: 2020-03-24
Attending: INTERNAL MEDICINE
Payer: MEDICARE

## 2020-03-24 ENCOUNTER — OUTPATIENT CASE MANAGEMENT (OUTPATIENT)
Dept: ADMINISTRATIVE | Facility: OTHER | Age: 80
End: 2020-03-24

## 2020-03-24 ENCOUNTER — TELEPHONE (OUTPATIENT)
Dept: PRIMARY CARE CLINIC | Facility: CLINIC | Age: 80
End: 2020-03-24

## 2020-03-24 VITALS
BODY MASS INDEX: 35.38 KG/M2 | OXYGEN SATURATION: 97 % | WEIGHT: 207.25 LBS | DIASTOLIC BLOOD PRESSURE: 62 MMHG | HEIGHT: 64 IN | SYSTOLIC BLOOD PRESSURE: 118 MMHG | HEART RATE: 76 BPM

## 2020-03-24 DIAGNOSIS — N18.30 STAGE 3 CHRONIC KIDNEY DISEASE: ICD-10-CM

## 2020-03-24 DIAGNOSIS — R30.0 DYSURIA: ICD-10-CM

## 2020-03-24 DIAGNOSIS — M54.41 CHRONIC BILATERAL LOW BACK PAIN WITH BILATERAL SCIATICA: ICD-10-CM

## 2020-03-24 DIAGNOSIS — G89.29 CHRONIC BILATERAL LOW BACK PAIN WITH BILATERAL SCIATICA: ICD-10-CM

## 2020-03-24 DIAGNOSIS — M79.675 PAIN OF TOE OF LEFT FOOT: ICD-10-CM

## 2020-03-24 DIAGNOSIS — M54.42 CHRONIC BILATERAL LOW BACK PAIN WITH BILATERAL SCIATICA: ICD-10-CM

## 2020-03-24 DIAGNOSIS — Z66 DNR (DO NOT RESUSCITATE): ICD-10-CM

## 2020-03-24 LAB
ANION GAP SERPL CALC-SCNC: 9 MMOL/L (ref 8–16)
BASOPHILS # BLD AUTO: 0.03 K/UL (ref 0–0.2)
BASOPHILS NFR BLD: 0.5 % (ref 0–1.9)
BILIRUB SERPL-MCNC: NEGATIVE MG/DL
BLOOD URINE, POC: NEGATIVE
BUN SERPL-MCNC: 19 MG/DL (ref 8–23)
CALCIUM SERPL-MCNC: 9.8 MG/DL (ref 8.7–10.5)
CHLORIDE SERPL-SCNC: 105 MMOL/L (ref 95–110)
CO2 SERPL-SCNC: 24 MMOL/L (ref 23–29)
COLOR, POC UA: NORMAL
CREAT SERPL-MCNC: 1.3 MG/DL (ref 0.5–1.4)
DIFFERENTIAL METHOD: ABNORMAL
EOSINOPHIL # BLD AUTO: 0.4 K/UL (ref 0–0.5)
EOSINOPHIL NFR BLD: 6.9 % (ref 0–8)
ERYTHROCYTE [DISTWIDTH] IN BLOOD BY AUTOMATED COUNT: 13.2 % (ref 11.5–14.5)
EST. GFR  (AFRICAN AMERICAN): 44.5 ML/MIN/1.73 M^2
EST. GFR  (NON AFRICAN AMERICAN): 38.6 ML/MIN/1.73 M^2
GLUCOSE SERPL-MCNC: 85 MG/DL (ref 70–110)
GLUCOSE UR QL STRIP: NORMAL
HCT VFR BLD AUTO: 36.4 % (ref 37–48.5)
HGB BLD-MCNC: 11 G/DL (ref 12–16)
IMM GRANULOCYTES # BLD AUTO: 0.02 K/UL (ref 0–0.04)
IMM GRANULOCYTES NFR BLD AUTO: 0.3 % (ref 0–0.5)
KETONES UR QL STRIP: NEGATIVE
LEUKOCYTE ESTERASE URINE, POC: NEGATIVE
LYMPHOCYTES # BLD AUTO: 2 K/UL (ref 1–4.8)
LYMPHOCYTES NFR BLD: 33.3 % (ref 18–48)
MCH RBC QN AUTO: 27.5 PG (ref 27–31)
MCHC RBC AUTO-ENTMCNC: 30.2 G/DL (ref 32–36)
MCV RBC AUTO: 91 FL (ref 82–98)
MONOCYTES # BLD AUTO: 0.6 K/UL (ref 0.3–1)
MONOCYTES NFR BLD: 10.6 % (ref 4–15)
NEUTROPHILS # BLD AUTO: 2.9 K/UL (ref 1.8–7.7)
NEUTROPHILS NFR BLD: 48.4 % (ref 38–73)
NITRITE, POC UA: NEGATIVE
NRBC BLD-RTO: 0 /100 WBC
PH, POC UA: 5
PLATELET # BLD AUTO: 243 K/UL (ref 150–350)
PMV BLD AUTO: 10.8 FL (ref 9.2–12.9)
POTASSIUM SERPL-SCNC: 4.1 MMOL/L (ref 3.5–5.1)
PROTEIN, POC: NEGATIVE
RBC # BLD AUTO: 4 M/UL (ref 4–5.4)
SODIUM SERPL-SCNC: 138 MMOL/L (ref 136–145)
SPECIFIC GRAVITY, POC UA: 1.01
UROBILINOGEN, POC UA: NORMAL
WBC # BLD AUTO: 5.97 K/UL (ref 3.9–12.7)

## 2020-03-24 PROCEDURE — 99499 RISK ADDL DX/OHS AUDIT: ICD-10-PCS | Mod: HCNC,S$GLB,, | Performed by: INTERNAL MEDICINE

## 2020-03-24 PROCEDURE — 3074F SYST BP LT 130 MM HG: CPT | Mod: HCNC,CPTII,S$GLB, | Performed by: INTERNAL MEDICINE

## 2020-03-24 PROCEDURE — 1159F MED LIST DOCD IN RCRD: CPT | Mod: HCNC,S$GLB,, | Performed by: INTERNAL MEDICINE

## 2020-03-24 PROCEDURE — 81001 POCT URINALYSIS, DIPSTICK OR TABLET REAGENT, AUTOMATED, WITH MICROSCOP: ICD-10-PCS | Mod: HCNC,S$GLB,, | Performed by: INTERNAL MEDICINE

## 2020-03-24 PROCEDURE — 81001 URINALYSIS AUTO W/SCOPE: CPT | Mod: HCNC,S$GLB,, | Performed by: INTERNAL MEDICINE

## 2020-03-24 PROCEDURE — 99497 ADVNCD CARE PLAN 30 MIN: CPT | Mod: HCNC,S$GLB,, | Performed by: INTERNAL MEDICINE

## 2020-03-24 PROCEDURE — 1159F PR MEDICATION LIST DOCUMENTED IN MEDICAL RECORD: ICD-10-PCS | Mod: HCNC,S$GLB,, | Performed by: INTERNAL MEDICINE

## 2020-03-24 PROCEDURE — 1101F PT FALLS ASSESS-DOCD LE1/YR: CPT | Mod: HCNC,CPTII,S$GLB, | Performed by: INTERNAL MEDICINE

## 2020-03-24 PROCEDURE — 99215 PR OFFICE/OUTPT VISIT, EST, LEVL V, 40-54 MIN: ICD-10-PCS | Mod: 25,HCNC,S$GLB, | Performed by: INTERNAL MEDICINE

## 2020-03-24 PROCEDURE — 1126F PR PAIN SEVERITY QUANTIFIED, NO PAIN PRESENT: ICD-10-PCS | Mod: HCNC,S$GLB,, | Performed by: INTERNAL MEDICINE

## 2020-03-24 PROCEDURE — 80048 BASIC METABOLIC PNL TOTAL CA: CPT | Mod: HCNC

## 2020-03-24 PROCEDURE — 99499 UNLISTED E&M SERVICE: CPT | Mod: HCNC,S$GLB,, | Performed by: INTERNAL MEDICINE

## 2020-03-24 PROCEDURE — 1101F PR PT FALLS ASSESS DOC 0-1 FALLS W/OUT INJ PAST YR: ICD-10-PCS | Mod: HCNC,CPTII,S$GLB, | Performed by: INTERNAL MEDICINE

## 2020-03-24 PROCEDURE — 99497 PR ADVNCD CARE PLAN 30 MIN: ICD-10-PCS | Mod: HCNC,S$GLB,, | Performed by: INTERNAL MEDICINE

## 2020-03-24 PROCEDURE — 36415 COLL VENOUS BLD VENIPUNCTURE: CPT | Mod: HCNC

## 2020-03-24 PROCEDURE — 85025 COMPLETE CBC W/AUTO DIFF WBC: CPT | Mod: HCNC

## 2020-03-24 PROCEDURE — 3074F PR MOST RECENT SYSTOLIC BLOOD PRESSURE < 130 MM HG: ICD-10-PCS | Mod: HCNC,CPTII,S$GLB, | Performed by: INTERNAL MEDICINE

## 2020-03-24 PROCEDURE — 3078F DIAST BP <80 MM HG: CPT | Mod: HCNC,CPTII,S$GLB, | Performed by: INTERNAL MEDICINE

## 2020-03-24 PROCEDURE — 3078F PR MOST RECENT DIASTOLIC BLOOD PRESSURE < 80 MM HG: ICD-10-PCS | Mod: HCNC,CPTII,S$GLB, | Performed by: INTERNAL MEDICINE

## 2020-03-24 PROCEDURE — 1126F AMNT PAIN NOTED NONE PRSNT: CPT | Mod: HCNC,S$GLB,, | Performed by: INTERNAL MEDICINE

## 2020-03-24 PROCEDURE — 99215 OFFICE O/P EST HI 40 MIN: CPT | Mod: 25,HCNC,S$GLB, | Performed by: INTERNAL MEDICINE

## 2020-03-24 NOTE — TELEPHONE ENCOUNTER
"----- Message from Monae Angulo LMSW sent at 3/24/2020 12:03 PM CDT -----  Contact: Monae Angulo LMSW  Good afternoon,  I spoke with the above pt. 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 She reports that she was provided a mask but she does not wear it.  I asked her "Why she doesn't wear the mask?" She replied "I don't know, I see mainly employees and not too many visitors because visitors are not allowed at [employer] at this time." She reports that she drinks one 16 ounce bottle of water per day. She also reports that she drinks coffee. Per Dr. Todd's recommendations, [facility] informed pt that one 16 ounce bottle of water is not enough and recommended that she drink more water. Pt reports that she has pain in her lower right side of her back. Pt also reports that her urine is "dark." Pt denies pain with urination. Pt thinks that she has a "kidney infection." [facility] informed Dr. Todd of pt's complaints. Dr. Todd will order labs for pt to get done today. Pt reports that she can come to the clinic at 1:30P today after she takes a bath.    Respectfully,    Monae BATEMAN"

## 2020-03-24 NOTE — PROGRESS NOTES
"Primary Care Provider Appointment- URGENT    Subjective:      Patient ID: Kelsey Fields is a 79 y.o. female with CKD, dysuria, DM    Chief Complaint: Back Pain (back pain on right side)    Patient called office about malodorous urine, back pain (in site of kidney possibly), and fatigue. She states "this one really hurting." She drinks 2-3 16oz mugs, and "not very much water, one 16oz bottle of water." She is trying to stay awake at her job and urinate less during her shifts as a  at [employer]. Her boss told her that if she falls asleep at work again she will be fired. 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    She is experiencing stress about her job and losing it. 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     Needs advanced directives completed today, she agrees to be DNR if she is found down and pulseless. She wants conservative goals of care should interventions be performed.    ADDENDUM: POCT urine dipstick was negative    Past Surgical History:   Procedure Laterality Date    blepharitis Bilateral 2017    Alessandra Grijalva OD    CATARACT EXTRACTION Bilateral 2017    Alessandra Grijalva MD    CATARACT EXTRACTION W/  INTRAOCULAR LENS IMPLANT Left 2019    Procedure: EXTRACTION, CATARACT, WITH IOL INSERTION;  Surgeon: Alysa De Souza MD;  Location: [facility];  Service: Ophthalmology;  Laterality: Left;    CATARACT EXTRACTION W/  INTRAOCULAR LENS IMPLANT Right 2019    Procedure: EXTRACTION, CATARACT, WITH IOL INSERTION;  Surgeon: Alysa De Souza MD;  Location: [facility];  Service: Ophthalmology;  Laterality: Right;     SECTION      FRACTURE SURGERY      JOINT REPLACEMENT      right knee     KNEE SURGERY Right        Past Medical History:   Diagnosis Date    Arthritis     Episcleritis of right eye     GERD (gastroesophageal reflux disease)     Hypertension     Shingles     Sleep apnea        Social " History     Socioeconomic History    Marital status: Single     Spouse name: Not on file    Number of children: Not on file    Years of education: Not on file    Highest education level: Not on file   Occupational History    Not on file   Social Needs    Financial resource strain: Not very hard    Food insecurity:     Worry: Never true     Inability: Never true    Transportation needs:     Medical: No     Non-medical: No   Tobacco Use    Smoking status: Never Smoker    Smokeless tobacco: Never Used   Substance and Sexual Activity    Alcohol use: No    Drug use: No    Sexual activity: Never   Lifestyle    Physical activity:     Days per week: 0 days     Minutes per session: 0 min    Stress: Not at all   Relationships    Social connections:     Talks on phone: Once a week     Gets together: Once a week     Attends Denominational service: More than 4 times per year     Active member of club or organization: Yes     Attends meetings of clubs or organizations: More than 4 times per year     Relationship status: Never    Other Topics Concern    Not on file   Social History Narrative    Not on file       Review of Systems   Constitutional: Positive for activity change and fatigue. Negative for appetite change.   HENT: Negative for congestion, postnasal drip, rhinorrhea, sinus pressure, sinus pain, sneezing, sore throat and trouble swallowing.    Eyes: Negative for photophobia and pain.   Respiratory: Negative for cough, chest tightness, shortness of breath and wheezing.    Cardiovascular: Negative for chest pain, palpitations and leg swelling.   Gastrointestinal: Negative for abdominal pain, constipation and diarrhea.   Endocrine: Negative for cold intolerance, heat intolerance and polyuria.   Genitourinary: Positive for difficulty urinating, flank pain and frequency. Negative for dyspareunia and hematuria.   Musculoskeletal: Positive for arthralgias, back pain and myalgias. Negative for joint swelling  "and neck stiffness.   Skin: Negative for color change and pallor.   Neurological: Negative for dizziness, seizures, speech difficulty, weakness and headaches.   Hematological: Negative for adenopathy.   Psychiatric/Behavioral: Positive for decreased concentration and dysphoric mood. Negative for agitation, sleep disturbance and suicidal ideas.       Objective:   /62   Pulse 76   Ht 5' 4" (1.626 m)   Wt 94 kg (207 lb 3.7 oz)   SpO2 97%   BMI 35.57 kg/m²     Physical Exam   Constitutional: She is oriented to person, place, and time. She appears well-developed and well-nourished.   obese   HENT:   Head: Normocephalic and atraumatic.   prognathism   Cardiovascular: Normal rate.   Pulmonary/Chest: Effort normal.   Musculoskeletal: She exhibits deformity.   Decreased ROM of R shoulder. No TTP.  Mild LE edema   Neurological: She is alert and oriented to person, place, and time.   Skin: Rash noted.   Ecchymoses and purpura on UE bilaterally  Pruritic dry skin on UE bilaterally   Psychiatric: She has a normal mood and affect. Her behavior is normal.   Vitals reviewed.      Lab Results   Component Value Date    WBC 5.79 02/13/2019    HGB 11.8 (L) 02/13/2019    HCT 38.4 02/13/2019     02/13/2019    CHOL 139 02/13/2019    TRIG 72 02/13/2019    HDL 41 02/13/2019    ALT 14 02/13/2019    AST 19 02/13/2019     12/13/2019    K 4.0 12/13/2019     12/13/2019    CREATININE 1.4 12/13/2019    BUN 23 12/13/2019    CO2 27 12/13/2019    TSH 0.725 09/20/2018    HGBA1C 6.1 (H) 12/13/2019       RESULTS: Reviewed labs and images today    Assessment:   79 y.o. female with multiple co-morbid illnesses here to continue work-up of chronic issues notably with CKD, dysuria, DM.     Plan:     Problem List Items Addressed This Visit        Renal/    Stage 3 chronic kidney disease     GFR stable, possible ACI. Previously followed by [facility]  · Monitor labs  · Postpone Nephro appt         Dysuria     Pain in back and " malodorous urine  · UA/U cx  · labs         Relevant Orders    CBC auto differential    Urinalysis    POCT URINE DIPSTICK WITH MICROSCOPE, AUTOMATED (Completed)    Urine culture    Basic metabolic panel       Orthopedic    Chronic bilateral low back pain with bilateral sciatica     Chronic low back pain, poor functional status  · Did not attend PT/OT on [place] nor medical fitness on [place]         Pain of toe of left foot     L toe pain, chronic. Is now wearing compression stockings  · Monitor  · XR with no infection or fracture            Palliative Care    DNR (do not resuscitate)     DNR/DNI- patient does not want intubation or resuscitation  · Adv Dir completed today               Health Maintenance       Date Due Completion Date    TETANUS VACCINE 11/19/1958 ---    DEXA SCAN 05/16/2021 5/16/2019    Lipid Panel 02/13/2024 2/13/2019        Follow up in about 4 weeks (around 4/21/2020). Total face-to-face time was 90 min, >50% of this was spent on counseling and coordination of care. 30min spent on adv care planning, patients elects to be DNR/DNI (resuscitation only if she has reasonable chance of recovery and NO INTUBATION). The following issues were discussed: with CKD, dysuria, DM    Laurie Todd MD/MPH  Internal Medicine  [facility]  [phone]

## 2020-03-24 NOTE — PROGRESS NOTES
"Outpatient Care Management   - Care Plan Follow Up    Patient: Kelsey Fields  MRN:  [id number]  Date of Service:  3/24/2020  Completed by:  Monae Angulo LMSW  Referral Date: 09/24/2019  Program: Case Management (High Risk)    Reason for Visit   Patient presents with    Update Plan Of Care     3/24/2020   3/25/2020 follow up   OPCM spoke with the above pt. 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 She reports that she was provided a mask for protection from the Covid19 virus but she does not wear it.  [facility] LMSW asked her "Why don't you wear the mask?" She replied "I don't know, I see mainly employees and not too many visitors because visitors are not allowed at [facility] at this time." She reports that she drinks one 16 ounce bottle of water per day. She also reports that she drinks coffee. [facility] Dr. Todd's [facility] one 16 ounce bottle of water is not enough and recommended that she drink more water. Pt reports that she has pain in her lower right side of her back. Pt also reports that her urine is "dark." Pt denies burning while urinating. Pt thinks that she has a "kidney infection." [facility] informed Dr. Todd of pt's complaints. Dr. Todd will order labs for pt to get done today. Pt reports that she can come to the clinic at 1:30P today after she takes a bath. [facility] 3/25/2020 and will possibly close case. Pt is in agreement with case closure.      Complex Care Plan     Care plan was discussed and completed today with input from patient and/or caregiver.    Goals      Patient/caregiver will have an action plan in place to manage and prevent complications of  HTN prior to discharge from [facility]. - Priority: HIGH       Overall Time to Completion  2 months from 10/10/2019    [facility] Identified Patient Barriers:  Health Literacy  -Care Plan Created  Nutrition-----Care Plan Created  Advanced Care " Planning-- -Referred to OPCM        OPCM Identified Education Barriers:  Education Barrier for Hypertension   -Care Plan Created        Short Term Goals  Patient will have appropriate health related knowledge and understanding of nutritional requirements within 1 month.   Interventions   · Mail Blood pressure logs for home use.  · Recognize and provide educational material (KIRSTIN).   · 12/3-- Pt is not sure about her dietary needs and expresses interest in Nutrition Consult.   · 12/31- Pt presents BP log to PCP on 12/27---- /60 at appt.       Status  · Met   12/31        Patient/caregiver will measure and record the blood pressure 1 times per day for 1 month.  Interventions   · Assess for availability of working blood pressure cuff in home setting.   10/10--Patient/Caregiver agrees to get new battery for existing BP machine by one week.  · 10/10--Patient/Caregiver agrees to OPCM follow up within one week to assess progress to goal.   · 10/25-- Pt reports replacing the batteries to BP machine and compared her BP reading to the reading from a friend's BP and found a difference in her reading being higher on hers--- 151/76 vs 135/70 on friend's.  ·  Pt plans to put in another battery.   · 10/25---Provided reinforcement to pt's efforts to get BP machine working to begin daily BP checks.   10/25- Patient/Caregiver agrees to try different batteries for BP machine within one week.  · Patient/Caregiver agrees to OPCM follow up within one week to assess progress to goal.   ·   · 12/3- Pt states she doesn't think her BP is not working quite right. Pt report attending the [facility] regularly and got a raffled off BP machine some time ago. 10/22= 149/71, 136/69, 135/71, 11/19= 131/74. Pt expresses concern over family h/o mother having a stroke.   · 12/3- Instructed pt to bring her BP machine and log to upcoming PCP appt with Dr Todd.  ·  12/3-- In basket message sent to PCP today includes  mention of pt not checking BPs --agrees to do so this week and to bring her log and BP mach to be checked at 12/10 PCP appt .  · 12/3-  Patient/Caregiver agrees to check/log BPs daily and bring to PCP 12/10.   · Patient/Caregiver agrees to OPCM follow up within one week to assess progress to goal.   · 12/12-- PCP appt rescheduled from 12/10 to 12/13. Attempt contact with pt finds pt at [facility].   · 12/12--Call back message received from pt. Upon calling pt soon thereafter, pt reports busy having her breakfast. Phone contact brief. Reinforced pt attending tomorrow's PCP appt 12/13 at 10:30 am for a f/u BP check. Pt c/o leg cramps, states she plans to go to appt.   Patient/Caregiver agrees to attend PCP appt 12/13.  · Patient/Caregiver agrees to OPCM follow up within one week to assess progress to goal.   · 12/31- Pt s/p PCP appts 12/13 and 12/27. Pt acknowledges HCTZ discontinued.   ·      Status  · Met  12/31      Patient/caregiver will verbalize 3 food items, 3 healthy food preparation methods, 3 Seasoning alternatives for  Low Sodium within 2 weeks.  Interventions   · Recognize and provide educational material (KIRSTIN).   · 10/10-- Mailed [employer] education materials.   · Foods----Fresh fruits, fresh vegs, lean meats (chicken, fish, turkey)  · Cooking -- Bake, Broil, Boil foods.------------------Began providing education 10/10  · Seasonings- Mrs. Dash, Pepper, Lemon Juice, onions---Began providing education-- 10/10  ·   · 2/27- Pt states she likes to eat peaches, pears & watermelon. Pt states she has to be very careful with what she eats due to her esophagus-- h/o food being caught & went to ED x2. Pt reports feeling afraid to eat and strangle on the food. Pt avoids heavy gravies,   · Instructed pt in taking small portions at a time, eat soft foods that do not require much chewing before swallowing, take her time when eating. Instructed pt in seasoning options ie Mrs. Dash, Pepper, Lemon Juice, onions. Pt states she  avoids acidic foods due to its affects on her stomach. Pt states she likes to eat at the [place] sometimes.   · Pt reports not eating much meats and has problems with her dentures. Wears old dentures with cotton. Pt does not want to go to dentist because of the treatment proposed by dentist about her gums wearing down. States she started wearing dentures at age 12-- the iodine meds ruined her teeth. Pt denies desire to see dentist at this time. Pt refers to an ongoing issue with her dentures.   ·   ·      Status  · Met   2/27      Patient/caregiver will verbalize 3 ways of preventing complications due to disease process within 1 month.  Interventions   · Empower patient/caregiver to discuss treatment plan with Physician/care team.  · Encourage compliance with Physician follow-ups.  · Encourage Dietary Compliance.  · Encourage Exercise.  · Encourage Medication Compliance.   · Daily BP checks and logging.  · Report persistently elevated BPs greater than 130/80.   · 12/3--Pt admits to eating foods that are sugary and that she needs to watch the salt intake more.   · 12/31- Pt to benefit from further instructions in eating healthy low na, low carb food plate however pt is not receptive or unable to focus on changes. Pt is usually physically tired and overwhelmed.   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 Pt is agreeable to a Nutrition Consult--message sent to Dr. Todd to request. Pt is s/p PCP appt 2/26-- Pt adhering to med appts. Pt has stopped going to OP PT however. Pt did not see the benefit to her back. /60 yest. Pt continues to check her BP at home.      Status  · Met   2/27            Clinical Reference Documents Added to Patient Instructions       Document    FALLS IN THE HOME, PREVENTING (ENGLISH)    HIGH BLOOD PRESSURE, WHAT IS?  (ENGLISH)        SALT, TIPS FOR USING LESS (ENGLISH)    STROKE AND HIGH BLOOD PRESSURE, UNDERSTANDING  "THE LINK BETWEEN (ENGLISH)             Patient/caregiver will have an action plan in place to manage and prevent complications of PreDiabetes  prior to discharge from [facility]. - Priority: HIGH      Overall Time to Completion  1 month from 10/25/2019     OPCM Identified Patient Barriers:  Health Literacy  -Care Plan Created  Nutrition-----Care Plan Created  Advanced Care Planning-- -Referred to OPCM        OPCM Identified Education Barriers:  Education Barrier for Hypertension   -Care Plan Created         Short Term Goals  Patient/caregiver will verbalize food required to create a well-balanced food plate within 1 month.  Interventions   · Encourage Dietary Compliance.  · Recognize and provide educational material (KIRSTIN).  · Review eating/nutrition habits.   ·  10/25- Pt c/o her toe hurting after she ate a cobbler. Pt relates her pain to information she learned from PCP regarding diabetes & potential complications. Pt says I really need to watch the salt and the sugar.   · 10/25- Mailed education material on Pre Diabetes and MyFood Plate.   · 12/3- Pt is not sure if she received the mailings. Pt states not looking through her mail and letting it pile up. Pt states however, getting the pill box mailed to pt by this RN CM.   · 12/3- Pt c/o left 4th toe hurting after she ate something sweet. Pt states, I guess I will have to stop eating sweets because the pain is not worth it". Pt c/o the pain associated with prior finger sticks for BG checks. Pt states, she couldn't check her BGs because of the pain. Pt is prediabetic, does not possess own glucometer at this time.     12/3-- In basket message sent to Dr. Todd with concerns -- pt in need of Nutrition Consult and yet at the same time, pt is overwhelmed/exhausted from working nights to properly focus and care for her daily health needs.   12/31- Pt s/p PCP appt 12/13. Pt verbalizes being told she has PreDiabetes. Last A1C=6.1 (128) on 12/13/19. " "  12/31- Began to explain healthy food 9" plate --1/2 of Vegs/salads and 1/4 lean meat + 1/4 starch--- pt cuts off conversation to report on other family members who have had DM. Provided reinforcement to pt that she can prevent progression to DM by eating a balanced diet and exercise ie walking and to include foods high in fiber as provided to her by PCP pt states to help her with c/o constipation.  Discouraged pt from riding bicycle for exercise-- recommended walking. Reviewed upcoming appts to [facility] and to Sports Medicine.     12/31--Patient/Caregiver agrees to attend 1/3/20 for Hands at [facility] followed by 1/6/20 [facility] [employer] [place]- Sciatica & 1/6/20 Sports Med- Shoulder/   Patient/Caregiver agrees to [facility] follow up in 2 wks to assess progress to goal.               12/31----Numerous appts upcoming-- as pt requests-- mailed out all Jan 2020 appts and then Feb PCP appt.   12/31-- New request sent to PCP for Nutrition Consult. (Noted 11/20/18 Nutrition Consult visit for PreDiabetes). No referral noted.   2/27- New request for Nutrition Consult sent to Dr. Todd per pt's request due to her worries over Pre Diabetes and CKD 3.   3/9- Informed pt of upcoming Nutrition consult appt on 3/19 at 1:30 pm. Pt was aware of a pending appt.   3/9- Pt confirms receipt of rollator. C/o a toe hurting her. Pt informed of existing PCP appt 3/13. Pt believes appt is for 3/11. Message to PCP clinic to clarify with pt.  Pt's appt is for 3/13.     Status  · Partially met           Clinical Reference Documents Added to Patient Instructions       Document    PREDIABETES (ENGLISH)    USDA MYPLATE, UNDERSTANDING (ENGLISH)             Patient/caregiver will have an action plan in place to manage and prevent complications of Sleep Apnea prior to discharge from [facility]. - Priority: HIGH      Overall Time to Completion  2 months from 10/10/2019    [facility] Identified Patient Barriers:  Health Literacy  -Care Plan " Created  Nutrition-----Care Plan Created  Advanced Care Planning-- -Referred to OPCM        OPCM Identified Education Barriers:  Education Barrier for Hypertension   -Care Plan Created        Short Term Goals  Patient/caregiver will obtain CPAP to support disease process within 1 month.  Interventions                  Collaborate with Physician as appropriate to meet patient needs as found necessary in promoting pt adherence to CPAP use.                10/10-- Explained the importance of using the CPAP-- to be sure pt is getting needed O2 while sleeps, needed to restore body's energy, to                            prevent neg affects on body with the CPAP ie heart disease, obesity, compromised functioning due to fatigue/sleepiness.  Done 10/10              10/10-Patient/Caregiver agrees to start wearing her CPAP within 2 wks.    Patient/Caregiver agrees to OPCM follow up within 2 weeks to assess progress to goal.     12/3-  Pt states she hasn't been using the CPAP but that she will start. Pt explains how she comes home so tired she lays down on the bed and falls asleep. Pt confirms receiving the needed tubing CPAP supplies in the mail.   Instructed pt to use and keep clean tubing/CPAP supplies.  12/3-  Patient/Caregiver agrees to start using her CPAP by one week.   Patient/Caregiver agrees to OPCM follow up within one week to assess progress to goal.   12/3- In basket message to PCP sent today includes pt's lack of using CPAP.  12/31- pt states she still hasn't started to use CPAP but says she will have to start in order to decrease feeling so tired all of the time.   12/31-- Patient/Caregiver agrees to start using her CPAP within 2 wks.   Patient/Caregiver agrees to OPCM follow up in 2 wks to assess progress to goal. .    2/27- Pt reports using CPAP once in awhile and claims to have all needed CPAP tubing supplies.        Status  · Partially met  2/27/2020            Clinical Reference Documents Added  to Patient Instructions       Document    FALLS IN THE HOME, PREVENTING (ENGLISH)    HIGH BLOOD PRESSURE, WHAT IS?  (ENGLISH)        SALT, TIPS FOR USING LESS (ENGLISH)    STROKE AND HIGH BLOOD PRESSURE, UNDERSTANDING THE LINK BETWEEN (ENGLISH)             Patient/caregiver will have knowledge of resources available in order to obtain the services that are needed prior to discharge from [facility]. - Priority: Medium      Overall Time to Completion  2 months from 12/03/2019    Social Work Identified Patient Barriers:   Advanced Care Planning:  Care plan created           Short Term Goals  Patient/caregiver will discuss Advanced Care Planning with family, Physician and/or Power of  within 2 weeks.  Interventions   · Collaborate with OPCM RN as appropriate to meet patient needs.  · Discuss and provide [facility] Advance Directive.  · Empower patient/caregiver to discuss End of life issues with family, Physician and/or Power of .  · Provide education on advance care planning.     Status  · Partially met     Patient agrees to follow up by  12/30/19.  Patient agrees to OPC follow up within two weeks to assess progress to goal.     2/27/2020 [facility] KAMRYN followed up with pt regarding Advance Care planning. [facility] KAMRYN re educated pt about advance care planning; pt verbalized understanding. Pt reports that she did not receive [facility]. Pt also reports that she noticed that she has not received other mail. Pt reports that she will contact the post office about her missing mail. [facility] KAMRYN will remail advance care packet. Also LMSW will meet [facility] in [facility] during her 3/13/2020 appointment and provide her with the advance care planning packet. During visit pt will discuss case closure and possibly close case.       3/25/2020 SHIRA spoke with the above pt. 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 She reports that she was provided a mask for protection from the  "Covid19 virus but she does not wear it.  [facility] LMSW asked her "Why don't you wear the mask?" She replied "I don't know, I see mainly employees and not too many visitors because visitors are not allowed at [facility] at this time." She reports that she drinks one 16 ounce bottle of water per day. She also reports that she drinks coffee. [facility] Dr. Todd's recommendations[facility] one 16 ounce bottle of water is not enough and recommended that she drink more water. Pt reports that she has pain in her lower right side of her back. Pt also reports that her urine is "dark." Pt denies burning while urinating. Pt thinks that she has a "kidney infection." [facility]'s [facility]. Dr. Todd will order labs for pt to get done today. Pt reports that she can come to the clinic at 1:30P today after she takes a bath. [facility] tomorrow 3/25/2020 and will possibly close case. Pt is in agreement with case closure.             Patient/caregiver will have knowledge of resources available in order to obtain the services that are needed prior to discharge from [facility]. - Priority: MOD      Overall Time to Completion  2 months from 10/10/2019      [facility] Identified Patient Barriers:  Health Literacy  -Care Plan Created  Nutrition-----Care Plan Created  Advanced Care Planning-- -Referred to [facility]      [facility] Identified Education Barriers:  Education Barrier for Hypertension    Short Term Goals  Patient/caregiver will have contact information for identified community resources IE:[facility]  for follow-up within 1 month.  Interventions   · Mail [employer] OTC Benefit Contact Information.     Done 10/10  · Refer to Outpatient Case Management Social Worker.  Done 10/10  Attempted to provide pt with contact for this RN CM and OOC however pt very sleepy and pen not writing.  Mailed contact information along with Welcome Letter, education material,  OTC 2019 " catalog, BP logs.   10/10-- Omitted PHQ2. Assess on next encounter.   10/25- Completed PHQ2=0.   10/25-- Pt confirms receipt of mailings as reason for her incoming call. Pt is thankful, still looking through them and will look at the 2019 [facility] OTC catalog.    10/25- OPCM LCSW assessment pending. Done 12/2       12/3-- In basket message sent to OPCM LMSW to alert to pt upcoming PCP appt 12/10 and this RN CM concerns for how pt is struggling to manage her health while being exhausted from working nights 10 pm- 6 am at [employer] parking garage, alternating 4 x/ 3 x wk.   1/15/2020- collaborated with OPCM LMSW in person to discuss pt case.       Status  · Met 1/15/2020            Clinical Reference Documents Added to Patient Instructions       Document    FALLS IN THE HOME, PREVENTING (ENGLISH)    HIGH BLOOD PRESSURE, WHAT IS?  (ENGLISH)        SALT, TIPS FOR USING LESS (ENGLISH)    STROKE AND HIGH BLOOD PRESSURE, UNDERSTANDING THE LINK BETWEEN (ENGLISH)                  Patient Instructions     Instructions were provided via the [employer] patient resources and are available for the patient to view on the patient portal.    Follow up in about 1 day (around 3/25/2020) for Social Work follow up.    Todays OPCM Self-Management Care Plan was developed with the patients/caregivers input and was based on identified barriers from todays assessment.  Goals were written today with the patient/caregiver and the patient has agreed to work towards these goals to improve his/her overall well-being. Patient verbalized understanding of the care plan, goals, and all of today's instructions. Encouraged patient/caregiver to communicate with his/her physician and health care team about health conditions and the treatment plan.  Provided my contact information today and encouraged patient/caregiver to call me with any questions as needed.

## 2020-03-24 NOTE — ASSESSMENT & PLAN NOTE
L toe pain, chronic. Is now wearing compression stockings  · Monitor  · XR with no infection or fracture

## 2020-03-24 NOTE — TELEPHONE ENCOUNTER
Spoke with pt, pain in right side. Above buttock on right side.   Pt denies temp at this time     pain started about the last 3 days.   Urine looks dark and smaller amount, no odor noted    Please advise.

## 2020-03-24 NOTE — TELEPHONE ENCOUNTER
Pt will increase water intake.   Pt taking tylenol for pain  She said the pain is in the area of her kidney, this is what she is worried about.     She said she will come in if you would like her to give a urine.    She is still working, yes

## 2020-03-24 NOTE — PATIENT INSTRUCTIONS
TODAY:  - labs and urine studies  - advanced directives  - increase your hydration  - have your friend apply for a job at [facility] at this website: [email or web address] or [email or web address]  - consider quitting your job!  - will try to move up your podiatry appt

## 2020-03-24 NOTE — TELEPHONE ENCOUNTER
----- Message from Lakia Sanches sent at 3/24/2020 10:53 AM CDT -----  Contact: Patient/[phone]  Would like to get medical advice.  Symptoms (please be specific):  Lower Back on the right side  How long has patient had these symptoms:  3 days  Pharmacy name and phone #:  [id number] - [phone]  Any drug allergies (copy from chart):      Would the patient rather a call back or a response via [facility]?:  Call  Comments:  Kidney Trouble    Thank you

## 2020-03-24 NOTE — TELEPHONE ENCOUNTER
Is she drinking enough water? What does she want done?     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    Thanks,  KJ

## 2020-03-24 NOTE — ASSESSMENT & PLAN NOTE
Chronic low back pain, poor functional status  · Did not attend PT/OT on [address] nor medical fitness on [address]

## 2020-03-24 NOTE — ASSESSMENT & PLAN NOTE
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 Previously followed by [facility]  · Monitor labs  · Postpone Nephro appt

## 2020-03-25 ENCOUNTER — OUTPATIENT CASE MANAGEMENT (OUTPATIENT)
Dept: ADMINISTRATIVE | Facility: OTHER | Age: 80
End: 2020-03-25

## 2020-03-25 ENCOUNTER — TELEPHONE (OUTPATIENT)
Dept: INTERNAL MEDICINE | Facility: CLINIC | Age: 80
End: 2020-03-25

## 2020-03-25 DIAGNOSIS — R10.9 FLANK PAIN: ICD-10-CM

## 2020-03-25 DIAGNOSIS — N18.30 STAGE 3 CHRONIC KIDNEY DISEASE: Primary | ICD-10-CM

## 2020-03-25 NOTE — PROGRESS NOTES
Outpatient Care Management  Plan of Care Follow Up Visit    Patient: Kelsey Fields  MRN: [id number]  Date of Service: 03/23/2020  Completed by: Dina Rodas RN  Referral Date: 09/24/2019  Program: Case Management (High Risk)    Reason for Visit   Patient presents with    OPCM RN First Follow-Up Attempt     3/23/2020 Pt states she can't take the call right now since she is driving. Agrees to be called back another time.     OPCM Chart Review     3/23/2020     OPCM RN Second Follow-Up Attempt     3/24/2020 Pt answers call and asks to be called back.     Update Plan Of Care     3/25/2020    Case Closure       Brief Summary:   F/u call to pt. Pt s/p PCP visit and labs. Pt was informed by her PCP of labs wnl and now waiting for further instructions. Pt with c/o pain to right flank/lower back. 10+/10 pain score today. Pt is taking Tylenol for the pain. Pt denies pain or burning with urination. Pt reports c/o pain x 2 wks. C/o difficulty being able to transfer out of bed.  Pt continues to work Part-time. One week works 3 days then one week 4 days, 10 pm -6 am. Discussed pt benefits to stop working but patient is not ready to consider this. Pt reports having food at home. Dgt called to offer pt some red beans but pt declined. Pt is use to ordering food out. Pt declines offer to use [employer] --TEL:[phone] for [place] and [facility]. 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  Pt reports always having a little allergy sniffles.   Provided pt with education on COVID 19 guidelines.  Instructed patient to call [facility] first if experiencing fever greater than 100.4, coughing and SOB. Provided patient [facility] phone number [phone] as well as [facility] COVID 19 hotline [phone], verbalized understanding. Informed patient that they may contact their PCP for further guidance as well. Reviewed with patient precautions to take to help prevent  the spread of this respiratory virus: Wash hands often (for 20 seconds singing Happy Birthday), cover your cough or sneeze into your elbow or a tissue, stay away from people who are sick, don't touch your eyes, nose or mouth with unwashed hands. Provided Instruction to stay home as directed by local government officials and only make trips that are of essential needs (Ex:grocery, pharmacy, medical appointments). Reviewed with patient supplies to have on hand while staying at home (food, water, medications, medical supplies and other essentials).   Pt states her understanding.       Discussed case closure. Pt may call RN CM should future needs arise. Pt states being in agreement.   [facility] LM closed case today.         Patient Summary     Involvement of Care:  Do I have permission to speak with other family members about your care?       Patient Reported Labs & Vitals:  1.  Any Patient Reported Labs & Vitals?     2.  Patient Reported Blood Pressure:     3.  Patient Reported Pulse:     4.  Patient Reported Weight (Kg):     5.  Patient Reported Blood Glucose (mg/dl):       Medical History:  Reviewed medical history with patient and/or caregiver    Social History:  Reviewed social history with patient and/or caregiver    Clinical Assessment     Reviewed and provided basic information on available community resources for mental health, transportation, wellness resources, and palliative care programs with patient and/or caregiver.    Complex Care Plan     Care plan was discussed and completed today with input from patient and/or caregiver.    Goals        Outpatient Case Management     Patient/caregiver will have an action plan in place to manage and prevent complications of  HTN prior to discharge from OPC. - Priority: HIGH       Overall Time to Completion  2 months from 10/10/2019    [facility] Identified Patient Barriers:  Health Literacy  -Care Plan Created  Nutrition-----Care Plan Created  Advanced Care Planning--  -Referred to OPCM        OPCM Identified Education Barriers:  Education Barrier for Hypertension   -Care Plan Created        Short Term Goals  Patient will have appropriate health related knowledge and understanding of nutritional requirements within 1 month.   Interventions   · Mail Blood pressure logs for home use.  · Recognize and provide educational material (KIRSTIN).   · 12/3-- Pt is not sure about her dietary needs and expresses interest in Nutrition Consult.   · 12/31- Pt presents BP log to PCP on 12/27---- /60 at appt.       Status  · Met   12/31        Patient/caregiver will measure and record the blood pressure 1 times per day for 1 month.  Interventions   · Assess for availability of working blood pressure cuff in home setting.   10/10--Patient/Caregiver agrees to get new battery for existing BP machine by one week.  · 10/10--Patient/Caregiver agrees to OPCM follow up within one week to assess progress to goal.   · 10/25-- Pt reports replacing the batteries to BP machine and compared her BP reading to the reading from a friend's BP and found a difference in her reading being higher on hers--- 151/76 vs 135/70 on friend's.  ·  Pt plans to put in another battery.   · 10/25---Provided reinforcement to pt's efforts to get BP machine working to begin daily BP checks.   10/25- Patient/Caregiver agrees to try different batteries for BP machine within one week.  · Patient/Caregiver agrees to OPCM follow up within one week to assess progress to goal.   ·   · 12/3- Pt states she doesn't think her BP is not working quite right. Pt report attending the [facility] [facility] regularly and got a raffled off BP machine some time ago. 10/22= 149/71, 136/69, 135/71, 11/19= 131/74. Pt expresses concern over family h/o mother having a stroke.   · 12/3- Instructed pt to bring her BP machine and log to upcoming PCP appt with Dr Todd.  ·  12/3-- In basket message sent to PCP today includes mention of  pt not checking BPs --agrees to do so this week and to bring her log and BP mach to be checked at 12/10 PCP appt .  · 12/3-  Patient/Caregiver agrees to check/log BPs daily and bring to PCP 12/10.   · Patient/Caregiver agrees to OPCM follow up within one week to assess progress to goal.   · 12/12-- PCP appt rescheduled from 12/10 to 12/13. Attempt contact with pt finds pt at [facility].   · 12/12--Call back message received from pt. Upon calling pt soon thereafter, pt reports busy having her breakfast. Phone contact brief. Reinforced pt attending tomorrow's PCP appt 12/13 at 10:30 am for a f/u BP check. Pt c/o leg cramps, states she plans to go to appt.   Patient/Caregiver agrees to attend PCP appt 12/13.  · Patient/Caregiver agrees to OPCM follow up within one week to assess progress to goal.   · 12/31- Pt s/p PCP appts 12/13 and 12/27. Pt acknowledges HCTZ discontinued.   ·      Status  · Met  12/31      Patient/caregiver will verbalize 3 food items, 3 healthy food preparation methods, 3 Seasoning alternatives for  Low Sodium within 2 weeks.  Interventions   · Recognize and provide educational material (KIRSTIN).   · 10/10-- Mailed [facility] education materials.   · Foods----Fresh fruits, fresh vegs, lean meats (chicken, fish, turkey)  · Cooking -- Bake, Broil, Boil foods.------------------Began providing education 10/10  · Seasonings- Mrs. Dash, Pepper, Lemon Juice, onions---Began providing education-- 10/10  ·   · 2/27- Pt states she likes to eat peaches, pears & watermelon. Pt states she has to be very careful with what she eats due to her esophagus-- h/o food being caught & went to ED x2. Pt reports feeling afraid to eat and strangle on the food. Pt avoids heavy gravies,   · Instructed pt in taking small portions at a time, eat soft foods that do not require much chewing before swallowing, take her time when eating. Instructed pt in seasoning options ie Mrs. Dash, Pepper, Lemon Juice, onions. Pt states she avoids  acidic foods due to its affects on her stomach. Pt states she likes to eat at the [place] sometimes.   · Pt reports not eating much meats and has problems with her dentures. Wears old dentures with cotton. Pt does not want to go to dentist because of the treatment proposed by dentist about her gums wearing down. States she started wearing dentures at age 12-- the iodine meds ruined her teeth. Pt denies desire to see dentist at this time. Pt refers to an ongoing issue with her dentures.   ·   ·      Status  · Met   2/27      Patient/caregiver will verbalize 3 ways of preventing complications due to disease process within 1 month.  Interventions   · Empower patient/caregiver to discuss treatment plan with Physician/care team.  · Encourage compliance with Physician follow-ups.  · Encourage Dietary Compliance.  · Encourage Exercise.  · Encourage Medication Compliance.   · Daily BP checks and logging.  · Report persistently elevated BPs greater than 130/80.   · 12/3--Pt admits to eating foods that are sugary and that she needs to watch the salt intake more.   · 12/31- Pt to benefit from further instructions in eating healthy low na, low carb food plate however pt is not receptive or unable to focus on changes. Pt is usually physically tired and overwhelmed.   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 Pt is agreeable to a Nutrition Consult--message sent to Dr. Todd to request. Pt is s/p PCP appt 2/26-- Pt adhering to med appts. Pt has stopped going to OP PT however. Pt did not see the benefit to her back. /60 yest. Pt continues to check her BP at home.      Status  · Met   2/27            Clinical Reference Documents Added to Patient Instructions       Document    FALLS IN THE HOME, PREVENTING (ENGLISH)    HIGH BLOOD PRESSURE, WHAT IS?  (ENGLISH)        SALT, TIPS FOR USING LESS (ENGLISH)    STROKE AND HIGH BLOOD PRESSURE, UNDERSTANDING THE LINK  "BETWEEN (ENGLISH)             Patient/caregiver will have an action plan in place to manage and prevent complications of PreDiabetes  prior to discharge from OPCM. - Priority: HIGH      Overall Time to Completion  1 month from 10/25/2019     OPCM Identified Patient Barriers:  Health Literacy  -Care Plan Created  Nutrition-----Care Plan Created  Advanced Care Planning-- -Referred to OPCM        OPCM Identified Education Barriers:  Education Barrier for Hypertension   -Care Plan Created         Short Term Goals  Patient/caregiver will verbalize food required to create a well-balanced food plate within 1 month.  Interventions   · Encourage Dietary Compliance.  · Recognize and provide educational material (KIRSTIN).  · Review eating/nutrition habits.   ·  10/25- Pt c/o her toe hurting after she ate a cobbler. Pt relates her pain to information she learned from PCP regarding diabetes & potential complications. Pt says I really need to watch the salt and the sugar.   · 10/25- Mailed education material on Pre Diabetes and MyFood Plate.   · 12/3- Pt is not sure if she received the mailings. Pt states not looking through her mail and letting it pile up. Pt states however, getting the pill box mailed to pt by this RN CM.   · 12/3- Pt c/o left 4th toe hurting after she ate something sweet. Pt states, I guess I will have to stop eating sweets because the pain is not worth it". Pt c/o the pain associated with prior finger sticks for BG checks. Pt states, she couldn't check her BGs because of the pain. Pt is prediabetic, does not possess own glucometer at this time.     12/3-- In basket message sent to Dr. Todd with concerns -- pt in need of Nutrition Consult and yet at the same time, pt is overwhelmed/exhausted from working nights to properly focus and care for her daily health needs.   12/31- Pt s/p PCP appt 12/13. Pt verbalizes being told she has PreDiabetes. Last A1C=6.1 (128) on 12/13/19.   12/31- Began " "to explain healthy food 9" plate --1/2 of Vegs/salads and 1/4 lean meat + 1/4 starch--- pt cuts off conversation to report on other family members who have had DM. Provided reinforcement to pt that she can prevent progression to DM by eating a balanced diet and exercise ie walking and to include foods high in fiber as provided to her by PCP pt states to help her with c/o constipation.  Discouraged pt from riding bicycle for exercise-- recommended walking. Reviewed upcoming appts to [facility] and to Sports Medicine.     12/31--Patient/Caregiver agrees to attend 1/3/20 for Hands at [facility] followed by 1/6/20 [facility] [employer] [place] & 1/6/20 Sports Med- Shoulder/   Patient/Caregiver agrees to [facility] follow up in 2 wks to assess progress to goal.               12/31----Numerous appts upcoming-- as pt requests-- mailed out all Jan 2020 appts and then Feb PCP appt.   12/31-- New request sent to PCP for Nutrition Consult. (Noted 11/20/18 Nutrition Consult visit for PreDiabetes). No referral noted.   2/27- New request for Nutrition Consult sent to Dr. Todd per pt's request due to her worries over Pre Diabetes and CKD 3.   3/9- Informed pt of upcoming Nutrition consult appt on 3/19 at 1:30 pm. Pt was aware of a pending appt.   3/9- Pt confirms receipt of rollator. C/o a toe hurting her. Pt informed of existing PCP appt 3/13. Pt believes appt is for 3/11. Message to PCP clinic to clarify with pt.  Pt's appt is for 3/13.     Status  · Met  3/9           Clinical Reference Documents Added to Patient Instructions       Document    PREDIABETES (ENGLISH)    USDA MYPLATE, UNDERSTANDING (ENGLISH)             Patient/caregiver will have an action plan in place to manage and prevent complications of Sleep Apnea prior to discharge from OPC. - Priority: HIGH      Overall Time to Completion  2 months from 10/10/2019    [facility] Identified Patient Barriers:  Health Literacy  -Care Plan Created  Nutrition-----Care " Plan Created  Advanced Care Planning-- -Referred to OPCM        OPCM Identified Education Barriers:  Education Barrier for Hypertension   -Care Plan Created        Short Term Goals  Patient/caregiver will obtain CPAP to support disease process within 1 month.  Interventions                  Collaborate with Physician as appropriate to meet patient needs as found necessary in promoting pt adherence to CPAP use.                10/10-- Explained the importance of using the CPAP-- to be sure pt is getting needed O2 while sleeps, needed to restore body's energy, to                            prevent neg affects on body with the CPAP ie heart disease, obesity, compromised functioning due to fatigue/sleepiness.  Done 10/10              10/10-Patient/Caregiver agrees to start wearing her CPAP within 2 wks.    Patient/Caregiver agrees to OPCM follow up within 2 weeks to assess progress to goal.     12/3-  Pt states she hasn't been using the CPAP but that she will start. Pt explains how she comes home so tired she lays down on the bed and falls asleep. Pt confirms receiving the needed tubing CPAP supplies in the mail.   Instructed pt to use and keep clean tubing/CPAP supplies.  12/3-  Patient/Caregiver agrees to start using her CPAP by one week.   Patient/Caregiver agrees to OPCM follow up within one week to assess progress to goal.   12/3- In basket message to PCP sent today includes pt's lack of using CPAP.  12/31- pt states she still hasn't started to use CPAP but says she will have to start in order to decrease feeling so tired all of the time.   12/31-- Patient/Caregiver agrees to start using her CPAP within 2 wks.   Patient/Caregiver agrees to OPCM follow up in 2 wks to assess progress to goal. .    2/27- Pt reports using CPAP once in awhile and claims to have all needed CPAP tubing supplies.        Status  · Partially met  2/27/2020            Clinical Reference Documents Added to Patient Instructions        Document    FALLS IN THE HOME, PREVENTING (ENGLISH)    HIGH BLOOD PRESSURE, WHAT IS?  (ENGLISH)        SALT, TIPS FOR USING LESS (ENGLISH)    STROKE AND HIGH BLOOD PRESSURE, UNDERSTANDING THE LINK BETWEEN (ENGLISH)             Patient/caregiver will have knowledge of resources available in order to obtain the services that are needed prior to discharge from [facility]. - Priority: Medium      Overall Time to Completion  2 months from 12/03/2019    Social Work Identified Patient Barriers:   Advanced Care Planning:  Care plan created           Short Term Goals  Patient/caregiver will discuss Advanced Care Planning with family, Physician and/or Power of  within 2 weeks.  Interventions   · Collaborate with OPCM RN as appropriate to meet patient needs.  · Discuss and provide [facility] Advance Directive.  · Empower patient/caregiver to discuss End of life issues with family, Physician and/or Power of .  · Provide education on advance care planning.     Status  · Partially met     Patient agrees to follow up by  12/30/19.  Patient agrees to OPC follow up within two weeks to assess progress to goal.     2/27/2020 [facility] followed up with pt regarding Advance Care planning. [facility] re educated pt about advance care planning; pt verbalized understanding. Pt reports that she did not receive the packet that [facility] mailed. Pt also reports that she noticed that she has not received other mail. Pt reports that she will contact the post office about her missing mail. [facility] will remail advance care packet. Also LMSW will meet pt [facility] during her 3/13/2020 appointment and provide her with the advance care planning packet. During visit pt will discuss case closure and possibly close case.       3/24/2020 SHIRA spoke with the above pt. She reports that she is still working at [employer]. She reports that she was provided a mask for protection from the Covid19 virus but she does not  "wear it.  [facility] LMSW asked her "Why don't you wear the mask?" She replied "I don't know, I see mainly employees and not too many visitors because visitors are not allowed at [facility] at this time." She reports that she drinks one 16 ounce bottle of water per day. She also reports that she drinks coffee. Per Dr. Todd's recommendations, [facility] LMSW informed pt that one 16 ounce bottle of water is not enough and recommended that she drink more water. Pt reports that she has pain in her lower right side of her back. Pt also reports that her urine is "dark." Pt denies burning while urinating. Pt thinks that she has a "kidney infection." [facility] Dr. Todd will order labs for pt to get done today. Pt reports that she can come to the clinic at 1:30P today after she takes a bath. [facility] pt tomorrow 3/25/2020 and will possibly close case. Pt is in agreement with case closure.    3/25/2020  [facility]. Pt reports that she went to appointment at [facility] on 3/24/2020. Pt reports that she had lab work done to find out the cause of her pain. Pt reports that she has adequate food at her home. Pt denied any additional needs at this time and is in agreement with case closure at this time. LMSW also provided contact information and encouraged pt to call if any additional needs arise.           Patient/caregiver will have knowledge of resources available in order to obtain the services that are needed prior to discharge from [facility]. - Priority: MOD      Overall Time to Completion  2 months from 10/10/2019      [facility] Identified Patient Barriers:  Health Literacy  -Care Plan Created  Nutrition-----Care Plan Created  Advanced Care Planning-- -Referred to [facility]      [facility] Identified Education Barriers:  Education Barrier for Hypertension    Short Term Goals  Patient/caregiver will have contact information for " identified community resources IE:OPCM  for follow-up within 1 month.  Interventions   · Mail [employer] Contact Information.     Done 10/10  · Refer to Outpatient Case Management Social Worker.  Done 10/10  Attempted to provide pt with contact for this RN CM and OOC however pt very sleepy and pen not writing.  Mailed contact information along with Welcome Letter, education material, [facility] OTC 2019 catalog, BP logs.   10/10-- Omitted PHQ2. Assess on next encounter.   10/25- Completed PHQ2=0.   10/25-- Pt confirms receipt of mailings as reason for her incoming call. Pt is thankful, still looking through them and will look at the 2019 [facility] OTC catalog.    10/25- OPCM LCSW assessment pending. 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   1/15/2020- collaborated with [facility] LMSW in person to discuss pt case.       Status  · Met 1/15/2020            Clinical Reference Documents Added to Patient Instructions       Document    FALLS IN THE HOME, PREVENTING (ENGLISH)    HIGH BLOOD PRESSURE, WHAT IS?  (ENGLISH)        SALT, TIPS FOR USING LESS (ENGLISH)    STROKE AND HIGH BLOOD PRESSURE, UNDERSTANDING THE LINK BETWEEN (ENGLISH)                  Patient Instructions     Instructions were provided via the [facility] patient resources and are available for the patient to view on the patient portal.    Next Steps:  Case closed.     No f/u.     Todays OPC Self-Management Care Plan was developed with the patients/caregivers input and was based on identified barriers from todays assessment.  Goals were written today with the patient/caregiver and the patient has agreed to work towards these goals to improve his/her overall well-being. Patient verbalized understanding of the care plan, goals, and  all of today's instructions. Encouraged patient/caregiver to communicate with his/her physician and health care team about health conditions and the treatment plan.  Provided my contact information today and encouraged patient/caregiver to call me with any questions as needed.

## 2020-03-25 NOTE — TELEPHONE ENCOUNTER
I ordered a renal US. I'm not sure if our imaging center is performing these now. Can you shedule her and confirm that she can have it performed?    Thanks,  KARLY

## 2020-03-25 NOTE — TELEPHONE ENCOUNTER
Spoke with pt, explained your message. Pt verbalized understanding, she is still having pain. I told her to call us tomorrow evening if she was still in pain.

## 2020-03-25 NOTE — TELEPHONE ENCOUNTER
Patient's labs are normal. Urine does not have infection, kidney function stable.    She should get rest, hydrate well and avoid others. She should also cut back on caffiene. 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    Thanks,  KARLY

## 2020-03-25 NOTE — PROGRESS NOTES
Outpatient Care Management   - High Risk Patient Assessment    Patient: Kelsey Fields  MRN:  [id number]  Date of Service:  3/25/2020  Completed by:  Monae Angulo LMSW  Referral Date: 09/24/2019  Program: Case Management (High Risk)    Reason for Visit   Patient presents with    Update Plan Of Care     3/25/2020    Case Closure     3/25/2020   3/25/2020 Case closure note  OPCM LMSW followed up with pt telephonically. Pt reports that she went to appointment at [facility] on 3/24/2020. Pt reports that she had lab work done to find out the cause of her pain. Pt reports that she has adequate food at her home. Pt denied any additional needs at this time and is in agreement with case closure at this time. LMSW also provided contact information and encouraged pt to call if any additional needs arise.    Patient Summary     OPCM Social Work Assessment (High Risk)    Involvement of Care  Cognitive  General  Support  Advanced Care Planning  Financial  Safety   History  Disaster Plan  Mental Health Status  Addictive Behaviors  Resources         Complex Care Plan     Care plan was discussed and completed today with input from patient and/or caregiver.    Goals      Patient/caregiver will have an action plan in place to manage and prevent complications of  HTN prior to discharge from OPCM. - Priority: HIGH       Overall Time to Completion  2 months from 10/10/2019    OPCM Identified Patient Barriers:  Health Literacy  -Care Plan Created  Nutrition-----Care Plan Created  Advanced Care Planning-- -Referred to OPCM        OPCM Identified Education Barriers:  Education Barrier for Hypertension   -Care Plan Created        Short Term Goals  Patient will have appropriate health related knowledge and understanding of nutritional requirements within 1 month.   Interventions   · Mail Blood pressure logs for home use.  · Recognize and provide educational material (KIRSTIN).   · 12/3-- Pt  is not sure about her dietary needs and expresses interest in Nutrition Consult.   · 12/31- Pt presents BP log to PCP on 12/27---- /60 at appt.       Status  · Met   12/31        Patient/caregiver will measure and record the blood pressure 1 times per day for 1 month.  Interventions   · Assess for availability of working blood pressure cuff in home setting.   10/10--Patient/Caregiver agrees to get new battery for existing BP machine by one week.  · 10/10--Patient/Caregiver agrees to OPCM follow up within one week to assess progress to goal.   · 10/25-- Pt reports replacing the batteries to BP machine and compared her BP reading to the reading from a friend's BP and found a difference in her reading being higher on hers--- 151/76 vs 135/70 on friend's.  ·  Pt plans to put in another battery.   · 10/25---Provided reinforcement to pt's efforts to get BP machine working to begin daily BP checks.   10/25- Patient/Caregiver agrees to try different batteries for BP machine within one week.  · Patient/Caregiver agrees to OPCM follow up within one week to assess progress to goal.   ·   · 12/3- Pt states she doesn't think her BP is not working quite right. Pt report attending the [facility] regularly and got a raffled off BP machine some time ago. 10/22= 149/71, 136/69, 135/71, 11/19= 131/74. Pt expresses concern over family h/o mother having a stroke.   · 12/3- Instructed pt to bring her BP machine and log to upcoming PCP appt with Dr Todd.  ·  12/3-- In basket message sent to PCP today includes mention of pt not checking BPs --agrees to do so this week and to bring her log and BP mach to be checked at 12/10 PCP appt .  · 12/3-  Patient/Caregiver agrees to check/log BPs daily and bring to PCP 12/10.   · Patient/Caregiver agrees to OPCM follow up within one week to assess progress to goal.   · 12/12-- PCP appt rescheduled from 12/10 to 12/13. Attempt contact with pt finds pt at [facility].   · 12/12--Call  back message received from pt. Upon calling pt soon thereafter, pt reports busy having her breakfast. Phone contact brief. Reinforced pt attending tomorrow's PCP appt 12/13 at 10:30 am for a f/u BP check. Pt c/o leg cramps, states she plans to go to appt.   Patient/Caregiver agrees to attend PCP appt 12/13.  · Patient/Caregiver agrees to OPCM follow up within one week to assess progress to goal.   · 12/31- Pt s/p PCP appts 12/13 and 12/27. Pt acknowledges HCTZ discontinued.   ·      Status  · Met  12/31      Patient/caregiver will verbalize 3 food items, 3 healthy food preparation methods, 3 Seasoning alternatives for  Low Sodium within 2 weeks.  Interventions   · Recognize and provide educational material (KIRSTIN).   · 10/10-- Mailed KRAMES education materials.   · Foods----Fresh fruits, fresh vegs, lean meats (chicken, fish, turkey)  · Cooking -- Bake, Broil, Boil foods.------------------Began providing education 10/10  · Seasonings- Mrs. Dash, Pepper, Lemon Juice, onions---Began providing education-- 10/10  ·   · 2/27- Pt states she likes to eat peaches, pears & watermelon. Pt states she has to be very careful with what she eats due to her esophagus-- h/o food being caught & went to ED x2. Pt reports feeling afraid to eat and strangle on the food. Pt avoids heavy gravies,   · Instructed pt in taking small portions at a time, eat soft foods that do not require much chewing before swallowing, take her time when eating. Instructed pt in seasoning options ie Mrs. Dash, Pepper, Lemon Juice, onions. Pt states she avoids acidic foods due to its affects on her stomach. Pt states she likes to eat at the [place] sometimes.   · Pt reports not eating much meats and has problems with her dentures. Wears old dentures with cotton. Pt does not want to go to dentist because of the treatment proposed by dentist about her gums wearing down. States she started wearing dentures at age 12-- the iodine meds ruined her teeth. Pt  denies desire to see dentist at this time. Pt refers to an ongoing issue with her dentures.   ·   ·      Status  · Met   2/27      Patient/caregiver will verbalize 3 ways of preventing complications due to disease process within 1 month.  Interventions   · Empower patient/caregiver to discuss treatment plan with Physician/care team.  · Encourage compliance with Physician follow-ups.  · Encourage Dietary Compliance.  · Encourage Exercise.  · Encourage Medication Compliance.   · Daily BP checks and logging.  · Report persistently elevated BPs greater than 130/80.   · 12/3--Pt admits to eating foods that are sugary and that she needs to watch the salt intake more.   · 12/31- Pt to benefit from further instructions in eating healthy low na, low carb food plate however pt is not receptive or unable to focus on changes. Pt is usually physically tired and overwhelmed.   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 Pt is agreeable to a Nutrition Consult--message sent to Dr. Todd to request. Pt is s/p PCP appt 2/26-- Pt adhering to med appts. Pt has stopped going to OP PT however. Pt did not see the benefit to her back. /60 yest. Pt continues to check her BP at home.      Status  · Met   2/27            Clinical Reference Documents Added to Patient Instructions       Document    FALLS IN THE HOME, PREVENTING (ENGLISH)    HIGH BLOOD PRESSURE, WHAT IS?  (ENGLISH)        SALT, TIPS FOR USING LESS (ENGLISH)    STROKE AND HIGH BLOOD PRESSURE, UNDERSTANDING THE LINK BETWEEN (ENGLISH)             Patient/caregiver will have an action plan in place to manage and prevent complications of PreDiabetes  prior to discharge from hospitals. - Priority: HIGH      Overall Time to Completion  1 month from 10/25/2019     hospitals Identified Patient Barriers:  Health Literacy  -Care Plan Created  Nutrition-----Care Plan Created  Advanced Care Planning-- -Referred to hospitals Social  "Worker       OPCM Identified Education Barriers:  Education Barrier for Hypertension   -Care Plan Created         Short Term Goals  Patient/caregiver will verbalize food required to create a well-balanced food plate within 1 month.  Interventions   · Encourage Dietary Compliance.  · Recognize and provide educational material (KIRSTIN).  · Review eating/nutrition habits.   ·  10/25- Pt c/o her toe hurting after she ate a cobbler. Pt relates her pain to information she learned from PCP regarding diabetes & potential complications. Pt says I really need to watch the salt and the sugar.   · 10/25- Mailed education material on Pre Diabetes and MyFood Plate.   · 12/3- Pt is not sure if she received the mailings. Pt states not looking through her mail and letting it pile up. Pt states however, getting the pill box mailed to pt by this RN CM.   · 12/3- Pt c/o left 4th toe hurting after she ate something sweet. Pt states, I guess I will have to stop eating sweets because the pain is not worth it". Pt c/o the pain associated with prior finger sticks for BG checks. Pt states, she couldn't check her BGs because of the pain. Pt is prediabetic, does not possess own glucometer at this time.     12/3-- In basket message sent to Dr. Todd with concerns -- pt in need of Nutrition Consult and yet at the same time, pt is overwhelmed/exhausted from working nights to properly focus and care for her daily health needs.   12/31- Pt s/p PCP appt 12/13. Pt verbalizes being told she has PreDiabetes. Last A1C=6.1 (128) on 12/13/19.   12/31- Began to explain healthy food 9" plate --1/2 of Vegs/salads and 1/4 lean meat + 1/4 starch--- pt cuts off conversation to report on other family members who have had DM. Provided reinforcement to pt that she can prevent progression to DM by eating a balanced diet and exercise ie walking and to include foods high in fiber as provided to her by PCP pt states to help her with c/o constipation.  " Discouraged pt from riding bicycle for exercise-- recommended walking. Reviewed upcoming appts to [facility] and to Sports Medicine.     12/31--Patient/Caregiver agrees to attend 1/3/20 for Hands at [facility] followed by 1/6/20 [facility]- Sciatica & 1/6/20 Sports Med- Shoulder/   Patient/Caregiver agrees to OPCM follow up in 2 wks to assess progress to goal.               12/31----Numerous appts upcoming-- as pt requests-- mailed out all Jan 2020 appts and then Feb PCP appt.   12/31-- New request sent to PCP for Nutrition Consult. (Noted 11/20/18 Nutrition Consult visit for PreDiabetes). No referral noted.   2/27- New request for Nutrition Consult sent to Dr. Todd per pt's request due to her worries over Pre Diabetes and CKD 3.   3/9- Informed pt of upcoming Nutrition consult appt on 3/19 at 1:30 pm. Pt was aware of a pending appt.   3/9- Pt confirms receipt of rollator. C/o a toe hurting her. Pt informed of existing PCP appt 3/13. Pt believes appt is for 3/11. Message to PCP clinic to clarify with pt.  Pt's appt is for 3/13.     Status  · Partially met           Clinical Reference Documents Added to Patient Instructions       Document    PREDIABETES (ENGLISH)    USDA MYPLATE, UNDERSTANDING (ENGLISH)             Patient/caregiver will have an action plan in place to manage and prevent complications of Sleep Apnea prior to discharge from OPCM. - Priority: HIGH      Overall Time to Completion  2 months from 10/10/2019    [facility] Identified Patient Barriers:  Health Literacy  -Care Plan Created  Nutrition-----Care Plan Created  Advanced Care Planning-- -Referred to OPCM        OPCM Identified Education Barriers:  Education Barrier for Hypertension   -Care Plan Created        Short Term Goals  Patient/caregiver will obtain CPAP to support disease process within 1 month.  Interventions                  Collaborate with Physician as appropriate to meet patient needs as found  necessary in promoting pt adherence to CPAP use.                10/10-- Explained the importance of using the CPAP-- to be sure pt is getting needed O2 while sleeps, needed to restore body's energy, to                            prevent neg affects on body with the CPAP ie heart disease, obesity, compromised functioning due to fatigue/sleepiness.  Done 10/10              10/10-Patient/Caregiver agrees to start wearing her CPAP within 2 wks.    Patient/Caregiver agrees to OPCM follow up within 2 weeks to assess progress to goal.     12/3-  Pt states she hasn't been using the CPAP but that she will start. Pt explains how she comes home so tired she lays down on the bed and falls asleep. Pt confirms receiving the needed tubing CPAP supplies in the mail.   Instructed pt to use and keep clean tubing/CPAP supplies.  12/3-  Patient/Caregiver agrees to start using her CPAP by one week.   Patient/Caregiver agrees to OPCM follow up within one week to assess progress to goal.   12/3- In basket message to PCP sent today includes pt's lack of using CPAP.  12/31- pt states she still hasn't started to use CPAP but says she will have to start in order to decrease feeling so tired all of the time.   12/31-- Patient/Caregiver agrees to start using her CPAP within 2 wks.   Patient/Caregiver agrees to OPCM follow up in 2 wks to assess progress to goal. .    2/27- Pt reports using CPAP once in awhile and claims to have all needed CPAP tubing supplies.        Status  · Partially met  2/27/2020            Clinical Reference Documents Added to Patient Instructions       Document    FALLS IN THE HOME, PREVENTING (ENGLISH)    HIGH BLOOD PRESSURE, WHAT IS?  (ENGLISH)        SALT, TIPS FOR USING LESS (ENGLISH)    STROKE AND HIGH BLOOD PRESSURE, UNDERSTANDING THE LINK BETWEEN (ENGLISH)             Patient/caregiver will have knowledge of resources available in order to obtain the services that are needed prior to discharge from OPCM. -  "Priority: Medium      Overall Time to Completion  2 months from 12/03/2019    Social Work Identified Patient Barriers:   Advanced Care Planning:  Care plan created           Short Term Goals  Patient/caregiver will discuss Advanced Care Planning with family, Physician and/or Power of  within 2 weeks.  Interventions   · Collaborate with OPCM RN as appropriate to meet patient needs.  · Discuss and provide [facility] Advance Directive.  · Empower patient/caregiver to discuss End of life issues with family, Physician and/or Power of .  · Provide education on advance care planning.     Status  · Partially met     Patient agrees to follow up by  12/30/19.  Patient agrees to OPC follow up within two weeks to assess progress to goal.     2/27/2020 SHIRA HARRY followed up with pt regarding Advance Care planning. [facility] about [facility] care planning; pt verbalized understanding. Pt reports that she did not receive the packet that SHIRA HARRY mailed. Pt also reports that she noticed that she has not received other mail. Pt reports that she will contact the post office about her missing mail. [facility] will remail advance care packet. Also KAMRYN will meet pt in [facility] during her 3/13/2020 appointment and provide her with the advance care planning packet. During visit pt will discuss case closure and possibly close case.       3/24/2020 SHIRA spoke with the above pt. 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 She reports that she was provided a mask for protection from the Covid19 virus but she does not wear it.  SHIRA HARRY asked her "Why don't you wear the mask?" She replied "I don't know, I see mainly employees and not too many visitors because visitors are not allowed at [employer] at this time." She reports that she drinks one 16 ounce bottle of water per day. She also reports that she drinks coffee. Per Dr. Todd's recommendations, [facility] informed pt that one 16 " "ounce bottle of water is not enough and recommended that she drink more water. Pt reports that she has pain in her lower right side of her back. Pt also reports that her urine is "dark." Pt denies burning while urinating. Pt thinks that she has a "kidney infection." [facility] Dr. Todd will order labs for pt to get done today. Pt reports that she can come to the clinic at 1:30P today after she takes a bath. [facility] LMSW will follow up with pt tomorrow 3/25/2020 and will possibly close case. Pt is in agreement with case closure.    3/25/2020  [facility]. Pt reports that she went to appointment at [facility] on 3/24/2020. Pt reports that she had lab work done to find out the cause of her pain. Pt reports that she has adequate food at her home. Pt denied any additional needs at this time and is in agreement with case closure at this time. [facility] also provided contact information and encouraged pt to call if any additional needs arise.           Patient/caregiver will have knowledge of resources available in order to obtain the services that are needed prior to discharge from [facility]. - Priority: MOD      Overall Time to Completion  2 months from 10/10/2019      [facility] Identified Patient Barriers:  Health Literacy  -Care Plan Created  Nutrition-----Care Plan Created  Advanced Care Planning-- -Referred to [facility]      [facility] Identified Education Barriers:  Education Barrier for Hypertension    Short Term Goals  Patient/caregiver will have contact information for identified community resources IE:[facility]  for follow-up within 1 month.  Interventions   · Mail [employer] Contact Information.     Done 10/10  · Refer to Outpatient Case Management Social Worker.  Done 10/10  Attempted to provide pt with contact for this RN CM and OOC however pt very sleepy and pen not writing.  Mailed contact information along with " Welcome Letter, education material, [facility] OTC 2019 catalog, BP logs.   10/10-- Omitted PHQ2. Assess on next encounter.   10/25- Completed PHQ2=0.   10/25-- Pt confirms receipt of mailings as reason for her incoming call. Pt is thankful, still looking through them and will look at the 2019  OTC catalog.    10/25- OPCM LCSW assessment pending. 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   1/15/2020- collaborated with OPCM LMSW in person to discuss pt case.       Status  · Met 1/15/2020            Clinical Reference Documents Added to Patient Instructions       Document    FALLS IN THE HOME, PREVENTING (ENGLISH)    HIGH BLOOD PRESSURE, WHAT IS?  (ENGLISH)        SALT, TIPS FOR USING LESS (ENGLISH)    STROKE AND HIGH BLOOD PRESSURE, UNDERSTANDING THE LINK BETWEEN (ENGLISH)                  Patient Instructions     No follow-ups on file.    Todays OPC Self-Management Care Plan was developed with the patients/caregivers input and was based on identified barriers from todays assessment.  Goals were written today with the patient/caregiver and the patient has agreed to work towards these goals to improve his/her overall well-being. Patient verbalized understanding of the care plan, goals, and all of today's instructions. Encouraged patient/caregiver to communicate with his/her physician and health care team about health conditions and the treatment plan.  Provided my contact information today and encouraged patient/caregiver to call me with any questions as needed.

## 2020-03-26 ENCOUNTER — TELEPHONE (OUTPATIENT)
Dept: PRIMARY CARE CLINIC | Facility: CLINIC | Age: 80
End: 2020-03-26

## 2020-03-26 DIAGNOSIS — R10.84 GENERALIZED ABDOMINAL PAIN: ICD-10-CM

## 2020-03-26 DIAGNOSIS — R10.9 FLANK PAIN: ICD-10-CM

## 2020-03-26 DIAGNOSIS — R63.4 UNINTENTIONAL WEIGHT LOSS: ICD-10-CM

## 2020-03-26 NOTE — TELEPHONE ENCOUNTER
CT order changed with urgent issues documented in notes. Please see if we can get it scheduled for tomorrow.    Thanks,  KARLY

## 2020-03-26 NOTE — TELEPHONE ENCOUNTER
These symptoms are concerning. Please call imaging and see what they can do to get her scheduled. I am changing the order to a CT abdomen/pelvis, please cancel renal US.    KARLY Valenzuela MA  You 27 minutes ago (9:50 AM)      Pt states it hurts more with movement, she states the tylenol helps her with it and lying down no movement, from 1-10 she stated her pain is a 10+. She stated she will not go to ED for this she will continue to take hey tylenol

## 2020-03-26 NOTE — TELEPHONE ENCOUNTER
Spoke w/ CT they stated you have to have in the notes that this is urgent to get her a sooner appt

## 2020-03-26 NOTE — TELEPHONE ENCOUNTER
Please get a better understanding of this patient's flank pain. Her labs and urine studies are normal, and all of the imaging centers are booked for a month. She should NOT GO TO ED for this.     Get more information about the pain, how bad is it, what makes it worse, what is it on a scale of 1-10. Can this wait??    Thanks,  KJ

## 2020-03-26 NOTE — TELEPHONE ENCOUNTER
Meghann- please assess this patient's side/back pain. Is she still having it?    If she still wants the ultrasound then it needs to be scheduled.    Thanks,  KJ

## 2020-03-26 NOTE — TELEPHONE ENCOUNTER
Spoke w/ pt and got her scheduled for tmrw she stated she does not want to come today because she worked last night

## 2020-03-27 ENCOUNTER — TELEPHONE (OUTPATIENT)
Dept: PODIATRY | Facility: CLINIC | Age: 80
End: 2020-03-27

## 2020-03-27 ENCOUNTER — HOSPITAL ENCOUNTER (OUTPATIENT)
Dept: RADIOLOGY | Facility: HOSPITAL | Age: 80
Discharge: HOME | End: 2020-03-27
Attending: INTERNAL MEDICINE
Payer: MEDICARE

## 2020-03-27 DIAGNOSIS — R63.4 UNINTENTIONAL WEIGHT LOSS: ICD-10-CM

## 2020-03-27 DIAGNOSIS — R10.84 GENERALIZED ABDOMINAL PAIN: ICD-10-CM

## 2020-03-27 DIAGNOSIS — R10.9 FLANK PAIN: ICD-10-CM

## 2020-03-27 PROCEDURE — 74176 CT ABDOMEN PELVIS WITHOUT CONTRAST: ICD-10-PCS | Mod: 26,HCNC,, | Performed by: RADIOLOGY

## 2020-03-27 PROCEDURE — 74176 CT ABD & PELVIS W/O CONTRAST: CPT | Mod: TC,HCNC

## 2020-03-27 PROCEDURE — 74176 CT ABD & PELVIS W/O CONTRAST: CPT | Mod: 26,HCNC,, | Performed by: RADIOLOGY

## 2020-03-27 NOTE — TELEPHONE ENCOUNTER
----- Message from Shahida Augustin DPM sent at 3/26/2020  8:22 AM CDT -----  Mary seen her before. If its the same pain as before, then its 2/2 nails and calluses. If her symptoms are severe you can sched an appt  ----- Message -----  From: Laurie Todd MD  Sent: 3/25/2020   3:49 PM CDT  To: Shahida Augustin DPM, #    Sounds good.     KARLY Zepeda  ----- Message -----  From: Billie Luis RN  Sent: 3/25/2020   3:28 PM CDT  To: Shahida Augustin DPM, #    In light of covid they are attempting to see those types of patients virtually, but she does not have my [facility].  I see that you ordered an xray, I will ask one of them to review it and see what we can do.  Let you know.  ----- Message -----  From: Laurie Todd MD  Sent: 3/25/2020   2:34 PM CDT  To: Billie Luis RN    Yes it would be the toe pain. She can see anyone.    KARLY Zepeda  ----- Message -----  From: Billie Luis RN  Sent: 3/25/2020   2:00 PM CDT  To: MD Dr KARLY Cheek, what is up with her.  I looked at the notes and looks like ?chronic toe pain??  We are trying to see emergent issues only.  The physician she is scheduled to see is out on maternity leave so moving her up with that CALLIE won't work but I can see what I can do but need more info as to her acute issue.  Duane.  Jazmin  ----- Message -----  From: Laurie Todd MD  Sent: 3/24/2020   2:43 PM CDT  To: Billie Luis RN    Can you move up patient's podiatry appt? She does have an acute issue    ThanksKARLY

## 2020-04-25 ENCOUNTER — OFFICE VISIT (OUTPATIENT)
Dept: INTERNAL MEDICINE | Facility: CLINIC | Age: 80
End: 2020-04-25
Payer: MEDICARE

## 2020-04-25 VITALS
OXYGEN SATURATION: 98 % | HEART RATE: 86 BPM | HEIGHT: 64 IN | SYSTOLIC BLOOD PRESSURE: 132 MMHG | BODY MASS INDEX: 35.38 KG/M2 | WEIGHT: 207.25 LBS | DIASTOLIC BLOOD PRESSURE: 66 MMHG

## 2020-04-25 DIAGNOSIS — M54.50 ACUTE LEFT-SIDED LOW BACK PAIN WITHOUT SCIATICA: ICD-10-CM

## 2020-04-25 DIAGNOSIS — S39.012A STRAIN OF LUMBAR REGION, INITIAL ENCOUNTER: Primary | ICD-10-CM

## 2020-04-25 LAB
BILIRUB UR QL STRIP: NEGATIVE
CLARITY UR REFRACT.AUTO: CLEAR
COLOR UR AUTO: NORMAL
GLUCOSE UR QL STRIP: NEGATIVE
HGB UR QL STRIP: NEGATIVE
KETONES UR QL STRIP: NEGATIVE
LEUKOCYTE ESTERASE UR QL STRIP: NEGATIVE
NITRITE UR QL STRIP: NEGATIVE
PH UR STRIP: 7 [PH] (ref 5–8)
PROT UR QL STRIP: NEGATIVE
SP GR UR STRIP: 1 (ref 1–1.03)
URN SPEC COLLECT METH UR: NORMAL

## 2020-04-25 PROCEDURE — 1101F PR PT FALLS ASSESS DOC 0-1 FALLS W/OUT INJ PAST YR: ICD-10-PCS | Mod: HCNC,CPTII,S$GLB, | Performed by: INTERNAL MEDICINE

## 2020-04-25 PROCEDURE — 99999 PR PBB SHADOW E&M-EST. PATIENT-LVL IV: ICD-10-PCS | Mod: PBBFAC,HCNC,, | Performed by: INTERNAL MEDICINE

## 2020-04-25 PROCEDURE — 3075F PR MOST RECENT SYSTOLIC BLOOD PRESS GE 130-139MM HG: ICD-10-PCS | Mod: HCNC,CPTII,S$GLB, | Performed by: INTERNAL MEDICINE

## 2020-04-25 PROCEDURE — 3078F DIAST BP <80 MM HG: CPT | Mod: HCNC,CPTII,S$GLB, | Performed by: INTERNAL MEDICINE

## 2020-04-25 PROCEDURE — 1125F AMNT PAIN NOTED PAIN PRSNT: CPT | Mod: HCNC,S$GLB,, | Performed by: INTERNAL MEDICINE

## 2020-04-25 PROCEDURE — 3075F SYST BP GE 130 - 139MM HG: CPT | Mod: HCNC,CPTII,S$GLB, | Performed by: INTERNAL MEDICINE

## 2020-04-25 PROCEDURE — 99213 PR OFFICE/OUTPT VISIT, EST, LEVL III, 20-29 MIN: ICD-10-PCS | Mod: HCNC,25,S$GLB, | Performed by: INTERNAL MEDICINE

## 2020-04-25 PROCEDURE — 1159F PR MEDICATION LIST DOCUMENTED IN MEDICAL RECORD: ICD-10-PCS | Mod: HCNC,S$GLB,, | Performed by: INTERNAL MEDICINE

## 2020-04-25 PROCEDURE — 1125F PR PAIN SEVERITY QUANTIFIED, PAIN PRESENT: ICD-10-PCS | Mod: HCNC,S$GLB,, | Performed by: INTERNAL MEDICINE

## 2020-04-25 PROCEDURE — 3078F PR MOST RECENT DIASTOLIC BLOOD PRESSURE < 80 MM HG: ICD-10-PCS | Mod: HCNC,CPTII,S$GLB, | Performed by: INTERNAL MEDICINE

## 2020-04-25 PROCEDURE — 81003 URINALYSIS AUTO W/O SCOPE: CPT | Mod: HCNC

## 2020-04-25 PROCEDURE — 1159F MED LIST DOCD IN RCRD: CPT | Mod: HCNC,S$GLB,, | Performed by: INTERNAL MEDICINE

## 2020-04-25 PROCEDURE — 99999 PR PBB SHADOW E&M-EST. PATIENT-LVL IV: CPT | Mod: PBBFAC,HCNC,, | Performed by: INTERNAL MEDICINE

## 2020-04-25 PROCEDURE — 99213 OFFICE O/P EST LOW 20 MIN: CPT | Mod: HCNC,25,S$GLB, | Performed by: INTERNAL MEDICINE

## 2020-04-25 PROCEDURE — 96372 PR INJECTION,THERAP/PROPH/DIAG2ST, IM OR SUBCUT: ICD-10-PCS | Mod: HCNC,S$GLB,, | Performed by: INTERNAL MEDICINE

## 2020-04-25 PROCEDURE — 96372 THER/PROPH/DIAG INJ SC/IM: CPT | Mod: HCNC,S$GLB,, | Performed by: INTERNAL MEDICINE

## 2020-04-25 PROCEDURE — 1101F PT FALLS ASSESS-DOCD LE1/YR: CPT | Mod: HCNC,CPTII,S$GLB, | Performed by: INTERNAL MEDICINE

## 2020-04-25 RX ORDER — METHOCARBAMOL 750 MG/1
TABLET, FILM COATED ORAL
Qty: 30 TABLET | Refills: 0 | Status: SHIPPED | OUTPATIENT
Start: 2020-04-25 | End: 2021-08-02

## 2020-04-25 RX ORDER — KETOROLAC TROMETHAMINE 30 MG/ML
60 INJECTION, SOLUTION INTRAMUSCULAR; INTRAVENOUS
Status: COMPLETED | OUTPATIENT
Start: 2020-04-25 | End: 2020-04-25

## 2020-04-25 RX ADMIN — KETOROLAC TROMETHAMINE 60 MG: 30 INJECTION, SOLUTION INTRAMUSCULAR; INTRAVENOUS at 01:04

## 2020-04-25 NOTE — PROGRESS NOTES
Subjective:       Patient ID: Kelsey Fields is a 79 y.o. female.    Chief Complaint:   Back Pain    HPI: Pt presents today with left low back pain x 1 day.  She began drinking guava juice and sx started   After such. She is worried it could be her kidneys.She has been drinking a lot of water and had episode of urinary urgency/incontinence this AM when she got out of bed up to urinate She has no dysuria, hematuria, F/C. She has no hx of trauma or Injury.  She has a past hx of Lumbar OA/DDD    Past Medical, Surgical, Social History: Please see as stated in Epic chart which has been reviewed.    Current Outpatient Medications   Medication Sig Dispense Refill    albuterol (PROVENTIL/VENTOLIN HFA) 90 mcg/actuation inhaler Inhale 2 puffs into the lungs every 6 (six) hours as needed for Wheezing. Rescue 3 Inhaler 3    amLODIPine (NORVASC) 10 MG tablet TAKE 1 TABLET BY MOUTH ONCE DAILY 90 tablet 3    ammonium lactate 12 % Crea Apply twice daily to affected parts both feet as needed. 140 g 11    atorvastatin (LIPITOR) 40 MG tablet Take 1 tablet (40 mg total) by mouth once daily. 90 tablet 3    cholecalciferol, vitamin D3, (VITAMIN D3) 125 mcg (5,000 unit) Tab Take 1 tablet (5,000 Units total) by mouth every 48 hours. 90 tablet 3    ciclopirox (LOPROX) 0.77 % Crea Apply topically 2 (two) times daily. 90 g 2    cimetidine (TAGAMET) 400 MG tablet Take 1 tablet (400 mg total) by mouth 2 (two) times daily. 180 tablet 3    CMPD Lidocaine 2%, Prilocaine 2%, Lamotrigine 2.5%, Meloxicam 0.09% in Transdermal Gel Apply 1 Pump (1.6 g total) topically 4 (four) times daily. 240 g 3    diclofenac sodium (VOLTAREN) 1 % Gel Apply 2 g topically 2 (two) times daily. 3 Tube 3    ferrous sulfate (FEOSOL) 325 mg (65 mg iron) Tab tablet Take 1 tablet (325 mg total) by mouth daily with breakfast. 90 tablet 0    fluticasone propionate (FLONASE) 50 mcg/actuation nasal spray 1 spray (50 mcg total) by Each Nostril route once daily. 16  g 2    gabapentin (NEURONTIN) 300 MG capsule Take 1 capsule (300 mg total) by mouth 2 (two) times daily as needed. 180 capsule 3    hydrocortisone 2.5 % cream Apply topically to rash on legs twice daily 28 g 0    ketoconazole (NIZORAL) 2 % cream Apply under breast topically twice daily for rash 60 g 0    loratadine (CLARITIN) 10 mg tablet Take 1 tablet (10 mg total) by mouth once daily. 30 tablet 11    losartan (COZAAR) 25 MG tablet Take 1 tablet (25 mg total) by mouth once daily. 90 tablet 3    triamcinolone acetonide 0.025% (KENALOG) 0.025 % Oint Apply topically once daily to rash on arms and legs as needed 80 g 1    methocarbamoL (ROBAXIN) 750 MG Tab 1 tab every 8 hours as needed for muscle spasm 30 tablet 0     Current Facility-Administered Medications   Medication Dose Route Frequency Provider Last Rate Last Dose    ketorolac injection 60 mg  60 mg Intramuscular 1 time in Clinic/HOD Fidencio Krause MD           Review of Systems   Constitutional: Negative for fever.   Respiratory: Negative for chest tightness and shortness of breath.    Gastrointestinal: Negative for abdominal pain.   Genitourinary: Positive for frequency. Negative for difficulty urinating, dysuria, hematuria, pelvic pain and vaginal discharge.   Musculoskeletal: Positive for back pain.       Objective:      Lab Results   Component Value Date    WBC 5.97 03/24/2020    HGB 11.0 (L) 03/24/2020    HCT 36.4 (L) 03/24/2020     03/24/2020    CHOL 139 02/13/2019    TRIG 72 02/13/2019    HDL 41 02/13/2019    ALT 14 02/13/2019    AST 19 02/13/2019     03/24/2020    K 4.1 03/24/2020     03/24/2020    CREATININE 1.3 03/24/2020    BUN 19 03/24/2020    CO2 24 03/24/2020    TSH 0.725 09/20/2018    HGBA1C 6.1 (H) 12/13/2019     Physical Exam   Constitutional: She appears well-developed and well-nourished.   Cardiovascular: Normal rate, regular rhythm and normal heart sounds.   Pulmonary/Chest: Effort normal and breath sounds  "normal.   Abdominal: Soft. Bowel sounds are normal. She exhibits no mass. There is no tenderness.   Musculoskeletal: She exhibits tenderness. She exhibits no edema or deformity.   +Left upper paralumbar muscle tightness; spine shows no point tenderness  Extremities negative for straight leg raise   Psychiatric: She has a normal mood and affect.   Vitals reviewed.        Vital Signs  Pulse: 86  SpO2: 98 %  BP: 132/66  BP Location: Left arm  Patient Position: Sitting  Pain Score:   7  Pain Loc: Abdomen  Height and Weight  Height: 5' 4" (162.6 cm)  Weight: 94 kg (207 lb 3.7 oz)  BSA (Calculated - sq m): 2.06 sq meters  BMI (Calculated): 35.6  Weight in (lb) to have BMI = 25: 145.3]    Assessment:       1. Strain of lumbar region, initial encounter    2. Acute left-sided low back pain without sciatica        Plan:     Health Maintenance   Topic Date Due    TETANUS VACCINE  11/19/1956    DEXA SCAN  05/16/2021    Lipid Panel  02/13/2024    Pneumococcal Vaccine (65+ High/Highest Risk)  Completed        Kelsey was seen today for back pain.    Diagnoses and all orders for this visit:    Strain of lumbar region, initial encounter  -     methocarbamoL (ROBAXIN) 750 MG Tab; 1 tab every 8 hours as needed for muscle spasm-no working/driving while taking  -     ketorolac injection 60 mg IM x 1  -     Work Note given-recommend pt not go to work until Monday(4/27) or later when no taking the Robaxin    Acute left-sided low back pain without sciatica  -     Urinalysis, Reflex to Urine Culture Urine, Clean Catch    Addendum:        U/A Dip(4/25)-negative  "

## 2020-04-25 NOTE — LETTER
04/25/2020                 [facility]  [address]  [facility] [address]  Phone: [phone]  Fax: [phone]   04/25/2020    Patient: Kelsey Fields   YOB: 1940   Date of Visit: 4/25/2020       To Whom it May Concern:    Kelsey Fields was seen in my clinic on 4/25/2020. She has a lumbar muscle strain and will need to be excused from work x next 2-3 days.  She can return to work 4/28 or later as long as she has recovered and no longer taking muscle relaxer.    If you have any questions or concerns, please don't hesitate to call.    Sincerely,         Fidencio Krause MD

## 2020-04-27 ENCOUNTER — TELEPHONE (OUTPATIENT)
Dept: INTERNAL MEDICINE | Facility: CLINIC | Age: 80
End: 2020-04-27

## 2020-04-27 NOTE — TELEPHONE ENCOUNTER
Keya/Sebastián, please call and let Ms Fields know that her Urinalysis from 4/25 was normal.  Please check to see if her back pain is doing better.  Thanks

## 2020-05-06 ENCOUNTER — NURSE TRIAGE (OUTPATIENT)
Dept: ADMINISTRATIVE | Facility: CLINIC | Age: 80
End: 2020-05-06

## 2020-05-06 ENCOUNTER — TELEPHONE (OUTPATIENT)
Dept: PRIMARY CARE CLINIC | Facility: CLINIC | Age: 80
End: 2020-05-06

## 2020-05-06 ENCOUNTER — OFFICE VISIT (OUTPATIENT)
Dept: PRIMARY CARE CLINIC | Facility: CLINIC | Age: 80
End: 2020-05-06
Payer: MEDICARE

## 2020-05-06 DIAGNOSIS — R53.82 CHRONIC FATIGUE: ICD-10-CM
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
## 2020-05-06 PROCEDURE — 99443 PR PHYSICIAN TELEPHONE EVALUATION 21-30 MIN: ICD-10-PCS | Mod: HCNC,95,, | Performed by: INTERNAL MEDICINE

## 2020-05-06 PROCEDURE — 99443 PR PHYSICIAN TELEPHONE EVALUATION 21-30 MIN: CPT | Mod: HCNC,95,, | Performed by: INTERNAL MEDICINE

## 2020-05-06 NOTE — TELEPHONE ENCOUNTER
Pt states she [place] [employer] [place] and they were a lot of termites out there and she have gotten bit on her wrist(right) she stated they swelling seem to be gong down now she put some alcohol on it, she is just hoping it was not poison ess

## 2020-05-06 NOTE — TELEPHONE ENCOUNTER
Pt hung up on pt access rep. Left message with Ms Partida. For pt to call nurse back for service.   A termite bit pt on wrist and has swelling. 4 inches wide. instructed Ms Partida that pt need evaluation now- UC since cannot do virtual visit. But that I would message PCP. Verbalizes understanding.     Reason for Disposition   Red streak or red line and length > 2 inches (5 cm)    Additional Information   Negative: Passed out (i.e., fainted, collapsed and was not responding)   Negative: Wheezing or difficulty breathing   Negative: Hoarseness, cough or tightness in the throat or chest   Negative: Swollen tongue or difficulty swallowing   Negative: Life-threatening reaction (anaphylaxis) in the past to same insect bite and < 2 hours since bite   Negative: Sounds like a life-threatening emergency to the triager   Negative: Patient sounds very sick or weak to the triager   Negative: Fever and area is very tender to touch   Negative: Fever and area is red   Negative: SEVERE bite pain and not improved after 2 hours of pain medicine    Protocols used: INSECT BITE-A-OH

## 2020-05-06 NOTE — TELEPHONE ENCOUNTER
When does she want an appointment?  Is anything urgent going on with her?    If you set her up for an appointment today and check her and I can call her tomorrow.    Thanks,  KJ
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
## 2020-05-08 NOTE — ASSESSMENT & PLAN NOTE
Likely related to gabapentin use and untreataed sleep apnea  · Avoid gapabentin  · Use CPAP  · Advised to sleep at night (and NOT work at the [facility] parking garage)

## 2020-05-08 NOTE — PROGRESS NOTES
"Established Patient - Audio Only Telehealth Visit with [facility] Provider     The patient location is: HOME  The chief complaint leading to consultation is: routine care  Visit type: Virtual visit with audio only (telephone)     The reason for the audio only service rather than synchronous audio and video virtual visit was related to technical difficulties or patient preference/necessity.     Each patient to whom I provide medical services by telemedicine is:  (1) informed of the relationship between the physician and patient and the respective role of any other health care provider with respect to management of the patient; and (2) notified that they may decline to receive medical services by telemedicine and may withdraw from such care at any time. Patient verbally consented to receive this service via voice-only telephone call.       Subjective:      Patient ID: Kelsey Fields is a 79 y.o. female.    Patient's risk score is 70%. Patient is high risk for poor outcomes with COVID due to the following chronic conditions advanced age, debility, HTN    Pt c/o swelling on left wrist -states that yesterday while she was throwing out the trash a termite bit her on the wrist. Denies color changes, but endorses "5inch swollen area". Ice pack is helping with the swelling, but it is getting smaller. Pt endorses feeling more fatigued than usual states she is not getting enough sleep because of the amount of hours she is working. She works at [employer] in the [employer].     Of note, pt saw a Dr on Tuesday for hip pain. States it was a muscular injury and she received an injection in the hip which has alleviated the pain. She took robaxin from an urgent care appointment.    States she does not need medication refill.       Objective:     Lab Results   Component Value Date    WBC 5.97 03/24/2020    HGB 11.0 (L) 03/24/2020    HCT 36.4 (L) 03/24/2020     03/24/2020    CHOL 139 02/13/2019    TRIG 72 " 02/13/2019    HDL 41 02/13/2019    ALT 14 02/13/2019    AST 19 02/13/2019     03/24/2020    K 4.1 03/24/2020     03/24/2020    CREATININE 1.3 03/24/2020    BUN 19 03/24/2020    CO2 24 03/24/2020    TSH 0.725 09/20/2018    HGBA1C 6.1 (H) 12/13/2019         Assessment:   79 y.o. female with multiple co-morbid illnesses here to continue work-up of chronic issues.     Plan:     Problem List Items Addressed This Visit        Orthopedic    Bug bite     Reports being bitten by a termite  · Monitor for infection            Other    Chronic fatigue     Likely related to gabapentin use and untreataed sleep apnea  · Avoid gapabentin  · Use CPAP  · Advised to sleep at night (and NOT work at the [facility] parking garage)               Health Maintenance       Date Due Completion Date    TETANUS VACCINE 11/19/1956 ---    DEXA SCAN 05/16/2021 5/16/2019    Lipid Panel 02/13/2024 2/13/2019          Follow up in about 4 weeks (around 6/3/2020). Thirty minutes spent with this patient today, including addressing advanced care planning.    Laurie Todd MD/MPH  Internal Medicine  [facility]  Spectra [phone]    This service was not originating from a related E/M service provided within the previous 7 days nor will  to an E/M service or procedure within the next 24 hours or my soonest available appointment.  Prevailing standard of care was able to be met in this audio-only visit.

## 2020-05-12 ENCOUNTER — TELEPHONE (OUTPATIENT)
Dept: PRIMARY CARE CLINIC | Facility: CLINIC | Age: 80
End: 2020-05-12

## 2020-05-12 NOTE — TELEPHONE ENCOUNTER
----- Message from Alexus Gilman sent at 5/12/2020  1:12 PM CDT -----  Contact: Medical Student  Talked to patient directly on phone.  Discussed   - No longer has hip pain.   - Still working, discussed importance of hand hygiene. States she can't use a face mask because of her asthma. Makes it difficult to breathe.     Pill packs/medications: Pt has a 2-3 month supply of medications  Upcoming appointments: Pt would like to keep upcoming virtual visit  Adv Dir status: Advance Directive ON FILE.   [facility]aco status: Pt has not signed up / pending  Telemedicine: Patient unable to setup telemedicine. Does not have access to a compatible phone.    Pt is coping well with isolation precautions.  Pt has has access to food and housing; is able to access transportation if needed.    -------------------------------------------------------------------------------------------------------------------------  Assessed for symptoms of covid.  Discussed symptoms to look out for and to call clinic FIRST with any concerns or if pt develops symptoms: [facility] [phone] or [phone], or COVID HOTLINE [phone].    Advised to NOT go to ED for testing.  Counseled on staying at home, washing hands frequently, avoiding touching face and covering cough or sneeze.     Resources for patients:     Telemed training/video: [email or web address]    Housing and food: https://ready.[place].gov/incident/coronavirus/resources/    Flaco help: [phone]

## 2020-05-22 ENCOUNTER — LAB VISIT (OUTPATIENT)
Dept: PRIMARY CARE CLINIC | Facility: CLINIC | Age: 80
End: 2020-05-22
Payer: MEDICARE

## 2020-05-22 DIAGNOSIS — R05.9 COUGH: Primary | ICD-10-CM

## 2020-05-22 PROCEDURE — U0003 INFECTIOUS AGENT DETECTION BY NUCLEIC ACID (DNA OR RNA); SEVERE ACUTE RESPIRATORY SYNDROME CORONAVIRUS 2 (SARS-COV-2) (CORONAVIRUS DISEASE [COVID-19]), AMPLIFIED PROBE TECHNIQUE, MAKING USE OF HIGH THROUGHPUT TECHNOLOGIES AS DESCRIBED BY CMS-2020-01-R: HCPCS | Mod: HCNC

## 2020-05-24 LAB — SARS-COV-2 RNA RESP QL NAA+PROBE: NOT DETECTED

## 2020-06-01 ENCOUNTER — TELEPHONE (OUTPATIENT)
Dept: PRIMARY CARE CLINIC | Facility: CLINIC | Age: 80
End: 2020-06-01

## 2020-06-01 DIAGNOSIS — R53.83 OTHER FATIGUE: ICD-10-CM

## 2020-06-01 DIAGNOSIS — R53.82 CHRONIC FATIGUE: Primary | ICD-10-CM

## 2020-06-01 DIAGNOSIS — R79.9 ABNORMAL FINDING OF BLOOD CHEMISTRY, UNSPECIFIED: ICD-10-CM

## 2020-06-01 NOTE — TELEPHONE ENCOUNTER
Patient feels worse today and is willing to come in tomorrow for labs at 10am.    Please schedule her for COVID testing and labs in this building. You will need to explain to her that COVID testing is in the drive through, and labs are at primary care.    Thanks,  KARLY

## 2020-06-01 NOTE — TELEPHONE ENCOUNTER
Spoke to pt, she is feeling bad. She still has a bad cough and nose in pain, pt has pressure from her nose to her eyes.     Pt is taking claritin, the delsym is not helping    Pt states she was tested for covid and it was negative.   Pt denies temp.   Her whole body aches, feels nauseated.     Pt states she looses control of her bladder in mornings only..   Denies pain, burning    Please advise.

## 2020-06-01 NOTE — TELEPHONE ENCOUNTER
Patient's COVID testing was on May 22nd.  She has a high risk of exposure with her work as a  at [employer].  Does she want to get retested at this time?    I would also recommend other basic labs given her high risk of poor outcomes with COVID. When can she come in for this? The COVID testing can be done in the home, but I think she also needs other labs and urine studies.    Thanks,  KJ

## 2020-06-01 NOTE — TELEPHONE ENCOUNTER
----- Message from Rubi Brito sent at 6/1/2020 11:55 AM CDT -----  Contact: Self [phone]  Would like to get medical advice.  Symptoms (please be specific): bad cough, nose pain   How long has patient had these symptoms:    Pharmacy name and phone #:    Any drug allergies (copy from chart):      Would the patient rather a call back or a response via [facility]?:  Call back  Comments:

## 2020-06-02 ENCOUNTER — CLINICAL SUPPORT (OUTPATIENT)
Dept: PRIMARY CARE CLINIC | Facility: CLINIC | Age: 80
End: 2020-06-02
Payer: MEDICARE

## 2020-06-02 ENCOUNTER — LAB VISIT (OUTPATIENT)
Dept: INTERNAL MEDICINE | Facility: CLINIC | Age: 80
End: 2020-06-02
Payer: MEDICARE

## 2020-06-02 ENCOUNTER — HOSPITAL ENCOUNTER (OUTPATIENT)
Dept: RADIOLOGY | Facility: HOSPITAL | Age: 80
Discharge: HOME | End: 2020-06-02
Attending: INTERNAL MEDICINE
Payer: MEDICARE

## 2020-06-02 DIAGNOSIS — R53.83 OTHER FATIGUE: ICD-10-CM

## 2020-06-02 DIAGNOSIS — R06.02 SOB (SHORTNESS OF BREATH): ICD-10-CM

## 2020-06-02 DIAGNOSIS — R06.02 SOB (SHORTNESS OF BREATH): Primary | ICD-10-CM

## 2020-06-02 DIAGNOSIS — J45.20 MILD INTERMITTENT ASTHMA WITHOUT COMPLICATION: ICD-10-CM

## 2020-06-02 PROCEDURE — 94640 PR INHAL RX, AIRWAY OBST/DX SPUTUM INDUCT: ICD-10-PCS | Mod: HCNC,S$GLB,, | Performed by: INTERNAL MEDICINE

## 2020-06-02 PROCEDURE — U0003 INFECTIOUS AGENT DETECTION BY NUCLEIC ACID (DNA OR RNA); SEVERE ACUTE RESPIRATORY SYNDROME CORONAVIRUS 2 (SARS-COV-2) (CORONAVIRUS DISEASE [COVID-19]), AMPLIFIED PROBE TECHNIQUE, MAKING USE OF HIGH THROUGHPUT TECHNOLOGIES AS DESCRIBED BY CMS-2020-01-R: HCPCS | Mod: HCNC

## 2020-06-02 PROCEDURE — 71046 X-RAY EXAM CHEST 2 VIEWS: CPT | Mod: 26,,, | Performed by: RADIOLOGY

## 2020-06-02 PROCEDURE — 71046 X-RAY EXAM CHEST 2 VIEWS: CPT | Mod: TC

## 2020-06-02 PROCEDURE — 94640 AIRWAY INHALATION TREATMENT: CPT | Mod: HCNC,S$GLB,, | Performed by: INTERNAL MEDICINE

## 2020-06-02 PROCEDURE — 71046 XR CHEST PA AND LATERAL: ICD-10-PCS | Mod: 26,,, | Performed by: RADIOLOGY

## 2020-06-02 RX ORDER — IPRATROPIUM BROMIDE AND ALBUTEROL SULFATE 2.5; .5 MG/3ML; MG/3ML
3 SOLUTION RESPIRATORY (INHALATION)
Status: COMPLETED | OUTPATIENT
Start: 2020-06-02 | End: 2020-06-02

## 2020-06-02 RX ORDER — ALBUTEROL SULFATE 90 UG/1
2 AEROSOL, METERED RESPIRATORY (INHALATION) EVERY 6 HOURS PRN
Qty: 18 G | Refills: 3 | Status: SHIPPED | OUTPATIENT
Start: 2020-06-02 | End: 2020-09-10 | Stop reason: SDUPTHER

## 2020-06-02 RX ADMIN — IPRATROPIUM BROMIDE AND ALBUTEROL SULFATE 3 ML: 2.5; .5 SOLUTION RESPIRATORY (INHALATION) at 12:06

## 2020-06-02 NOTE — TELEPHONE ENCOUNTER
Called pt .  Labs scheduled for 10am    No answer, left voicemail.     Called ms. Partida. She will try to get in touch with pt.

## 2020-06-02 NOTE — TELEPHONE ENCOUNTER
Called pt, no answer, not able to leave a message.     Called ms. Partida, no answer, left message for call back.

## 2020-06-02 NOTE — TELEPHONE ENCOUNTER
----- Message from Laurie Todd MD sent at 6/2/2020  1:12 PM CDT -----  Please schedule her for face to face on Friday    KJ

## 2020-06-02 NOTE — TELEPHONE ENCOUNTER
Pt waiting to have labs drawn, she had the covid test, she would like to be seen by the nurse.   She will come to clinic once her labs are drawn.

## 2020-06-02 NOTE — PROGRESS NOTES
Met with pt, identified by name and date of birth. Pt states she is feeling bad, she works 3 nights per week. Tested for covid and had labs drawn and urine collected.  97% pulse ox, [id number]-sitting position, medium dread cuff, right arm, 64-20-98.5, oral temp, auscultated lungs, right lower lung clear, do not hear any breath sounds left lung. Heart sounds regular rate and rhythm.   Pt is taking delsym but this is not helping much. Pt has a little swelling in legs, not much.    Dr. bruner ordered cxr to be done today.   1230  CXR done.    Pt stated she goes back to work for 10pm tonight.    1241 Explained to Dr. Bruner that pt continues to cough, duo neb ordered. Obtained new nebulizer kit, machine and med, discussed with pt, pt verbalized understanding. Pt coughing while taking nebulizer treatment.  1247 nebulizer completed, reassessed lungs, auscultated air moving around all lung fields. Dr. bruner ordered inhaler. Sent to pharmacy. They will bring to pt in lobby.

## 2020-06-02 NOTE — TELEPHONE ENCOUNTER
Spoke with pt, she stated she fell asleep. Explained that her labs are scheduled  Asked if she wants to have covid testing, she stated she would like to have covid testing.     Notified dr. Todd.  Will schedule the covid testing.

## 2020-06-03 LAB — SARS-COV-2 RNA RESP QL NAA+PROBE: NOT DETECTED

## 2020-06-05 ENCOUNTER — OUTPATIENT CASE MANAGEMENT (OUTPATIENT)
Dept: ADMINISTRATIVE | Facility: OTHER | Age: 80
End: 2020-06-05

## 2020-06-05 ENCOUNTER — LAB VISIT (OUTPATIENT)
Dept: LAB | Facility: HOSPITAL | Age: 80
End: 2020-06-05
Attending: INTERNAL MEDICINE
Payer: MEDICARE

## 2020-06-05 ENCOUNTER — OFFICE VISIT (OUTPATIENT)
Dept: PRIMARY CARE CLINIC | Facility: CLINIC | Age: 80
End: 2020-06-05
Payer: MEDICARE

## 2020-06-05 VITALS
DIASTOLIC BLOOD PRESSURE: 80 MMHG | HEIGHT: 64 IN | HEART RATE: 91 BPM | WEIGHT: 201.5 LBS | OXYGEN SATURATION: 98 % | BODY MASS INDEX: 34.4 KG/M2 | SYSTOLIC BLOOD PRESSURE: 126 MMHG

## 2020-06-05 DIAGNOSIS — E11.22 TYPE 2 DIABETES MELLITUS WITH STAGE 3 CHRONIC KIDNEY DISEASE, WITHOUT LONG-TERM CURRENT USE OF INSULIN: ICD-10-CM

## 2020-06-05 DIAGNOSIS — N18.30 TYPE 2 DIABETES MELLITUS WITH STAGE 3 CHRONIC KIDNEY DISEASE, WITHOUT LONG-TERM CURRENT USE OF INSULIN: ICD-10-CM

## 2020-06-05 DIAGNOSIS — J45.20 MILD INTERMITTENT ASTHMA WITHOUT COMPLICATION: ICD-10-CM

## 2020-06-05 DIAGNOSIS — N17.9 AKI (ACUTE KIDNEY INJURY): ICD-10-CM

## 2020-06-05 LAB
ANION GAP SERPL CALC-SCNC: 10 MMOL/L (ref 8–16)
BUN SERPL-MCNC: 12 MG/DL (ref 8–23)
CALCIUM SERPL-MCNC: 10 MG/DL (ref 8.7–10.5)
CHLORIDE SERPL-SCNC: 104 MMOL/L (ref 95–110)
CO2 SERPL-SCNC: 26 MMOL/L (ref 23–29)
CREAT SERPL-MCNC: 1.4 MG/DL (ref 0.5–1.4)
ERYTHROCYTE [SEDIMENTATION RATE] IN BLOOD BY WESTERGREN METHOD: 81 MM/HR (ref 0–36)
EST. GFR  (AFRICAN AMERICAN): 40.6 ML/MIN/1.73 M^2
EST. GFR  (NON AFRICAN AMERICAN): 35.3 ML/MIN/1.73 M^2
GLUCOSE SERPL-MCNC: 93 MG/DL (ref 70–110)
POTASSIUM SERPL-SCNC: 4.1 MMOL/L (ref 3.5–5.1)
SODIUM SERPL-SCNC: 140 MMOL/L (ref 136–145)

## 2020-06-05 PROCEDURE — 36415 COLL VENOUS BLD VENIPUNCTURE: CPT | Mod: HCNC

## 2020-06-05 PROCEDURE — 1126F AMNT PAIN NOTED NONE PRSNT: CPT | Mod: HCNC,S$GLB,, | Performed by: INTERNAL MEDICINE

## 2020-06-05 PROCEDURE — 1159F MED LIST DOCD IN RCRD: CPT | Mod: HCNC,S$GLB,, | Performed by: INTERNAL MEDICINE

## 2020-06-05 PROCEDURE — 99215 PR OFFICE/OUTPT VISIT, EST, LEVL V, 40-54 MIN: ICD-10-PCS | Mod: HCNC,S$GLB,, | Performed by: INTERNAL MEDICINE

## 2020-06-05 PROCEDURE — 1101F PT FALLS ASSESS-DOCD LE1/YR: CPT | Mod: HCNC,CPTII,S$GLB, | Performed by: INTERNAL MEDICINE

## 2020-06-05 PROCEDURE — 99499 RISK ADDL DX/OHS AUDIT: ICD-10-PCS | Mod: HCNC,S$GLB,, | Performed by: INTERNAL MEDICINE

## 2020-06-05 PROCEDURE — 3079F PR MOST RECENT DIASTOLIC BLOOD PRESSURE 80-89 MM HG: ICD-10-PCS | Mod: HCNC,CPTII,S$GLB, | Performed by: INTERNAL MEDICINE

## 2020-06-05 PROCEDURE — 1159F PR MEDICATION LIST DOCUMENTED IN MEDICAL RECORD: ICD-10-PCS | Mod: HCNC,S$GLB,, | Performed by: INTERNAL MEDICINE

## 2020-06-05 PROCEDURE — 85652 RBC SED RATE AUTOMATED: CPT | Mod: HCNC

## 2020-06-05 PROCEDURE — 3079F DIAST BP 80-89 MM HG: CPT | Mod: HCNC,CPTII,S$GLB, | Performed by: INTERNAL MEDICINE

## 2020-06-05 PROCEDURE — 1126F PR PAIN SEVERITY QUANTIFIED, NO PAIN PRESENT: ICD-10-PCS | Mod: HCNC,S$GLB,, | Performed by: INTERNAL MEDICINE

## 2020-06-05 PROCEDURE — 3074F PR MOST RECENT SYSTOLIC BLOOD PRESSURE < 130 MM HG: ICD-10-PCS | Mod: HCNC,CPTII,S$GLB, | Performed by: INTERNAL MEDICINE

## 2020-06-05 PROCEDURE — 80048 BASIC METABOLIC PNL TOTAL CA: CPT | Mod: HCNC

## 2020-06-05 PROCEDURE — 3074F SYST BP LT 130 MM HG: CPT | Mod: HCNC,CPTII,S$GLB, | Performed by: INTERNAL MEDICINE

## 2020-06-05 PROCEDURE — 99215 OFFICE O/P EST HI 40 MIN: CPT | Mod: HCNC,S$GLB,, | Performed by: INTERNAL MEDICINE

## 2020-06-05 PROCEDURE — 1101F PR PT FALLS ASSESS DOC 0-1 FALLS W/OUT INJ PAST YR: ICD-10-PCS | Mod: HCNC,CPTII,S$GLB, | Performed by: INTERNAL MEDICINE

## 2020-06-05 PROCEDURE — 99499 UNLISTED E&M SERVICE: CPT | Mod: HCNC,S$GLB,, | Performed by: INTERNAL MEDICINE

## 2020-06-05 RX ORDER — IPRATROPIUM BROMIDE AND ALBUTEROL SULFATE 2.5; .5 MG/3ML; MG/3ML
3 SOLUTION RESPIRATORY (INHALATION)
Qty: 90 ML | Refills: 3 | Status: SHIPPED | OUTPATIENT
Start: 2020-06-05 | End: 2020-08-10 | Stop reason: SDUPTHER

## 2020-06-05 NOTE — ASSESSMENT & PLAN NOTE
Poorly controlled with rescue inhaler  · Continue ventolin  · Start spacer  · Start nebulizer and duonebs  · Training today in clinic

## 2020-06-05 NOTE — LETTER
June 11, 2020    Kelsey Fields  [address] [facility] [place] [address]             [facility]  [address] [facility] [place] [place] [address] Dear Ms. Kelsey Rosariorosa m:    Welcome to [facility].  It was a pleasure talking with you today.  My name is Dina Jason RN. I look forward to working with you as your .  My goal is to help you function at the healthiest and highest level possible. You can contact me directly at [phone].    As an [facility] patient with [employer], some of the services we may be able to provide include:      Development of an individualized care plan with a Registered Nurse    Connection with a    Connection with available resources and services     Coordinate communication among your care team members    Provide coaching and education    Help you understand your doctors treatment plan   Help you obtain information about your insurance coverage.     All services provided by [facility] and other care team members are coordinated with and communicated to your primary care team.    As part of your enrollment, you will be receiving education materials and more information about these services in your [facility] account, by phone or through the mail.  If you do not wish to participate or receive information, please contact our office at [phone].      Sincerely,      Dina Jason RN  [facility]  [email or web address]  Tel:[phone]

## 2020-06-05 NOTE — PROGRESS NOTES
"Outpatient Care Management  Initial Patient Assessment    Patient: Kelsey Fields  MRN: [id number]  Date of Service: 06/05/2020  Completed by: Dina Jason RN  Referral Date: 06/02/2020  Program:     Reason for Visit   Patient presents with    OPCM Chart Review    OPCM Enrollment Call    Initial Assessment    OPCM RN First Assessment Attempt     06/05/20- spoke with pt, requested to be called at a later date (at clinic awaiting to have labwork performed)    Plan Of Care    PHQ-9     06/11/20       Brief Summary: OPCM received referral for pt from PCP Dr. MELI Todd with a risk score of 70%  for cleaning services to clean home. OPCM was able to contact Ms. Fields introduced OPC program, discussed its benefits which pt was agreeable with participating in. Pt is an 80 y/o female with PMHx of HTN, Diabetes, Anemia, CKD, Hyperlipidemia, Asthma. Pt reports she lives alone in her single story home,works 3-4 nights a week as a  at [employer]. Pt identified her dgt Naomie Price and her sister Helga Price as support system whom she calls only if necessary. Ms. Cole expressed she is still able drive and is able to perform most of  ADL and IADL's independently.Pt's main concern at this time is her Asthma. Ms. Cole stated she has "good and bad days", does often feel over exhausted, tired but voiced all her other medical conditions were being managed by PCP and medications. Pt detailed she has a dry cough at times, uses her Albuterol inhaler and nebulizer but when asked if pt was aware of potential triggers that can cause a flare up pt detailed she did not. OPCM RN reviewed possible triggers related to Asthma, ways to avoid them, use of medications. OPCM reviewed in detailed pt medications. Pt has knowledge of medications she is using and what their purpose but expressed  that she has not "set up my spacer and nebulizer". OPCM advised on the importance of having Albuterol medication " "established should Ms. Cole require it's use. OPC detailed to Ms. Cole educational information would be mailed to her. OPCM RN requested pt read material as OPCM RN would review material and assist with any questions pt may have. Pt uses walker (for long distances in walking) and cane (for short walks). In addition pt has rails on each side of her home for entrance, shower chair, grab bars in shower. Pt eats what she described as a soft diet "food that is well cooked" easy to swallow  and watches consuming foods high in sugar .   [facility] offered to refer pt to O[facility] to assist with cleaning her home, as discussed with PCP. Ms. Cole declined referral stating she would be able to manage the paperwork and if needed she would speak with her sister to assist her. [facility] also suggested calling [employer] with pt to inquire on obtaining Vitamin D from OTC benefit. Pt stated she had contact information and would call at her discretion. OPCM RN provided pt with her contact information and let pt know if needed referral could be initiated and or help provided with either at her request. [facility] completed PHQ-9 score of 0, medication reconciliation with pt, and reviewed upcoming provider appts. In basket message sent to PCP for med discrepancies and [facility]     Assessment Documentation       [facility] Initial Assessment    Involvement of Care  Do I have permission to speak with other family members about your care?: No  Assessment completed by: Patient  Identified Areas of Need  Advanced Care Planning: No  Housing: no  Medication Adherence: No  *Active medication list was reviewed and reconciled with patient and/or caregiver:   Nutrition: no  Lab Adherence: no  Depression: No  Cognitive/Behavioral Health: no  Communication: no  Health Literacy: no  Fall risk?: No  Equipment/Supplies/Services: no                 Problem List and History     Patient Active Problem List   Diagnosis    " Esophageal stricture    Essential hypertension    Morbid obesity    Senile purpura    Mixed hyperlipidemia    Gastroesophageal reflux disease without esophagitis    Other sleep apnea    Rash    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    Mild intermittent asthma without complication    Chronic fatigue    Prediabetes    Anemia due to stage 3 chronic kidney disease    Stage 3 chronic kidney disease    Acute right ankle pain    Vitamin D deficiency    Slow transit constipation    At risk for decreased bone density    Wrist pain, chronic, right    Nuclear sclerosis, bilateral    Chronic bilateral low back pain with bilateral sciatica    Post-operative state    Nuclear sclerotic cataract of right eye    Back pain    Poor posture    Bilateral leg weakness    Gait abnormality    Hyperparathyroidism    Venous stasis    Tear film insufficiency, bilateral    Acute pain of right shoulder    Upper respiratory disease    Candidal intertrigo    Pain of toe of left foot    Muscle spasm    Dysuria    DNR (do not resuscitate)    Bug bite    CHIN (acute kidney injury)    Type 2 diabetes mellitus with stage 3 chronic kidney disease, without long-term current use of insulin       Reviewed Active Problem List with patient and/or Caregiver. The following were identified as areas of need: Asthma/ Proper use of med    Medical History:  Reviewed medical history with patient and/or caregiver    Social History:  Reviewed social history with patient and/or caregiver    Complex Care Plan    Care plan was discussed and completed today with input from patient and/or caregiver.      Patient Instructions       Asthma Trigger Checklist  Allergens, irritants, and other things may trigger your asthma. Check the box next to each of your triggers. After each trigger is a list of ways to avoid it.     Dust mites. Dust mites live in mattresses, bedding, carpets, curtains, and indoor dust.  · To kill dust mites, wash bedding in  hot water (130°F) each week.  · Cover mattress and pillows with special dust-mite-proof cases.  · Dont use upholstered furniture like sofas or chairs in the bedroom.  · Use allergy-proof filters for air conditioners and furnaces. Replace or clean them as instructed.  · If you can, replace carpeting with wood or tile zeke, especially in the bedroom.    Animals. Animals with fur or feathers shed dander (allergens).  · Its best to choose a pet that doesnt have fur or feathers, such as a fish or a reptile.  · If you have pets, keep them off of your bed and out of your bedroom.  · Wash your hands and clothes after handling pets.      Mold. Mold grows in damp places, such as bathrooms, basements, and closets.  · Ask someone to clean damp areas in your home every week. Or try wearing a face mask while you clean.  · Run an exhaust fan while bathing. Or leave a window open in the bathroom.  · Repair water leaks in or around your home.  · Have someone else cut grass or rake leaves, if possible.  · Dont use vaporizers or humidifiers. They encourage mold growth.      Pollen. Pollen from trees, grasses, and weeds is a common allergen. (Flower pollens are generally not a problem).  · Try to learn what types of pollen affect you most. Pollen levels vary depending on the plant, the season, and the time of day.  · If possible, use air conditioning instead of opening the windows in your home or car.  · Have someone else do yard work, if possible.      Cockroaches. Roaches are found in many homes and produce allergens.  · Keep your kitchen clean and dry. A leaky faucet or drain can attract roaches.  · Remove garbage from your home daily.  · Store food in tightly sealed containers. Wash dishes as soon as they are used.  · Use bait stations or traps to control roaches. Avoid using chemical sprays.      Smoke. Smoke may be from cigarettes, cigars, pipes, incense or candles, barbecues or grills, and fireplaces.  · Dont smoke. And  dont let people smoke in your home or car.  · When you travel, ask for nonsmoking rental cars and hotel rooms.  · Avoid fireplaces and wood stoves. If you cant, sit away from them. Make sure the smoke is directed outside.  · Dont burn incense or use candles.  · Move away from smoky outdoor cooking grills.      Smog.  Smog is from car exhaust and other pollution.  · Read or listen to local air-quality reports. These let you know when air quality is poor.  · Stay indoors as much as you can on smoggy days. If possible, use air conditioning instead of opening the windows.  · In your car, set air conditioning to recirculate air, so less pollution gets in.      Strong odors. These include air fresheners, deodorizers, and cleaning products; perfume, deodorant, and other beauty products; incense and candles; and insect sprays and other sprays.  · Use scent-free products like deodorant or body lotion.  · Avoid using cleaning products with bleach and ammonia. Make your own cleaning solution with white vinegar, baking soda, or mild dish soap.  · Use exhaust fans while cooking. Or open a window, if possible.   · Avoid perfumes, air fresheners, potpourri, and other scented products.      Other irritants. These include dust, aerosol sprays, and powders.  · Wear a face mask while doing tasks like sanding, dusting, sweeping, and yard work. Open doors and windows if working indoors.  · Use pump spray bottles instead of aerosols.  · Pour liquid  onto a rag or cloth instead of spraying them.      Weather. Weather conditions can trigger symptoms or make them worse.  · Watch for very high or low temperatures, very humid conditions, or a lot of wind, as these conditions can make symptoms worse.  · Limit outdoor activity during the type of weather that affects you.  · Wear a scarf over your mouth and nose in cold weather.      Colds, flu, and sinus infections. Upper respiratory infections can trigger asthma.  · Wash your hands  often with soap and warm water or use a hand  containing alcohol.  · Get a yearly flu shot. And ask if you should get a pneumonia vaccine.  · Take care of your general health. Get plenty of sleep. And eat a variety of healthy foods.      Food additives. Food additives can trigger asthma flare-ups in some people.  · Check food labels for sulfites or other similar ingredients. These are often found in foods such as wine, beer, and dried fruits.  · Avoid foods that contain these additives.      Medicine. Aspirin, NSAIDS like ibuprofen and naproxen, and heart medicines like beta-blockers may be triggers.  · Tell your health care provider if you think certain medicines trigger symptoms.   · Be sure to read the labels on over-the-counter medicines. They may have ingredients that cause symptoms for you.     , Emotions. Laughing, crying, or feeling excited are triggers for some people.   · To help you stay calm: Try breathing in slowly through your nose for a count of 2 seconds. Close your lips and breathe out for 4 seconds. Repeat.  · Try to focus on a soothing image in your mind. This will help relax you and calm your breathing.  · Remember to take your daily controller medicines. When youre upset or under stress, its easy to forget.      Exercise. For some people, exercise can trigger symptoms. Dont let this keep you from being active.   · If you have not been exercising regularly, start slow and work up gradually.  · Take all of your medicines as prescribed.  · If you use quick-relief medicine, make sure you have it with you when you exercise.  · Stop if you have any symptoms. Make sure you talk with your provider about these symptoms.  Date Last Reviewed: 1/1/2017 © 2000-2017 [employer]. [address] [place] [address], [place] [address]. All rights reserved. This information is not intended as a substitute for professional medical care. Always follow your healthcare professional's  "instructions.        Asthma (Adult)  Asthma is a disease where the medium and  small air passages within the lung go into spasm and restrict the flow of air. Inflammation and swelling of the airways cause further restriction. During an acute asthma attack, these factors cause difficulty breathing, wheezing, cough and chest tightness.    An asthma attack can be triggered by many things. Common triggers include infections such as the common cold, bronchitis, pneumonia. Irritants such as smoke or pollutants in the air, emotional upset, and exercise can also trigger an attack. In many adults with asthma, allergies to dust, mold, pollen and animal dander can cause an asthma attack. Skipping doses of daily asthma medicine can also bring on an asthma attack.  Asthma can be controlled using the proper medicines prescribed by your healthcare provider and avoiding exposure to known triggers including allergens and irritants.  Home care  · Take prescribed medicine exactly at the times advised. If you need medicine such as from a hand held inhaler or aerosol breathing machine more than every 4 hours, contact your healthcare provider or seek immediate medical attention. If prescribed an antibiotic or prednisone, take all of the medicine as prescribed, even if you are feeling better after a few days.  · Do not smoke. Avoid being exposed to the smoke of others.  · Some people with asthma have worsening of their symptoms when they take aspirin and non-steroidal or fever-reducing medicines like ibuprofen and naproxen. Talk to your healthcare provider if you think this may apply to you.  Follow-up care  Follow up with your healthcare provider, or as advised. Always bring all of your current medicines to any appointments with your healthcare provider. Also bring a complete list of medications even those not taken for asthma. If you do not already have one, talk to your healthcare provider about developing a personalized "Asthma Action " "Plan."  A pneumococcal (pneumonia) vaccine and yearly flu shot (every fall) are recommended. Ask your doctor about this.  When to seek medical advice  Call your healthcare provider right away if any of these occur:   · Increased wheezing or shortness of breath  · Need to use your inhalers more often than usual without relief  · Fever of 100.4ºF (38ºC) or higher, or as directed by your healthcare provider  · Coughing up lots of dark-colored or bloody sputum (mucus)  · Chest pain with each breath  · If you use a peak flow meter as part of an Asthma Action Plan, and you are still in the yellow zone (50% to 80%) 15 minutes after using inhaler medicine.  Call 911  Call 911 if any of the following occur  · Trouble walking or talking because of shortness of breath  · If you use a peak flow meter as part of an Asthma Action Plan and you are still in the red zone (less than 50%) 15 minutes after using inhaler medicine  · Lips or fingernails turning gray or blue  Date Last Reviewed: 12/2/2015  © 3076-5542 [employer]. [address] [place] [address]. All rights reserved. This information is not intended as a substitute for professional medical care. Always follow your healthcare professional's instructions.        Inhaler Use  The inhaler that you were prescribed contains a potent medicine. It should only be used as directed. The medicine in your inhaler must be breathed deeply into your lungs for it to work. It will not work at all if it only reaches your mouth and throat. Follow the instructions below for best results. And remember to follow your asthma action plan as given to you by your doctor.  1. Keep your inhaler at room temperature.  2. Hold the inhaler so that the part that goes into your mouth is at the bottom.  3. Shake the inhaler well and remove the cap.  4. Breathe out through your mouth to fully empty your lungs.  5. Place the inhaler in your mouth and close your lips tightly around it. " (Or hold the inhaler 1 to 2 inches from your open mouth if told to do so by your healthcare provider.)  6. Squeeze the inhaler as you breathe in slowly through your mouth until your lungs are full of air, drawing the medicine deep into your lungs.  7. Hold your breath for 10 seconds, or as long as you can comfortably hold your breath. Then breathe out slowly.  8. If you have been advised to take 2 puffs, wait 5 minutes, then repeat steps 3-7 above. Waiting 5 minutes between puffs will alow the medicine to open up your lungs so the second puff can get deeper into the lungs. Replace the cap when done.  9. If you were prescribed both a steroid inhaler and a bronchodilator inhaler, use the bronchodilator first to open the air passages. Wait 5 minutes, then use the steroid inhaler.  10. Rinse your mouth with water and spit it out (especially after using a steroid inhaler). This is very important if you are using a steroid inhaler to prevent thrush, a mild yeast infection of the mouth and back of the throat.  11. A special chamber (spacer) may be prescribed that attaches to your inhaler. This increases the amount of medicine that goes to your lungs. It also improves how well each treatment works. Ask your doctor about this if you did not receive one.    Keep it clean  Remove the metal canister and do not immerse it in water. Then clean the plastic mouthpiece, cap, and spacer if you have one, by rinsing them well in warm running water for 30 to 60 seconds. Shake off excess water and allow the mouthpiece to dry completely (overnight is recommended). This should be done once a week. If you need the inhaler before the mouthpiece is dry, shake off excess water, replace canister, and test spray 2 times (away from the face).  Warning  A steroid inhaler is used to prevent an asthma attack. Do not use this to treat an acute wheezing episode. Use only bronchodilator inhalers (quick relief) to treat an acute asthma attack.  If you  find that your medicine is not working and you need to use it more often than prescribed, this could be a sign that your asthma is getting worse. Go to the emergency room or urgent care right away. An asthma attack is easiest to treat in the early stages before it becomes severe.  When to seek medical advice  Get prompt medical attention if any of the following occur:  · Increased wheezing or shortness of breath  · Need to use your inhalers more often than usual without relief  · Fever of 100.4°F (38°C) or higher, or as directed by your healthcare provider  · Coughing up lots of dark-colored or bloody sputum (mucus)  · Chest pain with each breath  · Blue lips or fingernails  · Peak flow reading less than 50% of your normal best  Date Last Reviewed: 12/2/2015  © 1163-4798 [employer]. [address] [place] [address], [place] [address]. All rights reserved. This information is not intended as a substitute for professional medical care. Always follow your healthcare professional's instructions.        Using a Nebulizer (Adult)    A nebulizer turns medicine into a mist. You breathe the mist in through a mask or a mouthpiece. To use your nebulizer, follow the steps below.  With a mask  · Put the correct dose of medicine in the cup. Attach the top as directed.  · Connect one end of the tubing to the cup and the other end to the nebulizer.  · Attach the mask to the cup.  · Place the mask over your nose and mouth. Make sure it fits securely and comfortably.  · Turn on the nebulizer.  · Take slow, deep breaths, keeping the nebulizer upright, until all the medicine is gone. This takes 10-15 minutes.  Be sure to follow directions you are given for cleaning the nebulizer and the mask.   With a mouthpiece    · Put the correct dose of medicine in the cup. Attach the top as directed.  · Connect one end of the tubing to the cup and the other end to the nebulizer.  · Attach the mouthpiece to the cup.  · Put the mouthpiece  between your teeth and close your lips around it. Keep your tongue below the mouthpiece.  · Turn on the nebulizer.  · Take slow, deep breaths through the mouthpiece, keeping the nebulizer upright, until all the medicine is gone. This takes 10-15 minutes.  Be sure to follow directions you are given for cleaning the nebulizer and the mouthpiece.   Date Last Reviewed: 10/1/2016  © 7698-1908 [employer]. [address] [place] [address], [place] [address]. All rights reserved. This information is not intended as a substitute for professional medical care. Always follow your healthcare professional's instructions.            Patient Instructions     Instructions were provided via the [employer] patient resources and are available for the patient to view on the patient portal, if active.    Next steps: F/u with [employer] info sent Asthma, Triggers, inhaler, nebulizer (review)  See if pt contacted [employer] re: Vitamin D  Attended appt with PCP 06/15 Dr. Todd see what if any recommendations were made    Follow up in about 2 weeks (around 6/19/2020) for RN follow up.    Todays OPCM Self-Management Care Plan was developed with the patients/caregivers input and was based on identified barriers from todays assessment.  Goals were written today with the patient/caregiver and the patient has agreed to work towards these goals to improve his/her overall well-being. Patient verbalized understanding of the care plan, goals, and all of today's instructions. Encouraged patient/caregiver to communicate with his/her physician and health care team about health conditions and the treatment plan.  Provided my contact information today and encouraged patient/caregiver to call me with any questions as needed.06/05/20- OPCM RN placed call to pt to offer and enroll pt into OPCM services. Pt was contacted requested to be called a later date, pt was at clinic awaiting lab work to be performed.

## 2020-06-05 NOTE — PROGRESS NOTES
Patient, Kelsey Fields (MRN #[id number]), presented with a recent Estimated Glumerular Filtration Rate (EGFR) between 15 and 29 consistent with the definition of chronic kidney disease stage 4 (ICD10 - N18.4).    eGFR if    Date Value Ref Range Status   06/02/2020 28.1 (A) >60 mL/min/1.73 m^2 Final       The patient's chronic kidney disease stage 4 was monitored, evaluated, addressed and/or treated. This addendum to the medical record is made on 06/05/2020.

## 2020-06-05 NOTE — PROGRESS NOTES
"Primary Care Provider Appointment   SHARED NOTE: Alexus Gliman (MS4), Dr Todd (Attending)    Subjective:      Patient ID: Kelsey Fields is a 79 y.o. female with asthma, fatigue, DM     Chief Complaint: Shortness of Breath and Low-back Pain    Patient has had a long-standing cough. She uses her inhaler every 6 hours but still gets SOB a few times a day. Gets reflux very infrequently, says that cimetidine has been helping. Doesn't get reflux symptoms when she lays down. Inhaler technique still poor, patient tries to swallow medication, didn't understand what "inhale" meant. COVID-19 negative. CXR normal. ESR and CRP are elevated.    TRISTON - waiting for tubing to start using CPAP at night. Hasn't been using it.     CKD - now stage 4. Labs from 6/2/20: eGFR was 28.1. 3 months ago was 44.5. Cr was 1.9. Baseline is 1.2-1.4. Not dehydrated, drinks water often.    Back pain - had a muscle spasm. Takes Methocarbamol for relief, takes it once a week. Works at [employer] 3-4 a week, always a night shift. Concerned about finances, says that social security income is not enough. Not in any financial distress currently, but if leaves job, she is worried that she will be.    Cleaning services requested, paper in her house is causing her anxiety. [facility] referral placed on 6/1. Has a daughter in [place] and sister that live nearby and are willing to help her clean up. Says house is clean, but just needs help sorting through some boxes of paperwork on her couch.    Past Surgical History:   Procedure Laterality Date    blepharitis Bilateral 09/20/2017    Alessandra Grijalva OD    CATARACT EXTRACTION Bilateral 09/20/2017    Alessandra Grijalva MD    CATARACT EXTRACTION W/  INTRAOCULAR LENS IMPLANT Left 9/16/2019    Procedure: EXTRACTION, CATARACT, WITH IOL INSERTION;  Surgeon: Alysa De Souza MD;  Location: [facility];  Service: Ophthalmology;  Laterality: Left;    CATARACT EXTRACTION W/  INTRAOCULAR LENS IMPLANT Right 9/30/2019    Procedure: " EXTRACTION, CATARACT, WITH IOL INSERTION;  Surgeon: Alysa De Souza MD;  Location: [facility];  Service: Ophthalmology;  Laterality: Right;     SECTION      FRACTURE SURGERY      JOINT REPLACEMENT      right knee     KNEE SURGERY Right        Past Medical History:   Diagnosis Date    Arthritis     Degenerative arthritis of lumbar spine     Episcleritis of right eye     GERD (gastroesophageal reflux disease)     Hypertension     Shingles     Sleep apnea     Type 2 diabetes mellitus with stage 3 chronic kidney disease, without long-term current use of insulin 2020       Social History     Socioeconomic History    Marital status: Single     Spouse name: Not on file    Number of children: Not on file    Years of education: Not on file    Highest education level: Not on file   Occupational History    Not on file   Social Needs    Financial resource strain: Not very hard    Food insecurity:     Worry: Never true     Inability: Never true    Transportation needs:     Medical: No     Non-medical: No   Tobacco Use    Smoking status: Never Smoker    Smokeless tobacco: Never Used   Substance and Sexual Activity    Alcohol use: No    Drug use: No    Sexual activity: Never   Lifestyle    Physical activity:     Days per week: 0 days     Minutes per session: 0 min    Stress: Not at all   Relationships    Social connections:     Talks on phone: Once a week     Gets together: Once a week     Attends Hindu service: More than 4 times per year     Active member of club or organization: Yes     Attends meetings of clubs or organizations: More than 4 times per year     Relationship status: Never    Other Topics Concern    Not on file   Social History Narrative    Not on file       Review of Systems   Constitutional: Negative for appetite change, chills, fever and unexpected weight change.   HENT: Positive for trouble swallowing. Negative for congestion, sinus pain, sneezing and sore  "throat.    Eyes: Negative for discharge and itching.   Respiratory: Positive for cough, chest tightness and shortness of breath. Negative for wheezing.    Cardiovascular: Negative for chest pain.   Gastrointestinal: Negative for abdominal pain, constipation and diarrhea.   Genitourinary: Negative for difficulty urinating and dysuria.   Musculoskeletal: Positive for back pain. Negative for arthralgias.   Skin: Positive for rash.   Neurological: Negative for dizziness, light-headedness and headaches.   Psychiatric/Behavioral: Negative for confusion, decreased concentration and sleep disturbance. The patient is not nervous/anxious.        Objective:   /80   Pulse 91   Ht 5' 4" (1.626 m)   Wt 91.4 kg (201 lb 8 oz)   SpO2 98%   BMI 34.59 kg/m²     Physical Exam   Constitutional: She is oriented to person, place, and time. She appears well-developed and well-nourished.   obese   HENT:   Head: Normocephalic and atraumatic.   prognathism   Cardiovascular: Normal rate.   Pulmonary/Chest: Effort normal.   Musculoskeletal: She exhibits deformity.   Decreased ROM of R shoulder. No TTP.  Mild LE edema   Neurological: She is alert and oriented to person, place, and time.   Skin: Rash noted.   Ecchymoses and purpura on UE bilaterally  Pruritic dry skin on UE bilaterally   Psychiatric: She has a normal mood and affect. Her behavior is normal.   Vitals reviewed.      Lab Results   Component Value Date    WBC 9.39 06/02/2020    HGB 10.1 (L) 06/02/2020    HCT 33.3 (L) 06/02/2020     06/02/2020    CHOL 139 02/13/2019    TRIG 72 02/13/2019    HDL 41 02/13/2019    ALT 8 (L) 06/02/2020    AST 12 06/02/2020     06/02/2020    K 3.9 06/02/2020     06/02/2020    CREATININE 1.9 (H) 06/02/2020    BUN 16 06/02/2020    CO2 25 06/02/2020    TSH 0.725 09/20/2018    HGBA1C 6.1 (H) 12/13/2019       Current Outpatient Medications on File Prior to Visit   Medication Sig Dispense Refill    albuterol (PROVENTIL/VENTOLIN " HFA) 90 mcg/actuation inhaler Inhale 2 puffs into the lungs every 6 (six) hours as needed for Wheezing. Rescue 18 g 3    amLODIPine (NORVASC) 10 MG tablet TAKE 1 TABLET BY MOUTH ONCE DAILY 90 tablet 3    ammonium lactate 12 % Crea Apply twice daily to affected parts both feet as needed. 140 g 11    atorvastatin (LIPITOR) 40 MG tablet Take 1 tablet (40 mg total) by mouth once daily. 90 tablet 3    cholecalciferol, vitamin D3, (VITAMIN D3) 125 mcg (5,000 unit) Tab Take 1 tablet (5,000 Units total) by mouth every 48 hours. 90 tablet 3    ciclopirox (LOPROX) 0.77 % Crea Apply topically 2 (two) times daily. 90 g 2    cimetidine (TAGAMET) 400 MG tablet Take 1 tablet (400 mg total) by mouth 2 (two) times daily. 180 tablet 3    CMPD Lidocaine 2%, Prilocaine 2%, Lamotrigine 2.5%, Meloxicam 0.09% in Transdermal Gel Apply 1 Pump (1.6 g total) topically 4 (four) times daily. 240 g 3    diclofenac sodium (VOLTAREN) 1 % Gel Apply 2 g topically 2 (two) times daily. 3 Tube 3    ferrous sulfate (FEOSOL) 325 mg (65 mg iron) Tab tablet Take 1 tablet (325 mg total) by mouth daily with breakfast. 90 tablet 0    fluticasone propionate (FLONASE) 50 mcg/actuation nasal spray 1 spray (50 mcg total) by Each Nostril route once daily. 16 g 2    gabapentin (NEURONTIN) 300 MG capsule Take 1 capsule (300 mg total) by mouth 2 (two) times daily as needed. 180 capsule 3    hydrocortisone 2.5 % cream Apply topically to rash on legs twice daily 28 g 0    ketoconazole (NIZORAL) 2 % cream Apply under breast topically twice daily for rash 60 g 0    loratadine (CLARITIN) 10 mg tablet Take 1 tablet (10 mg total) by mouth once daily. 30 tablet 11    losartan (COZAAR) 25 MG tablet Take 1 tablet (25 mg total) by mouth once daily. 90 tablet 3    methocarbamoL (ROBAXIN) 750 MG Tab 1 tab every 8 hours as needed for muscle spasm 30 tablet 0    triamcinolone acetonide 0.025% (KENALOG) 0.025 % Oint Apply topically once daily to rash on arms and  legs as needed 80 g 1     No current facility-administered medications on file prior to visit.        Assessment:   79 y.o. female with multiple co-morbid illnesses here to follow-up with PCP and continue work-up of chronic issues notably with asthma, fatigue, DM .     Plan:     Problem List Items Addressed This Visit        Pulmonary    Mild intermittent asthma without complication     Poorly controlled with rescue inhaler  · Continue ventolin  · Start spacer  · Start nebulizer and duonebs  · Training today in clinic         Relevant Medications    albuterol-ipratropium (DUO-NEB) 2.5 mg-0.5 mg/3 mL nebulizer solution    Other Relevant Orders    NEBULIZER FOR HOME USE    SPACER WITH MASK FOR HOME USE       Renal/    CHIN (acute kidney injury)     Likely due to dehydration  · Increase hydration  · Recheck labs         Relevant Orders    Basic metabolic panel    Sedimentation rate       Endocrine    Type 2 diabetes mellitus with stage 3 chronic kidney disease, without long-term current use of insulin     CHIN and CKD with DM  · Monitor kidney function               Health Maintenance       Date Due Completion Date    TETANUS VACCINE 11/19/1958 ---    DEXA SCAN 05/16/2021 5/16/2019    Lipid Panel 02/13/2024 2/13/2019          Follow up in about 1 week (around 6/12/2020). Total face-to-face time was 60 min, >50% of this was spent on counseling and coordination of care. The following issues were discussed: with asthma, fatigue, DM       Prabh Khunkhun [facility] MS4    Laurie Todd MD/MPH  Internal Medicine  [facility]  [phone] [facility]

## 2020-06-11 NOTE — PATIENT INSTRUCTIONS
Asthma Trigger Checklist  Allergens, irritants, and other things may trigger your asthma. Check the box next to each of your triggers. After each trigger is a list of ways to avoid it.     Dust mites. Dust mites live in mattresses, bedding, carpets, curtains, and indoor dust.  · To kill dust mites, wash bedding in hot water (130°F) each week.  · Cover mattress and pillows with special dust-mite-proof cases.  · Dont use upholstered furniture like sofas or chairs in the bedroom.  · Use allergy-proof filters for air conditioners and furnaces. Replace or clean them as instructed.  · If you can, replace carpeting with wood or tile zeke, especially in the bedroom.    Animals. Animals with fur or feathers shed dander (allergens).  · Its best to choose a pet that doesnt have fur or feathers, such as a fish or a reptile.  · If you have pets, keep them off of your bed and out of your bedroom.  · Wash your hands and clothes after handling pets.      Mold. Mold grows in damp places, such as bathrooms, basements, and closets.  · Ask someone to clean damp areas in your home every week. Or try wearing a face mask while you clean.  · Run an exhaust fan while bathing. Or leave a window open in the bathroom.  · Repair water leaks in or around your home.  · Have someone else cut grass or rake leaves, if possible.  · Dont use vaporizers or humidifiers. They encourage mold growth.      Pollen. Pollen from trees, grasses, and weeds is a common allergen. (Flower pollens are generally not a problem).  · Try to learn what types of pollen affect you most. Pollen levels vary depending on the plant, the season, and the time of day.  · If possible, use air conditioning instead of opening the windows in your home or car.  · Have someone else do yard work, if possible.      Cockroaches. Roaches are found in many homes and produce allergens.  · Keep your kitchen clean and dry. A leaky faucet or drain can attract roaches.  · Remove  garbage from your home daily.  · Store food in tightly sealed containers. Wash dishes as soon as they are used.  · Use bait stations or traps to control roaches. Avoid using chemical sprays.      Smoke. Smoke may be from cigarettes, cigars, pipes, incense or candles, barbecues or grills, and fireplaces.  · Dont smoke. And dont let people smoke in your home or car.  · When you travel, ask for nonsmoking rental cars and hotel rooms.  · Avoid fireplaces and wood stoves. If you cant, sit away from them. Make sure the smoke is directed outside.  · Dont burn incense or use candles.  · Move away from smoky outdoor cooking grills.      Smog.  Smog is from car exhaust and other pollution.  · Read or listen to local air-quality reports. These let you know when air quality is poor.  · Stay indoors as much as you can on smoggy days. If possible, use air conditioning instead of opening the windows.  · In your car, set air conditioning to recirculate air, so less pollution gets in.      Strong odors. These include air fresheners, deodorizers, and cleaning products; perfume, deodorant, and other beauty products; incense and candles; and insect sprays and other sprays.  · Use scent-free products like deodorant or body lotion.  · Avoid using cleaning products with bleach and ammonia. Make your own cleaning solution with white vinegar, baking soda, or mild dish soap.  · Use exhaust fans while cooking. Or open a window, if possible.   · Avoid perfumes, air fresheners, potpourri, and other scented products.      Other irritants. These include dust, aerosol sprays, and powders.  · Wear a face mask while doing tasks like sanding, dusting, sweeping, and yard work. Open doors and windows if working indoors.  · Use pump spray bottles instead of aerosols.  · Pour liquid  onto a rag or cloth instead of spraying them.      Weather. Weather conditions can trigger symptoms or make them worse.  · Watch for very high or low  temperatures, very humid conditions, or a lot of wind, as these conditions can make symptoms worse.  · Limit outdoor activity during the type of weather that affects you.  · Wear a scarf over your mouth and nose in cold weather.      Colds, flu, and sinus infections. Upper respiratory infections can trigger asthma.  · Wash your hands often with soap and warm water or use a hand  containing alcohol.  · Get a yearly flu shot. And ask if you should get a pneumonia vaccine.  · Take care of your general health. Get plenty of sleep. And eat a variety of healthy foods.      Food additives. Food additives can trigger asthma flare-ups in some people.  · Check food labels for sulfites or other similar ingredients. These are often found in foods such as wine, beer, and dried fruits.  · Avoid foods that contain these additives.      Medicine. Aspirin, NSAIDS like ibuprofen and naproxen, and heart medicines like beta-blockers may be triggers.  · Tell your health care provider if you think certain medicines trigger symptoms.   · Be sure to read the labels on over-the-counter medicines. They may have ingredients that cause symptoms for you.     , Emotions. Laughing, crying, or feeling excited are triggers for some people.   · To help you stay calm: Try breathing in slowly through your nose for a count of 2 seconds. Close your lips and breathe out for 4 seconds. Repeat.  · Try to focus on a soothing image in your mind. This will help relax you and calm your breathing.  · Remember to take your daily controller medicines. When youre upset or under stress, its easy to forget.      Exercise. For some people, exercise can trigger symptoms. Dont let this keep you from being active.   · If you have not been exercising regularly, start slow and work up gradually.  · Take all of your medicines as prescribed.  · If you use quick-relief medicine, make sure you have it with you when you exercise.  · Stop if you have any  symptoms. Make sure you talk with your provider about these symptoms.  Date Last Reviewed: 1/1/2017  © 7687-0041 [employer]. [address] [place] [address], [place] [address]. All rights reserved. This information is not intended as a substitute for professional medical care. Always follow your healthcare professional's instructions.        Asthma (Adult)  Asthma is a disease where the medium and  small air passages within the lung go into spasm and restrict the flow of air. Inflammation and swelling of the airways cause further restriction. During an acute asthma attack, these factors cause difficulty breathing, wheezing, cough and chest tightness.    An asthma attack can be triggered by many things. Common triggers include infections such as the common cold, bronchitis, pneumonia. Irritants such as smoke or pollutants in the air, emotional upset, and exercise can also trigger an attack. In many adults with asthma, allergies to dust, mold, pollen and animal dander can cause an asthma attack. Skipping doses of daily asthma medicine can also bring on an asthma attack.  Asthma can be controlled using the proper medicines prescribed by your healthcare provider and avoiding exposure to known triggers including allergens and irritants.  Home care  · Take prescribed medicine exactly at the times advised. If you need medicine such as from a hand held inhaler or aerosol breathing machine more than every 4 hours, contact your healthcare provider or seek immediate medical attention. If prescribed an antibiotic or prednisone, take all of the medicine as prescribed, even if you are feeling better after a few days.  · Do not smoke. Avoid being exposed to the smoke of others.  · Some people with asthma have worsening of their symptoms when they take aspirin and non-steroidal or fever-reducing medicines like ibuprofen and naproxen. Talk to your healthcare provider if you think this may apply to you.  Follow-up care  Follow  "up with your healthcare provider, or as advised. Always bring all of your current medicines to any appointments with your healthcare provider. Also bring a complete list of medications even those not taken for asthma. If you do not already have one, talk to your healthcare provider about developing a personalized "Asthma Action Plan."  A pneumococcal (pneumonia) vaccine and yearly flu shot (every fall) are recommended. Ask your doctor about this.  When to seek medical advice  Call your healthcare provider right away if any of these occur:   · Increased wheezing or shortness of breath  · Need to use your inhalers more often than usual without relief  · Fever of 100.4ºF (38ºC) or higher, or as directed by your healthcare provider  · Coughing up lots of dark-colored or bloody sputum (mucus)  · Chest pain with each breath  · If you use a peak flow meter as part of an Asthma Action Plan, and you are still in the yellow zone (50% to 80%) 15 minutes after using inhaler medicine.  Call 911  Call 911 if any of the following occur  · Trouble walking or talking because of shortness of breath  · If you use a peak flow meter as part of an Asthma Action Plan and you are still in the red zone (less than 50%) 15 minutes after using inhaler medicine  · Lips or fingernails turning gray or blue  Date Last Reviewed: 12/2/2015  © 2255-6039 [employer]. [address] [place] [address], [place] [address]. All rights reserved. This information is not intended as a substitute for professional medical care. Always follow your healthcare professional's instructions.        Inhaler Use  The inhaler that you were prescribed contains a potent medicine. It should only be used as directed. The medicine in your inhaler must be breathed deeply into your lungs for it to work. It will not work at all if it only reaches your mouth and throat. Follow the instructions below for best results. And remember to follow your asthma action plan as given to " you by your doctor.  1. Keep your inhaler at room temperature.  2. Hold the inhaler so that the part that goes into your mouth is at the bottom.  3. Shake the inhaler well and remove the cap.  4. Breathe out through your mouth to fully empty your lungs.  5. Place the inhaler in your mouth and close your lips tightly around it. (Or hold the inhaler 1 to 2 inches from your open mouth if told to do so by your healthcare provider.)  6. Squeeze the inhaler as you breathe in slowly through your mouth until your lungs are full of air, drawing the medicine deep into your lungs.  7. Hold your breath for 10 seconds, or as long as you can comfortably hold your breath. Then breathe out slowly.  8. If you have been advised to take 2 puffs, wait 5 minutes, then repeat steps 3-7 above. Waiting 5 minutes between puffs will alow the medicine to open up your lungs so the second puff can get deeper into the lungs. Replace the cap when done.  9. If you were prescribed both a steroid inhaler and a bronchodilator inhaler, use the bronchodilator first to open the air passages. Wait 5 minutes, then use the steroid inhaler.  10. Rinse your mouth with water and spit it out (especially after using a steroid inhaler). This is very important if you are using a steroid inhaler to prevent thrush, a mild yeast infection of the mouth and back of the throat.  11. A special chamber (spacer) may be prescribed that attaches to your inhaler. This increases the amount of medicine that goes to your lungs. It also improves how well each treatment works. Ask your doctor about this if you did not receive one.    Keep it clean  Remove the metal canister and do not immerse it in water. Then clean the plastic mouthpiece, cap, and spacer if you have one, by rinsing them well in warm running water for 30 to 60 seconds. Shake off excess water and allow the mouthpiece to dry completely (overnight is recommended). This should be done once a week. If you need the  inhaler before the mouthpiece is dry, shake off excess water, replace canister, and test spray 2 times (away from the face).  Warning  A steroid inhaler is used to prevent an asthma attack. Do not use this to treat an acute wheezing episode. Use only bronchodilator inhalers (quick relief) to treat an acute asthma attack.  If you find that your medicine is not working and you need to use it more often than prescribed, this could be a sign that your asthma is getting worse. Go to the emergency room or urgent care right away. An asthma attack is easiest to treat in the early stages before it becomes severe.  When to seek medical advice  Get prompt medical attention if any of the following occur:  · Increased wheezing or shortness of breath  · Need to use your inhalers more often than usual without relief  · Fever of 100.4°F (38°C) or higher, or as directed by your healthcare provider  · Coughing up lots of dark-colored or bloody sputum (mucus)  · Chest pain with each breath  · Blue lips or fingernails  · Peak flow reading less than 50% of your normal best  Date Last Reviewed: 12/2/2015  © 7909-0516 [employer]. [address] [place] [address]. All rights reserved. This information is not intended as a substitute for professional medical care. Always follow your healthcare professional's instructions.        Using a Nebulizer (Adult)    A nebulizer turns medicine into a mist. You breathe the mist in through a mask or a mouthpiece. To use your nebulizer, follow the steps below.  With a mask  · Put the correct dose of medicine in the cup. Attach the top as directed.  · Connect one end of the tubing to the cup and the other end to the nebulizer.  · Attach the mask to the cup.  · Place the mask over your nose and mouth. Make sure it fits securely and comfortably.  · Turn on the nebulizer.  · Take slow, deep breaths, keeping the nebulizer upright, until all the medicine is gone. This takes 10-15  minutes.  Be sure to follow directions you are given for cleaning the nebulizer and the mask.   With a mouthpiece    · Put the correct dose of medicine in the cup. Attach the top as directed.  · Connect one end of the tubing to the cup and the other end to the nebulizer.  · Attach the mouthpiece to the cup.  · Put the mouthpiece between your teeth and close your lips around it. Keep your tongue below the mouthpiece.  · Turn on the nebulizer.  · Take slow, deep breaths through the mouthpiece, keeping the nebulizer upright, until all the medicine is gone. This takes 10-15 minutes.  Be sure to follow directions you are given for cleaning the nebulizer and the mouthpiece.   Date Last Reviewed: 10/1/2016  © 8837-2257 [employer]. [address] [place] [address], [place] [address]. All rights reserved. This information is not intended as a substitute for professional medical care. Always follow your healthcare professional's instructions.

## 2020-06-15 ENCOUNTER — OFFICE VISIT (OUTPATIENT)
Dept: PRIMARY CARE CLINIC | Facility: CLINIC | Age: 80
End: 2020-06-15
Payer: MEDICARE

## 2020-06-15 ENCOUNTER — LAB VISIT (OUTPATIENT)
Dept: LAB | Facility: HOSPITAL | Age: 80
End: 2020-06-15
Attending: INTERNAL MEDICINE
Payer: MEDICARE

## 2020-06-15 VITALS
HEIGHT: 64 IN | OXYGEN SATURATION: 96 % | WEIGHT: 201.75 LBS | BODY MASS INDEX: 34.44 KG/M2 | DIASTOLIC BLOOD PRESSURE: 60 MMHG | SYSTOLIC BLOOD PRESSURE: 100 MMHG | HEART RATE: 95 BPM

## 2020-06-15 DIAGNOSIS — R06.02 SHORTNESS OF BREATH: ICD-10-CM

## 2020-06-15 DIAGNOSIS — I10 ESSENTIAL HYPERTENSION: ICD-10-CM

## 2020-06-15 DIAGNOSIS — J45.20 MILD INTERMITTENT ASTHMA WITHOUT COMPLICATION: ICD-10-CM

## 2020-06-15 DIAGNOSIS — R53.82 CHRONIC FATIGUE: ICD-10-CM

## 2020-06-15 DIAGNOSIS — M48.10 DISH (DIFFUSE IDIOPATHIC SKELETAL HYPEROSTOSIS): ICD-10-CM

## 2020-06-15 DIAGNOSIS — F15.982 CAFFEINE-INDUCED SLEEP DISORDER: ICD-10-CM

## 2020-06-15 DIAGNOSIS — R53.82 CHRONIC FATIGUE: Primary | ICD-10-CM

## 2020-06-15 LAB
ANION GAP SERPL CALC-SCNC: 9 MMOL/L (ref 8–16)
BASOPHILS # BLD AUTO: 0.04 K/UL (ref 0–0.2)
BASOPHILS NFR BLD: 0.5 % (ref 0–1.9)
BUN SERPL-MCNC: 13 MG/DL (ref 8–23)
CALCIUM SERPL-MCNC: 9.6 MG/DL (ref 8.7–10.5)
CHLORIDE SERPL-SCNC: 104 MMOL/L (ref 95–110)
CO2 SERPL-SCNC: 24 MMOL/L (ref 23–29)
CREAT SERPL-MCNC: 1.5 MG/DL (ref 0.5–1.4)
DIFFERENTIAL METHOD: ABNORMAL
EOSINOPHIL # BLD AUTO: 0.5 K/UL (ref 0–0.5)
EOSINOPHIL NFR BLD: 5.4 % (ref 0–8)
ERYTHROCYTE [DISTWIDTH] IN BLOOD BY AUTOMATED COUNT: 13 % (ref 11.5–14.5)
ERYTHROCYTE [SEDIMENTATION RATE] IN BLOOD BY WESTERGREN METHOD: 84 MM/HR (ref 0–36)
EST. GFR  (AFRICAN AMERICAN): 37.4 ML/MIN/1.73 M^2
EST. GFR  (NON AFRICAN AMERICAN): 32.4 ML/MIN/1.73 M^2
GLUCOSE SERPL-MCNC: 105 MG/DL (ref 70–110)
HCT VFR BLD AUTO: 35.3 % (ref 37–48.5)
HGB BLD-MCNC: 10.2 G/DL (ref 12–16)
IMM GRANULOCYTES # BLD AUTO: 0.02 K/UL (ref 0–0.04)
IMM GRANULOCYTES NFR BLD AUTO: 0.2 % (ref 0–0.5)
LYMPHOCYTES # BLD AUTO: 2.3 K/UL (ref 1–4.8)
LYMPHOCYTES NFR BLD: 26.5 % (ref 18–48)
MCH RBC QN AUTO: 26.8 PG (ref 27–31)
MCHC RBC AUTO-ENTMCNC: 28.9 G/DL (ref 32–36)
MCV RBC AUTO: 93 FL (ref 82–98)
MONOCYTES # BLD AUTO: 0.8 K/UL (ref 0.3–1)
MONOCYTES NFR BLD: 9 % (ref 4–15)
NEUTROPHILS # BLD AUTO: 5 K/UL (ref 1.8–7.7)
NEUTROPHILS NFR BLD: 58.4 % (ref 38–73)
NRBC BLD-RTO: 0 /100 WBC
PLATELET # BLD AUTO: 345 K/UL (ref 150–350)
PMV BLD AUTO: 10.4 FL (ref 9.2–12.9)
POTASSIUM SERPL-SCNC: 4.2 MMOL/L (ref 3.5–5.1)
RBC # BLD AUTO: 3.81 M/UL (ref 4–5.4)
SODIUM SERPL-SCNC: 137 MMOL/L (ref 136–145)
WBC # BLD AUTO: 8.54 K/UL (ref 3.9–12.7)

## 2020-06-15 PROCEDURE — 1159F MED LIST DOCD IN RCRD: CPT | Mod: HCNC,S$GLB,, | Performed by: INTERNAL MEDICINE

## 2020-06-15 PROCEDURE — 99215 OFFICE O/P EST HI 40 MIN: CPT | Mod: HCNC,S$GLB,, | Performed by: INTERNAL MEDICINE

## 2020-06-15 PROCEDURE — 99215 PR OFFICE/OUTPT VISIT, EST, LEVL V, 40-54 MIN: ICD-10-PCS | Mod: HCNC,S$GLB,, | Performed by: INTERNAL MEDICINE

## 2020-06-15 PROCEDURE — 1101F PR PT FALLS ASSESS DOC 0-1 FALLS W/OUT INJ PAST YR: ICD-10-PCS | Mod: HCNC,CPTII,S$GLB, | Performed by: INTERNAL MEDICINE

## 2020-06-15 PROCEDURE — 1159F PR MEDICATION LIST DOCUMENTED IN MEDICAL RECORD: ICD-10-PCS | Mod: HCNC,S$GLB,, | Performed by: INTERNAL MEDICINE

## 2020-06-15 PROCEDURE — 99499 RISK ADDL DX/OHS AUDIT: ICD-10-PCS | Mod: HCNC,S$GLB,, | Performed by: INTERNAL MEDICINE

## 2020-06-15 PROCEDURE — 1126F AMNT PAIN NOTED NONE PRSNT: CPT | Mod: HCNC,S$GLB,, | Performed by: INTERNAL MEDICINE

## 2020-06-15 PROCEDURE — 99499 UNLISTED E&M SERVICE: CPT | Mod: HCNC,S$GLB,, | Performed by: INTERNAL MEDICINE

## 2020-06-15 PROCEDURE — 3078F DIAST BP <80 MM HG: CPT | Mod: HCNC,CPTII,S$GLB, | Performed by: INTERNAL MEDICINE

## 2020-06-15 PROCEDURE — 85652 RBC SED RATE AUTOMATED: CPT | Mod: HCNC

## 2020-06-15 PROCEDURE — 3074F SYST BP LT 130 MM HG: CPT | Mod: HCNC,CPTII,S$GLB, | Performed by: INTERNAL MEDICINE

## 2020-06-15 PROCEDURE — 1126F PR PAIN SEVERITY QUANTIFIED, NO PAIN PRESENT: ICD-10-PCS | Mod: HCNC,S$GLB,, | Performed by: INTERNAL MEDICINE

## 2020-06-15 PROCEDURE — 80048 BASIC METABOLIC PNL TOTAL CA: CPT | Mod: HCNC

## 2020-06-15 PROCEDURE — 3078F PR MOST RECENT DIASTOLIC BLOOD PRESSURE < 80 MM HG: ICD-10-PCS | Mod: HCNC,CPTII,S$GLB, | Performed by: INTERNAL MEDICINE

## 2020-06-15 PROCEDURE — 85025 COMPLETE CBC W/AUTO DIFF WBC: CPT | Mod: HCNC

## 2020-06-15 PROCEDURE — 36415 COLL VENOUS BLD VENIPUNCTURE: CPT | Mod: HCNC

## 2020-06-15 PROCEDURE — 1101F PT FALLS ASSESS-DOCD LE1/YR: CPT | Mod: HCNC,CPTII,S$GLB, | Performed by: INTERNAL MEDICINE

## 2020-06-15 PROCEDURE — 3074F PR MOST RECENT SYSTOLIC BLOOD PRESSURE < 130 MM HG: ICD-10-PCS | Mod: HCNC,CPTII,S$GLB, | Performed by: INTERNAL MEDICINE

## 2020-06-15 NOTE — Clinical Note
Dr Rice,  Is there any benefit of sending this patient to Rheum for DISH? From what I read supportive care is all that can be done, but didn't know if there was something else that could be added by Rheum.    Thanks!  KJ

## 2020-06-15 NOTE — PATIENT INSTRUCTIONS
TODAY  - PCP to message Rheumatology about your back disease (DISH)  - labs today  - consider quitting your job  - pulmonary function test

## 2020-06-15 NOTE — ASSESSMENT & PLAN NOTE
Poorly controlled with rescue inhaler  · Continue ventolin  · Unable to use spacer  · Advised to use nebulizer and duonebs

## 2020-06-15 NOTE — PROGRESS NOTES
"Primary Care Provider Appointment    Subjective:      Patient ID: Kelsey Fields is a 79 y.o. female with cough, DISH, insomnia, weakness    Chief Complaint: Follow-up, Fatigue (pt states she have no energy, she have been drinking energy drinks ), Cough (she states the cough never left), and Shortness of Breath (not too bad .)    Patient continues to complain of cough. She was last seen by Pulm in 2017, was referred for sleep study. Last sleep study at [facility] was . Has a CPAP, but isn't using it. She drinks 3-12 energy drinks daily.    Was diagnosed with DISH on last XRAY. Was prescribed robaxin in urgent care. Is taking that regularly for her back pain, but continues to have severe kyphosis. Had injury in care accident 40 years ago and refers to this frequently.    Her BP is low today, may have dehydration. Has lost weight this year (7-10lbs). States she has been eating "soups in the can." Hg trending down.     She received training in using a spacer with her inhaler, and is compliant with that but "its kind of complicated." She is not using the spacer nor the duonebs. Has tested neg for COVID twice.    Past Surgical History:   Procedure Laterality Date    blepharitis Bilateral 2017    Alessandra Grijalva OD    CATARACT EXTRACTION Bilateral 2017    Alessandra Grijalva MD    CATARACT EXTRACTION W/  INTRAOCULAR LENS IMPLANT Left 2019    Procedure: EXTRACTION, CATARACT, WITH IOL INSERTION;  Surgeon: Alysa De Souza MD;  Location: [facility] OR;  Service: Ophthalmology;  Laterality: Left;    CATARACT EXTRACTION W/  INTRAOCULAR LENS IMPLANT Right 2019    Procedure: EXTRACTION, CATARACT, WITH IOL INSERTION;  Surgeon: Alysa De Souza MD;  Location: [facility] OR;  Service: Ophthalmology;  Laterality: Right;     SECTION      FRACTURE SURGERY      JOINT REPLACEMENT      right knee     KNEE SURGERY Right        Past Medical History:   Diagnosis Date    Arthritis     Degenerative arthritis of lumbar " spine     Episcleritis of right eye     GERD (gastroesophageal reflux disease)     Hypertension     Shingles     Sleep apnea     Type 2 diabetes mellitus with stage 3 chronic kidney disease, without long-term current use of insulin 6/5/2020       Social History     Socioeconomic History    Marital status: Single     Spouse name: Not on file    Number of children: Not on file    Years of education: Not on file    Highest education level: Not on file   Occupational History    Not on file   Social Needs    Financial resource strain: Not very hard    Food insecurity     Worry: Never true     Inability: Never true    Transportation needs     Medical: No     Non-medical: No   Tobacco Use    Smoking status: Never Smoker    Smokeless tobacco: Never Used   Substance and Sexual Activity    Alcohol use: No    Drug use: No    Sexual activity: Never   Lifestyle    Physical activity     Days per week: 0 days     Minutes per session: 0 min    Stress: Not at all   Relationships    Social connections     Talks on phone: Once a week     Gets together: Once a week     Attends [employer] service: More than 4 times per year     Active member of club or organization: Yes     Attends meetings of clubs or organizations: More than 4 times per year     Relationship status: Never    Other Topics Concern    Not on file   Social History Narrative    Not on file       Review of Systems   Constitutional: Negative for appetite change, chills, fever and unexpected weight change.   HENT: Positive for trouble swallowing. Negative for congestion, sinus pain, sneezing and sore throat.    Eyes: Negative for discharge and itching.   Respiratory: Positive for cough and shortness of breath. Negative for chest tightness and wheezing.    Cardiovascular: Negative for chest pain.   Gastrointestinal: Negative for abdominal pain, constipation and diarrhea.   Genitourinary: Negative for difficulty urinating and dysuria.  "  Musculoskeletal: Positive for back pain. Negative for arthralgias.   Skin: Positive for rash.   Neurological: Negative for dizziness, light-headedness and headaches.   Psychiatric/Behavioral: Negative for confusion, decreased concentration and sleep disturbance. The patient is not nervous/anxious.        Objective:   /60   Pulse 95   Ht 5' 4" (1.626 m)   Wt 91.5 kg (201 lb 11.5 oz)   SpO2 96%   BMI 34.63 kg/m²     Physical Exam  Vitals signs reviewed.   Constitutional:       Appearance: She is well-developed.      Comments: obese   HENT:      Head: Normocephalic and atraumatic.   Cardiovascular:      Rate and Rhythm: Normal rate.   Pulmonary:      Effort: Pulmonary effort is normal.   Musculoskeletal:         General: Deformity present.      Comments: Decreased ROM of R shoulder. No TTP.  Mild LE edema   Skin:     Findings: Rash present.      Comments: Ecchymoses and purpura on UE bilaterally  Pruritic dry skin on UE bilaterally   Neurological:      Mental Status: She is alert and oriented to person, place, and time.   Psychiatric:         Behavior: Behavior normal.         Lab Results   Component Value Date    WBC 9.39 06/02/2020    HGB 10.1 (L) 06/02/2020    HCT 33.3 (L) 06/02/2020     06/02/2020    CHOL 139 02/13/2019    TRIG 72 02/13/2019    HDL 41 02/13/2019    ALT 8 (L) 06/02/2020    AST 12 06/02/2020     06/05/2020    K 4.1 06/05/2020     06/05/2020    CREATININE 1.4 06/05/2020    BUN 12 06/05/2020    CO2 26 06/05/2020    TSH 0.725 09/20/2018    HGBA1C 6.1 (H) 12/13/2019       RESULTS: Reviewed labs and images today    Assessment:   79 y.o. female with multiple co-morbid illnesses here to continue work-up of chronic issues notably with cough, DISH, insomnia, weakness.     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  · Advised to stop her overnight work  · Advised to cut back on caffeine  · Advised to repeat sleep study            Pulmonary    Mild intermittent asthma without complication     Poorly controlled with rescue inhaler  · Continue ventolin  · Unable to use spacer  · Advised to use nebulizer and duonebs            Cardiac/Vascular    Essential hypertension     BP well-controlled on CCB and ARB  · Continue amlodipine 10mg and losartan 25mg  · PCP discontinued ASA  · Cardiology increased statin to atorvastatin 40mg  · HCTZ discarded due to patient confusion over its PRN use            Other    Chronic fatigue - Primary    Relevant Orders    CBC auto differential    Sedimentation rate    Basic metabolic panel    Complete PFT w/ bronchodilator    Shortness of breath    Relevant Orders    Complete PFT w/ bronchodilator          Health Maintenance       Date Due Completion Date    TETANUS VACCINE 11/19/1956 ---    Cervical Cancer Screening 11/19/1959 ---    DEXA SCAN 05/16/2021 5/16/2019    Lipid Panel 02/13/2024 2/13/2019          Follow up in about 2 weeks (around 6/29/2020). Total face-to-face time was 60 min, >50% of this was spent on counseling and coordination of care. The following issues were discussed: with cough, DISH, insomnia, weakness    Laurie Todd MD/MPH  Internal Medicine  [facility]  [phone]

## 2020-06-16 ENCOUNTER — LAB VISIT (OUTPATIENT)
Dept: INTERNAL MEDICINE | Facility: CLINIC | Age: 80
End: 2020-06-16
Payer: MEDICARE

## 2020-06-16 ENCOUNTER — TELEPHONE (OUTPATIENT)
Dept: PRIMARY CARE CLINIC | Facility: CLINIC | Age: 80
End: 2020-06-16

## 2020-06-16 DIAGNOSIS — R06.02 SHORTNESS OF BREATH: ICD-10-CM

## 2020-06-16 DIAGNOSIS — R53.82 CHRONIC FATIGUE: ICD-10-CM

## 2020-06-16 LAB — SARS-COV-2 RNA RESP QL NAA+PROBE: NOT DETECTED

## 2020-06-16 PROCEDURE — U0003 INFECTIOUS AGENT DETECTION BY NUCLEIC ACID (DNA OR RNA); SEVERE ACUTE RESPIRATORY SYNDROME CORONAVIRUS 2 (SARS-COV-2) (CORONAVIRUS DISEASE [COVID-19]), AMPLIFIED PROBE TECHNIQUE, MAKING USE OF HIGH THROUGHPUT TECHNOLOGIES AS DESCRIBED BY CMS-2020-01-R: HCPCS | Mod: HCNC

## 2020-06-16 NOTE — TELEPHONE ENCOUNTER
Labs are stable. Patient needs to increase her hydration with water. She also needs to sleep! She is not getting enough sleep with her job .    Today is her COVID testing. Please make sure she comes in for that, otherwise she will not be able to get her lung test on Thurs.    Thanks,  KARLY

## 2020-06-16 NOTE — TELEPHONE ENCOUNTER
Meghann Garcia MA  You 10 minutes ago (2:27 PM)     Spoke w/ pt she staes that she have not been taking her meds ever since you told her her bp was too low , she states she will not take the meds because that will drop it more pt states she had to go get her taxes done today and also get tested for COVID she is now home she stated she slept for about 1 hour now she needs to go to [employer] she goes to work for [employer] she states she cannot get any rest and is a busy person     Routing comment

## 2020-06-16 NOTE — TELEPHONE ENCOUNTER
Please let patient know that I spoke with Rheumatology about her spinal condition, and she does not need to do anything else expect continue with her current meds for pain control.    She definitely needs to get more sleep. How is she doing with that?    Thanks,  MD Laurie Solano MD             You are correct there is nothing else for rheumatology to add.  Take care.    Previous Messages    ----- Message -----   From: Laurie Todd MD   Sent: 6/15/2020  12:28 PM CDT   To: MD Dr Dwayne Hilario,   Is there any benefit of sending this patient to Rheum for DISH? From what I read supportive care is all that can be done, but didn't know if there was something else that could be added by Rheum.     Thanks!   KARLY

## 2020-06-18 ENCOUNTER — TELEPHONE (OUTPATIENT)
Dept: PRIMARY CARE CLINIC | Facility: CLINIC | Age: 80
End: 2020-06-18

## 2020-06-18 ENCOUNTER — HOSPITAL ENCOUNTER (OUTPATIENT)
Dept: PULMONOLOGY | Facility: CLINIC | Age: 80
Discharge: HOME | End: 2020-06-18
Payer: MEDICARE

## 2020-06-18 ENCOUNTER — PATIENT OUTREACH (OUTPATIENT)
Dept: ADMINISTRATIVE | Facility: OTHER | Age: 80
End: 2020-06-18

## 2020-06-18 DIAGNOSIS — R06.02 SHORTNESS OF BREATH: ICD-10-CM

## 2020-06-18 DIAGNOSIS — R53.82 CHRONIC FATIGUE: ICD-10-CM

## 2020-06-19 ENCOUNTER — OUTPATIENT CASE MANAGEMENT (OUTPATIENT)
Dept: ADMINISTRATIVE | Facility: OTHER | Age: 80
End: 2020-06-19

## 2020-06-19 NOTE — PROGRESS NOTES
"Outpatient Care Management  Plan of Care Follow Up Visit    Patient: Kelsey Fields  MRN: [id number]  Date of Service: 06/19/2020  Completed by: Dina Jason RN  Referral Date: 06/02/2020  Program:     Reason for Visit   Patient presents with    Update Plan Of Care     06/19/20- spoke with pt        Brief Summary: 06/19/20  OPCM RN placed call to pt/cg for continued follow up with OPCM services. Ms. Fields was contacted, stated she could not complete PFT test yesterday, "I cant blow up balloon", after several attempts. Pt reports Dr. Todd is aware and will wait to see if test will need to be repeated or alternative treatment advised. Pt reports she is tired. OPCM suggested pt rest periodically, allow herself enough time to sleep, take frequent breaks if necessary. Ms. Fields confirmed receiving [facility] [employer] [facility] info.OPCM RN reviewed information in detail to ensure pt was able to understand. Pt stated she was instructed on use on inhaler and spacer, feels she is able to inhale better and take meds. Reviewed triggers with pt (smoking pt does not smoke, dust, pollen. Pt detailed she avoids pt that smoke, .   OPCM RN discussed s/s of Asthama -SOB, chest pain, wheezing, coughing with pt. Pt was encouraged to eat small meals (soft diet)- states food will get stuck in her throat. Reinforced to hydrate with water (add fruit to water- blueberries cut up)    Patient Summary     Involvement of Care:  Do I have permission to speak with other family members about your care?   No    Patient Reported Labs & Vitals:  1.  Any Patient Reported Labs & Vitals?     2.  Patient Reported Blood Pressure:     3.  Patient Reported Pulse:     4.  Patient Reported Weight (Kg):     5.  Patient Reported Blood Glucose (mg/dl):       Medical History:  Reviewed medical history with patient and/or caregiver    Social History:  Reviewed social history with patient and/or caregiver    Clinical Assessment     Reviewed and provided basic " information on available community resources for mental health, transportation, wellness resources, and palliative care programs with patient and/or caregiver.    Complex Care Plan     Care plan was discussed and completed today with input from patient and/or caregiver.      Patient Instructions     Instructions were provided via the [facility] patient resources and are available for the patient to view on the patient portal.    Next Steps: contact pt prior to appt with PCP Dr. Todd remind to speak with PCP re: fluid intake how much is suggested for pt  Discuss having to have soft diet (food per pt get stuck in throat)  Review s/s of Asthma, see if she is able to continue to use inhaler    Follow up in about 10 days (around 6/29/2020) for RN follow up.      Todays OPCM Self-Management Care Plan was developed with the patients/caregivers input and was based on identified barriers from todays assessment.  Goals were written today with the patient/caregiver and the patient has agreed to work towards these goals to improve his/her overall well-being. Patient verbalized understanding of the care plan, goals, and all of today's instructions. Encouraged patient/caregiver to communicate with his/her physician and health care team about health conditions and the treatment plan.  Provided my contact information today and encouraged patient/caregiver to call me with any questions as needed.

## 2020-06-29 ENCOUNTER — OUTPATIENT CASE MANAGEMENT (OUTPATIENT)
Dept: ADMINISTRATIVE | Facility: OTHER | Age: 80
End: 2020-06-29

## 2020-06-29 NOTE — PROGRESS NOTES
"Outpatient Care Management  Plan of Care Follow Up Visit    Patient: Kelsey Fields  MRN: [id number]  Date of Service: 06/29/2020  Completed by: Dina Jason RN  Referral Date: 06/02/2020  Program:     Reason for Visit   Patient presents with    Update Plan Of Care     06/29/20        Brief Summary:  OPCM RN placed call to pt/cg for continued follow up with OPCM services. Ms. Kelsey Fields was contacted , stated she was feeling extremely sore in her neck after falling asleep in her chair. Pt has stated she can not fully turn her neck to the right without feeling pain. OPCM suggested using hot/cold compresses to relax her neck muscles, apply topical pain analgesic which she has Voltaren to ease discomfort, take Tylenol . Ms. Fields stated she is doing the above recommendations but would also speak with Dr. Todd during her upcoming appointment on July 7,2020.    Pt verbalized she was not experiencing Asthma flare up. No s/s verbalized during last MD appt she was demonstrated how to use inhaler properly. Pt stated she was having trouble with inhaling med, pt voiced she is could not up "blow up" balloon while doing PFT test. Dr. Todd is aware.  Pt advised to speak with Dr. Todd on what recommendation she would like to make regarding future testing or how she would like to proceed. OPCM RN encouraged pt to increase fluid  as she can tolerate. Ms. Fields voiced she bought Ensure supplements which she was not sure if they were helping her or not currently. OPCM suggested informing Dr. Todd that pt has added supplements to her diet. Pt agreed to continued communication with OPCM RN     Patient Summary     Involvement of Care:  Do I have permission to speak with other family members about your care?   No     Patient Reported Labs & Vitals:  1.  Any Patient Reported Labs & Vitals?     2.  Patient Reported Blood Pressure:     3.  Patient Reported Pulse:     4.  Patient " Reported Weight (Kg):     5.  Patient Reported Blood Glucose (mg/dl):       Medical and social history was reviewed with patient and/or caregiver.     Clinical Assessment     Reviewed and provided basic information on available community resources for mental health, transportation, wellness resources, and palliative care programs with patient and/or caregiver.     Complex Care Plan     Care plan was discussed and completed today with input from patient and/or caregiver.    Patient Instructions     Instructions were provided via the [facility] patient resources and are available for the patient to view on the patient portal.    Next Steps: F/u with appt to PCP Dr. Todd on 07/01  See how pt is managing Asthma symptoms   Review compliance with meds    Follow up in about 10 days (around 7/9/2020) for RN follow up.    [facility] OPCM Self-Management Care Plan was developed with the patients/caregivers input and was based on identified barriers from todays assessment.  Goals were written today with the patient/caregiver and the patient has agreed to work towards these goals to improve his/her overall well-being. Patient verbalized understanding of the care plan, goals, and all of today's instructions. Encouraged patient/caregiver to communicate with his/her physician and health care team about health conditions and the treatment plan.  Provided my contact information today and encouraged patient/caregiver to call me with any questions as needed.

## 2020-07-06 ENCOUNTER — OFFICE VISIT (OUTPATIENT)
Dept: PRIMARY CARE CLINIC | Facility: CLINIC | Age: 80
End: 2020-07-06
Payer: MEDICARE

## 2020-07-06 ENCOUNTER — HOSPITAL ENCOUNTER (OUTPATIENT)
Dept: RADIOLOGY | Facility: HOSPITAL | Age: 80
Discharge: HOME | End: 2020-07-06
Attending: INTERNAL MEDICINE
Payer: MEDICARE

## 2020-07-06 VITALS — BODY MASS INDEX: 34.25 KG/M2 | DIASTOLIC BLOOD PRESSURE: 70 MMHG | SYSTOLIC BLOOD PRESSURE: 147 MMHG | WEIGHT: 199.5 LBS

## 2020-07-06 DIAGNOSIS — M25.511 ACUTE PAIN OF RIGHT SHOULDER: ICD-10-CM

## 2020-07-06 DIAGNOSIS — G95.9 CERVICAL MYELOPATHY: ICD-10-CM

## 2020-07-06 DIAGNOSIS — M48.10 DISH (DIFFUSE IDIOPATHIC SKELETAL HYPEROSTOSIS): ICD-10-CM

## 2020-07-06 PROCEDURE — 99499 UNLISTED E&M SERVICE: CPT | Mod: S$GLB,,, | Performed by: INTERNAL MEDICINE

## 2020-07-06 PROCEDURE — 73030 XR SHOULDER COMPLETE 2 OR MORE VIEWS RIGHT: ICD-10-PCS | Mod: 26,HCNC,RT, | Performed by: RADIOLOGY

## 2020-07-06 PROCEDURE — 3077F PR MOST RECENT SYSTOLIC BLOOD PRESSURE >= 140 MM HG: ICD-10-PCS | Mod: HCNC,CPTII,S$GLB, | Performed by: INTERNAL MEDICINE

## 2020-07-06 PROCEDURE — 3078F PR MOST RECENT DIASTOLIC BLOOD PRESSURE < 80 MM HG: ICD-10-PCS | Mod: HCNC,CPTII,S$GLB, | Performed by: INTERNAL MEDICINE

## 2020-07-06 PROCEDURE — 99215 OFFICE O/P EST HI 40 MIN: CPT | Mod: HCNC,S$GLB,, | Performed by: INTERNAL MEDICINE

## 2020-07-06 PROCEDURE — 99215 PR OFFICE/OUTPT VISIT, EST, LEVL V, 40-54 MIN: ICD-10-PCS | Mod: HCNC,S$GLB,, | Performed by: INTERNAL MEDICINE

## 2020-07-06 PROCEDURE — 73030 X-RAY EXAM OF SHOULDER: CPT | Mod: TC,HCNC,RT

## 2020-07-06 PROCEDURE — 1101F PT FALLS ASSESS-DOCD LE1/YR: CPT | Mod: HCNC,CPTII,S$GLB, | Performed by: INTERNAL MEDICINE

## 2020-07-06 PROCEDURE — 73030 X-RAY EXAM OF SHOULDER: CPT | Mod: 26,HCNC,RT, | Performed by: RADIOLOGY

## 2020-07-06 PROCEDURE — 3078F DIAST BP <80 MM HG: CPT | Mod: HCNC,CPTII,S$GLB, | Performed by: INTERNAL MEDICINE

## 2020-07-06 PROCEDURE — 99499 RISK ADDL DX/OHS AUDIT: ICD-10-PCS | Mod: S$GLB,,, | Performed by: INTERNAL MEDICINE

## 2020-07-06 PROCEDURE — 3077F SYST BP >= 140 MM HG: CPT | Mod: HCNC,CPTII,S$GLB, | Performed by: INTERNAL MEDICINE

## 2020-07-06 PROCEDURE — 1101F PR PT FALLS ASSESS DOC 0-1 FALLS W/OUT INJ PAST YR: ICD-10-PCS | Mod: HCNC,CPTII,S$GLB, | Performed by: INTERNAL MEDICINE

## 2020-07-06 PROCEDURE — 1159F PR MEDICATION LIST DOCUMENTED IN MEDICAL RECORD: ICD-10-PCS | Mod: HCNC,S$GLB,, | Performed by: INTERNAL MEDICINE

## 2020-07-06 PROCEDURE — 1159F MED LIST DOCD IN RCRD: CPT | Mod: HCNC,S$GLB,, | Performed by: INTERNAL MEDICINE

## 2020-07-06 RX ORDER — GABAPENTIN 100 MG/1
100 CAPSULE ORAL 3 TIMES DAILY
Qty: 90 CAPSULE | Refills: 11 | Status: CANCELLED | OUTPATIENT
Start: 2020-07-06 | End: 2021-07-06

## 2020-07-06 NOTE — PROGRESS NOTES
Primary Care Provider Appointment   SHARED NOTE: Brody Salgado (MS4), Dr Todd (Attending)    Subjective:      Patient ID: Kelsey Fields is a 79 y.o. female with HTN, DISH, CKD who presents for R shoulder pain.      Chief Complaint: R shoulder     She started having neck pain ~2w ago which eventually shifted to just her R shoulder ~5d ago. 25/10 pain. She has tried biofreeze, muscle rub, hot packs, cold packs without any remission in pain. Tried tylenol 500mg, robaxin. Neither of which have helped. The cold pack seems to help, but only a little bit. Sharp, shocking type of pain. She describes it similar to a toothache. She is having trouble sleeping at night d/t pain. Turning her head to R exacerbates this pain. She has no h/o falls. She is TTP at the acromion. ESR is elevated for her age.     She takes amlodipine. Stopped the losartan on 6/15 d/t BP of 100/60. She is now elevated likely due to pain.    She has had a cough for 4-5mos. Has not changed during this time. Doesn't seem to cough more during any particular time of the day. She has been adherent to her cimetidine. She has been using her duonebs and albuterol inhaler. She is unsure whether it has been helpful as she has be distracted by significant shoulder pain.    She has been getting up at night and going to the bathroom multiple times to urinate for about 4 mos now. She wears depends as she is sometimes unable to make it to the bathroom. She feels that when she does go, she is voiding a normal volume. She has not noticed leakage in relation to coughing.     She has not yet seen rheumatology for DISH, but PCP communicated with Dr Fitzgerald who recommended supportive care.       Past Surgical History:   Procedure Laterality Date    blepharitis Bilateral 09/20/2017    Alessandra Grijalva OD    CATARACT EXTRACTION Bilateral 09/20/2017    Alessandra Grijalva MD    CATARACT EXTRACTION W/  INTRAOCULAR LENS IMPLANT Left 9/16/2019    Procedure: EXTRACTION,  CATARACT, WITH IOL INSERTION;  Surgeon: Alysa De Souza MD;  Location: [facility];  Service: Ophthalmology;  Laterality: Left;    CATARACT EXTRACTION W/  INTRAOCULAR LENS IMPLANT Right 2019    Procedure: EXTRACTION, CATARACT, WITH IOL INSERTION;  Surgeon: Alysa De Souza MD;  Location: [facility];  Service: Ophthalmology;  Laterality: Right;     SECTION      FRACTURE SURGERY      JOINT REPLACEMENT      right knee     KNEE SURGERY Right        Past Medical History:   Diagnosis Date    Arthritis     Degenerative arthritis of lumbar spine     Episcleritis of right eye     GERD (gastroesophageal reflux disease)     Hypertension     Shingles     Sleep apnea     Type 2 diabetes mellitus with stage 3 chronic kidney disease, without long-term current use of insulin 2020       Social History     Socioeconomic History    Marital status: Single     Spouse name: Not on file    Number of children: Not on file    Years of education: Not on file    Highest education level: Not on file   Occupational History    Not on file   Social Needs    Financial resource strain: Not very hard    Food insecurity     Worry: Never true     Inability: Never true    Transportation needs     Medical: No     Non-medical: No   Tobacco Use    Smoking status: Never Smoker    Smokeless tobacco: Never Used   Substance and Sexual Activity    Alcohol use: No    Drug use: No    Sexual activity: Never   Lifestyle    Physical activity     Days per week: 0 days     Minutes per session: 0 min    Stress: Not at all   Relationships    Social connections     Talks on phone: Once a week     Gets together: Once a week     Attends [employer] service: More than 4 times per year     Active member of club or organization: Yes     Attends meetings of clubs or organizations: More than 4 times per year     Relationship status: Never    Other Topics Concern    Not on file   Social History Narrative    Not on file       Review of  Systems   Constitutional: Negative for chills and fever.   HENT: Negative for rhinorrhea and sore throat.    Eyes: Negative for pain and visual disturbance.   Respiratory: Positive for cough (4-5 mos). Negative for shortness of breath.    Cardiovascular: Negative for chest pain and palpitations.   Gastrointestinal: Negative for abdominal pain, nausea and vomiting.   Genitourinary: Positive for frequency (up at night). Negative for dysuria.   Musculoskeletal: Positive for arthralgias and back pain.   Skin: Negative for color change and rash.   Neurological: Negative for dizziness and headaches.       Objective:   BP (!) 147/70   Wt 90.5 kg (199 lb 8.3 oz)   BMI 34.25 kg/m²     Physical Exam  Constitutional:       Appearance: Normal appearance.   HENT:      Head: Normocephalic and atraumatic.      Nose: Nose normal.      Mouth/Throat:      Mouth: Mucous membranes are moist.      Pharynx: Oropharynx is clear.   Eyes:      Conjunctiva/sclera: Conjunctivae normal.      Pupils: Pupils are equal, round, and reactive to light.   Neck:      Musculoskeletal: Normal range of motion and neck supple.   Cardiovascular:      Rate and Rhythm: Normal rate.      Pulses: Normal pulses.      Heart sounds: Murmur present.      Comments: Systolic murmur in aortic area  Pulmonary:      Effort: Pulmonary effort is normal.      Breath sounds: Decreased breath sounds (diffusely; low volumes) present.   Abdominal:      General: Bowel sounds are normal.      Palpations: Abdomen is soft.   Musculoskeletal:         General: Tenderness present.      Right shoulder: She exhibits bony tenderness.   Skin:     General: Skin is warm and dry.   Neurological:      General: No focal deficit present.      Mental Status: She is alert and oriented to person, place, and time.   Psychiatric:         Mood and Affect: Mood normal.         Behavior: Behavior normal.       Lab Results   Component Value Date    WBC 8.54 06/15/2020    HGB 10.2 (L) 06/15/2020     HCT 35.3 (L) 06/15/2020     06/15/2020    CHOL 139 02/13/2019    TRIG 72 02/13/2019    HDL 41 02/13/2019    ALT 8 (L) 06/02/2020    AST 12 06/02/2020     06/15/2020    K 4.2 06/15/2020     06/15/2020    CREATININE 1.5 (H) 06/15/2020    BUN 13 06/15/2020    CO2 24 06/15/2020    TSH 0.725 09/20/2018    HGBA1C 6.1 (H) 12/13/2019       Current Outpatient Medications on File Prior to Visit   Medication Sig Dispense Refill    albuterol (PROVENTIL/VENTOLIN HFA) 90 mcg/actuation inhaler Inhale 2 puffs into the lungs every 6 (six) hours as needed for Wheezing. Rescue 18 g 3    albuterol-ipratropium (DUO-NEB) 2.5 mg-0.5 mg/3 mL nebulizer solution Take 3 mLs by nebulization every 6 (six) hours while awake. Rescue 90 mL 3    amLODIPine (NORVASC) 10 MG tablet TAKE 1 TABLET BY MOUTH ONCE DAILY 90 tablet 3    ammonium lactate 12 % Crea Apply twice daily to affected parts both feet as needed. 140 g 11    atorvastatin (LIPITOR) 40 MG tablet Take 1 tablet (40 mg total) by mouth once daily. 90 tablet 3    cholecalciferol, vitamin D3, (VITAMIN D3) 125 mcg (5,000 unit) Tab Take 1 tablet (5,000 Units total) by mouth every 48 hours. 90 tablet 3    ciclopirox (LOPROX) 0.77 % Crea Apply topically 2 (two) times daily. 90 g 2    cimetidine (TAGAMET) 400 MG tablet Take 1 tablet (400 mg total) by mouth 2 (two) times daily. 180 tablet 3    CMPD Lidocaine 2%, Prilocaine 2%, Lamotrigine 2.5%, Meloxicam 0.09% in Transdermal Gel Apply 1 Pump (1.6 g total) topically 4 (four) times daily. 240 g 3    diclofenac sodium (VOLTAREN) 1 % Gel Apply 2 g topically 2 (two) times daily. 3 Tube 3    ferrous sulfate (FEOSOL) 325 mg (65 mg iron) Tab tablet Take 1 tablet (325 mg total) by mouth daily with breakfast. 90 tablet 0    fluticasone propionate (FLONASE) 50 mcg/actuation nasal spray 1 spray (50 mcg total) by Each Nostril route once daily. 16 g 2    gabapentin (NEURONTIN) 300 MG capsule Take 1 capsule (300 mg total) by mouth 2  (two) times daily as needed. 180 capsule 3    hydrocortisone 2.5 % cream Apply topically to rash on legs twice daily 28 g 0    inhalat. spacing dev,sm. mask Spcr Use with rescue inhaler 1 each 0    ketoconazole (NIZORAL) 2 % cream Apply under breast topically twice daily for rash 60 g 0    loratadine (CLARITIN) 10 mg tablet Take 1 tablet (10 mg total) by mouth once daily. 30 tablet 11    losartan (COZAAR) 25 MG tablet Take 1 tablet (25 mg total) by mouth once daily. 90 tablet 3    methocarbamoL (ROBAXIN) 750 MG Tab 1 tab every 8 hours as needed for muscle spasm 30 tablet 0    nebulizer and compressor Paty Use as directed 1 each 0    triamcinolone acetonide 0.025% (KENALOG) 0.025 % Oint Apply topically once daily to rash on arms and legs as needed 80 g 1     No current facility-administered medications on file prior to visit.          Assessment:   79 y.o. female with multiple co-morbid illnesses here to follow-up with PCP and continue work-up of chronic issues notably HTN, shoulder pain, CKD, DISH.     Plan:     Problem List Items Addressed This Visit        Neuro    DISH (diffuse idiopathic skeletal hyperostosis)     DISH seen on XR in 6/2020, chronic pain associated, now with SOB  · PCP messaged Rheum who advised supportive care         Cervical myelopathy     Neck pain, with DISH  · Image today  · Advised supportive care         Relevant Orders    X-Ray Neck Soft Tissue    Ambulatory referral/consult to [facility]       Orthopedic    Acute pain of right shoulder     R shoulder pain, seems to be MSK, has no injury history, but is impairing her sleep. Unlikely cardiac in origin, but has PHILLIP>4. EKG stable  · Shoulder XR         Relevant Orders    X-ray Shoulder 2 or More Views Right (Completed)    Ambulatory referral/consult to [facility]          Health Maintenance       Date Due Completion Date    TETANUS VACCINE 11/19/1958 ---    Influenza Vaccine (1) 09/01/2020 9/24/2019    DEXA SCAN  05/16/2021 5/16/2019    Lipid Panel 02/13/2024 2/13/2019          Follow up in about 4 weeks (around 8/3/2020). Total face-to-face time was 60 min, >50% of this was spent on counseling and coordination of care. The following issues were discussed:  HTN, shoulder pain, CKD, DISH    Brody Salgado (MS4)    Laurie Todd MD/MPH  Internal Medicine  [facility]  [phone] [employer]

## 2020-07-06 NOTE — PATIENT INSTRUCTIONS
TODAY:  - Xrays of shoulder and neck  - continue cold packs  - Back/Spine program at [facility]  - can consider sports medicine program if needed

## 2020-07-06 NOTE — ASSESSMENT & PLAN NOTE
R shoulder pain, seems to be MSK, has no injury history, but is impairing her sleep. Unlikely cardiac in origin, but has PHILLIP>4. EKG stable  · Shoulder XR

## 2020-07-06 NOTE — ASSESSMENT & PLAN NOTE
DISH seen on XR in 6/2020, chronic pain associated, now with SOB  · PCP messaged [place] who advised supportive care

## 2020-07-09 ENCOUNTER — OUTPATIENT CASE MANAGEMENT (OUTPATIENT)
Dept: ADMINISTRATIVE | Facility: OTHER | Age: 80
End: 2020-07-09

## 2020-07-09 NOTE — PROGRESS NOTES
Outpatient Care Management  Plan of Care Follow Up Visit    Patient: Kelsey Fields  MRN: [id number]  Date of Service: 07/09/2020  Completed by: Dina Jason RN  Referral Date: 06/02/2020  Program:     No chief complaint on file.      Brief Summary:  OPCM RN placed call to pt/cg for continued follow up with OPC services. Ms. Fields was contacted. OPCM RN discussed Asthma condition with pt, reviewed s/s of condition, triggers that may cause flare ups, Asthma medications were reviewed. Pt reported she is not having problems with breathing, the use of inhaler/ neb was not required everyday only as needed. No SOB, CP, detailed some congestion mild cough. Some pain with cough. Pt is still experiencing neck pain which has shifted to her R shoulder. Rated pain greater than 10. She has tried biofreeze, muscle rub, hot packs, cold packs with mild alleviation in pain. Pt is taking tylenol 500mg, robaxin as well. Pt has continued f/u with Dr. Todd 07/10/20 to have results of x-rays and determine further tx. Ms. Fields has been recommended to see back/spine specialist, appt has been scheduled in Sept. Pt has been off from work the past two days and is not sure she will be going tonight. OPCM RN advised pt continue pain interventions, attend f/u appt with PCP, pace herself as often as necessary. Pt agreed to continued communication with OPCM RN      Patient Summary     Involvement of Care:  Do I have permission to speak with other family members about your care?   No    Patient Reported Labs & Vitals:  1.  Any Patient Reported Labs & Vitals?     2.  Patient Reported Blood Pressure:   147/70  3.  Patient Reported Pulse:     4.  Patient Reported Weight (Kg):     5.  Patient Reported Blood Glucose (mg/dl):       Medical and social history was reviewed with patient and/or caregiver.     Clinical Assessment     Reviewed and provided basic information on available community resources for mental health,  transportation, wellness resources, and palliative care programs with patient and/or caregiver.     Complex Care Plan     Care plan was discussed and completed today with input from patient and/or caregiver.    Patient Instructions     Instructions were provided via the [facility] patient resources and are available for the patient to view on the patient portal.    Next Steps: F/u with pt re:xrays  See what recommendation PCP made for pt  appts with back/spine specialist in Sept  Review Asthma s/s reports taking meds as instructed     Follow up in about 2 weeks (around 7/23/2020) for RN follow up.    Todays OPCM Self-Management Care Plan was developed with the patients/caregivers input and was based on identified barriers from todays assessment.  Goals were written today with the patient/caregiver and the patient has agreed to work towards these goals to improve his/her overall well-being. Patient verbalized understanding of the care plan, goals, and all of today's instructions. Encouraged patient/caregiver to communicate with his/her physician and health care team about health conditions and the treatment plan.  Provided my contact information today and encouraged patient/caregiver to call me with any questions as needed.

## 2020-07-10 ENCOUNTER — OFFICE VISIT (OUTPATIENT)
Dept: PRIMARY CARE CLINIC | Facility: CLINIC | Age: 80
End: 2020-07-10
Payer: MEDICARE

## 2020-07-10 VITALS
BODY MASS INDEX: 33.68 KG/M2 | DIASTOLIC BLOOD PRESSURE: 60 MMHG | SYSTOLIC BLOOD PRESSURE: 128 MMHG | OXYGEN SATURATION: 97 % | WEIGHT: 196.19 LBS | HEART RATE: 92 BPM

## 2020-07-10 DIAGNOSIS — M48.10 DISH (DIFFUSE IDIOPATHIC SKELETAL HYPEROSTOSIS): ICD-10-CM

## 2020-07-10 PROCEDURE — 99215 PR OFFICE/OUTPT VISIT, EST, LEVL V, 40-54 MIN: ICD-10-PCS | Mod: HCNC,S$GLB,, | Performed by: INTERNAL MEDICINE

## 2020-07-10 PROCEDURE — 1101F PR PT FALLS ASSESS DOC 0-1 FALLS W/OUT INJ PAST YR: ICD-10-PCS | Mod: HCNC,CPTII,S$GLB, | Performed by: INTERNAL MEDICINE

## 2020-07-10 PROCEDURE — 1101F PT FALLS ASSESS-DOCD LE1/YR: CPT | Mod: HCNC,CPTII,S$GLB, | Performed by: INTERNAL MEDICINE

## 2020-07-10 PROCEDURE — 1159F PR MEDICATION LIST DOCUMENTED IN MEDICAL RECORD: ICD-10-PCS | Mod: HCNC,S$GLB,, | Performed by: INTERNAL MEDICINE

## 2020-07-10 PROCEDURE — 3078F PR MOST RECENT DIASTOLIC BLOOD PRESSURE < 80 MM HG: ICD-10-PCS | Mod: HCNC,CPTII,S$GLB, | Performed by: INTERNAL MEDICINE

## 2020-07-10 PROCEDURE — 3078F DIAST BP <80 MM HG: CPT | Mod: HCNC,CPTII,S$GLB, | Performed by: INTERNAL MEDICINE

## 2020-07-10 PROCEDURE — 99215 OFFICE O/P EST HI 40 MIN: CPT | Mod: HCNC,S$GLB,, | Performed by: INTERNAL MEDICINE

## 2020-07-10 PROCEDURE — 1126F PR PAIN SEVERITY QUANTIFIED, NO PAIN PRESENT: ICD-10-PCS | Mod: HCNC,S$GLB,, | Performed by: INTERNAL MEDICINE

## 2020-07-10 PROCEDURE — 3074F PR MOST RECENT SYSTOLIC BLOOD PRESSURE < 130 MM HG: ICD-10-PCS | Mod: HCNC,CPTII,S$GLB, | Performed by: INTERNAL MEDICINE

## 2020-07-10 PROCEDURE — 1159F MED LIST DOCD IN RCRD: CPT | Mod: HCNC,S$GLB,, | Performed by: INTERNAL MEDICINE

## 2020-07-10 PROCEDURE — 1126F AMNT PAIN NOTED NONE PRSNT: CPT | Mod: HCNC,S$GLB,, | Performed by: INTERNAL MEDICINE

## 2020-07-10 PROCEDURE — 3074F SYST BP LT 130 MM HG: CPT | Mod: HCNC,CPTII,S$GLB, | Performed by: INTERNAL MEDICINE

## 2020-07-10 NOTE — ASSESSMENT & PLAN NOTE
DISH seen on XR in 6/2020, chronic pain associated, now with SOB  · PCP spoke with Rheum who advised supportive care  · Stretches and exercises shared with patient today  · Advised aquatherpy  · Patient refuses  · Continue voltaren

## 2020-07-10 NOTE — PATIENT INSTRUCTIONS
TODAY  - appt every 2 weeks  - try gentle stretching in the home  - if you can get into a pool this may help  - stretching handout

## 2020-07-10 NOTE — PROGRESS NOTES
Primary Care Provider Appointment   SHARED NOTE: Brody Salgado (MS4), Dr Todd (Attending)    Subjective:      Patient ID: Kelsey Fields is a 79 y.o. female with DISH, HTN, CKD, asthma, TRISTON who presents for f/u and shoulder pain.       Chief Complaint: f/u and R shoulder pain    Patient has DISH, with intermittent pain exacerbations in the shoulders, neck and other areas that vacillate.     Her shoulder is doing better today, but still painful. She is still using biofreeze, mineral ice, muscle rub, cold packs. She is using it more often and the cold pack especially seems to be helpful. She is now having some pain closer to the neck. It seems to move around from time to time.    Today, her BP is 128/60. She is still just taking the amlodipine. Seems likely that the BP was elevated on last visit d/t pain.    She says that her nebulizer and albuterol are useful for occasional wheeze, but does continue to have chronic non-productive cough which has not changed for years now.      Past Surgical History:   Procedure Laterality Date    blepharitis Bilateral 2017    Alessandra Grijalva OD    CATARACT EXTRACTION Bilateral 2017    Alessandra Grijalva MD    CATARACT EXTRACTION W/  INTRAOCULAR LENS IMPLANT Left 2019    Procedure: EXTRACTION, CATARACT, WITH IOL INSERTION;  Surgeon: Alysa De Souza MD;  Location: [facility];  Service: Ophthalmology;  Laterality: Left;    CATARACT EXTRACTION W/  INTRAOCULAR LENS IMPLANT Right 2019    Procedure: EXTRACTION, CATARACT, WITH IOL INSERTION;  Surgeon: Alysa De Souza MD;  Location: [facility];  Service: Ophthalmology;  Laterality: Right;     SECTION      FRACTURE SURGERY      JOINT REPLACEMENT      right knee     KNEE SURGERY Right        Past Medical History:   Diagnosis Date    Arthritis     Degenerative arthritis of lumbar spine     Episcleritis of right eye     GERD (gastroesophageal reflux disease)     Hypertension     Shingles     Sleep apnea      Type 2 diabetes mellitus with stage 3 chronic kidney disease, without long-term current use of insulin 6/5/2020       Social History     Socioeconomic History    Marital status: Single     Spouse name: Not on file    Number of children: Not on file    Years of education: Not on file    Highest education level: Not on file   Occupational History    Not on file   Social Needs    Financial resource strain: Not very hard    Food insecurity     Worry: Never true     Inability: Never true    Transportation needs     Medical: No     Non-medical: No   Tobacco Use    Smoking status: Never Smoker    Smokeless tobacco: Never Used   Substance and Sexual Activity    Alcohol use: No    Drug use: No    Sexual activity: Never   Lifestyle    Physical activity     Days per week: 0 days     Minutes per session: 0 min    Stress: Not at all   Relationships    Social connections     Talks on phone: Once a week     Gets together: Once a week     Attends [employer] service: More than 4 times per year     Active member of club or organization: Yes     Attends meetings of clubs or organizations: More than 4 times per year     Relationship status: Never    Other Topics Concern    Not on file   Social History Narrative    Not on file       Review of Systems   Constitutional: Negative for chills and fever.   HENT: Negative for rhinorrhea and sore throat.    Eyes: Negative for pain and visual disturbance.   Respiratory: Negative for cough and shortness of breath.    Cardiovascular: Negative for chest pain and palpitations.   Gastrointestinal: Negative for abdominal distention and abdominal pain.   Genitourinary: Positive for enuresis. Negative for dysuria.   Musculoskeletal: Negative for arthralgias and joint swelling.   Skin: Negative for color change and rash.   Neurological: Negative for dizziness and headaches.   Hematological: Negative for adenopathy. Does not bruise/bleed easily.       Objective:   /60 (BP  Location: Right arm, Patient Position: Sitting, BP Method: Medium (Manual))   Pulse 92   Wt 89 kg (196 lb 3.4 oz)   SpO2 97%   BMI 33.68 kg/m²     Physical Exam  Constitutional:       Appearance: Normal appearance.   HENT:      Head: Normocephalic and atraumatic.      Nose: Nose normal.      Mouth/Throat:      Mouth: Mucous membranes are moist.      Pharynx: Oropharynx is clear.   Eyes:      Extraocular Movements: Extraocular movements intact.      Conjunctiva/sclera: Conjunctivae normal.      Pupils: Pupils are equal, round, and reactive to light.   Neck:      Musculoskeletal: Normal range of motion and neck supple.   Cardiovascular:      Rate and Rhythm: Tachycardia present.      Pulses: Normal pulses.      Heart sounds: Murmur (early systolic in aortic area) present.   Pulmonary:      Effort: Pulmonary effort is normal.      Breath sounds: Normal breath sounds. No wheezing.   Abdominal:      General: Bowel sounds are normal.      Palpations: Abdomen is soft.   Skin:     General: Skin is warm and dry.   Neurological:      General: No focal deficit present.      Mental Status: She is alert and oriented to person, place, and time.   Psychiatric:         Mood and Affect: Mood normal.         Behavior: Behavior normal.         Lab Results   Component Value Date    WBC 8.54 06/15/2020    HGB 10.2 (L) 06/15/2020    HCT 35.3 (L) 06/15/2020     06/15/2020    CHOL 139 02/13/2019    TRIG 72 02/13/2019    HDL 41 02/13/2019    ALT 8 (L) 06/02/2020    AST 12 06/02/2020     06/15/2020    K 4.2 06/15/2020     06/15/2020    CREATININE 1.5 (H) 06/15/2020    BUN 13 06/15/2020    CO2 24 06/15/2020    TSH 0.725 09/20/2018    HGBA1C 6.1 (H) 12/13/2019       Current Outpatient Medications on File Prior to Visit   Medication Sig Dispense Refill    albuterol (PROVENTIL/VENTOLIN HFA) 90 mcg/actuation inhaler Inhale 2 puffs into the lungs every 6 (six) hours as needed for Wheezing. Rescue 18 g 3     albuterol-ipratropium (DUO-NEB) 2.5 mg-0.5 mg/3 mL nebulizer solution Take 3 mLs by nebulization every 6 (six) hours while awake. Rescue 90 mL 3    amLODIPine (NORVASC) 10 MG tablet TAKE 1 TABLET BY MOUTH ONCE DAILY 90 tablet 3    ammonium lactate 12 % Crea Apply twice daily to affected parts both feet as needed. 140 g 11    atorvastatin (LIPITOR) 40 MG tablet Take 1 tablet (40 mg total) by mouth once daily. 90 tablet 3    cholecalciferol, vitamin D3, (VITAMIN D3) 125 mcg (5,000 unit) Tab Take 1 tablet (5,000 Units total) by mouth every 48 hours. 90 tablet 3    ciclopirox (LOPROX) 0.77 % Crea Apply topically 2 (two) times daily. 90 g 2    cimetidine (TAGAMET) 400 MG tablet Take 1 tablet (400 mg total) by mouth 2 (two) times daily. 180 tablet 3    CMPD Lidocaine 2%, Prilocaine 2%, Lamotrigine 2.5%, Meloxicam 0.09% in Transdermal Gel Apply 1 Pump (1.6 g total) topically 4 (four) times daily. 240 g 3    diclofenac sodium (VOLTAREN) 1 % Gel Apply 2 g topically 2 (two) times daily. 3 Tube 3    ferrous sulfate (FEOSOL) 325 mg (65 mg iron) Tab tablet Take 1 tablet (325 mg total) by mouth daily with breakfast. 90 tablet 0    fluticasone propionate (FLONASE) 50 mcg/actuation nasal spray 1 spray (50 mcg total) by Each Nostril route once daily. 16 g 2    gabapentin (NEURONTIN) 300 MG capsule Take 1 capsule (300 mg total) by mouth 2 (two) times daily as needed. 180 capsule 3    hydrocortisone 2.5 % cream Apply topically to rash on legs twice daily 28 g 0    inhalat. spacing dev,sm. mask Spcr Use with rescue inhaler 1 each 0    ketoconazole (NIZORAL) 2 % cream Apply under breast topically twice daily for rash 60 g 0    loratadine (CLARITIN) 10 mg tablet Take 1 tablet (10 mg total) by mouth once daily. 30 tablet 11    losartan (COZAAR) 25 MG tablet Take 1 tablet (25 mg total) by mouth once daily. 90 tablet 3    methocarbamoL (ROBAXIN) 750 MG Tab 1 tab every 8 hours as needed for muscle spasm 30 tablet 0     nebulizer and compressor Paty Use as directed 1 each 0    triamcinolone acetonide 0.025% (KENALOG) 0.025 % Oint Apply topically once daily to rash on arms and legs as needed 80 g 1     No current facility-administered medications on file prior to visit.          Assessment:   79 y.o. female with multiple co-morbid illnesses here to follow-up with PCP and continue work-up of chronic issues notably DISH, HTN, CKD, TRISTON, asthma.     Plan:     Problem List Items Addressed This Visit        Neuro    DISH (diffuse idiopathic skeletal hyperostosis)     DISH seen on XR in 6/2020, chronic pain associated, now with SOB  · PCP spoke with Rheum who advised supportive care  · Stretches and exercises shared with patient today  · Advised aquatherpy  · Patient refuses  · [facility]               Health Maintenance       Date Due Completion Date    TETANUS VACCINE 11/19/1958 ---    Influenza Vaccine (1) 09/01/2020 9/24/2019    DEXA SCAN 05/16/2021 5/16/2019    Lipid Panel 02/13/2024 2/13/2019            Follow up in about 2 weeks (around 7/24/2020). Total face-to-face time was 60 min, >50% of this was spent on counseling and coordination of care. The following issues were discussed: DISH, HTN, CKD, TRISTON, asthma    Brody Salgado (MS4)    Laurie Todd MD/MPH  Internal Medicine  [facility]  [phone] [facility]

## 2020-07-15 ENCOUNTER — TELEPHONE (OUTPATIENT)
Dept: PRIMARY CARE CLINIC | Facility: CLINIC | Age: 80
End: 2020-07-15

## 2020-07-17 ENCOUNTER — OFFICE VISIT (OUTPATIENT)
Dept: PRIMARY CARE CLINIC | Facility: CLINIC | Age: 80
End: 2020-07-17
Payer: MEDICARE

## 2020-07-17 VITALS
HEART RATE: 92 BPM | TEMPERATURE: 99 F | BODY MASS INDEX: 34.4 KG/M2 | WEIGHT: 200.38 LBS | SYSTOLIC BLOOD PRESSURE: 124 MMHG | DIASTOLIC BLOOD PRESSURE: 50 MMHG | OXYGEN SATURATION: 97 %

## 2020-07-17 DIAGNOSIS — M54.41 CHRONIC BILATERAL LOW BACK PAIN WITH BILATERAL SCIATICA: ICD-10-CM

## 2020-07-17 DIAGNOSIS — G89.29 CHRONIC BILATERAL LOW BACK PAIN WITH BILATERAL SCIATICA: ICD-10-CM

## 2020-07-17 DIAGNOSIS — M48.10 DISH (DIFFUSE IDIOPATHIC SKELETAL HYPEROSTOSIS): ICD-10-CM

## 2020-07-17 DIAGNOSIS — M54.42 CHRONIC BILATERAL LOW BACK PAIN WITH BILATERAL SCIATICA: ICD-10-CM

## 2020-07-17 PROCEDURE — 1101F PT FALLS ASSESS-DOCD LE1/YR: CPT | Mod: HCNC,CPTII,S$GLB, | Performed by: INTERNAL MEDICINE

## 2020-07-17 PROCEDURE — 3078F DIAST BP <80 MM HG: CPT | Mod: HCNC,CPTII,S$GLB, | Performed by: INTERNAL MEDICINE

## 2020-07-17 PROCEDURE — 3078F PR MOST RECENT DIASTOLIC BLOOD PRESSURE < 80 MM HG: ICD-10-PCS | Mod: HCNC,CPTII,S$GLB, | Performed by: INTERNAL MEDICINE

## 2020-07-17 PROCEDURE — 3074F SYST BP LT 130 MM HG: CPT | Mod: HCNC,CPTII,S$GLB, | Performed by: INTERNAL MEDICINE

## 2020-07-17 PROCEDURE — 1159F MED LIST DOCD IN RCRD: CPT | Mod: HCNC,S$GLB,, | Performed by: INTERNAL MEDICINE

## 2020-07-17 PROCEDURE — 1159F PR MEDICATION LIST DOCUMENTED IN MEDICAL RECORD: ICD-10-PCS | Mod: HCNC,S$GLB,, | Performed by: INTERNAL MEDICINE

## 2020-07-17 PROCEDURE — 99215 PR OFFICE/OUTPT VISIT, EST, LEVL V, 40-54 MIN: ICD-10-PCS | Mod: HCNC,S$GLB,, | Performed by: INTERNAL MEDICINE

## 2020-07-17 PROCEDURE — 1101F PR PT FALLS ASSESS DOC 0-1 FALLS W/OUT INJ PAST YR: ICD-10-PCS | Mod: HCNC,CPTII,S$GLB, | Performed by: INTERNAL MEDICINE

## 2020-07-17 PROCEDURE — 99215 OFFICE O/P EST HI 40 MIN: CPT | Mod: HCNC,S$GLB,, | Performed by: INTERNAL MEDICINE

## 2020-07-17 PROCEDURE — 3074F PR MOST RECENT SYSTOLIC BLOOD PRESSURE < 130 MM HG: ICD-10-PCS | Mod: HCNC,CPTII,S$GLB, | Performed by: INTERNAL MEDICINE

## 2020-07-17 NOTE — PROGRESS NOTES
Primary Care Provider Appointment   SHARED NOTE: Brody Salgado (MS4), Dr Todd (Attending)    Subjective:      Patient ID: Kelsey Fields is a 79 y.o. female with DISH, HTN, CKD, asthma, TRISTON who presents for f/u and shoulder pain.      Chief Complaint: shoulder pain    She continues to have R shoulder pain. It occasionally now switches to the L shoulder, ~2/wk. The pain seems to be staying same. She now only ices it when it feels really bad, about twice a week. She continues to use biofreeze, mineral ice, muscle rub as well.    She tried to eat some grits & egg and she vomited one time yesterday and one time the day before. She felt extremely nauseated at that time, but felt better afterwards. She isn't have any sensation of food stuck in her throat. Previously, she has not had any issues with eating grits and egg. She ate something different this morning but she didn't feel nauseated this time.    She is still using her albuterol every few days or so. Doesn't report any wheezes.    Past Surgical History:   Procedure Laterality Date    blepharitis Bilateral 2017    Alessandra Grijalva OD    CATARACT EXTRACTION Bilateral 2017    Alessandra Grijalva MD    CATARACT EXTRACTION W/  INTRAOCULAR LENS IMPLANT Left 2019    Procedure: EXTRACTION, CATARACT, WITH IOL INSERTION;  Surgeon: Alysa De Souza MD;  Location: [facility];  Service: Ophthalmology;  Laterality: Left;    CATARACT EXTRACTION W/  INTRAOCULAR LENS IMPLANT Right 2019    Procedure: EXTRACTION, CATARACT, WITH IOL INSERTION;  Surgeon: Alysa De Souza MD;  Location: [facility];  Service: Ophthalmology;  Laterality: Right;     SECTION      FRACTURE SURGERY      JOINT REPLACEMENT      right knee     KNEE SURGERY Right        Past Medical History:   Diagnosis Date    Arthritis     Degenerative arthritis of lumbar spine     Episcleritis of right eye     GERD (gastroesophageal reflux disease)     Hypertension     Shingles     Sleep apnea      Type 2 diabetes mellitus with stage 3 chronic kidney disease, without long-term current use of insulin 6/5/2020       Social History     Socioeconomic History    Marital status: Single     Spouse name: Not on file    Number of children: Not on file    Years of education: Not on file    Highest education level: Not on file   Occupational History    Not on file   Social Needs    Financial resource strain: Not very hard    Food insecurity     Worry: Never true     Inability: Never true    Transportation needs     Medical: No     Non-medical: No   Tobacco Use    Smoking status: Never Smoker    Smokeless tobacco: Never Used   Substance and Sexual Activity    Alcohol use: No    Drug use: No    Sexual activity: Never   Lifestyle    Physical activity     Days per week: 0 days     Minutes per session: 0 min    Stress: Not at all   Relationships    Social connections     Talks on phone: Once a week     Gets together: Once a week     Attends Sabian[employer] service: More than 4 times per year     Active member of club or organization: Yes     Attends meetings of clubs or organizations: More than 4 times per year     Relationship status: Never    Other Topics Concern    Not on file   Social History Narrative    Not on file       Review of Systems   Constitutional: Negative for chills and fever.   HENT: Negative for rhinorrhea and sore throat.    Eyes: Negative for pain and visual disturbance.   Respiratory: Positive for cough. Negative for shortness of breath.    Cardiovascular: Negative for chest pain and palpitations.   Gastrointestinal: Positive for nausea and vomiting.   Genitourinary: Positive for enuresis and frequency.   Musculoskeletal: Positive for arthralgias and gait problem.   Skin: Negative for color change and rash.   Allergic/Immunologic: Negative for environmental allergies and food allergies.   Neurological: Negative for dizziness and headaches.       Objective:   BP (!) 124/50 (BP  Location: Left arm, Patient Position: Sitting, BP Method: Large (Manual))   Pulse 92   Temp 98.9 °F (37.2 °C)   Wt 90.9 kg (200 lb 6.4 oz)   SpO2 97%   BMI 34.40 kg/m²     Physical Exam  Constitutional:       Appearance: Normal appearance.   HENT:      Head: Normocephalic and atraumatic.      Mouth/Throat:      Mouth: Mucous membranes are moist.      Pharynx: Oropharynx is clear.   Eyes:      Extraocular Movements: Extraocular movements intact.      Conjunctiva/sclera: Conjunctivae normal.      Pupils: Pupils are equal, round, and reactive to light.   Neck:      Musculoskeletal: Normal range of motion and neck supple.   Cardiovascular:      Rate and Rhythm: Normal rate.      Pulses: Normal pulses.      Heart sounds: Murmur (systolic ejection murmur at aortic) present.   Pulmonary:      Effort: Pulmonary effort is normal.      Breath sounds: Normal breath sounds.   Abdominal:      General: Bowel sounds are normal.      Palpations: Abdomen is soft.   Musculoskeletal: Normal range of motion.   Skin:     General: Skin is warm and dry.      Capillary Refill: Capillary refill takes less than 2 seconds.   Neurological:      General: No focal deficit present.      Mental Status: She is alert and oriented to person, place, and time.             Lab Results   Component Value Date    WBC 8.54 06/15/2020    HGB 10.2 (L) 06/15/2020    HCT 35.3 (L) 06/15/2020     06/15/2020    CHOL 139 02/13/2019    TRIG 72 02/13/2019    HDL 41 02/13/2019    ALT 8 (L) 06/02/2020    AST 12 06/02/2020     06/15/2020    K 4.2 06/15/2020     06/15/2020    CREATININE 1.5 (H) 06/15/2020    BUN 13 06/15/2020    CO2 24 06/15/2020    TSH 0.725 09/20/2018    HGBA1C 6.1 (H) 12/13/2019       Current Outpatient Medications on File Prior to Visit   Medication Sig Dispense Refill    albuterol (PROVENTIL/VENTOLIN HFA) 90 mcg/actuation inhaler Inhale 2 puffs into the lungs every 6 (six) hours as needed for Wheezing. Rescue 18 g 3     albuterol-ipratropium (DUO-NEB) 2.5 mg-0.5 mg/3 mL nebulizer solution Take 3 mLs by nebulization every 6 (six) hours while awake. Rescue 90 mL 3    amLODIPine (NORVASC) 10 MG tablet TAKE 1 TABLET BY MOUTH ONCE DAILY 90 tablet 3    ammonium lactate 12 % Crea Apply twice daily to affected parts both feet as needed. 140 g 11    atorvastatin (LIPITOR) 40 MG tablet Take 1 tablet (40 mg total) by mouth once daily. 90 tablet 3    cholecalciferol, vitamin D3, (VITAMIN D3) 125 mcg (5,000 unit) Tab Take 1 tablet (5,000 Units total) by mouth every 48 hours. 90 tablet 3    ciclopirox (LOPROX) 0.77 % Crea Apply topically 2 (two) times daily. 90 g 2    cimetidine (TAGAMET) 400 MG tablet Take 1 tablet (400 mg total) by mouth 2 (two) times daily. 180 tablet 3    CMPD Lidocaine 2%, Prilocaine 2%, Lamotrigine 2.5%, Meloxicam 0.09% in Transdermal Gel Apply 1 Pump (1.6 g total) topically 4 (four) times daily. 240 g 3    diclofenac sodium (VOLTAREN) 1 % Gel Apply 2 g topically 2 (two) times daily. 3 Tube 3    ferrous sulfate (FEOSOL) 325 mg (65 mg iron) Tab tablet Take 1 tablet (325 mg total) by mouth daily with breakfast. 90 tablet 0    fluticasone propionate (FLONASE) 50 mcg/actuation nasal spray 1 spray (50 mcg total) by Each Nostril route once daily. 16 g 2    gabapentin (NEURONTIN) 300 MG capsule Take 1 capsule (300 mg total) by mouth 2 (two) times daily as needed. 180 capsule 3    hydrocortisone 2.5 % cream Apply topically to rash on legs twice daily 28 g 0    inhalat. spacing dev,sm. mask Spcr Use with rescue inhaler 1 each 0    ketoconazole (NIZORAL) 2 % cream Apply under breast topically twice daily for rash 60 g 0    loratadine (CLARITIN) 10 mg tablet Take 1 tablet (10 mg total) by mouth once daily. 30 tablet 11    losartan (COZAAR) 25 MG tablet Take 1 tablet (25 mg total) by mouth once daily. 90 tablet 3    methocarbamoL (ROBAXIN) 750 MG Tab 1 tab every 8 hours as needed for muscle spasm 30 tablet 0     nebulizer and compressor Paty Use as directed 1 each 0    triamcinolone acetonide 0.025% (KENALOG) 0.025 % Oint Apply topically once daily to rash on arms and legs as needed 80 g 1     No current facility-administered medications on file prior to visit.          Assessment:   79 y.o. female with multiple co-morbid illnesses here to follow-up with PCP and continue work-up of chronic issues notably DISH, HTN, CKD, asthma, TRISTON.     Plan:     Problem List Items Addressed This Visit        Neuro    DISH (diffuse idiopathic skeletal hyperostosis)     DISH seen on XR in 6/2020, chronic pain associated  · PCP spoke with Rheum who advised supportive care  · Stretches and exercises advised  · Advised to apply ice daily  · Advised aquatherpy  · Patient refuses  · Continue voltaren            Orthopedic    Chronic bilateral low back pain with bilateral sciatica     Chronic low back pain, poor functional status  · Did not attend PT/OT on [place] nor medical fitness on [address]  · Advised to ice joints and sore muscles               Health Maintenance       Date Due Completion Date    TETANUS VACCINE 11/19/1958 ---    Influenza Vaccine (1) 09/01/2020 9/24/2019    DEXA SCAN 05/16/2021 5/16/2019    Lipid Panel 02/13/2024 2/13/2019          Follow up in about 2 weeks (around 7/31/2020). Total face-to-face time was 60 min, >50% of this was spent on counseling and coordination of care. The following issues were discussed: AFRICA Salgado (MS4)    Laurie Todd MD/MPH  Internal Medicine  [facility]  [phone] [facility]

## 2020-07-17 NOTE — PATIENT INSTRUCTIONS
TODAY:  - apply ice to your joints to prevent muscle pain  - also use voltaren  - keep coming to PCP every 2 weeks  - request records from Ortho at [facility]  - take 2 extra strength tylenol as needed

## 2020-07-17 NOTE — ASSESSMENT & PLAN NOTE
Chronic low back pain, poor functional status  · Did not attend PT/OT on [address] nor medical fitness on [address]  · Advised to ice joints and sore muscles

## 2020-07-17 NOTE — ASSESSMENT & PLAN NOTE
DISH seen on XR in 6/2020, chronic pain associated  · PCP spoke with Rheum who advised supportive care  · Stretches and exercises advised  · Advised to apply ice daily  · Advised aquatherpy  · Patient refuses  · Continue voltaren

## 2020-07-23 ENCOUNTER — PATIENT OUTREACH (OUTPATIENT)
Dept: ADMINISTRATIVE | Facility: OTHER | Age: 80
End: 2020-07-23

## 2020-07-23 ENCOUNTER — OUTPATIENT CASE MANAGEMENT (OUTPATIENT)
Dept: ADMINISTRATIVE | Facility: OTHER | Age: 80
End: 2020-07-23

## 2020-07-23 NOTE — PROGRESS NOTES
Outpatient Care Management  Plan of Care Follow Up Visit    Patient: Kelsey Fields  MRN: [id number]  Date of Service: 07/23/2020  Completed by: Dina Jason RN  Referral Date: 06/02/2020  Program:     No chief complaint on file.      Brief Summary: OPCM RN placed call to pt/cg for continued follow up with OPC services. Ms. Cole was contacted. Pt expressed she is feeling better from her Asthma but she is continuing to experience pain in her neck and shoulder. Pt is  using extra strength Tylenol, ice packs, compresses which minimizes her pain but does not fully alleviate her pain. Pt does verbalize she was informed years ago she has Arthritis in her back which she feels contributes to her pain. Pt would prefer not to have a cortisone shot, has already had this done in her wrist does not wish to have this performed on her neck or shoulder. hospitals offered to sent message to back/spine specialist to see if a sooner appointment could be obtained.Ms. Cole stated she will continue to wait and will stay with the appointment she has established. Pt has been using Asthma inhaler prn. Ms. Cole stated she has been have cold symptoms but no wheezing, SOB, no fever but does have cough. Pt has been staying in her home only going outside to run necessary errands, maintaining social distance. Ms. Cole is drinking Ensure drinks but has decreased her consumption to 1 can due to high sugar content. Pt prefer to eat soft foods grits,eggs, cereal. OPCM RN suggested adding fruit like strawberries, blueberries with soft consistency. OPC reviewed upcoming appointments with pt. Podiatry 7/24, PCP 07/27. Pt was aware of all appointments. Ms. Cole agreed to continued contact with hospitals.    Patient Summary     Involvement of Care:  Do I have permission to speak with other family members about your care?   No    Patient Reported Labs & Vitals:  1.  Any Patient Reported Labs & Vitals?     2.  Patient Reported Blood Pressure:      3.  Patient Reported Pulse:     4.  Patient Reported Weight (Kg):     5.  Patient Reported Blood Glucose (mg/dl):       Medical and social history was reviewed with patient and/or caregiver.     Clinical Assessment     Reviewed and provided basic information on available community resources for mental health, transportation, wellness resources, and palliative care programs with patient and/or caregiver.     Complex Care Plan     Care plan was discussed and completed today with input from patient and/or caregiver.    Patient Instructions     Instructions were provided via the "[employer] patient [employer] and are available for the patient to view on the patient portal.    Next Steps: F/u with Podiatry 07/24 & PCP appointment 7/27  Pt noted no asthma flare ups has been using meds prn  See if pain has minimized from neck and back     Follow up in about 2 weeks (around 8/6/2020) for RN follow up.    Todays OPCM Self-Management Care Plan was developed with the patients/caregivers input and was based on identified barriers from todays assessment.  Goals were written today with the patient/caregiver and the patient has agreed to work towards these goals to improve his/her overall well-being. Patient verbalized understanding of the care plan, goals, and all of today's instructions. Encouraged patient/caregiver to communicate with his/her physician and health care team about health conditions and the treatment plan.  Provided my contact information today and encouraged patient/caregiver to call me with any questions as needed.

## 2020-07-24 ENCOUNTER — OFFICE VISIT (OUTPATIENT)
Dept: PODIATRY | Facility: CLINIC | Age: 80
End: 2020-07-24
Payer: MEDICARE

## 2020-07-24 VITALS
BODY MASS INDEX: 34.4 KG/M2 | WEIGHT: 200.38 LBS | TEMPERATURE: 97 F | SYSTOLIC BLOOD PRESSURE: 126 MMHG | DIASTOLIC BLOOD PRESSURE: 66 MMHG | HEART RATE: 81 BPM

## 2020-07-24 DIAGNOSIS — L60.9 DISEASE OF NAIL: ICD-10-CM

## 2020-07-24 DIAGNOSIS — L84 CORN OR CALLUS: ICD-10-CM

## 2020-07-24 DIAGNOSIS — N18.30 STAGE 3 CHRONIC KIDNEY DISEASE: Primary | ICD-10-CM

## 2020-07-24 PROCEDURE — 11057 PARNG/CUTG B9 HYPRKR LES >4: CPT | Mod: Q9,HCNC,S$GLB, | Performed by: PODIATRIST

## 2020-07-24 PROCEDURE — 99999 PR PBB SHADOW E&M-EST. PATIENT-LVL III: CPT | Mod: PBBFAC,HCNC,, | Performed by: PODIATRIST

## 2020-07-24 PROCEDURE — 11721 PR DEBRIDEMENT OF NAILS, 6 OR MORE: ICD-10-PCS | Mod: Q9,59,HCNC,S$GLB | Performed by: PODIATRIST

## 2020-07-24 PROCEDURE — 99999 PR PBB SHADOW E&M-EST. PATIENT-LVL III: ICD-10-PCS | Mod: PBBFAC,HCNC,, | Performed by: PODIATRIST

## 2020-07-24 PROCEDURE — 11721 DEBRIDE NAIL 6 OR MORE: CPT | Mod: Q9,59,HCNC,S$GLB | Performed by: PODIATRIST

## 2020-07-24 PROCEDURE — 11057 PR TRIM BENIGN HYPERKERATOTIC SKIN LESION,>4: ICD-10-PCS | Mod: Q9,HCNC,S$GLB, | Performed by: PODIATRIST

## 2020-07-24 PROCEDURE — 99499 UNLISTED E&M SERVICE: CPT | Mod: HCNC,S$GLB,, | Performed by: PODIATRIST

## 2020-07-24 PROCEDURE — 99499 NO LOS: ICD-10-PCS | Mod: HCNC,S$GLB,, | Performed by: PODIATRIST

## 2020-07-24 NOTE — LETTER
July 24, 2020      Laurie Todd MD  [address]  [facility] [address]           [facility]  [address]  [facility] [address]  Phone: [phone]          Patient: Kelsey Fields   MR Number: [id number]   YOB: 1940   Date of Visit: 7/24/2020       Dear Dr. Laurie Todd:    Thank you for referring Kelsey Fields to me for evaluation. Attached you will find relevant portions of my assessment and plan of care.    If you have questions, please do not hesitate to call me. I look forward to following Kelsey Feilds along with you.    Sincerely,    Shahida Augustin, CALLIE    Enclosure  CC:  No Recipients    If you would like to receive this communication electronically, please contact [email or web address] or [phone] to request more information on [employer] Link access.    For providers and/or their staff who would like to refer a patient to [facility], please contact us through our one-stop-shop provider referral line, [facility], at [phone].    If you feel you have received this communication in error or would no longer like to receive these types of communications, please e-mail [email or web address]

## 2020-07-24 NOTE — PROGRESS NOTES
Subjective:      Patient ID: Kelsey Fields is a 79 y.o. female.    Chief Complaint: Foot Problem (7/17/20 shainajimmy)    Kelsey is a 79 y.o. female who presents to the clinic for evaluation and treatment of high risk feet. Kelsey has a past medical history of Arthritis, Degenerative arthritis of lumbar spine, Episcleritis of right eye, GERD (gastroesophageal reflux disease), Hypertension, Shingles, Sleep apnea, and Type 2 diabetes mellitus with stage 3 chronic kidney disease, without long-term current use of insulin (6/5/2020). The patient's chief complaint is long, thick toenails. This patient has documented high risk feet requiring routine maintenance secondary to CKD.    PCP: Laurie Todd MD    Date Last Seen by PCP:   Chief Complaint   Patient presents with    Foot Problem     7/17/20 zohreh       Last encounter in this department: Visit date not found    Hemoglobin A1C   Date Value Ref Range Status   12/13/2019 6.1 (H) 4.0 - 5.6 % Final     Comment:     ADA Screening Guidelines:  5.7-6.4%  Consistent with prediabetes  >or=6.5%  Consistent with diabetes  High levels of fetal hemoglobin interfere with the HbA1C  assay. Heterozygous hemoglobin variants (HbS, HgC, etc)do  not significantly interfere with this assay.   However, presence of multiple variants may affect accuracy.     02/13/2019 6.0 (H) 4.0 - 5.6 % Final     Comment:     ADA Screening Guidelines:  5.7-6.4%  Consistent with prediabetes  >or=6.5%  Consistent with diabetes  High levels of fetal hemoglobin interfere with the HbA1C  assay. Heterozygous hemoglobin variants (HbS, HgC, etc)do  not significantly interfere with this assay.   However, presence of multiple variants may affect accuracy.     09/20/2018 5.8 (H) 4.0 - 5.6 % Final     Comment:     ADA Screening Guidelines:  5.7-6.4%  Consistent with prediabetes  >or=6.5%  Consistent with diabetes  High levels of fetal hemoglobin interfere with the HbA1C  assay. Heterozygous  hemoglobin variants (HbS, HgC, etc)do  not significantly interfere with this assay.   However, presence of multiple variants may affect accuracy.         Review of Systems   Constitution: Negative for chills, decreased appetite and fever.   Cardiovascular: Negative for leg swelling.   Skin: Positive for dry skin and nail changes. Negative for flushing and itching.   Musculoskeletal: Positive for back pain and joint pain. Negative for arthritis, joint swelling and myalgias.   Gastrointestinal: Negative for nausea and vomiting.   Neurological: Negative for loss of balance, numbness and paresthesias.           Objective:       Vitals:    07/24/20 0933   BP: 126/66   Pulse: 81   Temp: 97 °F (36.1 °C)   TempSrc: Oral   Weight: 90.9 kg (200 lb 6.4 oz)        Physical Exam  Constitutional:       Appearance: She is well-developed.   Cardiovascular:      Comments: Dorsalis pedis and posterior tibial pulses are diminished Bilaterally. Toes are cool to touch. Feet are warm proximally.There is decreased digital hair. Skin is atrophic, slightly hyperpigmented, and mildly edematous      Musculoskeletal: Normal range of motion.         General: No tenderness.      Comments: Adequate joint range of motion without pain, limitation, nor crepitation Bilateral feet and ankle joints. Muscle strength is 5/5 in all groups bilaterally.         Skin:     General: Skin is warm and dry.      Coloration: Skin is not pale.      Findings: No ecchymosis, erythema, lesion or rash.      Comments: Nails x10 are elongated by  2-6mm's, thickened by 3-8 mm's, dystrophic, and are white, crumbling in  coloration . Xerosis Bilaterally. No open lesions noted.    Hyperkeratotic tissue noted to  distal L 4th toe, medial HIPJ b/l, plantar 5th MPJ b/l       Neurological:      Mental Status: She is alert and oriented to person, place, and time.      Comments: [place]-Dilip 5.07 monofilamant testing is diminished Jorge feet. Sharp/dull sensation diminished  Bilaterally. Light touch absent Bilaterally.       Psychiatric:         Behavior: Behavior normal.               Assessment:       Encounter Diagnoses   Name Primary?    Disease of nail     Stage 3 chronic kidney disease Yes         Plan:       Kelsey was seen today for foot problem.    Diagnoses and all orders for this visit:    Stage 3 chronic kidney disease    Disease of nail  -     Ambulatory referral/consult to Podiatry      I counseled the patient on her conditions, their implications and medical management.        - Shoe inspection.  Foot Education.  Patient instructed on proper foot hygeine. We discussed wearing proper shoe gear, daily foot inspections, never walking without protective shoe gear, never putting sharp instruments to feet, routine podiatric nail visits every 2-3 months.      - With patient's permission, nails were aggressively reduced and debrided x 10 to their soft tissue attachment mechanically and with electric , removing all offending nail and debris. Patient relates relief following the procedure. She will continue to monitor the areas daily, inspect her feet, wear protective shoe gear when ambulatory, moisturizer to maintain skin integrity and follow in this office in approximately 2-3 months, sooner p.r.n.    - After cleansing the  area w/ alcohol prep pad the above mentioned hyperkeratosis was trimmed utilizing No 15 scapel, to a smooth base with out incident. Patient tolerated this  well and reported comfort to the area of distal L 4th toe, medial HIPJ b/l, plantar 5th MPJ b/l

## 2020-08-06 ENCOUNTER — OUTPATIENT CASE MANAGEMENT (OUTPATIENT)
Dept: ADMINISTRATIVE | Facility: OTHER | Age: 80
End: 2020-08-06

## 2020-08-06 ENCOUNTER — TELEPHONE (OUTPATIENT)
Dept: INTERNAL MEDICINE | Facility: CLINIC | Age: 80
End: 2020-08-06

## 2020-08-06 NOTE — PROGRESS NOTES
"Outpatient Care Management  Plan of Care Follow Up Visit    Patient: Kelsey Fields  MRN: [id number]  Date of Service: 08/06/2020  Completed by: Dina Jason RN  Referral Date: 06/02/2020  Program:     Reason for Visit   Patient presents with    Update Plan Of Care     08/06/20       Brief Summary:PCM RN placed call to pt/cg for continued follow up with OPCM services.  was contacted. Pt voiced she has been feeling better. Pt attended an appointment with her Orthopedist at [facility], where she received "a shot, steroid I think". Pt reports her pain to her shoulder and neck are diminishing, beginning to feel relief. Pt does plan to still attend appointment with back/spine specialist scheduled in September. OPCM reviewed upcoming appointments with pt who stated she missed her appointment with her PCP Dr. Todd. OPCM offered to send in basket message to PCP's office requesting pt be contacted to reschedule new appointment. Pt agreed.  Pt stated she is having a hard time eating unless it is soft food. Pt eats cereal, soup. Pt was advised to decrease consuming Ensure by Podiatry. Pt was having swelling to her foot. Pt detailed she was seen by specialist who removed a corn and cut down her toe nails. OPCM suggested pt consider apple sauce, yogurt, grits, scrambled eggs for a soft consistency. Pt was receptive to suggestion. Pt does not wish to see GI provider at this time. Ms. Cole is doing well with her Asthma condition. Pt voiced she has not required the use of her inhaler/nebulizer. Pt is not experiencing any SOB, CP, wheezing, chest tightness. Pt did no have any questions or concerns, agreed to follow up with OPCM.    Patient Summary     Involvement of Care:  Do I have permission to speak with other family members about your care?   no    Patient Reported Labs & Vitals:  1.  Any Patient Reported Labs & Vitals?     2.  Patient Reported Blood Pressure:     3.  Patient Reported Pulse:     4.  " Patient Reported Weight (Kg):     5.  Patient Reported Blood Glucose (mg/dl):       Medical and social history was reviewed with patient and/or caregiver.     Clinical Assessment     Reviewed and provided basic information on available community resources for mental health, transportation, wellness resources, and palliative care programs with patient and/or caregiver.     Complex Care Plan     Care plan was discussed and completed today with input from patient and/or caregiver.    Patient Instructions     Instructions were provided via the [employer] patient [employer] and are available for the patient to view on the patient portal.    Next Steps: F/u with pt. (in basket sent to PCP) to reschedule appt  Pt has not required the use of inhaler/nebulizer, only prn  No longer having s/s of Asthma  Possible case closure      Follow up in about 20 days (around 8/26/2020).    Todays OPCM Self-Management Care Plan was developed with the patients/caregivers input and was based on identified barriers from todays assessment.  Goals were written today with the patient/caregiver and the patient has agreed to work towards these goals to improve his/her overall well-being. Patient verbalized understanding of the care plan, goals, and all of today's instructions. Encouraged patient/caregiver to communicate with his/her physician and health care team about health conditions and the treatment plan.  Provided my contact information today and encouraged patient/caregiver to call me with any questions as needed.

## 2020-08-06 NOTE — TELEPHONE ENCOUNTER
Please set her up with an appointment with me. She missed her last one. NO OVERBOOK,    Thanks,  KJ

## 2020-08-06 NOTE — TELEPHONE ENCOUNTER
----- Message from Dina Jason RN sent at 8/6/2020  9:31 AM CDT -----  Regarding: OPCM  I work with [facility] Outpatient [facility] Management Department. I have Ms. Fields enrolled in our program. She informed me missed her appointment in July and would like to reschedule an appointment with Dr. Todd. Can someone please call her and set up an appointment for her.          Thank you,    Dina Jason RN  [facility]  [email or web address]  Tel:[phone]

## 2020-08-07 NOTE — TELEPHONE ENCOUNTER
Spoke with pt, scheduled appt,   She stated that she received vit d 3 from our pharmacy and the bottle was empty.  Notified [place] in pharmacy.

## 2020-08-10 DIAGNOSIS — E78.2 MIXED HYPERLIPIDEMIA: ICD-10-CM

## 2020-08-10 DIAGNOSIS — I10 ESSENTIAL HYPERTENSION: ICD-10-CM

## 2020-08-10 DIAGNOSIS — J45.20 MILD INTERMITTENT ASTHMA WITHOUT COMPLICATION: ICD-10-CM

## 2020-08-24 RX ORDER — AMLODIPINE BESYLATE 10 MG/1
10 TABLET ORAL DAILY
Qty: 90 TABLET | Refills: 3 | Status: SHIPPED | OUTPATIENT
Start: 2020-08-24 | End: 2020-10-12

## 2020-08-24 RX ORDER — ATORVASTATIN CALCIUM 40 MG/1
40 TABLET, FILM COATED ORAL DAILY
Qty: 90 TABLET | Refills: 3 | Status: SHIPPED | OUTPATIENT
Start: 2020-08-24 | End: 2021-05-26 | Stop reason: SDUPTHER

## 2020-08-24 RX ORDER — IPRATROPIUM BROMIDE AND ALBUTEROL SULFATE 2.5; .5 MG/3ML; MG/3ML
3 SOLUTION RESPIRATORY (INHALATION)
Qty: 90 ML | Refills: 3 | Status: SHIPPED | OUTPATIENT
Start: 2020-08-24 | End: 2021-08-02 | Stop reason: SDUPTHER

## 2020-08-28 ENCOUNTER — OUTPATIENT CASE MANAGEMENT (OUTPATIENT)
Dept: ADMINISTRATIVE | Facility: OTHER | Age: 80
End: 2020-08-28

## 2020-08-28 NOTE — PROGRESS NOTES
Outpatient Care Management  Plan of Care Follow Up Visit    Patient: Kelsey Fields  MRN: [id number]  Date of Service: 08/28/2020  Completed by: Dina Jason RN  Referral Date: 06/02/2020  Program:     Reason for Visit   Patient presents with    OPCM RN First Follow-Up Attempt     08/28/20- pt requested to be called at a later date       Brief Summary:  OPCM RN received inbound call from pt/cg for continued follow up with OPCM services. Ms. Fields stated she was feeling well. Pt has not required the use of her inhaler or nebulizer. Pt expressed she is not having any SOB, wheezing, CP, avoids the triggers that may cause a flare up. Ms. Fields is continuing to maintain her soft based diet which OPCM encouraged to avoid risk of aspiration. Dietart choices were reviewed. Pt is taking all her medications consistently. Pt does plan to attend upcoming established appointments. Pt agreed to closing her episode, has OPCM contact information should any concerns arise.     Patient Summary     Involvement of Care:  Do I have permission to speak with other family members about your care?   No, not at this time    Patient Reported Labs & Vitals:  1.  Any Patient Reported Labs & Vitals?     2.  Patient Reported Blood Pressure:     3.  Patient Reported Pulse:     4.  Patient Reported Weight (Kg):     5.  Patient Reported Blood Glucose (mg/dl):       Medical and social history was reviewed with patient and/or caregiver.     Clinical Assessment     Reviewed and provided basic information on available community resources for mental health, transportation, wellness resources, and palliative care programs with patient and/or caregiver.     Complex Care Plan     Care plan was discussed and completed today with input from patient and/or caregiver.    Patient Instructions     Instructions were provided via the [employer] patient resources and are available for the patient to view on the patient portal.    Next Steps: Closing  episode     No follow-ups on file. Closing episode     Todays OPCM Self-Management Care Plan was developed with the patients/caregivers input and was based on identified barriers from todays assessment.  Goals were written today with the patient/caregiver and the patient has agreed to work towards these goals to improve his/her overall well-being. Patient verbalized understanding of the care plan, goals, and all of today's instructions. Encouraged patient/caregiver to communicate with his/her physician and health care team about health conditions and the treatment plan.  Provided my contact information today and encouraged patient/caregiver to call me with any questions as needed.

## 2020-09-09 ENCOUNTER — TELEPHONE (OUTPATIENT)
Dept: ADMINISTRATIVE | Facility: OTHER | Age: 80
End: 2020-09-09

## 2020-09-09 DIAGNOSIS — K21.9 GASTROESOPHAGEAL REFLUX DISEASE WITHOUT ESOPHAGITIS: ICD-10-CM

## 2020-09-09 RX ORDER — CIMETIDINE 400 MG/1
400 TABLET, FILM COATED ORAL 2 TIMES DAILY
Qty: 180 TABLET | Refills: 3 | Status: SHIPPED | OUTPATIENT
Start: 2020-09-09 | End: 2021-05-26 | Stop reason: SDUPTHER

## 2020-09-10 ENCOUNTER — OFFICE VISIT (OUTPATIENT)
Dept: PRIMARY CARE CLINIC | Facility: CLINIC | Age: 80
End: 2020-09-10
Payer: MEDICARE

## 2020-09-10 ENCOUNTER — LAB VISIT (OUTPATIENT)
Dept: LAB | Facility: HOSPITAL | Age: 80
End: 2020-09-10
Attending: INTERNAL MEDICINE
Payer: MEDICARE

## 2020-09-10 VITALS
HEIGHT: 64 IN | RESPIRATION RATE: 16 BRPM | OXYGEN SATURATION: 97 % | WEIGHT: 192.69 LBS | SYSTOLIC BLOOD PRESSURE: 118 MMHG | BODY MASS INDEX: 32.9 KG/M2 | DIASTOLIC BLOOD PRESSURE: 52 MMHG | TEMPERATURE: 98 F | HEART RATE: 83 BPM

## 2020-09-10 DIAGNOSIS — I77.6 ARTERITIS: ICD-10-CM

## 2020-09-10 DIAGNOSIS — N18.30 TYPE 2 DIABETES MELLITUS WITH STAGE 3 CHRONIC KIDNEY DISEASE, WITHOUT LONG-TERM CURRENT USE OF INSULIN: ICD-10-CM

## 2020-09-10 DIAGNOSIS — J45.20 MILD INTERMITTENT ASTHMA WITHOUT COMPLICATION: ICD-10-CM

## 2020-09-10 DIAGNOSIS — R73.03 PREDIABETES: ICD-10-CM

## 2020-09-10 DIAGNOSIS — E11.22 TYPE 2 DIABETES MELLITUS WITH STAGE 3 CHRONIC KIDNEY DISEASE, WITHOUT LONG-TERM CURRENT USE OF INSULIN: ICD-10-CM

## 2020-09-10 DIAGNOSIS — M48.10 DISH (DIFFUSE IDIOPATHIC SKELETAL HYPEROSTOSIS): Primary | ICD-10-CM

## 2020-09-10 LAB
ANION GAP SERPL CALC-SCNC: 11 MMOL/L (ref 8–16)
BUN SERPL-MCNC: 13 MG/DL (ref 8–23)
CALCIUM SERPL-MCNC: 10.2 MG/DL (ref 8.7–10.5)
CHLORIDE SERPL-SCNC: 105 MMOL/L (ref 95–110)
CO2 SERPL-SCNC: 24 MMOL/L (ref 23–29)
CREAT SERPL-MCNC: 1.1 MG/DL (ref 0.5–1.4)
CRP SERPL-MCNC: 0.9 MG/L (ref 0–8.2)
ERYTHROCYTE [SEDIMENTATION RATE] IN BLOOD BY WESTERGREN METHOD: 74 MM/HR (ref 0–36)
EST. GFR  (AFRICAN AMERICAN): 54.4 ML/MIN/1.73 M^2
EST. GFR  (NON AFRICAN AMERICAN): 47.2 ML/MIN/1.73 M^2
GLUCOSE SERPL-MCNC: 80 MG/DL (ref 70–110)
POTASSIUM SERPL-SCNC: 4 MMOL/L (ref 3.5–5.1)
SODIUM SERPL-SCNC: 140 MMOL/L (ref 136–145)

## 2020-09-10 PROCEDURE — 1126F PR PAIN SEVERITY QUANTIFIED, NO PAIN PRESENT: ICD-10-PCS | Mod: HCNC,S$GLB,, | Performed by: INTERNAL MEDICINE

## 2020-09-10 PROCEDURE — G0008 PR ADMIN INFLUENZA VIRUS VAC: ICD-10-PCS | Mod: HCNC,S$GLB,, | Performed by: INTERNAL MEDICINE

## 2020-09-10 PROCEDURE — 99499 UNLISTED E&M SERVICE: CPT | Mod: S$GLB,,, | Performed by: INTERNAL MEDICINE

## 2020-09-10 PROCEDURE — 90694 FLU VACCINE - QUADRIVALENT - ADJUVANTED: ICD-10-PCS | Mod: HCNC,S$GLB,, | Performed by: INTERNAL MEDICINE

## 2020-09-10 PROCEDURE — 85652 RBC SED RATE AUTOMATED: CPT | Mod: HCNC

## 2020-09-10 PROCEDURE — 86140 C-REACTIVE PROTEIN: CPT | Mod: HCNC

## 2020-09-10 PROCEDURE — 1101F PT FALLS ASSESS-DOCD LE1/YR: CPT | Mod: HCNC,CPTII,S$GLB, | Performed by: INTERNAL MEDICINE

## 2020-09-10 PROCEDURE — 99215 OFFICE O/P EST HI 40 MIN: CPT | Mod: 25,HCNC,S$GLB, | Performed by: INTERNAL MEDICINE

## 2020-09-10 PROCEDURE — 1159F MED LIST DOCD IN RCRD: CPT | Mod: HCNC,S$GLB,, | Performed by: INTERNAL MEDICINE

## 2020-09-10 PROCEDURE — 36415 COLL VENOUS BLD VENIPUNCTURE: CPT | Mod: HCNC

## 2020-09-10 PROCEDURE — 3078F DIAST BP <80 MM HG: CPT | Mod: HCNC,CPTII,S$GLB, | Performed by: INTERNAL MEDICINE

## 2020-09-10 PROCEDURE — 1126F AMNT PAIN NOTED NONE PRSNT: CPT | Mod: HCNC,S$GLB,, | Performed by: INTERNAL MEDICINE

## 2020-09-10 PROCEDURE — 80048 BASIC METABOLIC PNL TOTAL CA: CPT | Mod: HCNC

## 2020-09-10 PROCEDURE — 99215 PR OFFICE/OUTPT VISIT, EST, LEVL V, 40-54 MIN: ICD-10-PCS | Mod: 25,HCNC,S$GLB, | Performed by: INTERNAL MEDICINE

## 2020-09-10 PROCEDURE — 3074F PR MOST RECENT SYSTOLIC BLOOD PRESSURE < 130 MM HG: ICD-10-PCS | Mod: HCNC,CPTII,S$GLB, | Performed by: INTERNAL MEDICINE

## 2020-09-10 PROCEDURE — 3074F SYST BP LT 130 MM HG: CPT | Mod: HCNC,CPTII,S$GLB, | Performed by: INTERNAL MEDICINE

## 2020-09-10 PROCEDURE — 3078F PR MOST RECENT DIASTOLIC BLOOD PRESSURE < 80 MM HG: ICD-10-PCS | Mod: HCNC,CPTII,S$GLB, | Performed by: INTERNAL MEDICINE

## 2020-09-10 PROCEDURE — 83036 HEMOGLOBIN GLYCOSYLATED A1C: CPT | Mod: HCNC

## 2020-09-10 PROCEDURE — 1159F PR MEDICATION LIST DOCUMENTED IN MEDICAL RECORD: ICD-10-PCS | Mod: HCNC,S$GLB,, | Performed by: INTERNAL MEDICINE

## 2020-09-10 PROCEDURE — 99499 RISK ADDL DX/OHS AUDIT: ICD-10-PCS | Mod: S$GLB,,, | Performed by: INTERNAL MEDICINE

## 2020-09-10 PROCEDURE — 1101F PR PT FALLS ASSESS DOC 0-1 FALLS W/OUT INJ PAST YR: ICD-10-PCS | Mod: HCNC,CPTII,S$GLB, | Performed by: INTERNAL MEDICINE

## 2020-09-10 PROCEDURE — 90694 VACC AIIV4 NO PRSRV 0.5ML IM: CPT | Mod: HCNC,S$GLB,, | Performed by: INTERNAL MEDICINE

## 2020-09-10 PROCEDURE — G0008 ADMIN INFLUENZA VIRUS VAC: HCPCS | Mod: HCNC,S$GLB,, | Performed by: INTERNAL MEDICINE

## 2020-09-10 RX ORDER — ALBUTEROL SULFATE 90 UG/1
2 AEROSOL, METERED RESPIRATORY (INHALATION) EVERY 6 HOURS PRN
Qty: 18 G | Refills: 3 | Status: SHIPPED | OUTPATIENT
Start: 2020-09-10 | End: 2020-09-10 | Stop reason: SDUPTHER

## 2020-09-10 RX ORDER — ALBUTEROL SULFATE 90 UG/1
2 AEROSOL, METERED RESPIRATORY (INHALATION) EVERY 6 HOURS PRN
Qty: 18 G | Refills: 3 | Status: SHIPPED | OUTPATIENT
Start: 2020-09-10 | End: 2021-05-26 | Stop reason: SDUPTHER

## 2020-09-10 NOTE — PROGRESS NOTES
1430 identified pt by name and date of birth, discussed flu vaccine, pt verbalized understanding. Answered screening questions, administered flu vaccine, FLUAD, to left arm, sterile technique, 25g needle. Pt tolerated well. Flu information sheet given to pt. Pt will call for any extreme symptoms. pt instructed to wait here for 15 min after injection.  .

## 2020-09-10 NOTE — PATIENT INSTRUCTIONS
TODAY:  - apply compression stockings at home  - attend Back and Spine appointment  - albuterol sent to [facility]  - flu vaccine today  - labs today

## 2020-09-10 NOTE — ASSESSMENT & PLAN NOTE
DISH seen on XR in 6/2020, chronic pain associated  · PCP spoke with Rheum who advised supportive care  · Stretches and exercises advised  · Advised to apply ice daily  · Advised aquatherpy  · Patient refuses  · Continue voltaren  · Prescribed prednisone by outside Ortho at [facility] (Dr Fam)

## 2020-09-11 LAB
ESTIMATED AVG GLUCOSE: 114 MG/DL (ref 68–131)
HBA1C MFR BLD HPLC: 5.6 % (ref 4–5.6)

## 2020-09-15 ENCOUNTER — PATIENT OUTREACH (OUTPATIENT)
Dept: ADMINISTRATIVE | Facility: OTHER | Age: 80
End: 2020-09-15

## 2020-09-15 NOTE — PROGRESS NOTES
Health Maintenance Due   Topic Date Due    Foot Exam  11/19/1950    TETANUS VACCINE  11/19/1958    Lipid Panel  02/13/2020     Updates were requested from care everywhere.  Chart was reviewed for overdue Proactive [facility] Encounters (CARLOS) topics (CRS, Breast Cancer Screening, Eye exam)  Health Maintenance has been updated.  LINKS immunization registry triggered.  Immunizations were reconciled.

## 2020-09-23 ENCOUNTER — TELEPHONE (OUTPATIENT)
Dept: PRIMARY CARE CLINIC | Facility: CLINIC | Age: 80
End: 2020-09-23

## 2020-09-23 NOTE — TELEPHONE ENCOUNTER
Please let patient know that all of her labs were at goal (A1c, lipids). Her inflammatory marker CRP remained elevated, but it is trending down. This is likely related to her skeletal disorder (DISH) that is causing her pain.     How is she doing?      Thanks,  KJ

## 2020-09-29 ENCOUNTER — TELEPHONE (OUTPATIENT)
Dept: ADMINISTRATIVE | Facility: OTHER | Age: 80
End: 2020-09-29

## 2020-09-29 NOTE — TELEPHONE ENCOUNTER
Good morning,  I will find out if [facility] is currently offering assistance. I will also contact the [employer].     Respectfully,    Monae

## 2020-09-29 NOTE — TELEPHONE ENCOUNTER
Estela- please also talk to [facility] and try to get this CPAP returned to them. It makes no sense for her to pay for it if she's not using it! Please call them and figure that out. If they don't know then call the sleep medicine department.    Thanks,  KARLY

## 2020-09-29 NOTE — TELEPHONE ENCOUNTER
Spoke with pt, she is doing ok, her pain is in low back, tylenol ES seems to help her.   Sometimes she says she pushes herself.     Pt says that [facility] is charging her for supplies. Requested pt to bring mask and tubing to next appt.     Pt also needs to be set up with the dietitian to learn what foods she needs to eat and not eat.    Monae  Is there any help you can off to pt?  She mentioned problems with water bill and electric bill.

## 2020-09-30 ENCOUNTER — TELEPHONE (OUTPATIENT)
Dept: ADMINISTRATIVE | Facility: OTHER | Age: 80
End: 2020-09-30

## 2020-09-30 NOTE — TELEPHONE ENCOUNTER
SHIRA HARRY provided consent from pt to refer to the [employer] [facility] program with the [employer]; [facility] KAMRYN provided referral telephonically. SHIRA HARRY  was not able to provide the account number, due date and amount of the bill. Pt reports that she will look for the bill and provide the information to the [employer].

## 2020-10-02 ENCOUNTER — TELEPHONE (OUTPATIENT)
Dept: PRIMARY CARE CLINIC | Facility: CLINIC | Age: 80
End: 2020-10-02

## 2020-10-02 ENCOUNTER — TELEPHONE (OUTPATIENT)
Dept: ADMINISTRATIVE | Facility: OTHER | Age: 80
End: 2020-10-02

## 2020-10-02 NOTE — TELEPHONE ENCOUNTER
----- Message from Corrine Na sent at 10/2/2020 11:38 AM CDT -----  Contact: Pt [phone]  Requesting a call about her cpap machine.    Please call and advise.    Thank You

## 2020-10-02 NOTE — TELEPHONE ENCOUNTER
Thank you,  I will call around to find out the best way to assist pt with the cost of the CPAP.    Respectfully,    Monae Angulo LMSW

## 2020-10-05 ENCOUNTER — TELEPHONE (OUTPATIENT)
Dept: ADMINISTRATIVE | Facility: OTHER | Age: 80
End: 2020-10-05

## 2020-10-05 NOTE — TELEPHONE ENCOUNTER
Could you speak with Travis or TED and see how it works? She uses her CPAP throughout the day and owes money on it but can't pay it.    Thanks  KJ

## 2020-10-05 NOTE — TELEPHONE ENCOUNTER
OPCM LMSW followed up with [facility] regarding pt's CPAP. [facility] informed OPCM LMSW that pt finished paying for her CPAP in July 2020. OPCM LMSW informed pt that her CPAP is paid off; pt verbalized understanding. OPCM LMSW will follow.

## 2020-10-09 NOTE — TELEPHONE ENCOUNTER
Spoke to pt last week, she stated that she wants to keep the cpap  I checked with [facility]. They put her on the do not call list.    Pt knows to call if she needs more supplies.  Pt stated she has a lot of supplies.    Monae was able to assist the the amount she owes. And pt balance is  0.

## 2020-10-12 ENCOUNTER — OFFICE VISIT (OUTPATIENT)
Dept: PRIMARY CARE CLINIC | Facility: CLINIC | Age: 80
End: 2020-10-12
Payer: MEDICARE

## 2020-10-12 VITALS
HEART RATE: 72 BPM | TEMPERATURE: 98 F | RESPIRATION RATE: 13 BRPM | DIASTOLIC BLOOD PRESSURE: 58 MMHG | SYSTOLIC BLOOD PRESSURE: 110 MMHG | BODY MASS INDEX: 34.35 KG/M2 | OXYGEN SATURATION: 98 % | WEIGHT: 193.88 LBS | HEIGHT: 63 IN

## 2020-10-12 DIAGNOSIS — I10 ESSENTIAL HYPERTENSION: ICD-10-CM

## 2020-10-12 DIAGNOSIS — R22.43 LOCALIZED SWELLING OF BOTH LOWER LEGS: Primary | ICD-10-CM

## 2020-10-12 DIAGNOSIS — R35.0 URINARY FREQUENCY: ICD-10-CM

## 2020-10-12 LAB
BILIRUB UR QL STRIP: NEGATIVE
CLARITY UR REFRACT.AUTO: CLEAR
COLOR UR AUTO: ABNORMAL
GLUCOSE UR QL STRIP: NEGATIVE
HGB UR QL STRIP: NEGATIVE
KETONES UR QL STRIP: NEGATIVE
LEUKOCYTE ESTERASE UR QL STRIP: ABNORMAL
MICROSCOPIC COMMENT: NORMAL
NITRITE UR QL STRIP: NEGATIVE
PH UR STRIP: 6 [PH] (ref 5–8)
PROT UR QL STRIP: NEGATIVE
RBC #/AREA URNS AUTO: 1 /HPF (ref 0–4)
SP GR UR STRIP: 1.01 (ref 1–1.03)
SQUAMOUS #/AREA URNS AUTO: 1 /HPF
URN SPEC COLLECT METH UR: ABNORMAL
WBC #/AREA URNS AUTO: 0 /HPF (ref 0–5)

## 2020-10-12 PROCEDURE — 81001 URINALYSIS AUTO W/SCOPE: CPT | Mod: HCNC

## 2020-10-12 PROCEDURE — 3074F SYST BP LT 130 MM HG: CPT | Mod: HCNC,CPTII,S$GLB, | Performed by: INTERNAL MEDICINE

## 2020-10-12 PROCEDURE — 3078F DIAST BP <80 MM HG: CPT | Mod: HCNC,CPTII,S$GLB, | Performed by: INTERNAL MEDICINE

## 2020-10-12 PROCEDURE — 1101F PR PT FALLS ASSESS DOC 0-1 FALLS W/OUT INJ PAST YR: ICD-10-PCS | Mod: HCNC,CPTII,S$GLB, | Performed by: INTERNAL MEDICINE

## 2020-10-12 PROCEDURE — 1159F MED LIST DOCD IN RCRD: CPT | Mod: HCNC,S$GLB,, | Performed by: INTERNAL MEDICINE

## 2020-10-12 PROCEDURE — 1125F AMNT PAIN NOTED PAIN PRSNT: CPT | Mod: HCNC,S$GLB,, | Performed by: INTERNAL MEDICINE

## 2020-10-12 PROCEDURE — 1159F PR MEDICATION LIST DOCUMENTED IN MEDICAL RECORD: ICD-10-PCS | Mod: HCNC,S$GLB,, | Performed by: INTERNAL MEDICINE

## 2020-10-12 PROCEDURE — 99214 PR OFFICE/OUTPT VISIT, EST, LEVL IV, 30-39 MIN: ICD-10-PCS | Mod: HCNC,S$GLB,, | Performed by: INTERNAL MEDICINE

## 2020-10-12 PROCEDURE — 3074F PR MOST RECENT SYSTOLIC BLOOD PRESSURE < 130 MM HG: ICD-10-PCS | Mod: HCNC,CPTII,S$GLB, | Performed by: INTERNAL MEDICINE

## 2020-10-12 PROCEDURE — 1101F PT FALLS ASSESS-DOCD LE1/YR: CPT | Mod: HCNC,CPTII,S$GLB, | Performed by: INTERNAL MEDICINE

## 2020-10-12 PROCEDURE — 1125F PR PAIN SEVERITY QUANTIFIED, PAIN PRESENT: ICD-10-PCS | Mod: HCNC,S$GLB,, | Performed by: INTERNAL MEDICINE

## 2020-10-12 PROCEDURE — 99214 OFFICE O/P EST MOD 30 MIN: CPT | Mod: HCNC,S$GLB,, | Performed by: INTERNAL MEDICINE

## 2020-10-12 PROCEDURE — 3078F PR MOST RECENT DIASTOLIC BLOOD PRESSURE < 80 MM HG: ICD-10-PCS | Mod: HCNC,CPTII,S$GLB, | Performed by: INTERNAL MEDICINE

## 2020-10-12 RX ORDER — HYDROCHLOROTHIAZIDE 12.5 MG/1
12.5 TABLET ORAL DAILY
Qty: 30 TABLET | Refills: 3 | Status: SHIPPED | OUTPATIENT
Start: 2020-10-12 | End: 2020-10-27

## 2020-10-12 NOTE — ASSESSMENT & PLAN NOTE
Increased urinary frequency last 1-2 weeks, no burning or blood. No CVA tenderness on exam. No abdominal pain. No confusion. HDS.   Will order UA with reflex to culture in clinic today. Can start abx if concerning for infection.

## 2020-10-12 NOTE — ASSESSMENT & PLAN NOTE
Continue Losartan, switch Amlodipine 10 mg to HCTZ 12.5 mg daily to help with LE swelling/added diuretic.  Advised to check BP at home and write it down.   Follow-up in 2 weeks for follow-up and BP check. Can do labs at that time.

## 2020-10-12 NOTE — ASSESSMENT & PLAN NOTE
Pt with bilateral LE swelling, right >left  Eating increased salt intake (Ritz crackers, Ramen noodles).   Counseled on low-salt diet. US ordered to evaluate for DVT (Wells 2, low risk however given asymmetrical with pitting edema, will evaluate)  Echo ordered to evaluate for CHF  Switch Amlodipine to HCTZ to help with fluid retention  Compression socks prn    F/u 2 weeks

## 2020-10-12 NOTE — PROGRESS NOTES
Primary Care Provider Appointment - [employer]  SHARED NOTE: Dr. Ng (Resident), Dr Todd (Attending)    Subjective:      Patient ID: Kelsey Fields is a 79 y.o. female with CKD, Asthma, HTN    Chief Complaint: Edema (lower swelling in right leg )    Prior to this visit, patient's last encounter with PCP was 9/10/2020.    Pt walked-in to clinic today due to concern over LE swelling. Per chart review, has been going on for long time but pt feels it is new in last 3 weeks. She has been eating saltine crackers (light salt) and ramen noodles recently. Feels right is worse than left. No pain. No redness. No trouble walking or calf pain. Has not been using compression socks. No changes in medications. Still working at [employer] and mostly on feet all day. Some SOB with walking and fatigability. Sleeps with 3 pillows but also with back pain. No nausea/vomiting/diarrhea. Does report increased urinary frequency but no dysuria/urgency/hematuria. Not on any blood thinners. Denies hx of CHF.       Past Surgical History:   Procedure Laterality Date    blepharitis Bilateral 2017    Alessandra Grijalva OD    CATARACT EXTRACTION Bilateral 2017    Alessandra Grijalva MD    CATARACT EXTRACTION W/  INTRAOCULAR LENS IMPLANT Left 2019    Procedure: EXTRACTION, CATARACT, WITH IOL INSERTION;  Surgeon: Alysa De Souza MD;  Location: [facility] OR;  Service: Ophthalmology;  Laterality: Left;    CATARACT EXTRACTION W/  INTRAOCULAR LENS IMPLANT Right 2019    Procedure: EXTRACTION, CATARACT, WITH IOL INSERTION;  Surgeon: Alysa De Souza MD;  Location: [facility] OR;  Service: Ophthalmology;  Laterality: Right;     SECTION      FRACTURE SURGERY      JOINT REPLACEMENT      right knee     KNEE SURGERY Right        Past Medical History:   Diagnosis Date    Arthritis     Degenerative arthritis of lumbar spine     Episcleritis of right eye     GERD (gastroesophageal reflux disease)     Hypertension     Shingles     Sleep  apnea     Type 2 diabetes mellitus with stage 3 chronic kidney disease, without long-term current use of insulin 6/5/2020       Social History     Socioeconomic History    Marital status: Single     Spouse name: Not on file    Number of children: Not on file    Years of education: Not on file    Highest education level: Not on file   Occupational History    Not on file   Social Needs    Financial resource strain: Not very hard    Food insecurity     Worry: Never true     Inability: Never true    Transportation needs     Medical: No     Non-medical: No   Tobacco Use    Smoking status: Never Smoker    Smokeless tobacco: Never Used   Substance and Sexual Activity    Alcohol use: No    Drug use: No    Sexual activity: Never   Lifestyle    Physical activity     Days per week: 0 days     Minutes per session: 0 min    Stress: Not at all   Relationships    Social connections     Talks on phone: Once a week     Gets together: Once a week     Attends [employer] service: More than 4 times per year     Active member of club or organization: Yes     Attends meetings of clubs or organizations: More than 4 times per year     Relationship status: Never    Other Topics Concern    Not on file   Social History Narrative    Not on file       Review of Systems   Constitutional: Positive for fatigue. Negative for activity change, chills, diaphoresis and fever.   HENT: Negative for congestion.    Respiratory: Negative for cough and wheezing.    Cardiovascular: Positive for leg swelling. Negative for chest pain.   Gastrointestinal: Negative for abdominal pain, constipation, nausea and vomiting.   Genitourinary: Positive for frequency. Negative for decreased urine volume, difficulty urinating, dysuria, hematuria and urgency.   Musculoskeletal: Negative for back pain.   Skin: Negative for rash and wound.   Neurological: Negative for dizziness, tremors, speech difficulty, weakness and headaches.  "  Psychiatric/Behavioral: Negative for agitation, behavioral problems and confusion.       Objective:   BP (!) 110/58 (BP Location: Left arm, Patient Position: Sitting, BP Method: Medium (Manual))   Pulse 72   Temp 98.2 °F (36.8 °C) (Oral)   Resp 13   Ht 5' 3" (1.6 m)   Wt 87.9 kg (193 lb 14.3 oz)   SpO2 98%   BMI 34.35 kg/m²     Physical Exam  Vitals signs and nursing note reviewed.   Constitutional:       General: She is not in acute distress.     Appearance: Normal appearance. She is not ill-appearing, toxic-appearing or diaphoretic.   HENT:      Head: Normocephalic and atraumatic.      Nose: No congestion.   Eyes:      Extraocular Movements: Extraocular movements intact.      Conjunctiva/sclera: Conjunctivae normal.   Neck:      Musculoskeletal: Normal range of motion and neck supple.   Cardiovascular:      Rate and Rhythm: Normal rate.   Pulmonary:      Effort: Pulmonary effort is normal. No respiratory distress.   Abdominal:      General: There is no distension.      Palpations: Abdomen is soft.      Tenderness: There is no abdominal tenderness.   Musculoskeletal: Normal range of motion.         General: Swelling present.      Right lower leg: Edema (right side with greater swelling than left) present.      Left lower leg: Edema present.      Comments: Some warmth of RLE. No calf tenderness.    Skin:     General: Skin is warm and dry.      Findings: No bruising.   Neurological:      General: No focal deficit present.      Mental Status: She is alert and oriented to person, place, and time. Mental status is at baseline.             Lab Results   Component Value Date    WBC 8.54 06/15/2020    HGB 10.2 (L) 06/15/2020    HCT 35.3 (L) 06/15/2020     06/15/2020    CHOL 139 02/13/2019    TRIG 72 02/13/2019    HDL 41 02/13/2019    ALT 8 (L) 06/02/2020    AST 12 06/02/2020     09/10/2020    K 4.0 09/10/2020     09/10/2020    CREATININE 1.1 09/10/2020    BUN 13 09/10/2020    CO2 24 09/10/2020 "    TSH 0.725 09/20/2018    HGBA1C 5.6 09/10/2020       Current Outpatient Medications on File Prior to Visit   Medication Sig Dispense Refill    albuterol (PROVENTIL/VENTOLIN HFA) 90 mcg/actuation inhaler Inhale 2 puffs into the lungs every 6 (six) hours as needed for Wheezing. Rescue 18 g 3    albuterol-ipratropium (DUO-NEB) 2.5 mg-0.5 mg/3 mL nebulizer solution Take 3 mLs by nebulization every 6 (six) hours while awake. Rescue 90 mL 3    ammonium lactate 12 % Crea Apply twice daily to affected parts both feet as needed. 140 g 11    atorvastatin (LIPITOR) 40 MG tablet Take 1 tablet (40 mg total) by mouth once daily. 90 tablet 3    cholecalciferol, vitamin D3, (VITAMIN D3) 125 mcg (5,000 unit) Tab Take 1 tablet (5,000 Units total) by mouth every 48 hours. 90 tablet 3    ciclopirox (LOPROX) 0.77 % Crea Apply topically 2 (two) times daily. 90 g 2    cimetidine (TAGAMET) 400 MG tablet Take 1 tablet (400 mg total) by mouth 2 (two) times daily. 180 tablet 3    CMPD Lidocaine 2%, Prilocaine 2%, Lamotrigine 2.5%, Meloxicam 0.09% in Transdermal Gel Apply 1 Pump (1.6 g total) topically 4 (four) times daily. 240 g 3    diclofenac sodium (VOLTAREN) 1 % Gel Apply 2 g topically 2 (two) times daily. 3 Tube 3    ferrous sulfate (FEOSOL) 325 mg (65 mg iron) Tab tablet Take 1 tablet (325 mg total) by mouth daily with breakfast. 90 tablet 0    fluticasone propionate (FLONASE) 50 mcg/actuation nasal spray 1 spray (50 mcg total) by Each Nostril route once daily. 16 g 2    gabapentin (NEURONTIN) 300 MG capsule Take 1 capsule (300 mg total) by mouth 2 (two) times daily as needed. 180 capsule 3    hydrocortisone 2.5 % cream Apply topically to rash on legs twice daily 28 g 0    inhalat. spacing dev,sm. mask Spcr Use with rescue inhaler 1 each 0    ketoconazole (NIZORAL) 2 % cream Apply under breast topically twice daily for rash 60 g 0    loratadine (CLARITIN) 10 mg tablet Take 1 tablet (10 mg total) by mouth once daily. 30  tablet 11    losartan (COZAAR) 25 MG tablet Take 1 tablet (25 mg total) by mouth once daily. 90 tablet 3    methocarbamoL (ROBAXIN) 750 MG Tab 1 tab every 8 hours as needed for muscle spasm 30 tablet 0    nebulizer and compressor Paty Use as directed 1 each 0    triamcinolone acetonide 0.025% (KENALOG) 0.025 % Oint Apply topically once daily to rash on arms and legs as needed 80 g 1    [DISCONTINUED] amLODIPine (NORVASC) 10 MG tablet Take 1 tablet (10 mg total) by mouth once daily. 90 tablet 3     No current facility-administered medications on file prior to visit.          Assessment:   79 y.o. female with multiple co-morbid illnesses here to follow-up with PCP and continue work-up of chronic issues notably lower leg swelling, CKD, weakness, HTN,.     Plan:     Problem List Items Addressed This Visit        Cardiac/Vascular    Essential hypertension     Continue Losartan, switch Amlodipine 10 mg to HCTZ 12.5 mg daily to help with LE swelling/added diuretic.  Advised to check BP at home and write it down.   Follow-up in 2 weeks for follow-up and BP check. Can do labs at that time.          Relevant Medications    hydroCHLOROthiazide (HYDRODIURIL) 12.5 MG Tab       Renal/    Urinary frequency     Increased urinary frequency last 1-2 weeks, no burning or blood. No CVA tenderness on exam. No abdominal pain. No confusion. HDS.   Will order UA with reflex to culture in clinic today. Can start abx if concerning for infection.          Relevant Orders    Urinalysis, Reflex to Urine Culture Urine, Clean Catch       Other    Localized swelling of both lower legs  - Primary     Pt with bilateral LE swelling, right >left  Eating increased salt intake (Ritz crackers, Ramen noodles).   Counseled on low-salt diet. US ordered to evaluate for DVT (Wells 2, low risk however given asymmetrical with pitting edema, will evaluate)  Echo ordered to evaluate for CHF  Switch Amlodipine to HCTZ to help with fluid  retention  Compression socks prn    F/u 2 weeks         Relevant Medications    hydroCHLOROthiazide (HYDRODIURIL) 12.5 MG Tab    Other Relevant Orders    US Lower Extremity Veins Bilateral    Echo Color Flow Doppler? Yes; Bubble Contrast? No          Health Maintenance       Date Due Completion Date    Foot Exam 11/19/1950 ---    TETANUS VACCINE 11/19/1956 ---    Lipid Panel 02/13/2020 2/13/2019    Eye Exam 01/15/2021 1/15/2020    Hemoglobin A1c 03/10/2021 9/10/2020    DEXA SCAN 05/16/2021 5/16/2019              Follow up in about 2 weeks (around 10/26/2020) for Follow-up, BP monitoring. Total face-to-face time was 60 min, >50% of this was spent on counseling and coordination of care. The following issues were discussed: LE swelling, changes in BP medication, concerns regarding CHF, concerns regarding blood clot, concerns regarding low salt diet.     Noy Ng MD (resident)  Internal Medicine Residency     Laurie Todd MD/MPH  [facility]  [phone] [facility]

## 2020-10-12 NOTE — PATIENT INSTRUCTIONS
Stop taking Amlodipine and switch to Hydrochlorothiazide 12.5 mg daily. Please check your blood pressure every day for 2 weeks and let us know. We may need to increase the dose.     Ultrasound of the legs.   Echo for your heart (ulstraspaula).       Check your urine today. If infected, will call in antibiotics.

## 2020-10-13 ENCOUNTER — TELEPHONE (OUTPATIENT)
Dept: INTERNAL MEDICINE | Facility: CLINIC | Age: 80
End: 2020-10-13

## 2020-10-13 NOTE — TELEPHONE ENCOUNTER
Called pt to let he know the results of her UA were negative for any infection (trace leuk but negative nitrite, 0 wbc, no blood, no ketones). She voiced understanding and states she is feeling better.       Noy Ng MD  Internal Medicine PGY-2

## 2020-10-26 ENCOUNTER — TELEPHONE (OUTPATIENT)
Dept: PRIMARY CARE CLINIC | Facility: CLINIC | Age: 80
End: 2020-10-26

## 2020-10-26 ENCOUNTER — TELEPHONE (OUTPATIENT)
Dept: ADMINISTRATIVE | Facility: OTHER | Age: 80
End: 2020-10-26

## 2020-10-26 NOTE — TELEPHONE ENCOUNTER
"Please remind patient of her appointment tomorrow. Seems that she is having "kidney" issues.    Thanks,  KARLY  "

## 2020-10-26 NOTE — TELEPHONE ENCOUNTER
"----- Message from Monae Angulo LMSW sent at 10/26/2020 11:37 AM CDT -----  Regarding: Contact with pt  Good morning,  SHIRA HARRY followed up with pt telephonically. Pt reports that she has a "touch of diabetes." Pt also reports that the DM is causing numbness in the tips of her toes. Pt also reports that her "right kidney" is hurting." Pt stated "I might have to see a kidney doctor to see if he can give me something for my kidney." [facility] KAMRYN informed pt that Dr. Todd and Estela MCNALLY RN will be notified of concerns. [facility] KAMRYN will follow and assist as needed.    Respectfully,    Monae Angulo LMSW    "

## 2020-10-26 NOTE — TELEPHONE ENCOUNTER
"Good morning,  [facility] KAMRYN followed up with pt telephonically. Pt reports that she has a "touch of diabetes." Pt also reports that the DM is causing numbness in the tips of her toes. Pt also reports that her "right kidney" is hurting." Pt stated "I might have to see a kidney doctor to see if he can give me something for my kidney." [facility] Dr[facility] Todd and Estela MCNALLY RN will be notified of concerns. [facility] will follow and assist as needed.    Respectfully,    Monae Angulo LMSW  "

## 2020-10-27 ENCOUNTER — OFFICE VISIT (OUTPATIENT)
Dept: PRIMARY CARE CLINIC | Facility: CLINIC | Age: 80
End: 2020-10-27
Payer: MEDICARE

## 2020-10-27 ENCOUNTER — PATIENT OUTREACH (OUTPATIENT)
Dept: ADMINISTRATIVE | Facility: OTHER | Age: 80
End: 2020-10-27

## 2020-10-27 VITALS
SYSTOLIC BLOOD PRESSURE: 122 MMHG | HEART RATE: 76 BPM | WEIGHT: 192.69 LBS | BODY MASS INDEX: 34.14 KG/M2 | HEIGHT: 63 IN | OXYGEN SATURATION: 97 % | DIASTOLIC BLOOD PRESSURE: 68 MMHG

## 2020-10-27 DIAGNOSIS — J45.20 MILD INTERMITTENT ASTHMA WITHOUT COMPLICATION: ICD-10-CM

## 2020-10-27 DIAGNOSIS — R35.0 URINARY FREQUENCY: ICD-10-CM

## 2020-10-27 DIAGNOSIS — R73.03 PREDIABETES: ICD-10-CM

## 2020-10-27 DIAGNOSIS — G57.93 NEUROPATHY OF BOTH FEET: ICD-10-CM

## 2020-10-27 DIAGNOSIS — G47.39 OTHER SLEEP APNEA: ICD-10-CM

## 2020-10-27 DIAGNOSIS — R20.2 TINGLING OF BOTH FEET: Primary | ICD-10-CM

## 2020-10-27 DIAGNOSIS — I10 ESSENTIAL HYPERTENSION: ICD-10-CM

## 2020-10-27 DIAGNOSIS — R22.43 LOCALIZED SWELLING OF BOTH LOWER LEGS: ICD-10-CM

## 2020-10-27 PROCEDURE — 3074F SYST BP LT 130 MM HG: CPT | Mod: HCNC,CPTII,S$GLB, | Performed by: INTERNAL MEDICINE

## 2020-10-27 PROCEDURE — 3078F DIAST BP <80 MM HG: CPT | Mod: HCNC,CPTII,S$GLB, | Performed by: INTERNAL MEDICINE

## 2020-10-27 PROCEDURE — 3078F PR MOST RECENT DIASTOLIC BLOOD PRESSURE < 80 MM HG: ICD-10-PCS | Mod: HCNC,CPTII,S$GLB, | Performed by: INTERNAL MEDICINE

## 2020-10-27 PROCEDURE — 1101F PT FALLS ASSESS-DOCD LE1/YR: CPT | Mod: HCNC,CPTII,S$GLB, | Performed by: INTERNAL MEDICINE

## 2020-10-27 PROCEDURE — 1159F PR MEDICATION LIST DOCUMENTED IN MEDICAL RECORD: ICD-10-PCS | Mod: HCNC,S$GLB,, | Performed by: INTERNAL MEDICINE

## 2020-10-27 PROCEDURE — 99215 PR OFFICE/OUTPT VISIT, EST, LEVL V, 40-54 MIN: ICD-10-PCS | Mod: HCNC,S$GLB,, | Performed by: INTERNAL MEDICINE

## 2020-10-27 PROCEDURE — 1101F PR PT FALLS ASSESS DOC 0-1 FALLS W/OUT INJ PAST YR: ICD-10-PCS | Mod: HCNC,CPTII,S$GLB, | Performed by: INTERNAL MEDICINE

## 2020-10-27 PROCEDURE — 1125F AMNT PAIN NOTED PAIN PRSNT: CPT | Mod: HCNC,S$GLB,, | Performed by: INTERNAL MEDICINE

## 2020-10-27 PROCEDURE — 3074F PR MOST RECENT SYSTOLIC BLOOD PRESSURE < 130 MM HG: ICD-10-PCS | Mod: HCNC,CPTII,S$GLB, | Performed by: INTERNAL MEDICINE

## 2020-10-27 PROCEDURE — 1159F MED LIST DOCD IN RCRD: CPT | Mod: HCNC,S$GLB,, | Performed by: INTERNAL MEDICINE

## 2020-10-27 PROCEDURE — 99215 OFFICE O/P EST HI 40 MIN: CPT | Mod: HCNC,S$GLB,, | Performed by: INTERNAL MEDICINE

## 2020-10-27 PROCEDURE — 1125F PR PAIN SEVERITY QUANTIFIED, PAIN PRESENT: ICD-10-PCS | Mod: HCNC,S$GLB,, | Performed by: INTERNAL MEDICINE

## 2020-10-27 RX ORDER — AMLODIPINE BESYLATE 10 MG/1
10 TABLET ORAL DAILY
COMMUNITY
End: 2021-05-26 | Stop reason: SDUPTHER

## 2020-10-27 RX ORDER — ACETAMINOPHEN, DEXTROMETHORPHAN HBR, DOXYLAMINE SUCCINATE, PHENYLEPHRINE HCL 650; 20; 12.5; 1 MG/30ML; MG/30ML; MG/30ML; MG/30ML
1000 SOLUTION ORAL DAILY
Qty: 30 EACH | Refills: 0 | Status: SHIPPED | OUTPATIENT
Start: 2020-10-27

## 2020-10-27 NOTE — PROGRESS NOTES
LINKS immunization registry not responding  Care Everywhere updated  Health Maintenance updated  Chart reviewed for overdue [facility] (CARLOS) health maintenance testing (CRS, Breast Ca, Diabetic Eye Exam)   Orders entered:N/A

## 2020-10-27 NOTE — PROGRESS NOTES
"  Primary Care Provider Appointment - [facility]  SHARED NOTE: Dr. Ng (Resident), Dr Todd (Attending)    Subjective:      Patient ID: Kelsey Fields is a 79 y.o. female with CKD, TRISTON, Leg swelling, Asthma, HTN.        Chief Complaint: Follow-up    Prior to this visit, patient's last encounter with PCP was 10/12/2020.    Tingling-Main complaint today is that she is having pain/tingling in the toes, started 3-4 weeks ago when she found out about the "pre-diabetes." Still focused on this diagnosis, states she wants to know what she can eat. Trying to avoid sugar but drinks apple juice. Likes chocolate chip cookies and has a few at night. Does not like to cook. Was told she cannot have coffee. Has Gabapentin at home but not taking it due to fear of making her feel tired at work. Pain is constant. Pain on right foot more than left. Cannot recall when the pain is worse during the day. Convinced this happened because of pound cake she ate before this started.     Was having pain in the right "kidney" a few days ago, only lasted half an hour, no nausea, no hematuria. She laid down and pain resolved. No longer having pain. Never had kidney stones before. Drinking lots of water. Urinating well but has trouble controlling the bladder (can't get to bathroom fast enough). Uses Depends to help but needing to change them often, using about 2x/day.      LE edema/HTN- last visit we had changed in BP med from Amlodipine to HCTZ but states she did not switch, still taking Amlodipine because the HCTZ "it makes her pee." Feels leg swelling improved after elevating legs. Not taking Losartan either. At last visit, we discussed importance of low-salt diet (she is trying). LE US ordered last visit but did not go, states she does not want to go.      Asthma-feels it is well-controlled, using albuterol inhaler 1x/day. Feels breathing is OK. Denies SOB.     Cpap-does not use frequently, maybe twice a week, feels the attachment " falls off at night but does not want to bring it in anywhere to get adjusted.           Past Surgical History:   Procedure Laterality Date    blepharitis Bilateral 2017    Alessandra Grijalva OD    CATARACT EXTRACTION Bilateral 2017    Alessandra Grijalva MD    CATARACT EXTRACTION W/  INTRAOCULAR LENS IMPLANT Left 2019    Procedure: EXTRACTION, CATARACT, WITH IOL INSERTION;  Surgeon: Alysa De Souza MD;  Location: [facility];  Service: Ophthalmology;  Laterality: Left;    CATARACT EXTRACTION W/  INTRAOCULAR LENS IMPLANT Right 2019    Procedure: EXTRACTION, CATARACT, WITH IOL INSERTION;  Surgeon: Alysa De Souza MD;  Location: [facility];  Service: Ophthalmology;  Laterality: Right;     SECTION      FRACTURE SURGERY      JOINT REPLACEMENT      right knee     KNEE SURGERY Right        Past Medical History:   Diagnosis Date    Arthritis     Degenerative arthritis of lumbar spine     Episcleritis of right eye     GERD (gastroesophageal reflux disease)     Hypertension     Shingles     Sleep apnea     Type 2 diabetes mellitus with stage 3 chronic kidney disease, without long-term current use of insulin 2020       Social History     Socioeconomic History    Marital status: Single     Spouse name: Not on file    Number of children: Not on file    Years of education: Not on file    Highest education level: Not on file   Occupational History    Not on file   Social Needs    Financial resource strain: Not very hard    Food insecurity     Worry: Never true     Inability: Never true    Transportation needs     Medical: No     Non-medical: No   Tobacco Use    Smoking status: Never Smoker    Smokeless tobacco: Never Used   Substance and Sexual Activity    Alcohol use: No    Drug use: No    Sexual activity: Never   Lifestyle    Physical activity     Days per week: 0 days     Minutes per session: 0 min    Stress: Not at all   Relationships    Social connections     Talks on phone: Once a  "week     Gets together: Once a week     Attends Taoist service: More than 4 times per year     Active member of club or organization: Yes     Attends meetings of clubs or organizations: More than 4 times per year     Relationship status: Never    Other Topics Concern    Not on file   Social History Narrative    Not on file       Review of Systems   Constitutional: Negative for activity change, appetite change and chills.   HENT: Negative for congestion and trouble swallowing.    Respiratory: Negative for shortness of breath and wheezing.    Gastrointestinal: Negative for abdominal pain.   Genitourinary: Negative for difficulty urinating, dysuria and hematuria.   Musculoskeletal: Positive for back pain and gait problem.   Neurological: Negative for dizziness, tremors, weakness and light-headedness.   Psychiatric/Behavioral: Negative for confusion.       Objective:   /68 (BP Location: Right arm, Patient Position: Sitting, BP Method: Large (Manual))   Pulse 76   Ht 5' 3" (1.6 m)   Wt 87.4 kg (192 lb 10.9 oz)   SpO2 97%   BMI 34.13 kg/m²     Physical Exam  Vitals signs and nursing note reviewed.   Constitutional:       General: She is not in acute distress.     Appearance: Normal appearance. She is not ill-appearing, toxic-appearing or diaphoretic.   HENT:      Head: Normocephalic and atraumatic.   Eyes:      Extraocular Movements: Extraocular movements intact.   Neck:      Musculoskeletal: Normal range of motion.   Pulmonary:      Effort: Pulmonary effort is normal. No respiratory distress.   Musculoskeletal: Normal range of motion.         General: No tenderness or signs of injury.      Right foot: Normal range of motion. No deformity.      Left foot: Normal range of motion. No deformity.      Comments: Significant improvement in bilateral LE   Feet:      Right foot:      Skin integrity: Callus and dry skin present. No ulcer, erythema or warmth.      Toenail Condition: Right toenails are " abnormally thick.      Left foot:      Skin integrity: Callus and dry skin present. No ulcer, erythema or warmth.      Toenail Condition: Left toenails are abnormally thick.   Skin:     General: Skin is warm and dry.   Neurological:      General: No focal deficit present.      Mental Status: She is alert and oriented to person, place, and time. Mental status is at baseline.   Psychiatric:         Mood and Affect: Mood normal.             Lab Results   Component Value Date    WBC 8.54 06/15/2020    HGB 10.2 (L) 06/15/2020    HCT 35.3 (L) 06/15/2020     06/15/2020    CHOL 139 02/13/2019    TRIG 72 02/13/2019    HDL 41 02/13/2019    ALT 8 (L) 06/02/2020    AST 12 06/02/2020     09/10/2020    K 4.0 09/10/2020     09/10/2020    CREATININE 1.1 09/10/2020    BUN 13 09/10/2020    CO2 24 09/10/2020    TSH 0.725 09/20/2018    HGBA1C 5.6 09/10/2020       Current Outpatient Medications on File Prior to Visit   Medication Sig Dispense Refill    albuterol (PROVENTIL/VENTOLIN HFA) 90 mcg/actuation inhaler Inhale 2 puffs into the lungs every 6 (six) hours as needed for Wheezing. Rescue 18 g 3    albuterol-ipratropium (DUO-NEB) 2.5 mg-0.5 mg/3 mL nebulizer solution Take 3 mLs by nebulization every 6 (six) hours while awake. Rescue 90 mL 3    amLODIPine (NORVASC) 10 MG tablet Take 10 mg by mouth once daily.      ammonium lactate 12 % Crea Apply twice daily to affected parts both feet as needed. 140 g 11    atorvastatin (LIPITOR) 40 MG tablet Take 1 tablet (40 mg total) by mouth once daily. 90 tablet 3    cholecalciferol, vitamin D3, (VITAMIN D3) 125 mcg (5,000 unit) Tab Take 1 tablet (5,000 Units total) by mouth every 48 hours. 90 tablet 3    ciclopirox (LOPROX) 0.77 % Crea Apply topically 2 (two) times daily. 90 g 2    cimetidine (TAGAMET) 400 MG tablet Take 1 tablet (400 mg total) by mouth 2 (two) times daily. 180 tablet 3    CMPD Lidocaine 2%, Prilocaine 2%, Lamotrigine 2.5%, Meloxicam 0.09% in  Transdermal Gel Apply 1 Pump (1.6 g total) topically 4 (four) times daily. 240 g 3    diclofenac sodium (VOLTAREN) 1 % Gel Apply 2 g topically 2 (two) times daily. 3 Tube 3    ferrous sulfate (FEOSOL) 325 mg (65 mg iron) Tab tablet Take 1 tablet (325 mg total) by mouth daily with breakfast. 90 tablet 0    fluticasone propionate (FLONASE) 50 mcg/actuation nasal spray 1 spray (50 mcg total) by Each Nostril route once daily. 16 g 2    gabapentin (NEURONTIN) 300 MG capsule Take 1 capsule (300 mg total) by mouth 2 (two) times daily as needed. 180 capsule 3    hydrocortisone 2.5 % cream Apply topically to rash on legs twice daily 28 g 0    inhalat. spacing dev,sm. mask Spcr Use with rescue inhaler 1 each 0    ketoconazole (NIZORAL) 2 % cream Apply under breast topically twice daily for rash 60 g 0    loratadine (CLARITIN) 10 mg tablet Take 1 tablet (10 mg total) by mouth once daily. 30 tablet 11    methocarbamoL (ROBAXIN) 750 MG Tab 1 tab every 8 hours as needed for muscle spasm 30 tablet 0    nebulizer and compressor Paty Use as directed 1 each 0    triamcinolone acetonide 0.025% (KENALOG) 0.025 % Oint Apply topically once daily to rash on arms and legs as needed 80 g 1    [DISCONTINUED] hydroCHLOROthiazide (HYDRODIURIL) 12.5 MG Tab Take 1 tablet (12.5 mg total) by mouth once daily. 30 tablet 3    [DISCONTINUED] losartan (COZAAR) 25 MG tablet Take 1 tablet (25 mg total) by mouth once daily. 90 tablet 3     No current facility-administered medications on file prior to visit.          Assessment:   79 y.o. female with multiple co-morbid illnesses here to follow-up with PCP and continue work-up of chronic issues notably CKD, TRISTON, Leg swelling, Asthma, HTN.    .     Plan:     Problem List Items Addressed This Visit        Neuro    Neuropathy of both feet     Pt with tingling/pain in toes of both feet. Worse on the right. No open wounds noted. Sensation intact on exam.     -F/u with Podiatry (appointment  "scheduled 10/29 at 2:45 PM)  -B12 supplements, will send to [facility] mail in pharmacy  -Diabetic Diet (limit juice, canned fruit)            Pulmonary    Mild intermittent asthma without complication     Seems to be controlled with rescue inhaler  · Continue ventolin  · Uses spacer intermittently   · Advised to use nebulizer and duonebs            Cardiac/Vascular    Essential hypertension     BP well-controlled on Amlodipine 10mg. Was switched to HCTZ at last visit due to leg swelling but patient not taking and leg swelling improved with elevation.   · Continue amlodipine 10mg               Renal/    Urinary frequency     Urinary symptoms resolved. No longer with pain in flank. Seems to have improved on its own. UA done at last visit 2 weeks ago negative for infection.     -Monitor, let us know if symptoms recur             Endocrine    Prediabetes     A1c 5.6 at last visit. Pt still convinced she has "bad diabetes" despite education. Also not adhering to diet. Drinking apple juice, cookies, pound cake.   Discussed diabetic diet.     -Info provided regarding diabetic diet (types of food, low carb foods, fruits not in a can, limit juices and processed cookies)            Other    Other sleep apnea    Localized swelling of both lower legs      At last visit, pt with bilateral LE swelling that has since resolved. At that time she had increased salt intake (Ritz crackers, Ramen noodles). Counseled on low-salt diet. US ordered but patient never had done (did not feel like). Attempted to switch Amlodipine to HCTZ to help with fluid retention but she has not switched, still on Amlodipine. Feels elevating legs helped the most.     -Continue with elevation to help  -Low salt diet  -Will change med rec to reflect that she is not taking HCTZ or Losartan, only Amlodipine 10 mg           Other Visit Diagnoses     Tingling of both feet    -  Primary    Relevant Medications    cyanocobalamin, vitamin B-12, 1,000 mcg TbSR    "       Health Maintenance       Date Due Completion Date    Foot Exam 11/19/1950 ---    TETANUS VACCINE 11/19/1956 ---    Lipid Panel 02/13/2020 2/13/2019    Eye Exam 01/15/2021 1/15/2020    Hemoglobin A1c 03/10/2021 9/10/2020    DEXA SCAN 05/16/2021 5/16/2019              Follow up in about 3 months (around 1/27/2021) for Follow-up. Total face-to-face time was 60 min, >50% of this was spent on counseling and coordination of care. The following issues were discussedL CKD, TRISTON, Leg swelling, Asthma, HTN, neuropathy, DM diet, vitamins.            Noy Ng MD  Internal Medicine PGY-2        Laurie Todd MD/MPH  [facility]  [phone] [facility]

## 2020-10-27 NOTE — ASSESSMENT & PLAN NOTE
BP well-controlled on Amlodipine 10mg. Was switched to HCTZ at last visit due to leg swelling but patient not taking and leg swelling improved with elevation.   · Continue amlodipine 10mg

## 2020-10-27 NOTE — ASSESSMENT & PLAN NOTE
Urinary symptoms resolved. No longer with pain in flank. Seems to have improved on its own. UA done at last visit 2 weeks ago negative for infection.     -Monitor, let us know if symptoms recur

## 2020-10-27 NOTE — ASSESSMENT & PLAN NOTE
Seems to be controlled with rescue inhaler  · Continue ventolin  · Uses spacer intermittently   · Advised to use nebulizer and duonebs

## 2020-10-27 NOTE — ASSESSMENT & PLAN NOTE
At last visit, pt with bilateral LE swelling that has since resolved. At that time she had increased salt intake (Ritz crackers, Ramen noodles). Counseled on low-salt diet. US ordered but patient never had done (did not feel like). Attempted to switch Amlodipine to HCTZ to help with fluid retention but she has not switched, still on Amlodipine. Feels elevating legs helped the most.     -Continue with elevation to help  -Low salt diet  -Will change med rec to reflect that she is not taking HCTZ or Losartan, only Amlodipine 10 mg

## 2020-10-27 NOTE — PROGRESS NOTES
Primary Care Provider Appointment - [facility]  SHARED NOTE: Mojgan England (MS4), Dr Todd (Attending)    Subjective:      Patient ID: Kelsey Fields is a 79 y.o. female with CKD, Asthma, HTN.      Chief Complaint: No chief complaint on file.    Prior to this visit, patient's last encounter with PCP was 10/12/2020.    Chronic pain     LE edema- last time had change in BP med from Amlodipine to HCTZ, remained on Losartan, discussed importance of low-salt diet     Asthma    CKD    HA    Past Surgical History:   Procedure Laterality Date    blepharitis Bilateral 2017    Alessandra Grijalva OD    CATARACT EXTRACTION Bilateral 2017    Alessandra Grijalva MD    CATARACT EXTRACTION W/  INTRAOCULAR LENS IMPLANT Left 2019    Procedure: EXTRACTION, CATARACT, WITH IOL INSERTION;  Surgeon: Alysa De Souza MD;  Location: [facility];  Service: Ophthalmology;  Laterality: Left;    CATARACT EXTRACTION W/  INTRAOCULAR LENS IMPLANT Right 2019    Procedure: EXTRACTION, CATARACT, WITH IOL INSERTION;  Surgeon: Alysa De Souza MD;  Location: [facility];  Service: Ophthalmology;  Laterality: Right;     SECTION      FRACTURE SURGERY      JOINT REPLACEMENT      right knee     KNEE SURGERY Right        Past Medical History:   Diagnosis Date    Arthritis     Degenerative arthritis of lumbar spine     Episcleritis of right eye     GERD (gastroesophageal reflux disease)     Hypertension     Shingles     Sleep apnea     Type 2 diabetes mellitus with stage 3 chronic kidney disease, without long-term current use of insulin 2020       Social History     Socioeconomic History    Marital status: Single     Spouse name: Not on file    Number of children: Not on file    Years of education: Not on file    Highest education level: Not on file   Occupational History    Not on file   Social Needs    Financial resource strain: Not very hard    Food insecurity     Worry: Never true     Inability: Never true     Transportation needs     Medical: No     Non-medical: No   Tobacco Use    Smoking status: Never Smoker    Smokeless tobacco: Never Used   Substance and Sexual Activity    Alcohol use: No    Drug use: No    Sexual activity: Never   Lifestyle    Physical activity     Days per week: 0 days     Minutes per session: 0 min    Stress: Not at all   Relationships    Social connections     Talks on phone: Once a week     Gets together: Once a week     Attends Faith service: More than 4 times per year     Active member of club or organization: Yes     Attends meetings of clubs or organizations: More than 4 times per year     Relationship status: Never    Other Topics Concern    Not on file   Social History Narrative    Not on file       Review of Systems    Objective:   There were no vitals taken for this visit.    Physical Exam        Lab Results   Component Value Date    WBC 8.54 06/15/2020    HGB 10.2 (L) 06/15/2020    HCT 35.3 (L) 06/15/2020     06/15/2020    CHOL 139 02/13/2019    TRIG 72 02/13/2019    HDL 41 02/13/2019    ALT 8 (L) 06/02/2020    AST 12 06/02/2020     09/10/2020    K 4.0 09/10/2020     09/10/2020    CREATININE 1.1 09/10/2020    BUN 13 09/10/2020    CO2 24 09/10/2020    TSH 0.725 09/20/2018    HGBA1C 5.6 09/10/2020       Current Outpatient Medications on File Prior to Visit   Medication Sig Dispense Refill    albuterol (PROVENTIL/VENTOLIN HFA) 90 mcg/actuation inhaler Inhale 2 puffs into the lungs every 6 (six) hours as needed for Wheezing. Rescue 18 g 3    albuterol-ipratropium (DUO-NEB) 2.5 mg-0.5 mg/3 mL nebulizer solution Take 3 mLs by nebulization every 6 (six) hours while awake. Rescue 90 mL 3    ammonium lactate 12 % Crea Apply twice daily to affected parts both feet as needed. 140 g 11    atorvastatin (LIPITOR) 40 MG tablet Take 1 tablet (40 mg total) by mouth once daily. 90 tablet 3    cholecalciferol, vitamin D3, (VITAMIN D3) 125 mcg (5,000 unit) Tab  Take 1 tablet (5,000 Units total) by mouth every 48 hours. 90 tablet 3    ciclopirox (LOPROX) 0.77 % Crea Apply topically 2 (two) times daily. 90 g 2    cimetidine (TAGAMET) 400 MG tablet Take 1 tablet (400 mg total) by mouth 2 (two) times daily. 180 tablet 3    CMPD Lidocaine 2%, Prilocaine 2%, Lamotrigine 2.5%, Meloxicam 0.09% in Transdermal Gel Apply 1 Pump (1.6 g total) topically 4 (four) times daily. 240 g 3    diclofenac sodium (VOLTAREN) 1 % Gel Apply 2 g topically 2 (two) times daily. 3 Tube 3    ferrous sulfate (FEOSOL) 325 mg (65 mg iron) Tab tablet Take 1 tablet (325 mg total) by mouth daily with breakfast. 90 tablet 0    fluticasone propionate (FLONASE) 50 mcg/actuation nasal spray 1 spray (50 mcg total) by Each Nostril route once daily. 16 g 2    gabapentin (NEURONTIN) 300 MG capsule Take 1 capsule (300 mg total) by mouth 2 (two) times daily as needed. 180 capsule 3    hydroCHLOROthiazide (HYDRODIURIL) 12.5 MG Tab Take 1 tablet (12.5 mg total) by mouth once daily. 30 tablet 3    hydrocortisone 2.5 % cream Apply topically to rash on legs twice daily 28 g 0    inhalat. spacing dev,sm. mask Spcr Use with rescue inhaler 1 each 0    ketoconazole (NIZORAL) 2 % cream Apply under breast topically twice daily for rash 60 g 0    loratadine (CLARITIN) 10 mg tablet Take 1 tablet (10 mg total) by mouth once daily. 30 tablet 11    losartan (COZAAR) 25 MG tablet Take 1 tablet (25 mg total) by mouth once daily. 90 tablet 3    methocarbamoL (ROBAXIN) 750 MG Tab 1 tab every 8 hours as needed for muscle spasm 30 tablet 0    nebulizer and compressor Paty Use as directed 1 each 0    triamcinolone acetonide 0.025% (KENALOG) 0.025 % Oint Apply topically once daily to rash on arms and legs as needed 80 g 1     No current facility-administered medications on file prior to visit.          Assessment:   79 y.o. female with multiple co-morbid illnesses here to follow-up with PCP and continue work-up of chronic  issues notably ***.     Plan:     Problem List Items Addressed This Visit     None          Health Maintenance       Date Due Completion Date    Foot Exam 11/19/1950 ---    TETANUS VACCINE [birth year]/19/1956 ---    Lipid Panel 02/13/2020 2/13/2019    Eye Exam 01/15/2021 1/15/2020    Hemoglobin A1c 03/10/2021 9/10/2020    DEXA SCAN 05/16/2021 5/16/2019              No follow-ups on file. Total face-to-face time was 60 min, >50% of this was spent on counseling and coordination of care. The following issues were discussed: ***    Mojgan England (student)    Laurie Todd MD/MPH  [facility]  [phone] [facility]

## 2020-10-27 NOTE — ASSESSMENT & PLAN NOTE
"A1c 5.6 at last visit. Pt still convinced she has "bad diabetes" despite education. Also not adhering to diet. Drinking apple juice, cookies, pound cake.   Discussed diabetic diet.     -Info provided regarding diabetic diet (types of food, low carb foods, fruits not in a can, limit juices and processed cookies)  "

## 2020-10-27 NOTE — ASSESSMENT & PLAN NOTE
Pt with tingling/pain in toes of both feet. Worse on the right. No open wounds noted. Sensation intact on exam.     -F/u with Podiatry (appointment scheduled 10/29 at 2:45 PM)  -B12 supplements, will send to [facility] mail in pharmacy  -Diabetic Diet (limit juice, canned fruit)

## 2020-10-27 NOTE — PATIENT INSTRUCTIONS
Try Gabapentin at night on a night that you do not have work the next day to see how you feel.     Take B12 tablets every day to see if it helps with the nerve pain of your feet     Follow-up with Podiatry on 10/29 at 2:45 PM    Will give handout about healthy eating for diabetes     ------------------------------------------------------------------------------------------------------------------------------  Healthy Meals for Diabetes     A healthcare provider will help you develop a meal plan that fits your needs.   Ask your healthcare team to help you make a meal plan that fits your needs. Your meal plan tells you when to eat your meals and snacks, what kinds of foods to eat, and how much of each food to eat. You dont have to give up all the foods you like. But you do need to follow some guidelines.  Choose healthy carbohydrates  Starches, sugars, and fiber are all types of carbohydrates. Fiber can help lower your cholesterol and triglycerides. Fiber is also healthy for your heart. You should have 20 to 35 grams of total fiber each day. Fiber-rich foods include:  · Whole-grain breads and cereals  · Bulgur wheat  · Brown rice     · Whole-wheat pasta  · Fruits and vegetables  · Dry beans, and peas   Keep track of the amount of carbohydrates you eat. This can help you keep the right balance of physical activity and medicine. The amount of carbohydrates needed will vary for each person. It depends on many things such as your health, the medicines you take, and how active you are. Your healthcare team will help you figure out the right amount of carbohydrates for you. You may start with around 45 to 60 grams of carbohydrates per meal, depending on your situation.   Here are some examples of foods containing about 15 grams of carbohydrates (1 serving of carbohydrates):  · 1/2 cup of canned or frozen fruit  · A small piece of fresh fruit (4 ounces)  · 1 slice of bread  · 1/2 cup of oatmeal  · 1/3 cup of rice  · 4 to 6  crackers  · 1/2 English muffin  · 1/2 cup of black beans  · 1/4 of a large baked potato (3 ounces)  · 2/3 cup of plain fat-free yogurt  · 1 cup of soup  · 1/2 cup of casserole  · 6 chicken nuggets  · 2-inch-square brownie or cake without frosting  · 2 small cookies  · 1/2 cup of ice cream or sherbet  Choose healthy protein foods  Eating protein that is low in fat can help you control your weight. It also helps keep your heart healthy. Low-fat protein foods include:  · Fish  · Plant proteins, such as dry beans and peas, nuts, and soy products like tofu and soymilk  · Lean meat with all visible fat removed  · Poultry with the skin removed  · Low-fat or nonfat milk, cheese, and yogurt  Limit unhealthy fats and sugar  Saturated and trans fats are unhealthy for your heart. They raise LDL (bad) cholesterol. Fat is also high in calories, so it can make you gain weight. To cut down on unhealthy fats and sugar, limit these foods:  · Butter or margarine  · Palm and palm kernel oils and coconut oil  · Cream  · Cheese  · [place]  · Lunch meats     · Ice cream  · Sweet bakery goods such as pies, muffins, and donuts  · Jams and jellies  · Candy bars  · Regular sodas   How much to eat  The amount of food you eat affects your blood sugar. It also affects your weight. Your healthcare team will tell you how much of each type of food you should eat.  · Use measuring cups and spoons and a food scale to measure serving sizes.  · Learn what a correct serving size looks like on your plate. This will help when you are away from home and cant measure your servings.  · Eat only the number of servings given on your meal plan for each food. Dont take seconds.  · Learn to read food labels. Be sure to look at serving size, total carbohydrates, fiber, calories, sugar, and saturated and trans fats. Look for healthier alternatives to foods that have added sugar.  · Plan ahead for parties so you can still have a good time without going overboard  with unhealthy food choices. Set a good example yourself by bringing a healthy dish to pot lucks.   Choose healthy snacks  When it comes to snacks, we usually think about foods with added sugar and fats. But there are many other options for healthier snack choices. Here are a few snack ideas to choose from:  Snacks with less than 5 grams of carbohydrates  · 1 piece of string cheese  · 3 celery sticks plus 1 tablespoon of peanut butter  · 5 cherry tomatoes plus 1 tablespoon of ranch dressing  · 1 hard-boiled egg  · 1/4 cup of fresh blueberries  ·  5 baby carrots  · 1 cup of light popcorn  · 1/2 cup of sugar-free gelatin  · 15 almonds  Snacks with about 10 to 20 grams of carbohydrates  · 1/3 cup of hummus plus 1 cup of fresh cut nonstarchy vegetables (carrots, green peppers, broccoli, celery, or a combination)  · 1/2 cup of fresh or canned fruit plus 1/4 cup of cottage cheese  · 1/2 cup of tuna salad with 4 crackers  · 2 rice cakes and a tablespoon of peanut butter  · 1 small apple or orange  · 3 cups light popcorn  · 1/2 of a turkey sandwich (1 slice of whole-wheat bread, 2 ounces of turkey, and mustard)  Portion sizes are important to controlling your blood sugar and staying at a healthy weight. Stock up on healthy snack items so you always have them on hand.  When to eat  Your meal plan will likely include breakfast, lunch, dinner, and some snacks.  · Try to eat your meals and snacks at about the same times each day.  · Eat all your meals and snacks. Skipping a meal or snack can make your blood sugar drop too low. It can also cause you to eat too much at the next meal or snack. Then your blood sugar could get too high.  Date Last Reviewed: 7/1/2016  © 5054-9595 [employer]. [address] [place] [address], [place] [address]. All rights reserved. This information is not intended as a substitute for professional medical care. Always follow your healthcare professional's instructions.

## 2020-11-03 ENCOUNTER — TELEPHONE (OUTPATIENT)
Dept: PRIMARY CARE CLINIC | Facility: CLINIC | Age: 80
End: 2020-11-03

## 2020-11-03 ENCOUNTER — TELEPHONE (OUTPATIENT)
Dept: ADMINISTRATIVE | Facility: OTHER | Age: 80
End: 2020-11-03
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
## 2020-11-19 ENCOUNTER — TELEPHONE (OUTPATIENT)
Dept: ADMINISTRATIVE | Facility: OTHER | Age: 80
End: 2020-11-19

## 2020-11-19 NOTE — TELEPHONE ENCOUNTER
OP[facility] LMSW attempted to follow up and wish pt Happy Birthday but phone went straight to voicemail. OPCM LMSW will re-attempt.

## 2020-11-23 ENCOUNTER — TELEPHONE (OUTPATIENT)
Dept: ADMINISTRATIVE | Facility: OTHER | Age: 80
End: 2020-11-23
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
## 2020-12-11 ENCOUNTER — PATIENT MESSAGE (OUTPATIENT)
Dept: OTHER | Facility: OTHER | Age: 80
End: 2020-12-11

## 2020-12-23 ENCOUNTER — LAB VISIT (OUTPATIENT)
Dept: LAB | Facility: HOSPITAL | Age: 80
End: 2020-12-23
Attending: INTERNAL MEDICINE
Payer: MEDICARE

## 2020-12-23 ENCOUNTER — OFFICE VISIT (OUTPATIENT)
Dept: PRIMARY CARE CLINIC | Facility: CLINIC | Age: 80
End: 2020-12-23
Payer: MEDICARE

## 2020-12-23 VITALS
SYSTOLIC BLOOD PRESSURE: 136 MMHG | HEART RATE: 85 BPM | BODY MASS INDEX: 33.16 KG/M2 | DIASTOLIC BLOOD PRESSURE: 68 MMHG | RESPIRATION RATE: 16 BRPM | OXYGEN SATURATION: 98 % | TEMPERATURE: 97 F | HEIGHT: 64 IN | WEIGHT: 194.25 LBS

## 2020-12-23 DIAGNOSIS — R35.0 URINARY FREQUENCY: Primary | ICD-10-CM

## 2020-12-23 DIAGNOSIS — N18.31 TYPE 2 DIABETES MELLITUS WITH STAGE 3A CHRONIC KIDNEY DISEASE, WITHOUT LONG-TERM CURRENT USE OF INSULIN: ICD-10-CM

## 2020-12-23 DIAGNOSIS — M48.10 DISH (DIFFUSE IDIOPATHIC SKELETAL HYPEROSTOSIS): ICD-10-CM

## 2020-12-23 DIAGNOSIS — E11.22 TYPE 2 DIABETES MELLITUS WITH STAGE 3A CHRONIC KIDNEY DISEASE, WITHOUT LONG-TERM CURRENT USE OF INSULIN: ICD-10-CM

## 2020-12-23 DIAGNOSIS — J39.9 UPPER RESPIRATORY DISEASE: ICD-10-CM

## 2020-12-23 LAB
ALBUMIN/CREAT UR: 52.6 UG/MG (ref 0–30)
BILIRUB SERPL-MCNC: NEGATIVE MG/DL
BLOOD URINE, POC: NEGATIVE
COLOR, POC UA: NORMAL
CREAT UR-MCNC: 19 MG/DL (ref 15–325)
GLUCOSE SERPL-MCNC: 90 MG/DL (ref 70–110)
GLUCOSE UR QL STRIP: NORMAL
KETONES UR QL STRIP: NEGATIVE
LEUKOCYTE ESTERASE URINE, POC: NEGATIVE
MICROALBUMIN UR DL<=1MG/L-MCNC: 10 UG/ML
NITRITE, POC UA: NEGATIVE
PH, POC UA: 8
PROTEIN, POC: NEGATIVE
SPECIFIC GRAVITY, POC UA: 1
UROBILINOGEN, POC UA: NORMAL

## 2020-12-23 PROCEDURE — 82043 UR ALBUMIN QUANTITATIVE: CPT | Mod: HCNC

## 2020-12-23 PROCEDURE — 1159F MED LIST DOCD IN RCRD: CPT | Mod: HCNC,S$GLB,, | Performed by: INTERNAL MEDICINE

## 2020-12-23 PROCEDURE — 82962 POCT GLUCOSE, HAND-HELD DEVICE: ICD-10-PCS | Mod: HCNC,,, | Performed by: INTERNAL MEDICINE

## 2020-12-23 PROCEDURE — 99215 OFFICE O/P EST HI 40 MIN: CPT | Mod: HCNC,25,S$GLB, | Performed by: INTERNAL MEDICINE

## 2020-12-23 PROCEDURE — 3288F FALL RISK ASSESSMENT DOCD: CPT | Mod: HCNC,CPTII,S$GLB, | Performed by: INTERNAL MEDICINE

## 2020-12-23 PROCEDURE — 1101F PT FALLS ASSESS-DOCD LE1/YR: CPT | Mod: HCNC,CPTII,S$GLB, | Performed by: INTERNAL MEDICINE

## 2020-12-23 PROCEDURE — 81001 URINALYSIS AUTO W/SCOPE: CPT | Mod: HCNC,S$GLB,, | Performed by: INTERNAL MEDICINE

## 2020-12-23 PROCEDURE — 3078F PR MOST RECENT DIASTOLIC BLOOD PRESSURE < 80 MM HG: ICD-10-PCS | Mod: HCNC,CPTII,S$GLB, | Performed by: INTERNAL MEDICINE

## 2020-12-23 PROCEDURE — 1126F PR PAIN SEVERITY QUANTIFIED, NO PAIN PRESENT: ICD-10-PCS | Mod: HCNC,S$GLB,, | Performed by: INTERNAL MEDICINE

## 2020-12-23 PROCEDURE — 82962 GLUCOSE BLOOD TEST: CPT | Mod: HCNC,,, | Performed by: INTERNAL MEDICINE

## 2020-12-23 PROCEDURE — 1159F PR MEDICATION LIST DOCUMENTED IN MEDICAL RECORD: ICD-10-PCS | Mod: HCNC,S$GLB,, | Performed by: INTERNAL MEDICINE

## 2020-12-23 PROCEDURE — 81001 POCT URINALYSIS, DIPSTICK OR TABLET REAGENT, AUTOMATED, WITH MICROSCOP: ICD-10-PCS | Mod: HCNC,S$GLB,, | Performed by: INTERNAL MEDICINE

## 2020-12-23 PROCEDURE — 3078F DIAST BP <80 MM HG: CPT | Mod: HCNC,CPTII,S$GLB, | Performed by: INTERNAL MEDICINE

## 2020-12-23 PROCEDURE — 1157F PR ADVANCE CARE PLAN OR EQUIV PRESENT IN MEDICAL RECORD: ICD-10-PCS | Mod: HCNC,S$GLB,, | Performed by: INTERNAL MEDICINE

## 2020-12-23 PROCEDURE — 3077F SYST BP >= 140 MM HG: CPT | Mod: HCNC,CPTII,S$GLB, | Performed by: INTERNAL MEDICINE

## 2020-12-23 PROCEDURE — 1126F AMNT PAIN NOTED NONE PRSNT: CPT | Mod: HCNC,S$GLB,, | Performed by: INTERNAL MEDICINE

## 2020-12-23 PROCEDURE — 1157F ADVNC CARE PLAN IN RCRD: CPT | Mod: HCNC,S$GLB,, | Performed by: INTERNAL MEDICINE

## 2020-12-23 PROCEDURE — 3077F PR MOST RECENT SYSTOLIC BLOOD PRESSURE >= 140 MM HG: ICD-10-PCS | Mod: HCNC,CPTII,S$GLB, | Performed by: INTERNAL MEDICINE

## 2020-12-23 PROCEDURE — 99215 PR OFFICE/OUTPT VISIT, EST, LEVL V, 40-54 MIN: ICD-10-PCS | Mod: HCNC,25,S$GLB, | Performed by: INTERNAL MEDICINE

## 2020-12-23 PROCEDURE — 87086 URINE CULTURE/COLONY COUNT: CPT | Mod: HCNC

## 2020-12-23 PROCEDURE — 1101F PR PT FALLS ASSESS DOC 0-1 FALLS W/OUT INJ PAST YR: ICD-10-PCS | Mod: HCNC,CPTII,S$GLB, | Performed by: INTERNAL MEDICINE

## 2020-12-23 PROCEDURE — 3288F PR FALLS RISK ASSESSMENT DOCUMENTED: ICD-10-PCS | Mod: HCNC,CPTII,S$GLB, | Performed by: INTERNAL MEDICINE

## 2020-12-23 RX ORDER — LORATADINE 10 MG/1
10 TABLET ORAL DAILY
Qty: 90 TABLET | Refills: 3 | Status: SHIPPED | OUTPATIENT
Start: 2020-12-23 | End: 2021-08-02 | Stop reason: SDUPTHER

## 2020-12-23 RX ORDER — FLUTICASONE PROPIONATE 50 MCG
1 SPRAY, SUSPENSION (ML) NASAL DAILY
Qty: 16 G | Refills: 3 | Status: SHIPPED | OUTPATIENT
Start: 2020-12-23 | End: 2021-08-02 | Stop reason: SDUPTHER

## 2020-12-23 NOTE — PROGRESS NOTES
Primary Care Provider Appointment- [facility] SAME DAY APPOINTMENT    Subjective:      Patient ID: Kelsey Fields is a 80 y.o. female with urinary symptoms, PreDM, HLD, DISH     Chief Complaint: Follow-up    Patient complaining of generally feeling illl. She is worried that she has diabetes. Her POCT glucose was in 90s today. Her last A1c was 5.6 in 2020. Is due for microalb/creatinine ratio.    Has urinary symptoms today. POCT UA showed no abnormalities.    Is not wearing her compression stockings.    Needs refills of allergy medications.    Past Surgical History:   Procedure Laterality Date    blepharitis Bilateral 2017    Alessandra Grijalva OD    CATARACT EXTRACTION Bilateral 2017    Alessandra Grijalva MD    CATARACT EXTRACTION W/  INTRAOCULAR LENS IMPLANT Left 2019    Procedure: EXTRACTION, CATARACT, WITH IOL INSERTION;  Surgeon: Alysa De Souza MD;  Location: [facility];  Service: Ophthalmology;  Laterality: Left;    CATARACT EXTRACTION W/  INTRAOCULAR LENS IMPLANT Right 2019    Procedure: EXTRACTION, CATARACT, WITH IOL INSERTION;  Surgeon: Alysa De Souza MD;  Location: [facility];  Service: Ophthalmology;  Laterality: Right;     SECTION      FRACTURE SURGERY      JOINT REPLACEMENT      right knee     KNEE SURGERY Right        Past Medical History:   Diagnosis Date    Arthritis     Degenerative arthritis of lumbar spine     Episcleritis of right eye     GERD (gastroesophageal reflux disease)     Hypertension     Shingles     Sleep apnea     Type 2 diabetes mellitus with stage 3 chronic kidney disease, without long-term current use of insulin 2020       Social History     Socioeconomic History    Marital status: Single     Spouse name: Not on file    Number of children: Not on file    Years of education: Not on file    Highest education level: Not on file   Occupational History    Not on file   Social Needs    Financial resource strain: Not very hard    Food  "insecurity     Worry: Never true     Inability: Never true    Transportation needs     Medical: No     Non-medical: No   Tobacco Use    Smoking status: Never Smoker    Smokeless tobacco: Never Used   Substance and Sexual Activity    Alcohol use: No    Drug use: No    Sexual activity: Never   Lifestyle    Physical activity     Days per week: 0 days     Minutes per session: 0 min    Stress: Not at all   Relationships    Social connections     Talks on phone: Once a week     Gets together: Once a week     Attends Hol[employer] service: More than 4 times per year     Active member of club or organization: Yes     Attends meetings of clubs or organizations: More than 4 times per year     Relationship status: Never    Other Topics Concern    Not on file   Social History Narrative    Not on file       Review of Systems   Constitutional: Positive for activity change.   Cardiovascular: Positive for leg swelling.   Endocrine: Positive for polyuria.   Musculoskeletal: Positive for arthralgias and back pain.       Objective:   BP (!) 142/86 (BP Location: Left arm, Patient Position: Sitting, BP Method: Medium (Manual))   Pulse 85   Temp 97.3 °F (36.3 °C)   Resp 16   Ht 5' 4" (1.626 m)   Wt 88.1 kg (194 lb 3.6 oz)   SpO2 98%   BMI 33.34 kg/m²     Physical Exam  Constitutional:       Appearance: She is ill-appearing.   Musculoskeletal:         General: Swelling present.      Right lower leg: Edema present.      Left lower leg: Edema present.   Neurological:      Mental Status: She is alert.   Psychiatric:      Comments: Flat affect         Lab Results   Component Value Date    WBC 8.54 06/15/2020    HGB 10.2 (L) 06/15/2020    HCT 35.3 (L) 06/15/2020     06/15/2020    CHOL 139 02/13/2019    TRIG 72 02/13/2019    HDL 41 02/13/2019    ALT 8 (L) 06/02/2020    AST 12 06/02/2020     09/10/2020    K 4.0 09/10/2020     09/10/2020    CREATININE 1.1 09/10/2020    BUN 13 09/10/2020    CO2 24 09/10/2020 "    TSH 0.725 09/20/2018    HGBA1C 5.6 09/10/2020       RESULTS: Reviewed labs and images today    Assessment:   80 y.o. female with multiple co-morbid illnesses here to continue work-up of chronic issues notably with urinary symptoms, PreDM, HLD, DISH      Plan:     Problem List Items Addressed This Visit        Neuro    DISH (diffuse idiopathic skeletal hyperostosis)    Relevant Orders    Sedimentation rate    Comprehensive Metabolic Panel       ENT    Upper respiratory disease    Relevant Medications    loratadine (CLARITIN) 10 mg tablet    fluticasone propionate (FLONASE) 50 mcg/actuation nasal spray       Renal/    Urinary frequency - Primary    Relevant Orders    POCT Glucose, Hand-Held Device (Completed)    POCT URINE DIPSTICK WITH MICROSCOPE, AUTOMATED (Completed)    Urine culture       Endocrine    Type 2 diabetes mellitus with stage 3 chronic kidney disease, without long-term current use of insulin    Relevant Orders    Hemoglobin A1C    Comprehensive Metabolic Panel    CBC Auto Differential    Microalbumin/Creatinine Ratio, Urine    Lipid Panel          Health Maintenance       Date Due Completion Date    Foot Exam 11/19/1950 ---    TETANUS VACCINE 11/19/1956 ---    Urine Microalbumin 10/22/2019 10/22/2018- TODAY    Lipid Panel 02/13/2020 2/13/2019- TODAY    Eye Exam 01/15/2021 1/15/2020    Hemoglobin A1c 03/10/2021 9/10/2020    DEXA SCAN 05/16/2021 5/16/2019          Follow up in about 3 weeks (around 1/13/2021). Total face-to-face time was 60 min, >50% of this was spent on counseling and coordination of care. The following issues were discussed: with urinary symptoms, PreDM, HLD, DISH     Laurie Todd MD/MPH  Internal Medicine  [facility]  [phone]

## 2020-12-23 NOTE — PATIENT INSTRUCTIONS
TODAY:  - labs  - urine studies  - wear your compression stockings  - reduce your liquid intake at night  - cut back on coffee  - refills of allergy meds sent to [facility]  - call Ms Madisyn with any issues at [phone]  - let us know when you want to come back with your daughter

## 2020-12-24 LAB — BACTERIA UR CULT: NO GROWTH

## 2020-12-28 ENCOUNTER — TELEPHONE (OUTPATIENT)
Dept: PRIMARY CARE CLINIC | Facility: CLINIC | Age: 80
End: 2020-12-28

## 2020-12-28 DIAGNOSIS — R35.0 URINARY FREQUENCY: Primary | ICD-10-CM
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
## 2020-12-28 NOTE — TELEPHONE ENCOUNTER
Please let patient know that her urine did not grow any bacteria. She only had a small amount of protein in her urine, which is likely related to blood pressure or prediabetes. She should not worry about this.    Is she still having excess urination?    Thanks,  KJ

## 2020-12-28 NOTE — TELEPHONE ENCOUNTER
Please let patient know that she needs to see GYN regarding these symptoms. She does not have a UTI but may have something structural going on.    Referral placed, please get her scheduled with GYN.    Thanks,  KJ

## 2020-12-31 ENCOUNTER — PATIENT OUTREACH (OUTPATIENT)
Dept: ADMINISTRATIVE | Facility: OTHER | Age: 80
End: 2020-12-31

## 2021-01-10 ENCOUNTER — IMMUNIZATION (OUTPATIENT)
Dept: INTERNAL MEDICINE | Facility: CLINIC | Age: 81
End: 2021-01-10
Payer: MEDICARE

## 2021-01-10 DIAGNOSIS — Z23 NEED FOR VACCINATION: ICD-10-CM

## 2021-01-10 PROCEDURE — 91300 COVID-19, MRNA, LNP-S, PF, 30 MCG/0.3 ML DOSE VACCINE: CPT | Mod: PBBFAC | Performed by: INTERNAL MEDICINE

## 2021-01-11 ENCOUNTER — OFFICE VISIT (OUTPATIENT)
Dept: OBSTETRICS AND GYNECOLOGY | Facility: CLINIC | Age: 81
End: 2021-01-11
Attending: OBSTETRICS & GYNECOLOGY
Payer: MEDICARE

## 2021-01-11 VITALS
DIASTOLIC BLOOD PRESSURE: 62 MMHG | SYSTOLIC BLOOD PRESSURE: 112 MMHG | HEIGHT: 64 IN | WEIGHT: 197.31 LBS | BODY MASS INDEX: 33.69 KG/M2

## 2021-01-11 DIAGNOSIS — N81.10 CYSTOCELE WITH RECTOCELE: ICD-10-CM

## 2021-01-11 DIAGNOSIS — N81.6 CYSTOCELE WITH RECTOCELE: ICD-10-CM

## 2021-01-11 DIAGNOSIS — R35.0 URINARY FREQUENCY: Primary | ICD-10-CM

## 2021-01-11 PROCEDURE — 1101F PR PT FALLS ASSESS DOC 0-1 FALLS W/OUT INJ PAST YR: ICD-10-PCS | Mod: HCNC,CPTII,S$GLB, | Performed by: OBSTETRICS & GYNECOLOGY

## 2021-01-11 PROCEDURE — 1159F PR MEDICATION LIST DOCUMENTED IN MEDICAL RECORD: ICD-10-PCS | Mod: HCNC,S$GLB,, | Performed by: OBSTETRICS & GYNECOLOGY

## 2021-01-11 PROCEDURE — 99999 PR PBB SHADOW E&M-EST. PATIENT-LVL III: ICD-10-PCS | Mod: PBBFAC,HCNC,, | Performed by: OBSTETRICS & GYNECOLOGY

## 2021-01-11 PROCEDURE — 3074F PR MOST RECENT SYSTOLIC BLOOD PRESSURE < 130 MM HG: ICD-10-PCS | Mod: HCNC,CPTII,S$GLB, | Performed by: OBSTETRICS & GYNECOLOGY

## 2021-01-11 PROCEDURE — 3074F SYST BP LT 130 MM HG: CPT | Mod: HCNC,CPTII,S$GLB, | Performed by: OBSTETRICS & GYNECOLOGY

## 2021-01-11 PROCEDURE — 99203 PR OFFICE/OUTPT VISIT, NEW, LEVL III, 30-44 MIN: ICD-10-PCS | Mod: HCNC,S$GLB,, | Performed by: OBSTETRICS & GYNECOLOGY

## 2021-01-11 PROCEDURE — 99203 OFFICE O/P NEW LOW 30 MIN: CPT | Mod: HCNC,S$GLB,, | Performed by: OBSTETRICS & GYNECOLOGY

## 2021-01-11 PROCEDURE — 3078F DIAST BP <80 MM HG: CPT | Mod: HCNC,CPTII,S$GLB, | Performed by: OBSTETRICS & GYNECOLOGY

## 2021-01-11 PROCEDURE — 1126F AMNT PAIN NOTED NONE PRSNT: CPT | Mod: HCNC,S$GLB,, | Performed by: OBSTETRICS & GYNECOLOGY

## 2021-01-11 PROCEDURE — 1101F PT FALLS ASSESS-DOCD LE1/YR: CPT | Mod: HCNC,CPTII,S$GLB, | Performed by: OBSTETRICS & GYNECOLOGY

## 2021-01-11 PROCEDURE — 3288F PR FALLS RISK ASSESSMENT DOCUMENTED: ICD-10-PCS | Mod: HCNC,CPTII,S$GLB, | Performed by: OBSTETRICS & GYNECOLOGY

## 2021-01-11 PROCEDURE — 1159F MED LIST DOCD IN RCRD: CPT | Mod: HCNC,S$GLB,, | Performed by: OBSTETRICS & GYNECOLOGY

## 2021-01-11 PROCEDURE — 99999 PR PBB SHADOW E&M-EST. PATIENT-LVL III: CPT | Mod: PBBFAC,HCNC,, | Performed by: OBSTETRICS & GYNECOLOGY

## 2021-01-11 PROCEDURE — 1126F PR PAIN SEVERITY QUANTIFIED, NO PAIN PRESENT: ICD-10-PCS | Mod: HCNC,S$GLB,, | Performed by: OBSTETRICS & GYNECOLOGY

## 2021-01-11 PROCEDURE — 1157F PR ADVANCE CARE PLAN OR EQUIV PRESENT IN MEDICAL RECORD: ICD-10-PCS | Mod: HCNC,S$GLB,, | Performed by: OBSTETRICS & GYNECOLOGY

## 2021-01-11 PROCEDURE — 3078F PR MOST RECENT DIASTOLIC BLOOD PRESSURE < 80 MM HG: ICD-10-PCS | Mod: HCNC,CPTII,S$GLB, | Performed by: OBSTETRICS & GYNECOLOGY

## 2021-01-11 PROCEDURE — 1157F ADVNC CARE PLAN IN RCRD: CPT | Mod: HCNC,S$GLB,, | Performed by: OBSTETRICS & GYNECOLOGY

## 2021-01-11 PROCEDURE — 3288F FALL RISK ASSESSMENT DOCD: CPT | Mod: HCNC,CPTII,S$GLB, | Performed by: OBSTETRICS & GYNECOLOGY

## 2021-01-13 ENCOUNTER — LAB VISIT (OUTPATIENT)
Dept: LAB | Facility: HOSPITAL | Age: 81
End: 2021-01-13
Attending: INTERNAL MEDICINE
Payer: MEDICARE

## 2021-01-13 ENCOUNTER — TELEPHONE (OUTPATIENT)
Dept: PRIMARY CARE CLINIC | Facility: CLINIC | Age: 81
End: 2021-01-13

## 2021-01-13 ENCOUNTER — OFFICE VISIT (OUTPATIENT)
Dept: PRIMARY CARE CLINIC | Facility: CLINIC | Age: 81
End: 2021-01-13
Payer: MEDICARE

## 2021-01-13 VITALS
HEIGHT: 64 IN | DIASTOLIC BLOOD PRESSURE: 72 MMHG | RESPIRATION RATE: 16 BRPM | SYSTOLIC BLOOD PRESSURE: 138 MMHG | BODY MASS INDEX: 33.76 KG/M2 | HEART RATE: 79 BPM | TEMPERATURE: 97 F | OXYGEN SATURATION: 98 % | WEIGHT: 197.75 LBS

## 2021-01-13 DIAGNOSIS — D69.2 OTHER NONTHROMBOCYTOPENIC PURPURA: ICD-10-CM

## 2021-01-13 DIAGNOSIS — E21.3 HYPERPARATHYROIDISM: ICD-10-CM

## 2021-01-13 DIAGNOSIS — G95.9 CERVICAL MYELOPATHY: ICD-10-CM

## 2021-01-13 DIAGNOSIS — I77.6 ARTERITIS: ICD-10-CM

## 2021-01-13 DIAGNOSIS — E11.22 TYPE 2 DIABETES MELLITUS WITH STAGE 3A CHRONIC KIDNEY DISEASE, WITHOUT LONG-TERM CURRENT USE OF INSULIN: ICD-10-CM

## 2021-01-13 DIAGNOSIS — N18.31 TYPE 2 DIABETES MELLITUS WITH STAGE 3A CHRONIC KIDNEY DISEASE, WITHOUT LONG-TERM CURRENT USE OF INSULIN: ICD-10-CM

## 2021-01-13 DIAGNOSIS — M48.10 DISH (DIFFUSE IDIOPATHIC SKELETAL HYPEROSTOSIS): ICD-10-CM

## 2021-01-13 DIAGNOSIS — I70.0 AORTIC ATHEROSCLEROSIS: ICD-10-CM

## 2021-01-13 DIAGNOSIS — N18.4 CHRONIC KIDNEY DISEASE, STAGE 4 (SEVERE): ICD-10-CM

## 2021-01-13 DIAGNOSIS — N18.31 STAGE 3A CHRONIC KIDNEY DISEASE: ICD-10-CM

## 2021-01-13 DIAGNOSIS — D69.2 SENILE PURPURA: ICD-10-CM

## 2021-01-13 DIAGNOSIS — E66.01 MORBID OBESITY: ICD-10-CM

## 2021-01-13 DIAGNOSIS — F15.982 CAFFEINE-INDUCED SLEEP DISORDER: ICD-10-CM

## 2021-01-13 PROCEDURE — 1101F PT FALLS ASSESS-DOCD LE1/YR: CPT | Mod: HCNC,CPTII,S$GLB, | Performed by: INTERNAL MEDICINE

## 2021-01-13 PROCEDURE — 80061 LIPID PANEL: CPT | Mod: HCNC

## 2021-01-13 PROCEDURE — 83036 HEMOGLOBIN GLYCOSYLATED A1C: CPT | Mod: HCNC

## 2021-01-13 PROCEDURE — 99499 UNLISTED E&M SERVICE: CPT | Mod: S$GLB,,, | Performed by: INTERNAL MEDICINE

## 2021-01-13 PROCEDURE — 3078F PR MOST RECENT DIASTOLIC BLOOD PRESSURE < 80 MM HG: ICD-10-PCS | Mod: HCNC,CPTII,S$GLB, | Performed by: INTERNAL MEDICINE

## 2021-01-13 PROCEDURE — 3075F PR MOST RECENT SYSTOLIC BLOOD PRESS GE 130-139MM HG: ICD-10-PCS | Mod: HCNC,CPTII,S$GLB, | Performed by: INTERNAL MEDICINE

## 2021-01-13 PROCEDURE — 1159F MED LIST DOCD IN RCRD: CPT | Mod: HCNC,S$GLB,, | Performed by: INTERNAL MEDICINE

## 2021-01-13 PROCEDURE — 3078F DIAST BP <80 MM HG: CPT | Mod: HCNC,CPTII,S$GLB, | Performed by: INTERNAL MEDICINE

## 2021-01-13 PROCEDURE — 1157F ADVNC CARE PLAN IN RCRD: CPT | Mod: HCNC,S$GLB,, | Performed by: INTERNAL MEDICINE

## 2021-01-13 PROCEDURE — 1126F PR PAIN SEVERITY QUANTIFIED, NO PAIN PRESENT: ICD-10-PCS | Mod: HCNC,S$GLB,, | Performed by: INTERNAL MEDICINE

## 2021-01-13 PROCEDURE — 1126F AMNT PAIN NOTED NONE PRSNT: CPT | Mod: HCNC,S$GLB,, | Performed by: INTERNAL MEDICINE

## 2021-01-13 PROCEDURE — 3288F PR FALLS RISK ASSESSMENT DOCUMENTED: ICD-10-PCS | Mod: HCNC,CPTII,S$GLB, | Performed by: INTERNAL MEDICINE

## 2021-01-13 PROCEDURE — 80053 COMPREHEN METABOLIC PANEL: CPT | Mod: HCNC

## 2021-01-13 PROCEDURE — 99499 RISK ADDL DX/OHS AUDIT: ICD-10-PCS | Mod: S$GLB,,, | Performed by: INTERNAL MEDICINE

## 2021-01-13 PROCEDURE — 99215 PR OFFICE/OUTPT VISIT, EST, LEVL V, 40-54 MIN: ICD-10-PCS | Mod: HCNC,S$GLB,, | Performed by: INTERNAL MEDICINE

## 2021-01-13 PROCEDURE — 99215 OFFICE O/P EST HI 40 MIN: CPT | Mod: HCNC,S$GLB,, | Performed by: INTERNAL MEDICINE

## 2021-01-13 PROCEDURE — 3288F FALL RISK ASSESSMENT DOCD: CPT | Mod: HCNC,CPTII,S$GLB, | Performed by: INTERNAL MEDICINE

## 2021-01-13 PROCEDURE — 3075F SYST BP GE 130 - 139MM HG: CPT | Mod: HCNC,CPTII,S$GLB, | Performed by: INTERNAL MEDICINE

## 2021-01-13 PROCEDURE — 1101F PR PT FALLS ASSESS DOC 0-1 FALLS W/OUT INJ PAST YR: ICD-10-PCS | Mod: HCNC,CPTII,S$GLB, | Performed by: INTERNAL MEDICINE

## 2021-01-13 PROCEDURE — 1159F PR MEDICATION LIST DOCUMENTED IN MEDICAL RECORD: ICD-10-PCS | Mod: HCNC,S$GLB,, | Performed by: INTERNAL MEDICINE

## 2021-01-13 PROCEDURE — 1157F PR ADVANCE CARE PLAN OR EQUIV PRESENT IN MEDICAL RECORD: ICD-10-PCS | Mod: HCNC,S$GLB,, | Performed by: INTERNAL MEDICINE

## 2021-01-13 PROCEDURE — 36415 COLL VENOUS BLD VENIPUNCTURE: CPT | Mod: HCNC

## 2021-01-14 LAB
ALBUMIN SERPL BCP-MCNC: 3.8 G/DL (ref 3.5–5.2)
ALP SERPL-CCNC: 38 U/L (ref 55–135)
ALT SERPL W/O P-5'-P-CCNC: 11 U/L (ref 10–44)
ANION GAP SERPL CALC-SCNC: 10 MMOL/L (ref 8–16)
AST SERPL-CCNC: 18 U/L (ref 10–40)
BILIRUB SERPL-MCNC: 0.3 MG/DL (ref 0.1–1)
BUN SERPL-MCNC: 22 MG/DL (ref 8–23)
CALCIUM SERPL-MCNC: 9.5 MG/DL (ref 8.7–10.5)
CHLORIDE SERPL-SCNC: 102 MMOL/L (ref 95–110)
CHOLEST SERPL-MCNC: 146 MG/DL (ref 120–199)
CHOLEST/HDLC SERPL: 3.2 {RATIO} (ref 2–5)
CO2 SERPL-SCNC: 28 MMOL/L (ref 23–29)
CREAT SERPL-MCNC: 1.3 MG/DL (ref 0.5–1.4)
EST. GFR  (AFRICAN AMERICAN): 44.1 ML/MIN/1.73 M^2
EST. GFR  (NON AFRICAN AMERICAN): 38.3 ML/MIN/1.73 M^2
ESTIMATED AVG GLUCOSE: 117 MG/DL (ref 68–131)
GLUCOSE SERPL-MCNC: 89 MG/DL (ref 70–110)
HBA1C MFR BLD HPLC: 5.7 % (ref 4–5.6)
HDLC SERPL-MCNC: 46 MG/DL (ref 40–75)
HDLC SERPL: 31.5 % (ref 20–50)
LDLC SERPL CALC-MCNC: 87 MG/DL (ref 63–159)
NONHDLC SERPL-MCNC: 100 MG/DL
POTASSIUM SERPL-SCNC: 4 MMOL/L (ref 3.5–5.1)
PROT SERPL-MCNC: 8.1 G/DL (ref 6–8.4)
SODIUM SERPL-SCNC: 140 MMOL/L (ref 136–145)
TRIGL SERPL-MCNC: 65 MG/DL (ref 30–150)

## 2021-01-27 ENCOUNTER — OFFICE VISIT (OUTPATIENT)
Dept: PODIATRY | Facility: CLINIC | Age: 81
End: 2021-01-27
Payer: MEDICARE

## 2021-01-27 VITALS
SYSTOLIC BLOOD PRESSURE: 140 MMHG | HEART RATE: 75 BPM | BODY MASS INDEX: 34.62 KG/M2 | HEIGHT: 64 IN | WEIGHT: 202.81 LBS | DIASTOLIC BLOOD PRESSURE: 76 MMHG

## 2021-01-27 DIAGNOSIS — E11.51 DIABETES MELLITUS WITH PERIPHERAL VASCULAR DISEASE: ICD-10-CM

## 2021-01-27 DIAGNOSIS — B35.1 ONYCHOMYCOSIS DUE TO DERMATOPHYTE: ICD-10-CM

## 2021-01-27 DIAGNOSIS — L84 CORN OR CALLUS: Primary | ICD-10-CM

## 2021-01-27 DIAGNOSIS — L85.3 DRY SKIN: ICD-10-CM

## 2021-01-27 PROCEDURE — 1157F ADVNC CARE PLAN IN RCRD: CPT | Mod: S$GLB,,, | Performed by: PODIATRIST

## 2021-01-27 PROCEDURE — 99499 UNLISTED E&M SERVICE: CPT | Mod: S$GLB,,, | Performed by: PODIATRIST

## 2021-01-27 PROCEDURE — 11721 PR DEBRIDEMENT OF NAILS, 6 OR MORE: ICD-10-PCS | Mod: Q8,59,S$GLB, | Performed by: PODIATRIST

## 2021-01-27 PROCEDURE — 11721 DEBRIDE NAIL 6 OR MORE: CPT | Mod: Q8,59,S$GLB, | Performed by: PODIATRIST

## 2021-01-27 PROCEDURE — 11056 PARNG/CUTG B9 HYPRKR LES 2-4: CPT | Mod: Q8,S$GLB,, | Performed by: PODIATRIST

## 2021-01-27 PROCEDURE — 1125F PR PAIN SEVERITY QUANTIFIED, PAIN PRESENT: ICD-10-PCS | Mod: S$GLB,,, | Performed by: PODIATRIST

## 2021-01-27 PROCEDURE — 1157F PR ADVANCE CARE PLAN OR EQUIV PRESENT IN MEDICAL RECORD: ICD-10-PCS | Mod: S$GLB,,, | Performed by: PODIATRIST

## 2021-01-27 PROCEDURE — 99499 NO LOS: ICD-10-PCS | Mod: S$GLB,,, | Performed by: PODIATRIST

## 2021-01-27 PROCEDURE — 99999 PR PBB SHADOW E&M-EST. PATIENT-LVL IV: ICD-10-PCS | Mod: PBBFAC,,, | Performed by: PODIATRIST

## 2021-01-27 PROCEDURE — 11056 PR TRIM BENIGN HYPERKERATOTIC SKIN LESION,2-4: ICD-10-PCS | Mod: Q8,S$GLB,, | Performed by: PODIATRIST

## 2021-01-27 PROCEDURE — 99999 PR PBB SHADOW E&M-EST. PATIENT-LVL IV: CPT | Mod: PBBFAC,,, | Performed by: PODIATRIST

## 2021-01-27 PROCEDURE — 1125F AMNT PAIN NOTED PAIN PRSNT: CPT | Mod: S$GLB,,, | Performed by: PODIATRIST

## 2021-01-27 RX ORDER — AMMONIUM LACTATE 12 G/100G
CREAM TOPICAL
Qty: 1 TUBE | Refills: 3 | Status: SHIPPED | OUTPATIENT
Start: 2021-01-27 | End: 2022-02-15

## 2021-01-31 ENCOUNTER — IMMUNIZATION (OUTPATIENT)
Dept: INTERNAL MEDICINE | Facility: CLINIC | Age: 81
End: 2021-01-31
Payer: MEDICARE

## 2021-01-31 DIAGNOSIS — Z23 NEED FOR VACCINATION: Primary | ICD-10-CM

## 2021-01-31 PROCEDURE — 0002A PR IMMUNIZ ADMIN, SARS-COV-2 COVID-19 VACC, 30MCG/0.3ML, 2ND DOSE: CPT | Mod: PBBFAC | Performed by: INTERNAL MEDICINE

## 2021-01-31 PROCEDURE — 91300 PR SARS-COV- 2 COVID-19 VACCINE, NO PRSV, 30MCG/0.3ML, IM: CPT | Mod: PBBFAC | Performed by: INTERNAL MEDICINE

## 2021-01-31 RX ADMIN — RNA INGREDIENT BNT-162B2 0.3 ML: 0.23 INJECTION, SUSPENSION INTRAMUSCULAR at 12:01

## 2021-03-01 ENCOUNTER — PATIENT OUTREACH (OUTPATIENT)
Dept: ADMINISTRATIVE | Facility: OTHER | Age: 81
End: 2021-03-01

## 2021-03-03 ENCOUNTER — TELEPHONE (OUTPATIENT)
Dept: PRIMARY CARE CLINIC | Facility: CLINIC | Age: 81
End: 2021-03-03

## 2021-03-03 ENCOUNTER — OFFICE VISIT (OUTPATIENT)
Dept: OPTOMETRY | Facility: CLINIC | Age: 81
End: 2021-03-03
Payer: MEDICARE

## 2021-03-03 ENCOUNTER — LAB VISIT (OUTPATIENT)
Dept: LAB | Facility: HOSPITAL | Age: 81
End: 2021-03-03
Attending: INTERNAL MEDICINE
Payer: MEDICARE

## 2021-03-03 ENCOUNTER — OFFICE VISIT (OUTPATIENT)
Dept: PRIMARY CARE CLINIC | Facility: CLINIC | Age: 81
End: 2021-03-03
Payer: MEDICARE

## 2021-03-03 VITALS
SYSTOLIC BLOOD PRESSURE: 140 MMHG | HEART RATE: 73 BPM | DIASTOLIC BLOOD PRESSURE: 78 MMHG | TEMPERATURE: 97 F | BODY MASS INDEX: 33.89 KG/M2 | WEIGHT: 198.5 LBS | OXYGEN SATURATION: 99 % | HEIGHT: 64 IN

## 2021-03-03 DIAGNOSIS — E11.22 TYPE 2 DIABETES MELLITUS WITH STAGE 3A CHRONIC KIDNEY DISEASE, WITHOUT LONG-TERM CURRENT USE OF INSULIN: ICD-10-CM

## 2021-03-03 DIAGNOSIS — K59.00 CONSTIPATION, UNSPECIFIED CONSTIPATION TYPE: ICD-10-CM

## 2021-03-03 DIAGNOSIS — H52.13 MYOPIA WITH ASTIGMATISM AND PRESBYOPIA, BILATERAL: ICD-10-CM

## 2021-03-03 DIAGNOSIS — E11.9 TYPE 2 DIABETES MELLITUS WITHOUT RETINOPATHY: Primary | ICD-10-CM

## 2021-03-03 DIAGNOSIS — Z96.1 PSEUDOPHAKIA OF BOTH EYES: ICD-10-CM

## 2021-03-03 DIAGNOSIS — H52.4 MYOPIA WITH ASTIGMATISM AND PRESBYOPIA, BILATERAL: ICD-10-CM

## 2021-03-03 DIAGNOSIS — N18.31 TYPE 2 DIABETES MELLITUS WITH STAGE 3A CHRONIC KIDNEY DISEASE, WITHOUT LONG-TERM CURRENT USE OF INSULIN: ICD-10-CM

## 2021-03-03 DIAGNOSIS — N18.32 STAGE 3B CHRONIC KIDNEY DISEASE: ICD-10-CM

## 2021-03-03 DIAGNOSIS — H52.203 MYOPIA WITH ASTIGMATISM AND PRESBYOPIA, BILATERAL: ICD-10-CM

## 2021-03-03 DIAGNOSIS — N18.32 STAGE 3B CHRONIC KIDNEY DISEASE: Primary | ICD-10-CM

## 2021-03-03 DIAGNOSIS — M48.10 DISH (DIFFUSE IDIOPATHIC SKELETAL HYPEROSTOSIS): ICD-10-CM

## 2021-03-03 DIAGNOSIS — H02.889 MGD (MEIBOMIAN GLAND DYSFUNCTION): ICD-10-CM

## 2021-03-03 DIAGNOSIS — R73.03 PREDIABETES: ICD-10-CM

## 2021-03-03 DIAGNOSIS — M79.675 PAIN OF TOE OF LEFT FOOT: ICD-10-CM

## 2021-03-03 LAB
ANION GAP SERPL CALC-SCNC: 11 MMOL/L (ref 8–16)
BUN SERPL-MCNC: 16 MG/DL (ref 8–23)
CALCIUM SERPL-MCNC: 10.1 MG/DL (ref 8.7–10.5)
CHLORIDE SERPL-SCNC: 105 MMOL/L (ref 95–110)
CO2 SERPL-SCNC: 25 MMOL/L (ref 23–29)
CREAT SERPL-MCNC: 1.4 MG/DL (ref 0.5–1.4)
EST. GFR  (AFRICAN AMERICAN): 40.4 ML/MIN/1.73 M^2
EST. GFR  (NON AFRICAN AMERICAN): 35 ML/MIN/1.73 M^2
GLUCOSE SERPL-MCNC: 100 MG/DL (ref 70–110)
GLUCOSE SERPL-MCNC: 104 MG/DL (ref 70–110)
POTASSIUM SERPL-SCNC: 4 MMOL/L (ref 3.5–5.1)
SODIUM SERPL-SCNC: 141 MMOL/L (ref 136–145)

## 2021-03-03 PROCEDURE — 99999 PR PBB SHADOW E&M-EST. PATIENT-LVL II: ICD-10-PCS | Mod: PBBFAC,,, | Performed by: OPTOMETRIST

## 2021-03-03 PROCEDURE — 1126F PR PAIN SEVERITY QUANTIFIED, NO PAIN PRESENT: ICD-10-PCS | Mod: S$GLB,,, | Performed by: INTERNAL MEDICINE

## 2021-03-03 PROCEDURE — 3078F DIAST BP <80 MM HG: CPT | Mod: CPTII,S$GLB,, | Performed by: INTERNAL MEDICINE

## 2021-03-03 PROCEDURE — 92014 COMPRE OPH EXAM EST PT 1/>: CPT | Mod: S$GLB,,, | Performed by: OPTOMETRIST

## 2021-03-03 PROCEDURE — 1157F PR ADVANCE CARE PLAN OR EQUIV PRESENT IN MEDICAL RECORD: ICD-10-PCS | Mod: S$GLB,,, | Performed by: INTERNAL MEDICINE

## 2021-03-03 PROCEDURE — 3078F PR MOST RECENT DIASTOLIC BLOOD PRESSURE < 80 MM HG: ICD-10-PCS | Mod: CPTII,S$GLB,, | Performed by: INTERNAL MEDICINE

## 2021-03-03 PROCEDURE — 80048 BASIC METABOLIC PNL TOTAL CA: CPT | Performed by: INTERNAL MEDICINE

## 2021-03-03 PROCEDURE — 99999 PR PBB SHADOW E&M-EST. PATIENT-LVL II: CPT | Mod: PBBFAC,,, | Performed by: OPTOMETRIST

## 2021-03-03 PROCEDURE — 36415 COLL VENOUS BLD VENIPUNCTURE: CPT | Performed by: INTERNAL MEDICINE

## 2021-03-03 PROCEDURE — 82962 GLUCOSE BLOOD TEST: CPT | Mod: ,,, | Performed by: INTERNAL MEDICINE

## 2021-03-03 PROCEDURE — 3077F PR MOST RECENT SYSTOLIC BLOOD PRESSURE >= 140 MM HG: ICD-10-PCS | Mod: CPTII,S$GLB,, | Performed by: INTERNAL MEDICINE

## 2021-03-03 PROCEDURE — 99215 OFFICE O/P EST HI 40 MIN: CPT | Mod: S$GLB,,, | Performed by: INTERNAL MEDICINE

## 2021-03-03 PROCEDURE — 3077F SYST BP >= 140 MM HG: CPT | Mod: CPTII,S$GLB,, | Performed by: INTERNAL MEDICINE

## 2021-03-03 PROCEDURE — 99215 PR OFFICE/OUTPT VISIT, EST, LEVL V, 40-54 MIN: ICD-10-PCS | Mod: S$GLB,,, | Performed by: INTERNAL MEDICINE

## 2021-03-03 PROCEDURE — 1126F AMNT PAIN NOTED NONE PRSNT: CPT | Mod: S$GLB,,, | Performed by: INTERNAL MEDICINE

## 2021-03-03 PROCEDURE — 1157F ADVNC CARE PLAN IN RCRD: CPT | Mod: S$GLB,,, | Performed by: INTERNAL MEDICINE

## 2021-03-03 PROCEDURE — 1101F PT FALLS ASSESS-DOCD LE1/YR: CPT | Mod: CPTII,S$GLB,, | Performed by: INTERNAL MEDICINE

## 2021-03-03 PROCEDURE — 82962 POCT GLUCOSE, HAND-HELD DEVICE: ICD-10-PCS | Mod: ,,, | Performed by: INTERNAL MEDICINE

## 2021-03-03 PROCEDURE — 92015 PR REFRACTION: ICD-10-PCS | Mod: S$GLB,,, | Performed by: OPTOMETRIST

## 2021-03-03 PROCEDURE — 3288F FALL RISK ASSESSMENT DOCD: CPT | Mod: CPTII,S$GLB,, | Performed by: INTERNAL MEDICINE

## 2021-03-03 PROCEDURE — 1159F MED LIST DOCD IN RCRD: CPT | Mod: S$GLB,,, | Performed by: INTERNAL MEDICINE

## 2021-03-03 PROCEDURE — 92014 PR EYE EXAM, EST PATIENT,COMPREHESV: ICD-10-PCS | Mod: S$GLB,,, | Performed by: OPTOMETRIST

## 2021-03-03 PROCEDURE — 92015 DETERMINE REFRACTIVE STATE: CPT | Mod: S$GLB,,, | Performed by: OPTOMETRIST

## 2021-03-03 PROCEDURE — 3288F PR FALLS RISK ASSESSMENT DOCUMENTED: ICD-10-PCS | Mod: CPTII,S$GLB,, | Performed by: INTERNAL MEDICINE

## 2021-03-03 PROCEDURE — 1101F PR PT FALLS ASSESS DOC 0-1 FALLS W/OUT INJ PAST YR: ICD-10-PCS | Mod: CPTII,S$GLB,, | Performed by: INTERNAL MEDICINE

## 2021-03-03 PROCEDURE — 1159F PR MEDICATION LIST DOCUMENTED IN MEDICAL RECORD: ICD-10-PCS | Mod: S$GLB,,, | Performed by: INTERNAL MEDICINE

## 2021-03-03 RX ORDER — PSYLLIUM HUSK 3.4 G/5.8G
1 POWDER ORAL DAILY
COMMUNITY
Start: 2021-03-03

## 2021-03-08 ENCOUNTER — TELEPHONE (OUTPATIENT)
Dept: ADMINISTRATIVE | Facility: OTHER | Age: 81
End: 2021-03-08

## 2021-03-12 ENCOUNTER — TELEPHONE (OUTPATIENT)
Dept: PRIMARY CARE CLINIC | Facility: CLINIC | Age: 81
End: 2021-03-12

## 2021-04-07 ENCOUNTER — NUTRITION (OUTPATIENT)
Dept: NUTRITION | Facility: CLINIC | Age: 81
End: 2021-04-07
Payer: MEDICARE

## 2021-04-07 ENCOUNTER — LAB VISIT (OUTPATIENT)
Dept: LAB | Facility: HOSPITAL | Age: 81
End: 2021-04-07
Attending: INTERNAL MEDICINE
Payer: MEDICARE

## 2021-04-07 ENCOUNTER — OFFICE VISIT (OUTPATIENT)
Dept: PRIMARY CARE CLINIC | Facility: CLINIC | Age: 81
End: 2021-04-07
Payer: MEDICARE

## 2021-04-07 VITALS
TEMPERATURE: 98 F | SYSTOLIC BLOOD PRESSURE: 135 MMHG | BODY MASS INDEX: 32.05 KG/M2 | HEIGHT: 66 IN | WEIGHT: 199.44 LBS | OXYGEN SATURATION: 95 % | HEART RATE: 70 BPM | DIASTOLIC BLOOD PRESSURE: 65 MMHG

## 2021-04-07 VITALS — BODY MASS INDEX: 32.06 KG/M2 | HEIGHT: 66 IN | WEIGHT: 199.5 LBS

## 2021-04-07 DIAGNOSIS — N18.32 STAGE 3B CHRONIC KIDNEY DISEASE: Primary | ICD-10-CM

## 2021-04-07 DIAGNOSIS — N18.30 TYPE 2 DIABETES MELLITUS WITH STAGE 3 CHRONIC KIDNEY DISEASE, WITHOUT LONG-TERM CURRENT USE OF INSULIN, UNSPECIFIED WHETHER STAGE 3A OR 3B CKD: ICD-10-CM

## 2021-04-07 DIAGNOSIS — E66.9 DIABETES MELLITUS TYPE 2 IN OBESE: ICD-10-CM

## 2021-04-07 DIAGNOSIS — B02.23 NEUROPATHY DUE TO HERPES ZOSTER: ICD-10-CM

## 2021-04-07 DIAGNOSIS — E11.69 DIABETES MELLITUS TYPE 2 IN OBESE: ICD-10-CM

## 2021-04-07 DIAGNOSIS — Z71.3 DIETARY COUNSELING: ICD-10-CM

## 2021-04-07 DIAGNOSIS — E11.22 TYPE 2 DIABETES MELLITUS WITH STAGE 3 CHRONIC KIDNEY DISEASE, WITHOUT LONG-TERM CURRENT USE OF INSULIN, UNSPECIFIED WHETHER STAGE 3A OR 3B CKD: ICD-10-CM

## 2021-04-07 DIAGNOSIS — E11.22 TYPE 2 DIABETES MELLITUS WITH STAGE 3 CHRONIC KIDNEY DISEASE, WITHOUT LONG-TERM CURRENT USE OF INSULIN, UNSPECIFIED WHETHER STAGE 3A OR 3B CKD: Primary | ICD-10-CM

## 2021-04-07 DIAGNOSIS — N18.30 TYPE 2 DIABETES MELLITUS WITH STAGE 3 CHRONIC KIDNEY DISEASE, WITHOUT LONG-TERM CURRENT USE OF INSULIN, UNSPECIFIED WHETHER STAGE 3A OR 3B CKD: Primary | ICD-10-CM

## 2021-04-07 LAB — GLUCOSE SERPL-MCNC: 86 MG/DL (ref 70–110)

## 2021-04-07 PROCEDURE — 1159F MED LIST DOCD IN RCRD: CPT | Mod: S$GLB,,, | Performed by: INTERNAL MEDICINE

## 2021-04-07 PROCEDURE — 82962 POCT GLUCOSE, HAND-HELD DEVICE: ICD-10-PCS | Mod: ,,, | Performed by: INTERNAL MEDICINE

## 2021-04-07 PROCEDURE — 1101F PT FALLS ASSESS-DOCD LE1/YR: CPT | Mod: CPTII,S$GLB,, | Performed by: INTERNAL MEDICINE

## 2021-04-07 PROCEDURE — 99499 RISK ADDL DX/OHS AUDIT: ICD-10-PCS | Mod: S$GLB,,, | Performed by: INTERNAL MEDICINE

## 2021-04-07 PROCEDURE — 1159F PR MEDICATION LIST DOCUMENTED IN MEDICAL RECORD: ICD-10-PCS | Mod: S$GLB,,, | Performed by: INTERNAL MEDICINE

## 2021-04-07 PROCEDURE — 3288F PR FALLS RISK ASSESSMENT DOCUMENTED: ICD-10-PCS | Mod: CPTII,S$GLB,, | Performed by: INTERNAL MEDICINE

## 2021-04-07 PROCEDURE — 99499 UNLISTED E&M SERVICE: CPT | Mod: S$GLB,,, | Performed by: INTERNAL MEDICINE

## 2021-04-07 PROCEDURE — 99215 OFFICE O/P EST HI 40 MIN: CPT | Mod: S$GLB,,, | Performed by: INTERNAL MEDICINE

## 2021-04-07 PROCEDURE — 99215 PR OFFICE/OUTPT VISIT, EST, LEVL V, 40-54 MIN: ICD-10-PCS | Mod: S$GLB,,, | Performed by: INTERNAL MEDICINE

## 2021-04-07 PROCEDURE — 83036 HEMOGLOBIN GLYCOSYLATED A1C: CPT | Performed by: INTERNAL MEDICINE

## 2021-04-07 PROCEDURE — 82962 GLUCOSE BLOOD TEST: CPT | Mod: ,,, | Performed by: INTERNAL MEDICINE

## 2021-04-07 PROCEDURE — 1157F PR ADVANCE CARE PLAN OR EQUIV PRESENT IN MEDICAL RECORD: ICD-10-PCS | Mod: S$GLB,,, | Performed by: INTERNAL MEDICINE

## 2021-04-07 PROCEDURE — 1157F ADVNC CARE PLAN IN RCRD: CPT | Mod: S$GLB,,, | Performed by: INTERNAL MEDICINE

## 2021-04-07 PROCEDURE — 3288F FALL RISK ASSESSMENT DOCD: CPT | Mod: CPTII,S$GLB,, | Performed by: INTERNAL MEDICINE

## 2021-04-07 PROCEDURE — 1101F PR PT FALLS ASSESS DOC 0-1 FALLS W/OUT INJ PAST YR: ICD-10-PCS | Mod: CPTII,S$GLB,, | Performed by: INTERNAL MEDICINE

## 2021-04-07 PROCEDURE — 1126F PR PAIN SEVERITY QUANTIFIED, NO PAIN PRESENT: ICD-10-PCS | Mod: S$GLB,,, | Performed by: INTERNAL MEDICINE

## 2021-04-07 PROCEDURE — 36415 COLL VENOUS BLD VENIPUNCTURE: CPT | Performed by: INTERNAL MEDICINE

## 2021-04-07 PROCEDURE — 99999 PR PBB SHADOW E&M-EST. PATIENT-LVL IV: ICD-10-PCS | Mod: PBBFAC,,,

## 2021-04-07 PROCEDURE — 99999 PR PBB SHADOW E&M-EST. PATIENT-LVL IV: CPT | Mod: PBBFAC,,,

## 2021-04-07 PROCEDURE — 1126F AMNT PAIN NOTED NONE PRSNT: CPT | Mod: S$GLB,,, | Performed by: INTERNAL MEDICINE

## 2021-04-07 RX ORDER — LIDOCAINE 50 MG/G
1 PATCH TOPICAL DAILY
Qty: 30 PATCH | Refills: 0 | Status: SHIPPED | OUTPATIENT
Start: 2021-04-07

## 2021-04-07 RX ORDER — GABAPENTIN 300 MG/1
300 CAPSULE ORAL NIGHTLY
Qty: 90 CAPSULE | Refills: 3 | Status: SHIPPED | OUTPATIENT
Start: 2021-04-07 | End: 2022-06-20 | Stop reason: SDUPTHER

## 2021-04-08 LAB
ESTIMATED AVG GLUCOSE: 120 MG/DL (ref 68–131)
HBA1C MFR BLD: 5.8 % (ref 4–5.6)

## 2021-04-09 ENCOUNTER — OUTPATIENT CASE MANAGEMENT (OUTPATIENT)
Dept: ADMINISTRATIVE | Facility: OTHER | Age: 81
End: 2021-04-09

## 2021-04-13 ENCOUNTER — TELEPHONE (OUTPATIENT)
Dept: PRIMARY CARE CLINIC | Facility: CLINIC | Age: 81
End: 2021-04-13

## 2021-04-19 ENCOUNTER — TELEPHONE (OUTPATIENT)
Dept: INTERNAL MEDICINE | Facility: CLINIC | Age: 81
End: 2021-04-19

## 2021-05-26 ENCOUNTER — CLINICAL SUPPORT (OUTPATIENT)
Dept: INTERNAL MEDICINE | Facility: CLINIC | Age: 81
End: 2021-05-26
Payer: MEDICARE

## 2021-05-26 ENCOUNTER — OFFICE VISIT (OUTPATIENT)
Dept: PRIMARY CARE CLINIC | Facility: CLINIC | Age: 81
End: 2021-05-26
Payer: MEDICARE

## 2021-05-26 VITALS — WEIGHT: 203.5 LBS | BODY MASS INDEX: 39.95 KG/M2 | HEIGHT: 60 IN

## 2021-05-26 VITALS
HEART RATE: 57 BPM | SYSTOLIC BLOOD PRESSURE: 136 MMHG | HEIGHT: 66 IN | TEMPERATURE: 98 F | RESPIRATION RATE: 14 BRPM | OXYGEN SATURATION: 100 % | DIASTOLIC BLOOD PRESSURE: 70 MMHG | WEIGHT: 205.81 LBS | BODY MASS INDEX: 33.07 KG/M2

## 2021-05-26 DIAGNOSIS — I10 ESSENTIAL HYPERTENSION: ICD-10-CM

## 2021-05-26 DIAGNOSIS — E11.22 TYPE 2 DIABETES MELLITUS WITH STAGE 3 CHRONIC KIDNEY DISEASE, WITHOUT LONG-TERM CURRENT USE OF INSULIN, UNSPECIFIED WHETHER STAGE 3A OR 3B CKD: ICD-10-CM

## 2021-05-26 DIAGNOSIS — R30.0 DYSURIA: Primary | ICD-10-CM

## 2021-05-26 DIAGNOSIS — R32 URINARY INCONTINENCE, UNSPECIFIED TYPE: ICD-10-CM

## 2021-05-26 DIAGNOSIS — E78.2 MIXED HYPERLIPIDEMIA: ICD-10-CM

## 2021-05-26 DIAGNOSIS — J45.20 MILD INTERMITTENT ASTHMA WITHOUT COMPLICATION: ICD-10-CM

## 2021-05-26 DIAGNOSIS — L03.316 NAVEL CELLULITIS: ICD-10-CM

## 2021-05-26 DIAGNOSIS — N18.30 TYPE 2 DIABETES MELLITUS WITH STAGE 3 CHRONIC KIDNEY DISEASE, WITHOUT LONG-TERM CURRENT USE OF INSULIN, UNSPECIFIED WHETHER STAGE 3A OR 3B CKD: ICD-10-CM

## 2021-05-26 DIAGNOSIS — K21.9 GASTROESOPHAGEAL REFLUX DISEASE WITHOUT ESOPHAGITIS: ICD-10-CM

## 2021-05-26 LAB
BILIRUB UR QL STRIP: NEGATIVE
CLARITY UR REFRACT.AUTO: CLEAR
COLOR UR AUTO: NORMAL
GLUCOSE SERPL-MCNC: 90 MG/DL (ref 70–110)
GLUCOSE UR QL STRIP: NEGATIVE
HGB UR QL STRIP: NEGATIVE
KETONES UR QL STRIP: NEGATIVE
LEUKOCYTE ESTERASE UR QL STRIP: NEGATIVE
NITRITE UR QL STRIP: NEGATIVE
PH UR STRIP: 7 [PH] (ref 5–8)
PROT UR QL STRIP: NEGATIVE
SP GR UR STRIP: 1 (ref 1–1.03)
URN SPEC COLLECT METH UR: NORMAL

## 2021-05-26 PROCEDURE — 82962 POCT GLUCOSE, HAND-HELD DEVICE: ICD-10-PCS | Mod: ,,, | Performed by: INTERNAL MEDICINE

## 2021-05-26 PROCEDURE — 1159F MED LIST DOCD IN RCRD: CPT | Mod: S$GLB,,, | Performed by: INTERNAL MEDICINE

## 2021-05-26 PROCEDURE — 99999 PR PBB SHADOW E&M-EST. PATIENT-LVL I: ICD-10-PCS | Mod: PBBFAC,,,

## 2021-05-26 PROCEDURE — 81003 URINALYSIS AUTO W/O SCOPE: CPT | Performed by: INTERNAL MEDICINE

## 2021-05-26 PROCEDURE — 1101F PT FALLS ASSESS-DOCD LE1/YR: CPT | Mod: CPTII,S$GLB,, | Performed by: INTERNAL MEDICINE

## 2021-05-26 PROCEDURE — 1157F ADVNC CARE PLAN IN RCRD: CPT | Mod: S$GLB,,, | Performed by: INTERNAL MEDICINE

## 2021-05-26 PROCEDURE — 82962 GLUCOSE BLOOD TEST: CPT | Mod: ,,, | Performed by: INTERNAL MEDICINE

## 2021-05-26 PROCEDURE — 1101F PR PT FALLS ASSESS DOC 0-1 FALLS W/OUT INJ PAST YR: ICD-10-PCS | Mod: CPTII,S$GLB,, | Performed by: INTERNAL MEDICINE

## 2021-05-26 PROCEDURE — 1157F PR ADVANCE CARE PLAN OR EQUIV PRESENT IN MEDICAL RECORD: ICD-10-PCS | Mod: S$GLB,,, | Performed by: INTERNAL MEDICINE

## 2021-05-26 PROCEDURE — 99215 OFFICE O/P EST HI 40 MIN: CPT | Mod: S$GLB,,, | Performed by: INTERNAL MEDICINE

## 2021-05-26 PROCEDURE — 3288F PR FALLS RISK ASSESSMENT DOCUMENTED: ICD-10-PCS | Mod: CPTII,S$GLB,, | Performed by: INTERNAL MEDICINE

## 2021-05-26 PROCEDURE — 99999 PR PBB SHADOW E&M-EST. PATIENT-LVL I: CPT | Mod: PBBFAC,,,

## 2021-05-26 PROCEDURE — 1126F PR PAIN SEVERITY QUANTIFIED, NO PAIN PRESENT: ICD-10-PCS | Mod: S$GLB,,, | Performed by: INTERNAL MEDICINE

## 2021-05-26 PROCEDURE — 1126F AMNT PAIN NOTED NONE PRSNT: CPT | Mod: S$GLB,,, | Performed by: INTERNAL MEDICINE

## 2021-05-26 PROCEDURE — 1159F PR MEDICATION LIST DOCUMENTED IN MEDICAL RECORD: ICD-10-PCS | Mod: S$GLB,,, | Performed by: INTERNAL MEDICINE

## 2021-05-26 PROCEDURE — 3288F FALL RISK ASSESSMENT DOCD: CPT | Mod: CPTII,S$GLB,, | Performed by: INTERNAL MEDICINE

## 2021-05-26 PROCEDURE — 99215 PR OFFICE/OUTPT VISIT, EST, LEVL V, 40-54 MIN: ICD-10-PCS | Mod: S$GLB,,, | Performed by: INTERNAL MEDICINE

## 2021-05-26 RX ORDER — CIMETIDINE 400 MG/1
400 TABLET, FILM COATED ORAL 2 TIMES DAILY
Qty: 180 TABLET | Refills: 3 | Status: SHIPPED | OUTPATIENT
Start: 2021-05-26 | End: 2021-08-02 | Stop reason: SDUPTHER

## 2021-05-26 RX ORDER — ALBUTEROL SULFATE 90 UG/1
2 AEROSOL, METERED RESPIRATORY (INHALATION) EVERY 6 HOURS PRN
Qty: 18 G | Refills: 3 | Status: SHIPPED | OUTPATIENT
Start: 2021-05-26 | End: 2021-08-02 | Stop reason: SDUPTHER

## 2021-05-26 RX ORDER — ATORVASTATIN CALCIUM 40 MG/1
40 TABLET, FILM COATED ORAL DAILY
Qty: 90 TABLET | Refills: 3 | Status: SHIPPED | OUTPATIENT
Start: 2021-05-26 | End: 2021-08-02 | Stop reason: SDUPTHER

## 2021-05-26 RX ORDER — AMLODIPINE BESYLATE 10 MG/1
10 TABLET ORAL DAILY
Qty: 90 TABLET | Refills: 3 | Status: SHIPPED | OUTPATIENT
Start: 2021-05-26 | End: 2021-08-02 | Stop reason: SDUPTHER

## 2021-06-03 ENCOUNTER — PATIENT OUTREACH (OUTPATIENT)
Dept: ADMINISTRATIVE | Facility: OTHER | Age: 81
End: 2021-06-03

## 2021-06-14 ENCOUNTER — TELEPHONE (OUTPATIENT)
Dept: OPTOMETRY | Facility: CLINIC | Age: 81
End: 2021-06-14

## 2021-06-15 ENCOUNTER — PES CALL (OUTPATIENT)
Dept: ADMINISTRATIVE | Facility: CLINIC | Age: 81
End: 2021-06-15

## 2021-08-02 ENCOUNTER — TELEPHONE (OUTPATIENT)
Dept: PRIMARY CARE CLINIC | Facility: CLINIC | Age: 81
End: 2021-08-02

## 2021-08-02 ENCOUNTER — TELEPHONE (OUTPATIENT)
Dept: INTERNAL MEDICINE | Facility: CLINIC | Age: 81
End: 2021-08-02

## 2021-08-02 ENCOUNTER — OFFICE VISIT (OUTPATIENT)
Dept: PRIMARY CARE CLINIC | Facility: CLINIC | Age: 81
End: 2021-08-02
Payer: MEDICARE

## 2021-08-02 DIAGNOSIS — J39.9 UPPER RESPIRATORY DISEASE: ICD-10-CM

## 2021-08-02 DIAGNOSIS — N18.30 TYPE 2 DIABETES MELLITUS WITH STAGE 3 CHRONIC KIDNEY DISEASE, WITHOUT LONG-TERM CURRENT USE OF INSULIN, UNSPECIFIED WHETHER STAGE 3A OR 3B CKD: Primary | ICD-10-CM

## 2021-08-02 DIAGNOSIS — J45.20 MILD INTERMITTENT ASTHMA WITHOUT COMPLICATION: ICD-10-CM

## 2021-08-02 DIAGNOSIS — E11.43 DIABETIC AUTONOMIC NEUROPATHY ASSOCIATED WITH TYPE 2 DIABETES MELLITUS: ICD-10-CM

## 2021-08-02 DIAGNOSIS — K21.9 GASTROESOPHAGEAL REFLUX DISEASE WITHOUT ESOPHAGITIS: ICD-10-CM

## 2021-08-02 DIAGNOSIS — R32 URINARY INCONTINENCE, UNSPECIFIED TYPE: ICD-10-CM

## 2021-08-02 DIAGNOSIS — E11.22 TYPE 2 DIABETES MELLITUS WITH STAGE 3 CHRONIC KIDNEY DISEASE, WITHOUT LONG-TERM CURRENT USE OF INSULIN, UNSPECIFIED WHETHER STAGE 3A OR 3B CKD: Primary | ICD-10-CM

## 2021-08-02 DIAGNOSIS — R21 RASH: ICD-10-CM

## 2021-08-02 DIAGNOSIS — E78.2 MIXED HYPERLIPIDEMIA: ICD-10-CM

## 2021-08-02 DIAGNOSIS — I10 ESSENTIAL HYPERTENSION: ICD-10-CM

## 2021-08-02 PROCEDURE — 1157F ADVNC CARE PLAN IN RCRD: CPT | Mod: CPTII,95,, | Performed by: INTERNAL MEDICINE

## 2021-08-02 PROCEDURE — 99443 PR PHYSICIAN TELEPHONE EVALUATION 21-30 MIN: ICD-10-PCS | Mod: 95,,, | Performed by: INTERNAL MEDICINE

## 2021-08-02 PROCEDURE — 1157F PR ADVANCE CARE PLAN OR EQUIV PRESENT IN MEDICAL RECORD: ICD-10-PCS | Mod: CPTII,95,, | Performed by: INTERNAL MEDICINE

## 2021-08-02 PROCEDURE — 99443 PR PHYSICIAN TELEPHONE EVALUATION 21-30 MIN: CPT | Mod: 95,,, | Performed by: INTERNAL MEDICINE

## 2021-08-02 RX ORDER — AMLODIPINE BESYLATE 10 MG/1
10 TABLET ORAL DAILY
Qty: 90 TABLET | Refills: 3 | Status: SHIPPED | OUTPATIENT
Start: 2021-08-02 | End: 2022-06-20 | Stop reason: SDUPTHER

## 2021-08-02 RX ORDER — BIOTIN 10 MG
1 TABLET ORAL DAILY
Qty: 90 EACH | Refills: 3
Start: 2021-08-02

## 2021-08-02 RX ORDER — ATORVASTATIN CALCIUM 40 MG/1
40 TABLET, FILM COATED ORAL DAILY
Qty: 90 TABLET | Refills: 3 | Status: SHIPPED | OUTPATIENT
Start: 2021-08-02 | End: 2022-06-20 | Stop reason: SDUPTHER

## 2021-08-02 RX ORDER — LORATADINE 10 MG/1
10 TABLET ORAL DAILY
Qty: 90 TABLET | Refills: 3 | Status: SHIPPED | OUTPATIENT
Start: 2021-08-02 | End: 2022-09-20 | Stop reason: SDUPTHER

## 2021-08-02 RX ORDER — TRIAMCINOLONE ACETONIDE 0.25 MG/G
OINTMENT TOPICAL
Qty: 80 G | Refills: 1 | Status: SHIPPED | OUTPATIENT
Start: 2021-08-02

## 2021-08-02 RX ORDER — CIMETIDINE 400 MG/1
400 TABLET, FILM COATED ORAL 2 TIMES DAILY
Qty: 180 TABLET | Refills: 3 | Status: SHIPPED | OUTPATIENT
Start: 2021-08-02 | End: 2021-12-20 | Stop reason: SDUPTHER

## 2021-08-02 RX ORDER — ALBUTEROL SULFATE 90 UG/1
2 AEROSOL, METERED RESPIRATORY (INHALATION) EVERY 6 HOURS PRN
Qty: 18 G | Refills: 3 | Status: SHIPPED | OUTPATIENT
Start: 2021-08-02

## 2021-08-02 RX ORDER — IPRATROPIUM BROMIDE AND ALBUTEROL SULFATE 2.5; .5 MG/3ML; MG/3ML
3 SOLUTION RESPIRATORY (INHALATION)
Qty: 90 ML | Refills: 3 | Status: SHIPPED | OUTPATIENT
Start: 2021-08-02 | End: 2022-08-02

## 2021-08-02 RX ORDER — FLUTICASONE PROPIONATE 50 MCG
1 SPRAY, SUSPENSION (ML) NASAL DAILY
Qty: 16 G | Refills: 3 | Status: SHIPPED | OUTPATIENT
Start: 2021-08-02

## 2021-08-03 ENCOUNTER — TELEPHONE (OUTPATIENT)
Dept: PRIMARY CARE CLINIC | Facility: CLINIC | Age: 81
End: 2021-08-03

## 2021-08-05 ENCOUNTER — PATIENT OUTREACH (OUTPATIENT)
Dept: ADMINISTRATIVE | Facility: OTHER | Age: 81
End: 2021-08-05

## 2021-09-10 ENCOUNTER — TELEPHONE (OUTPATIENT)
Dept: INTERNAL MEDICINE | Facility: CLINIC | Age: 81
End: 2021-09-10

## 2021-09-20 ENCOUNTER — TELEPHONE (OUTPATIENT)
Dept: PRIMARY CARE CLINIC | Facility: CLINIC | Age: 81
End: 2021-09-20

## 2021-10-15 ENCOUNTER — PES CALL (OUTPATIENT)
Dept: ADMINISTRATIVE | Facility: CLINIC | Age: 81
End: 2021-10-15

## 2021-10-18 ENCOUNTER — HOSPITAL ENCOUNTER (EMERGENCY)
Facility: HOSPITAL | Age: 81
Discharge: HOME | End: 2021-10-18
Attending: EMERGENCY MEDICINE
Payer: MEDICARE

## 2021-10-18 VITALS
DIASTOLIC BLOOD PRESSURE: 70 MMHG | TEMPERATURE: 98 F | OXYGEN SATURATION: 95 % | HEART RATE: 96 BPM | RESPIRATION RATE: 16 BRPM | SYSTOLIC BLOOD PRESSURE: 145 MMHG

## 2021-10-18 DIAGNOSIS — M25.562 LEFT KNEE PAIN: Primary | ICD-10-CM

## 2021-10-18 PROCEDURE — 99284 PR EMERGENCY DEPT VISIT,LEVEL IV: ICD-10-PCS | Mod: HCNC,,, | Performed by: PHYSICIAN ASSISTANT

## 2021-10-18 PROCEDURE — 99283 EMERGENCY DEPT VISIT LOW MDM: CPT | Mod: HCNC

## 2021-10-18 PROCEDURE — 99284 EMERGENCY DEPT VISIT MOD MDM: CPT | Mod: 25,HCNC

## 2021-10-18 PROCEDURE — 25000003 PHARM REV CODE 250: Mod: HCNC | Performed by: PHYSICIAN ASSISTANT

## 2021-10-18 PROCEDURE — 99284 EMERGENCY DEPT VISIT MOD MDM: CPT | Mod: HCNC,,, | Performed by: PHYSICIAN ASSISTANT

## 2021-10-18 RX ORDER — DICLOFENAC SODIUM 10 MG/G
4 GEL TOPICAL
Qty: 100 G | Refills: 0 | Status: SHIPPED | OUTPATIENT
Start: 2021-10-18 | End: 2021-12-20 | Stop reason: SDUPTHER

## 2021-10-18 RX ORDER — ACETAMINOPHEN 500 MG
1000 TABLET ORAL EVERY 8 HOURS
Refills: 0 | COMMUNITY
Start: 2021-10-18 | End: 2021-10-25

## 2021-10-18 RX ORDER — NAPROXEN 500 MG/1
500 TABLET ORAL
Status: COMPLETED | OUTPATIENT
Start: 2021-10-18 | End: 2021-10-18

## 2021-10-18 RX ADMIN — NAPROXEN 500 MG: 500 TABLET ORAL at 09:10

## 2021-10-19 ENCOUNTER — TELEPHONE (OUTPATIENT)
Dept: PRIMARY CARE CLINIC | Facility: CLINIC | Age: 81
End: 2021-10-19

## 2021-10-19 DIAGNOSIS — M25.569 KNEE PAIN, UNSPECIFIED CHRONICITY, UNSPECIFIED LATERALITY: ICD-10-CM

## 2021-10-22 ENCOUNTER — PES CALL (OUTPATIENT)
Dept: ADMINISTRATIVE | Facility: CLINIC | Age: 81
End: 2021-10-22

## 2021-10-25 ENCOUNTER — OFFICE VISIT (OUTPATIENT)
Dept: INTERNAL MEDICINE | Facility: CLINIC | Age: 81
End: 2021-10-25
Payer: MEDICARE

## 2021-10-25 ENCOUNTER — LAB VISIT (OUTPATIENT)
Dept: LAB | Facility: HOSPITAL | Age: 81
End: 2021-10-25
Payer: MEDICARE

## 2021-10-25 VITALS
BODY MASS INDEX: 39.7 KG/M2 | SYSTOLIC BLOOD PRESSURE: 132 MMHG | WEIGHT: 203.25 LBS | RESPIRATION RATE: 16 BRPM | OXYGEN SATURATION: 98 % | DIASTOLIC BLOOD PRESSURE: 62 MMHG | HEART RATE: 85 BPM

## 2021-10-25 DIAGNOSIS — I70.0 AORTIC ATHEROSCLEROSIS: ICD-10-CM

## 2021-10-25 DIAGNOSIS — G89.29 CHRONIC BILATERAL LOW BACK PAIN WITH BILATERAL SCIATICA: ICD-10-CM

## 2021-10-25 DIAGNOSIS — Z66 DNR (DO NOT RESUSCITATE): ICD-10-CM

## 2021-10-25 DIAGNOSIS — Z00.00 ENCOUNTER FOR PREVENTIVE HEALTH EXAMINATION: Primary | ICD-10-CM

## 2021-10-25 DIAGNOSIS — R26.9 ABNORMALITY OF GAIT AND MOBILITY: ICD-10-CM

## 2021-10-25 DIAGNOSIS — D69.2 SENILE PURPURA: ICD-10-CM

## 2021-10-25 DIAGNOSIS — F15.982 CAFFEINE-INDUCED SLEEP DISORDER: ICD-10-CM

## 2021-10-25 DIAGNOSIS — I10 ESSENTIAL HYPERTENSION: ICD-10-CM

## 2021-10-25 DIAGNOSIS — Z99.89 DEPENDENCE ON OTHER ENABLING MACHINES AND DEVICES: ICD-10-CM

## 2021-10-25 DIAGNOSIS — R73.03 PRE-DIABETES: ICD-10-CM

## 2021-10-25 DIAGNOSIS — R94.120 ABNORMAL HEARING SCREEN: ICD-10-CM

## 2021-10-25 DIAGNOSIS — G57.93 NEUROPATHY OF BOTH FEET: ICD-10-CM

## 2021-10-25 DIAGNOSIS — E78.2 MIXED HYPERLIPIDEMIA: ICD-10-CM

## 2021-10-25 DIAGNOSIS — M48.10 DISH (DIFFUSE IDIOPATHIC SKELETAL HYPEROSTOSIS): ICD-10-CM

## 2021-10-25 DIAGNOSIS — E21.3 HYPERPARATHYROIDISM: ICD-10-CM

## 2021-10-25 DIAGNOSIS — M54.42 CHRONIC BILATERAL LOW BACK PAIN WITH BILATERAL SCIATICA: ICD-10-CM

## 2021-10-25 DIAGNOSIS — G95.9 CERVICAL MYELOPATHY: ICD-10-CM

## 2021-10-25 DIAGNOSIS — Z78.0 POST-MENOPAUSAL: ICD-10-CM

## 2021-10-25 DIAGNOSIS — E66.09 CLASS 2 OBESITY DUE TO EXCESS CALORIES WITH BODY MASS INDEX (BMI) OF 39.0 TO 39.9 IN ADULT, UNSPECIFIED WHETHER SERIOUS COMORBIDITY PRESENT: ICD-10-CM

## 2021-10-25 DIAGNOSIS — N39.3 STRESS INCONTINENCE OF URINE: ICD-10-CM

## 2021-10-25 DIAGNOSIS — J45.20 MILD INTERMITTENT ASTHMA WITHOUT COMPLICATION: ICD-10-CM

## 2021-10-25 DIAGNOSIS — M54.41 CHRONIC BILATERAL LOW BACK PAIN WITH BILATERAL SCIATICA: ICD-10-CM

## 2021-10-25 DIAGNOSIS — N18.30 STAGE 3 CHRONIC KIDNEY DISEASE, UNSPECIFIED WHETHER STAGE 3A OR 3B CKD: ICD-10-CM

## 2021-10-25 LAB
ESTIMATED AVG GLUCOSE: 120 MG/DL (ref 68–131)
HBA1C MFR BLD: 5.8 % (ref 4–5.6)

## 2021-10-25 PROCEDURE — 1160F RVW MEDS BY RX/DR IN RCRD: CPT | Mod: HCNC,CPTII,S$GLB, | Performed by: NURSE PRACTITIONER

## 2021-10-25 PROCEDURE — 1159F MED LIST DOCD IN RCRD: CPT | Mod: HCNC,CPTII,S$GLB, | Performed by: NURSE PRACTITIONER

## 2021-10-25 PROCEDURE — 1126F AMNT PAIN NOTED NONE PRSNT: CPT | Mod: HCNC,CPTII,S$GLB, | Performed by: NURSE PRACTITIONER

## 2021-10-25 PROCEDURE — 3288F FALL RISK ASSESSMENT DOCD: CPT | Mod: HCNC,CPTII,S$GLB, | Performed by: NURSE PRACTITIONER

## 2021-10-25 PROCEDURE — 99499 UNLISTED E&M SERVICE: CPT | Mod: S$GLB,,, | Performed by: NURSE PRACTITIONER

## 2021-10-25 PROCEDURE — 1157F PR ADVANCE CARE PLAN OR EQUIV PRESENT IN MEDICAL RECORD: ICD-10-PCS | Mod: HCNC,CPTII,S$GLB, | Performed by: NURSE PRACTITIONER

## 2021-10-25 PROCEDURE — 1101F PT FALLS ASSESS-DOCD LE1/YR: CPT | Mod: HCNC,CPTII,S$GLB, | Performed by: NURSE PRACTITIONER

## 2021-10-25 PROCEDURE — 99999 PR PBB SHADOW E&M-EST. PATIENT-LVL III: CPT | Mod: PBBFAC,HCNC,, | Performed by: NURSE PRACTITIONER

## 2021-10-25 PROCEDURE — 3078F PR MOST RECENT DIASTOLIC BLOOD PRESSURE < 80 MM HG: ICD-10-PCS | Mod: HCNC,CPTII,S$GLB, | Performed by: NURSE PRACTITIONER

## 2021-10-25 PROCEDURE — G0439 PR MEDICARE ANNUAL WELLNESS SUBSEQUENT VISIT: ICD-10-PCS | Mod: HCNC,S$GLB,, | Performed by: NURSE PRACTITIONER

## 2021-10-25 PROCEDURE — 99499 RISK ADDL DX/OHS AUDIT: ICD-10-PCS | Mod: S$GLB,,, | Performed by: NURSE PRACTITIONER

## 2021-10-25 PROCEDURE — 3288F PR FALLS RISK ASSESSMENT DOCUMENTED: ICD-10-PCS | Mod: HCNC,CPTII,S$GLB, | Performed by: NURSE PRACTITIONER

## 2021-10-25 PROCEDURE — 1159F PR MEDICATION LIST DOCUMENTED IN MEDICAL RECORD: ICD-10-PCS | Mod: HCNC,CPTII,S$GLB, | Performed by: NURSE PRACTITIONER

## 2021-10-25 PROCEDURE — 83036 HEMOGLOBIN GLYCOSYLATED A1C: CPT | Mod: HCNC | Performed by: NURSE PRACTITIONER

## 2021-10-25 PROCEDURE — 1160F PR REVIEW ALL MEDS BY PRESCRIBER/CLIN PHARMACIST DOCUMENTED: ICD-10-PCS | Mod: HCNC,CPTII,S$GLB, | Performed by: NURSE PRACTITIONER

## 2021-10-25 PROCEDURE — G0439 PPPS, SUBSEQ VISIT: HCPCS | Mod: HCNC,S$GLB,, | Performed by: NURSE PRACTITIONER

## 2021-10-25 PROCEDURE — 99999 PR PBB SHADOW E&M-EST. PATIENT-LVL III: ICD-10-PCS | Mod: PBBFAC,HCNC,, | Performed by: NURSE PRACTITIONER

## 2021-10-25 PROCEDURE — 3075F SYST BP GE 130 - 139MM HG: CPT | Mod: HCNC,CPTII,S$GLB, | Performed by: NURSE PRACTITIONER

## 2021-10-25 PROCEDURE — 3075F PR MOST RECENT SYSTOLIC BLOOD PRESS GE 130-139MM HG: ICD-10-PCS | Mod: HCNC,CPTII,S$GLB, | Performed by: NURSE PRACTITIONER

## 2021-10-25 PROCEDURE — 1170F FXNL STATUS ASSESSED: CPT | Mod: HCNC,CPTII,S$GLB, | Performed by: NURSE PRACTITIONER

## 2021-10-25 PROCEDURE — 1126F PR PAIN SEVERITY QUANTIFIED, NO PAIN PRESENT: ICD-10-PCS | Mod: HCNC,CPTII,S$GLB, | Performed by: NURSE PRACTITIONER

## 2021-10-25 PROCEDURE — 3078F DIAST BP <80 MM HG: CPT | Mod: HCNC,CPTII,S$GLB, | Performed by: NURSE PRACTITIONER

## 2021-10-25 PROCEDURE — 1157F ADVNC CARE PLAN IN RCRD: CPT | Mod: HCNC,CPTII,S$GLB, | Performed by: NURSE PRACTITIONER

## 2021-10-25 PROCEDURE — 1101F PR PT FALLS ASSESS DOC 0-1 FALLS W/OUT INJ PAST YR: ICD-10-PCS | Mod: HCNC,CPTII,S$GLB, | Performed by: NURSE PRACTITIONER

## 2021-10-25 PROCEDURE — 36415 COLL VENOUS BLD VENIPUNCTURE: CPT | Mod: HCNC | Performed by: NURSE PRACTITIONER

## 2021-10-25 PROCEDURE — 1170F PR FUNCTIONAL STATUS ASSESSED: ICD-10-PCS | Mod: HCNC,CPTII,S$GLB, | Performed by: NURSE PRACTITIONER

## 2021-10-26 ENCOUNTER — TELEPHONE (OUTPATIENT)
Dept: PRIMARY CARE CLINIC | Facility: CLINIC | Age: 81
End: 2021-10-26
Payer: MEDICARE

## 2021-12-20 ENCOUNTER — OFFICE VISIT (OUTPATIENT)
Dept: PRIMARY CARE CLINIC | Facility: CLINIC | Age: 81
End: 2021-12-20
Payer: MEDICARE

## 2021-12-20 ENCOUNTER — IMMUNIZATION (OUTPATIENT)
Dept: INTERNAL MEDICINE | Facility: CLINIC | Age: 81
End: 2021-12-20
Payer: MEDICARE

## 2021-12-20 VITALS
BODY MASS INDEX: 39.51 KG/M2 | TEMPERATURE: 99 F | OXYGEN SATURATION: 99 % | HEART RATE: 102 BPM | WEIGHT: 201.25 LBS | SYSTOLIC BLOOD PRESSURE: 138 MMHG | DIASTOLIC BLOOD PRESSURE: 82 MMHG | HEIGHT: 60 IN

## 2021-12-20 DIAGNOSIS — N81.6 RECTOCELE: ICD-10-CM

## 2021-12-20 DIAGNOSIS — E11.22 TYPE 2 DIABETES MELLITUS WITH STAGE 3 CHRONIC KIDNEY DISEASE, WITHOUT LONG-TERM CURRENT USE OF INSULIN, UNSPECIFIED WHETHER STAGE 3A OR 3B CKD: Primary | ICD-10-CM

## 2021-12-20 DIAGNOSIS — M48.10 DISH (DIFFUSE IDIOPATHIC SKELETAL HYPEROSTOSIS): ICD-10-CM

## 2021-12-20 DIAGNOSIS — M25.569 KNEE PAIN, UNSPECIFIED CHRONICITY, UNSPECIFIED LATERALITY: ICD-10-CM

## 2021-12-20 DIAGNOSIS — K21.9 GASTROESOPHAGEAL REFLUX DISEASE WITHOUT ESOPHAGITIS: ICD-10-CM

## 2021-12-20 DIAGNOSIS — N18.30 TYPE 2 DIABETES MELLITUS WITH STAGE 3 CHRONIC KIDNEY DISEASE, WITHOUT LONG-TERM CURRENT USE OF INSULIN, UNSPECIFIED WHETHER STAGE 3A OR 3B CKD: Primary | ICD-10-CM

## 2021-12-20 LAB
ALBUMIN/CREAT UR: 24.6 UG/MG (ref 0–30)
CREAT UR-MCNC: 126 MG/DL (ref 15–325)
GLUCOSE SERPL-MCNC: 97 MG/DL (ref 70–110)
MICROALBUMIN UR DL<=1MG/L-MCNC: 31 UG/ML

## 2021-12-20 PROCEDURE — 3075F SYST BP GE 130 - 139MM HG: CPT | Mod: HCNC,CPTII,S$GLB, | Performed by: INTERNAL MEDICINE

## 2021-12-20 PROCEDURE — 1101F PT FALLS ASSESS-DOCD LE1/YR: CPT | Mod: HCNC,CPTII,S$GLB, | Performed by: INTERNAL MEDICINE

## 2021-12-20 PROCEDURE — 1157F ADVNC CARE PLAN IN RCRD: CPT | Mod: HCNC,CPTII,S$GLB, | Performed by: INTERNAL MEDICINE

## 2021-12-20 PROCEDURE — 1157F PR ADVANCE CARE PLAN OR EQUIV PRESENT IN MEDICAL RECORD: ICD-10-PCS | Mod: HCNC,CPTII,S$GLB, | Performed by: INTERNAL MEDICINE

## 2021-12-20 PROCEDURE — 90694 VACC AIIV4 NO PRSRV 0.5ML IM: CPT | Mod: HCNC,S$GLB,, | Performed by: INTERNAL MEDICINE

## 2021-12-20 PROCEDURE — 1101F PR PT FALLS ASSESS DOC 0-1 FALLS W/OUT INJ PAST YR: ICD-10-PCS | Mod: HCNC,CPTII,S$GLB, | Performed by: INTERNAL MEDICINE

## 2021-12-20 PROCEDURE — 3288F FALL RISK ASSESSMENT DOCD: CPT | Mod: HCNC,CPTII,S$GLB, | Performed by: INTERNAL MEDICINE

## 2021-12-20 PROCEDURE — 82962 GLUCOSE BLOOD TEST: CPT | Mod: HCNC,,, | Performed by: INTERNAL MEDICINE

## 2021-12-20 PROCEDURE — 99215 OFFICE O/P EST HI 40 MIN: CPT | Mod: HCNC,25,S$GLB, | Performed by: INTERNAL MEDICINE

## 2021-12-20 PROCEDURE — 3075F PR MOST RECENT SYSTOLIC BLOOD PRESS GE 130-139MM HG: ICD-10-PCS | Mod: HCNC,CPTII,S$GLB, | Performed by: INTERNAL MEDICINE

## 2021-12-20 PROCEDURE — 1159F PR MEDICATION LIST DOCUMENTED IN MEDICAL RECORD: ICD-10-PCS | Mod: HCNC,CPTII,S$GLB, | Performed by: INTERNAL MEDICINE

## 2021-12-20 PROCEDURE — G0008 ADMIN INFLUENZA VIRUS VAC: HCPCS | Mod: HCNC,S$GLB,, | Performed by: INTERNAL MEDICINE

## 2021-12-20 PROCEDURE — 90694 FLU VACCINE - QUADRIVALENT - ADJUVANTED: ICD-10-PCS | Mod: HCNC,S$GLB,, | Performed by: INTERNAL MEDICINE

## 2021-12-20 PROCEDURE — 82962 POCT GLUCOSE, HAND-HELD DEVICE: ICD-10-PCS | Mod: HCNC,,, | Performed by: INTERNAL MEDICINE

## 2021-12-20 PROCEDURE — 1126F PR PAIN SEVERITY QUANTIFIED, NO PAIN PRESENT: ICD-10-PCS | Mod: HCNC,CPTII,S$GLB, | Performed by: INTERNAL MEDICINE

## 2021-12-20 PROCEDURE — 3079F PR MOST RECENT DIASTOLIC BLOOD PRESSURE 80-89 MM HG: ICD-10-PCS | Mod: HCNC,CPTII,S$GLB, | Performed by: INTERNAL MEDICINE

## 2021-12-20 PROCEDURE — G0008 FLU VACCINE - QUADRIVALENT - ADJUVANTED: ICD-10-PCS | Mod: HCNC,S$GLB,, | Performed by: INTERNAL MEDICINE

## 2021-12-20 PROCEDURE — 3079F DIAST BP 80-89 MM HG: CPT | Mod: HCNC,CPTII,S$GLB, | Performed by: INTERNAL MEDICINE

## 2021-12-20 PROCEDURE — 82570 ASSAY OF URINE CREATININE: CPT | Mod: HCNC | Performed by: INTERNAL MEDICINE

## 2021-12-20 PROCEDURE — 99215 PR OFFICE/OUTPT VISIT, EST, LEVL V, 40-54 MIN: ICD-10-PCS | Mod: HCNC,25,S$GLB, | Performed by: INTERNAL MEDICINE

## 2021-12-20 PROCEDURE — 1159F MED LIST DOCD IN RCRD: CPT | Mod: HCNC,CPTII,S$GLB, | Performed by: INTERNAL MEDICINE

## 2021-12-20 PROCEDURE — 3288F PR FALLS RISK ASSESSMENT DOCUMENTED: ICD-10-PCS | Mod: HCNC,CPTII,S$GLB, | Performed by: INTERNAL MEDICINE

## 2021-12-20 PROCEDURE — 1126F AMNT PAIN NOTED NONE PRSNT: CPT | Mod: HCNC,CPTII,S$GLB, | Performed by: INTERNAL MEDICINE

## 2021-12-20 RX ORDER — DICLOFENAC SODIUM 10 MG/G
4 GEL TOPICAL 4 TIMES DAILY
Qty: 450 G | Refills: 3 | Status: SHIPPED | OUTPATIENT
Start: 2021-12-20

## 2021-12-20 RX ORDER — CIMETIDINE 400 MG/1
400 TABLET, FILM COATED ORAL 2 TIMES DAILY
Qty: 180 TABLET | Refills: 3 | Status: SHIPPED | OUTPATIENT
Start: 2021-12-20 | End: 2022-06-20 | Stop reason: SDUPTHER

## 2021-12-20 RX ORDER — ACETAMINOPHEN 500 MG
1000 TABLET ORAL EVERY 8 HOURS PRN
Qty: 300 TABLET | Refills: 3 | Status: SHIPPED | OUTPATIENT
Start: 2021-12-20

## 2021-12-30 ENCOUNTER — TELEPHONE (OUTPATIENT)
Dept: PRIMARY CARE CLINIC | Facility: CLINIC | Age: 81
End: 2021-12-30
Payer: MEDICARE

## 2022-01-03 NOTE — ASSESSMENT & PLAN NOTE
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

## 2022-01-04 ENCOUNTER — PATIENT OUTREACH (OUTPATIENT)
Dept: ADMINISTRATIVE | Facility: OTHER | Age: 82
End: 2022-01-04
Payer: MEDICARE

## 2022-01-05 ENCOUNTER — OFFICE VISIT (OUTPATIENT)
Dept: UROGYNECOLOGY | Facility: CLINIC | Age: 82
End: 2022-01-05
Payer: MEDICARE

## 2022-01-05 VITALS
BODY MASS INDEX: 39.79 KG/M2 | WEIGHT: 202.69 LBS | DIASTOLIC BLOOD PRESSURE: 94 MMHG | HEART RATE: 113 BPM | HEIGHT: 60 IN | SYSTOLIC BLOOD PRESSURE: 201 MMHG

## 2022-01-05 DIAGNOSIS — N39.46 MIXED INCONTINENCE URGE AND STRESS: Primary | ICD-10-CM

## 2022-01-05 DIAGNOSIS — N95.2 VAGINAL ATROPHY: ICD-10-CM

## 2022-01-05 PROCEDURE — 3080F PR MOST RECENT DIASTOLIC BLOOD PRESSURE >= 90 MM HG: ICD-10-PCS | Mod: CPTII,S$GLB,, | Performed by: OBSTETRICS & GYNECOLOGY

## 2022-01-05 PROCEDURE — 3077F PR MOST RECENT SYSTOLIC BLOOD PRESSURE >= 140 MM HG: ICD-10-PCS | Mod: CPTII,S$GLB,, | Performed by: OBSTETRICS & GYNECOLOGY

## 2022-01-05 PROCEDURE — 87086 URINE CULTURE/COLONY COUNT: CPT | Mod: HCNC | Performed by: OBSTETRICS & GYNECOLOGY

## 2022-01-05 PROCEDURE — 99999 PR PBB SHADOW E&M-EST. PATIENT-LVL V: ICD-10-PCS | Mod: PBBFAC,,, | Performed by: OBSTETRICS & GYNECOLOGY

## 2022-01-05 PROCEDURE — 3077F SYST BP >= 140 MM HG: CPT | Mod: CPTII,S$GLB,, | Performed by: OBSTETRICS & GYNECOLOGY

## 2022-01-05 PROCEDURE — 1157F ADVNC CARE PLAN IN RCRD: CPT | Mod: CPTII,S$GLB,, | Performed by: OBSTETRICS & GYNECOLOGY

## 2022-01-05 PROCEDURE — 99999 PR PBB SHADOW E&M-EST. PATIENT-LVL V: CPT | Mod: PBBFAC,,, | Performed by: OBSTETRICS & GYNECOLOGY

## 2022-01-05 PROCEDURE — 1126F AMNT PAIN NOTED NONE PRSNT: CPT | Mod: CPTII,S$GLB,, | Performed by: OBSTETRICS & GYNECOLOGY

## 2022-01-05 PROCEDURE — 1160F RVW MEDS BY RX/DR IN RCRD: CPT | Mod: CPTII,S$GLB,, | Performed by: OBSTETRICS & GYNECOLOGY

## 2022-01-05 PROCEDURE — 1126F PR PAIN SEVERITY QUANTIFIED, NO PAIN PRESENT: ICD-10-PCS | Mod: CPTII,S$GLB,, | Performed by: OBSTETRICS & GYNECOLOGY

## 2022-01-05 PROCEDURE — 1157F PR ADVANCE CARE PLAN OR EQUIV PRESENT IN MEDICAL RECORD: ICD-10-PCS | Mod: CPTII,S$GLB,, | Performed by: OBSTETRICS & GYNECOLOGY

## 2022-01-05 PROCEDURE — 3288F FALL RISK ASSESSMENT DOCD: CPT | Mod: CPTII,S$GLB,, | Performed by: OBSTETRICS & GYNECOLOGY

## 2022-01-05 PROCEDURE — 99204 PR OFFICE/OUTPT VISIT, NEW, LEVL IV, 45-59 MIN: ICD-10-PCS | Mod: S$GLB,,, | Performed by: OBSTETRICS & GYNECOLOGY

## 2022-01-05 PROCEDURE — 1101F PT FALLS ASSESS-DOCD LE1/YR: CPT | Mod: CPTII,S$GLB,, | Performed by: OBSTETRICS & GYNECOLOGY

## 2022-01-05 PROCEDURE — 99204 OFFICE O/P NEW MOD 45 MIN: CPT | Mod: S$GLB,,, | Performed by: OBSTETRICS & GYNECOLOGY

## 2022-01-05 PROCEDURE — 1159F PR MEDICATION LIST DOCUMENTED IN MEDICAL RECORD: ICD-10-PCS | Mod: CPTII,S$GLB,, | Performed by: OBSTETRICS & GYNECOLOGY

## 2022-01-05 PROCEDURE — 1159F MED LIST DOCD IN RCRD: CPT | Mod: CPTII,S$GLB,, | Performed by: OBSTETRICS & GYNECOLOGY

## 2022-01-05 PROCEDURE — 3288F PR FALLS RISK ASSESSMENT DOCUMENTED: ICD-10-PCS | Mod: CPTII,S$GLB,, | Performed by: OBSTETRICS & GYNECOLOGY

## 2022-01-05 PROCEDURE — 1101F PR PT FALLS ASSESS DOC 0-1 FALLS W/OUT INJ PAST YR: ICD-10-PCS | Mod: CPTII,S$GLB,, | Performed by: OBSTETRICS & GYNECOLOGY

## 2022-01-05 PROCEDURE — 3080F DIAST BP >= 90 MM HG: CPT | Mod: CPTII,S$GLB,, | Performed by: OBSTETRICS & GYNECOLOGY

## 2022-01-05 PROCEDURE — 1160F PR REVIEW ALL MEDS BY PRESCRIBER/CLIN PHARMACIST DOCUMENTED: ICD-10-PCS | Mod: CPTII,S$GLB,, | Performed by: OBSTETRICS & GYNECOLOGY

## 2022-01-05 PROCEDURE — 3072F PR LOW RISK FOR RETINOPATHY: ICD-10-PCS | Mod: CPTII,S$GLB,, | Performed by: OBSTETRICS & GYNECOLOGY

## 2022-01-05 PROCEDURE — 3072F LOW RISK FOR RETINOPATHY: CPT | Mod: CPTII,S$GLB,, | Performed by: OBSTETRICS & GYNECOLOGY

## 2022-01-05 RX ORDER — SOLIFENACIN SUCCINATE 10 MG/1
5 TABLET, FILM COATED ORAL DAILY
Qty: 30 TABLET | Refills: 11 | Status: SHIPPED | OUTPATIENT
Start: 2022-01-05 | End: 2022-06-20 | Stop reason: SDUPTHER

## 2022-01-05 RX ORDER — ESTRADIOL 0.1 MG/G
0.5 CREAM VAGINAL
Qty: 45 G | Refills: 4 | Status: SHIPPED | OUTPATIENT
Start: 2022-01-06

## 2022-01-05 NOTE — PROGRESS NOTES
Subjective:       Patient ID: Kelsey Fields is a 81 y.o. female.    Chief Complaint:  Consult and Rectocele      History of Present Illness  HPI 81 Y O F  has a past medical history of Arthritis, Degenerative arthritis of lumbar spine, Episcleritis of right eye, GERD (gastroesophageal reflux disease), Hypertension, Shingles, Sleep apnea, and Type 2 diabetes mellitus with stage 3 chronic kidney disease, without long-term current use of insulin (6/5/2020).  Referred for evaluation of urinary incontinence.     [facility]    Question 1/5/2022  2:40 PM CST - Filed by Hayley Smith MA   General Urogynecology: Are you experiencing the following?    Dysuria (painful urination) No   Nocturia:  waking up at night to empty your bladder  Yes   If you answered yes to the previous question, how many times does this happen per night? 1-2   Enuresis (urine loss during sleep) Yes   Dribbling urine after you urinate Yes   Hematuria (urine appears red) No   Type of stream Weak   Urinary frequency: How often a day are you going to the bathroom per day?  Less than 10   Urinary Tract Infections: How many Urinary Tract Infections have you had in the past year? I have not had a UTI in the past year   If you have had a UTI in the past year, what treatments have you had so far?  I have not had a UTI in the past year   Urinary Incontinence (General): Are you experiencing the following?    Past consultation for incontinence: Have you ever seen someone for the evaluation of incontinence? No   If you answered yes to the previous question, please select all the therapies you have tried.  N/a- I answered no to the previous question   Please note the effectiveness of the therapies.    Need to wear protection to keep clothes dry  Yes   If you answered yes to the previous question, please paul the protection you use.  Diaper   If you wear protection, how much wetness is typically on each pad? Moderate   If you wear protection, how  "often do you have to change per day, if applicable?     Stress Symptoms: Are you experiencing the following?    Leakage of urine with cough, laugh and/or sneeze Yes   If you answered yes to the previous question, what is the frequency in days, weeks and/or months? Daily   Leakage of urine with sex No   Leakage of urine with bending/ lifting No   Leakage of urine with briskly walking or jogging No   If you lose urine for any other reason not previously mentioned, please note it below, if applicable.     Urge Symptoms: Are you experiencing the following?    Urgency ("got to go" feeling) Yes   Urge: How frequently do you feel an urge to urinate (feeling like you "gotta go" to the bathroom and can't wait) Daily   Do you experience a leakage of urine when you have a feeling of urgency?  Yes   Leakage of urine when unaware Yes   Past use of anticholinergics (medications used to treat overactive bladder) No   If you answered yes to the previous question, please paul the anticholinergics you have used:     Have you ever used Mirbetriq (aka Mirabegron)?  No   Prolapse Symptoms: Are you experiencing any of the following?     Falling out/ Bulging/ Heaviness in the vagina No   Vaginal/ Abdominal Pain/ Pressure No   Need to strain/ Push to void No   Need to wait on the toilet before you void No   Unusual position to urinate (using your hands to push back the vaginal bulge) No   Sensation of incomplete emptying No   Past use of pessary device No   If you answered yes to the previous question, please list the devices you have used below.     Bowel Symptoms: Are you experiencing any of the following?    Constipation Yes   Diarrhea  No   Hematochezia (bloody stool) No   Incomplete evacuation of stool No   Involuntary loss of formed stool No   Fecal smearing/urgency No   Involuntary loss of gas No   Vaginal Symptoms: Are you experiencing any of the following?     Abnormal vaginal bleeding  No   Vaginal dryness No   Sexually active  " No   Dyspareunia (painful intercourse) No   Estrogen use  No       GYN & OB History  No LMP recorded. Patient is postmenopausal.   Date of Last Pap: No result found    OB History   No obstetric history on file.       Review of Systems  Review of Systems   Constitutional: Negative.  Negative for activity change, appetite change, chills, diaphoresis, fatigue, fever and unexpected weight change.   HENT: Negative.    Eyes: Negative.    Respiratory: Negative.  Negative for cough.    Cardiovascular: Negative.    Gastrointestinal: Positive for constipation. Negative for abdominal distention, abdominal pain, anal bleeding, blood in stool, diarrhea, nausea, rectal pain and vomiting.   Endocrine: Negative.    Genitourinary: Negative for decreased urine volume, difficulty urinating, dyspareunia, dysuria, enuresis, flank pain, frequency, genital sores, hematuria, menstrual problem, pelvic pain, urgency, vaginal bleeding, vaginal discharge and vaginal pain.        Prolapse  + vaginal bulge   +vaginal pressure    Musculoskeletal: Negative for back pain.   Skin: Negative for color change, pallor, rash and wound.   Allergic/Immunologic: Negative for environmental allergies, food allergies and immunocompromised state.   Hematological: Negative for adenopathy. Does not bruise/bleed easily.   Psychiatric/Behavioral: Negative for agitation, behavioral problems, confusion and sleep disturbance.           Objective:     Physical Exam   Constitutional: She is oriented to person, place, and time. She appears well-developed.   HENT:   Head: Normocephalic and atraumatic.   Eyes: Conjunctivae and EOM are normal.   Cardiovascular: Normal rate, regular rhythm, S1 normal, S2 normal, normal heart sounds and intact distal pulses.   Pulmonary/Chest: Effort normal and breath sounds normal. She exhibits no tenderness.   Abdominal: Soft. Bowel sounds are normal. She exhibits no distension and no mass. There is no hepatosplenomegaly, splenomegaly or  hepatomegaly. There is no abdominal tenderness. There is no rigidity, no rebound, no guarding and no CVA tenderness. No hernia.   Genitourinary: Pelvic exam was performed with patient supine. Rectum normal, vagina normal, uterus normal, cervix normal, skenes normal and bartholins normal. Right labia normal and left labia normal. Urethra exhibits hypermobility. Urethra exhibits no urethral caruncle, no urethral diverticulum and no urethral mass. Right bartholin is not enlarged and not tender. Left bartholin is not enlarged and not tender. Rectal exam shows resting tone normal and active tone normal. Rectal exam shows no external hemorrhoid, no fissure, no tenderness, anal tone normal and no dovetailing. Guaiac negative stool. There is atrophy in the vagina. No foreign body, tenderness, bleeding, unspecified prolapse of vaginal walls, fistula, mesh exposure or lavator tenderness in the vagina. Right adnexum displays no mass and no tenderness. Left adnexum displays no mass and no tenderness. Cervix exhibits no motion tenderness and no discharge. Uterus is not tender and not experiencing uterine prolapse.   PVR: 10 ML  Empty cough stress test: Negative.  Kegel: 3/5    POP-Q  Aa: -2 Ba: -2 C: -5   GH: 3 PB: 2 TVL: 8   Ap: -2 Bp: -2 D: -6                     Musculoskeletal:         General: Normal range of motion.      Cervical back: Normal range of motion and neck supple.   Lymphadenopathy:     She has no axillary adenopathy.        Right: No inguinal adenopathy present.        Left: No inguinal adenopathy present.   Neurological: She is alert and oriented to person, place, and time. She has normal strength and normal reflexes. Cranial nerves II through XII intact. No cranial nerve deficit.   Skin: Skin is warm, dry and intact.   Psychiatric: She has a normal mood and affect. Her speech is normal and behavior is normal. Judgment normal. Cognition and memory are normal.             HCM  Pap's:None recently   Mammo: not  done   Colonoscopy: normal more than 10 years ago   Upper endoscopy : 2014 Food in the upper third of the esophagus. Removal                         was successful.                         - Hiatus hernia.                         - Benign-appearing esophageal stricture.                         - Gastritis.                         - Normal examined duodenum.   Dexa: normal 2019        Assessment:        1. Mixed incontinence urge and stress    2. Vaginal atrophy               Plan:      1.  Mixed urinary incontinence, urge ? stress:   --Bladder diary for 3-7 days, write the number of times you go to the rest room and associated symptoms of urgency or leakage.   --Empty bladder every 3 hours.  Empty well: wait a minute, lean forward on toilet.    --Avoid dietary irritants (see sheet).  Keep diary x 3-5 days to determine your irritants.  --KEGELS: do 10 in AM and 10 in PM, holding each x 10 seconds.  When you feel urge to go, STOP, KEGEL, and when urge has passed, then go to bathroom.  Start pelvic PT floor PT     --URGE:  Vesicare 5 mg nightly.  SE profile reviewed.   Takes 2-4 weeks to see if will have effect.  For dry mouth: get sour, sugar free lozenge or gum.    --STRESS:  Pessary vs. Sling.     2.  Vaginal atrophy (dryness):  Use 1 gram of estrogen cream in vagina nightly x 2 weeks, then twice a week thereafter.       Approximately 50  minutes of total time spent in consult, 75 % in discussion. This includes face to face time and non-face to face time preparing to see the patient, obtaining and/or reviewing separately obtained history, documenting clinical information in the electronic or other health record, independently interpreting results (not separately reported) and communicating results to the patient/family/caregiver, or care coordination (not separately reported).        Thank you for requesting consultation of your patient.  I look forward to participating in her care.    Dinh Haynes  DO  Female Pelvic Medicine and Reconstructive Surgery  [facility] [place], [place]

## 2022-01-05 NOTE — PATIENT INSTRUCTIONS
1.  Mixed urinary incontinence, urge ? stress:   --Bladder diary for 3-7 days, write the number of times you go to the rest room and associated symptoms of urgency or leakage.   --Empty bladder every 3 hours.  Empty well: wait a minute, lean forward on toilet.    --Avoid dietary irritants (see sheet).  Keep diary x 3-5 days to determine your irritants.  --KEGELS: do 10 in AM and 10 in PM, holding each x 10 seconds.  When you feel urge to go, STOP, KEGEL, and when urge has passed, then go to bathroom.  Start pelvic PT floor PT     --URGE:  Vesicare 5 mg nightly.  SE profile reviewed.   Takes 2-4 weeks to see if will have effect.  For dry mouth: get sour, sugar free lozenge or gum.    --STRESS:  Pessary vs. Sling.     2.  Vaginal atrophy (dryness):  Use 1 gram of estrogen cream in vagina nightly x 2 weeks, then twice a week thereafter.

## 2022-01-06 LAB — BACTERIA UR CULT: NO GROWTH

## 2022-01-07 ENCOUNTER — PATIENT MESSAGE (OUTPATIENT)
Dept: UROGYNECOLOGY | Facility: CLINIC | Age: 82
End: 2022-01-07
Payer: MEDICARE

## 2022-01-07 ENCOUNTER — TELEPHONE (OUTPATIENT)
Dept: PRIMARY CARE CLINIC | Facility: CLINIC | Age: 82
End: 2022-01-07
Payer: MEDICARE

## 2022-01-07 NOTE — TELEPHONE ENCOUNTER
Please check on her at the end of the day to make sure her pressures remain stable.    She may have eaten a salty meal, which would cause her BP to spike. She needs to avoid salty meals.    Thanks,  KARLY

## 2022-01-07 NOTE — TELEPHONE ENCOUNTER
Spoke with Ms Cole, her latest b/p was 139/82. She stated, as before, that she has been taking her medications. She verbalized understanding that eating salty meals will cause her blood pressure to increase.  She did not have any other questions at this time.

## 2022-01-07 NOTE — TELEPHONE ENCOUNTER
Returned patient call.1/7/22. Patient stated that her last b/p was 140/76, and that she had taken all of her morning meds.  Stated she was feeling fine.   Advised patient to continue monitoring her blood pressures, and to call if it goes back up, or if she starts feeling unwell.     Looked over urology note, b/p was documented at 201/94, with a pulse of 113, which was consistent with what patient reported.

## 2022-01-07 NOTE — TELEPHONE ENCOUNTER
----- Message from Macey Guerrier sent at 1/6/2022  9:51 AM CST -----  Contact: Self [phone]  Patient would like to get medical advice.    Would you like a call back, or a response through your [facility] portal?:   call back    Pharmacy name and phone # (copy from chart):      [id number] - [place], [place] - [address]  [address]  [place] [address]  Phone: [phone] Fax: [phone]    Comments:   Calling to speak with the nurse regarding advice and if able to perform yard work. Pt states at urology appt on yesterday, blood pressure was 201/97. Pt states she feels overall she feels fine.

## 2022-01-07 NOTE — TELEPHONE ENCOUNTER
Spoke with pt, she stated her bp was real high yesterday, pt will check bp and will call pt back to see what her blood pressure is today.

## 2022-01-13 ENCOUNTER — TELEPHONE (OUTPATIENT)
Dept: PRIMARY CARE CLINIC | Facility: CLINIC | Age: 82
End: 2022-01-13
Payer: MEDICARE

## 2022-01-13 DIAGNOSIS — E11.22 TYPE 2 DIABETES MELLITUS WITH STAGE 3 CHRONIC KIDNEY DISEASE, WITHOUT LONG-TERM CURRENT USE OF INSULIN, UNSPECIFIED WHETHER STAGE 3A OR 3B CKD: Primary | ICD-10-CM

## 2022-01-13 DIAGNOSIS — N18.30 TYPE 2 DIABETES MELLITUS WITH STAGE 3 CHRONIC KIDNEY DISEASE, WITHOUT LONG-TERM CURRENT USE OF INSULIN, UNSPECIFIED WHETHER STAGE 3A OR 3B CKD: Primary | ICD-10-CM

## 2022-01-13 NOTE — TELEPHONE ENCOUNTER
Called and spoke to patient about diabetic shoes.  Patient stated that she cancelled the need and order for the diabetic shoes.    Stated that she could not find store and that daughter is unable to bring her

## 2022-02-03 PROBLEM — M62.89 PELVIC FLOOR DYSFUNCTION: Status: ACTIVE | Noted: 2022-02-03

## 2022-02-04 ENCOUNTER — TELEPHONE (OUTPATIENT)
Dept: PRIMARY CARE CLINIC | Facility: CLINIC | Age: 82
End: 2022-02-04
Payer: MEDICARE

## 2022-02-04 NOTE — TELEPHONE ENCOUNTER
Unable to find store for diabetic shoes, in [facility] . That takes [employer]..      Called patient and made aware.  Patient asked that I call her back because patel is driving.  Will call back

## 2022-02-04 NOTE — TELEPHONE ENCOUNTER
Called patient back .  Spoke with patient.  Stated that she would be willing to go to [facility].  Patient given address.  Told patient that we would call her back with follow up/upsate.  Verbalized understanding.

## 2022-02-04 NOTE — TELEPHONE ENCOUNTER
Called  And spoke to Amy, at [facility].      Order is good for 6 months.  Order was written for  8/2/2021.  Will need new order written for diabetic shoes

## 2022-02-04 NOTE — TELEPHONE ENCOUNTER
Call patient back and spoke to her about getting diabetic shoe.  Patient said that if we could find a place in [place], that she would consider it, but not [place] or [facility].  Will follow up.

## 2022-02-07 ENCOUNTER — TELEPHONE (OUTPATIENT)
Dept: PRIMARY CARE CLINIC | Facility: CLINIC | Age: 82
End: 2022-02-07
Payer: MEDICARE

## 2022-02-07 DIAGNOSIS — N18.30 TYPE 2 DIABETES MELLITUS WITH STAGE 3 CHRONIC KIDNEY DISEASE, WITHOUT LONG-TERM CURRENT USE OF INSULIN, UNSPECIFIED WHETHER STAGE 3A OR 3B CKD: Primary | ICD-10-CM

## 2022-02-07 DIAGNOSIS — E11.22 TYPE 2 DIABETES MELLITUS WITH STAGE 3 CHRONIC KIDNEY DISEASE, WITHOUT LONG-TERM CURRENT USE OF INSULIN, UNSPECIFIED WHETHER STAGE 3A OR 3B CKD: Primary | ICD-10-CM

## 2022-02-07 NOTE — TELEPHONE ENCOUNTER
Call made to patient to schedule appointment for foot exam.  No answer to phone.  Unable to leave message.  Will follow up

## 2022-02-08 NOTE — TELEPHONE ENCOUNTER
You can also get her an appointment with podiatry if she prefers that.  Either Podiatry or I could do a full diabetic foot exam.  I placed a referral to Podiatry in the event that that these are out.

## 2022-02-11 ENCOUNTER — TELEPHONE (OUTPATIENT)
Dept: PRIMARY CARE CLINIC | Facility: CLINIC | Age: 82
End: 2022-02-11

## 2022-02-11 NOTE — TELEPHONE ENCOUNTER
Appointment made with podiatry, by Estela Dave RN.  Appointmeny is 2/15/2022 at 10:00Am.      Patient notified of podiatry appointment.  Given date and time of appointment.  Also address and phone number of office.  Verbalized understanding

## 2022-02-15 ENCOUNTER — OFFICE VISIT (OUTPATIENT)
Dept: PODIATRY | Facility: CLINIC | Age: 82
End: 2022-02-15
Payer: MEDICARE

## 2022-02-15 ENCOUNTER — TELEPHONE (OUTPATIENT)
Dept: PRIMARY CARE CLINIC | Facility: CLINIC | Age: 82
End: 2022-02-15
Payer: MEDICARE

## 2022-02-15 VITALS
HEART RATE: 96 BPM | SYSTOLIC BLOOD PRESSURE: 145 MMHG | HEIGHT: 60 IN | WEIGHT: 202 LBS | BODY MASS INDEX: 39.66 KG/M2 | DIASTOLIC BLOOD PRESSURE: 66 MMHG

## 2022-02-15 DIAGNOSIS — E11.51 DIABETES MELLITUS WITH PERIPHERAL VASCULAR DISEASE: Primary | ICD-10-CM

## 2022-02-15 DIAGNOSIS — M20.12 VALGUS DEFORMITY OF BOTH GREAT TOES: ICD-10-CM

## 2022-02-15 DIAGNOSIS — B35.1 ONYCHOMYCOSIS DUE TO DERMATOPHYTE: ICD-10-CM

## 2022-02-15 DIAGNOSIS — E11.22 TYPE 2 DIABETES MELLITUS WITH STAGE 3 CHRONIC KIDNEY DISEASE, WITHOUT LONG-TERM CURRENT USE OF INSULIN, UNSPECIFIED WHETHER STAGE 3A OR 3B CKD: ICD-10-CM

## 2022-02-15 DIAGNOSIS — N18.30 TYPE 2 DIABETES MELLITUS WITH STAGE 3 CHRONIC KIDNEY DISEASE, WITHOUT LONG-TERM CURRENT USE OF INSULIN, UNSPECIFIED WHETHER STAGE 3A OR 3B CKD: ICD-10-CM

## 2022-02-15 DIAGNOSIS — M20.11 VALGUS DEFORMITY OF BOTH GREAT TOES: ICD-10-CM

## 2022-02-15 DIAGNOSIS — L84 CORN OR CALLUS: ICD-10-CM

## 2022-02-15 PROCEDURE — 1157F PR ADVANCE CARE PLAN OR EQUIV PRESENT IN MEDICAL RECORD: ICD-10-PCS | Mod: CPTII,S$GLB,, | Performed by: PODIATRIST

## 2022-02-15 PROCEDURE — 3078F PR MOST RECENT DIASTOLIC BLOOD PRESSURE < 80 MM HG: ICD-10-PCS | Mod: CPTII,S$GLB,, | Performed by: PODIATRIST

## 2022-02-15 PROCEDURE — 1126F PR PAIN SEVERITY QUANTIFIED, NO PAIN PRESENT: ICD-10-PCS | Mod: CPTII,S$GLB,, | Performed by: PODIATRIST

## 2022-02-15 PROCEDURE — 11721 DEBRIDE NAIL 6 OR MORE: CPT | Mod: Q9,S$GLB,, | Performed by: PODIATRIST

## 2022-02-15 PROCEDURE — 3072F PR LOW RISK FOR RETINOPATHY: ICD-10-PCS | Mod: CPTII,S$GLB,, | Performed by: PODIATRIST

## 2022-02-15 PROCEDURE — 1101F PT FALLS ASSESS-DOCD LE1/YR: CPT | Mod: CPTII,S$GLB,, | Performed by: PODIATRIST

## 2022-02-15 PROCEDURE — 99999 PR PBB SHADOW E&M-EST. PATIENT-LVL IV: CPT | Mod: PBBFAC,,, | Performed by: PODIATRIST

## 2022-02-15 PROCEDURE — 3288F PR FALLS RISK ASSESSMENT DOCUMENTED: ICD-10-PCS | Mod: CPTII,S$GLB,, | Performed by: PODIATRIST

## 2022-02-15 PROCEDURE — 3077F SYST BP >= 140 MM HG: CPT | Mod: CPTII,S$GLB,, | Performed by: PODIATRIST

## 2022-02-15 PROCEDURE — 3288F FALL RISK ASSESSMENT DOCD: CPT | Mod: CPTII,S$GLB,, | Performed by: PODIATRIST

## 2022-02-15 PROCEDURE — 11721 PR DEBRIDEMENT OF NAILS, 6 OR MORE: ICD-10-PCS | Mod: Q9,S$GLB,, | Performed by: PODIATRIST

## 2022-02-15 PROCEDURE — 3077F PR MOST RECENT SYSTOLIC BLOOD PRESSURE >= 140 MM HG: ICD-10-PCS | Mod: CPTII,S$GLB,, | Performed by: PODIATRIST

## 2022-02-15 PROCEDURE — 1157F ADVNC CARE PLAN IN RCRD: CPT | Mod: CPTII,S$GLB,, | Performed by: PODIATRIST

## 2022-02-15 PROCEDURE — 99204 PR OFFICE/OUTPT VISIT, NEW, LEVL IV, 45-59 MIN: ICD-10-PCS | Mod: 25,S$GLB,, | Performed by: PODIATRIST

## 2022-02-15 PROCEDURE — 1101F PR PT FALLS ASSESS DOC 0-1 FALLS W/OUT INJ PAST YR: ICD-10-PCS | Mod: CPTII,S$GLB,, | Performed by: PODIATRIST

## 2022-02-15 PROCEDURE — 1159F MED LIST DOCD IN RCRD: CPT | Mod: CPTII,S$GLB,, | Performed by: PODIATRIST

## 2022-02-15 PROCEDURE — 3078F DIAST BP <80 MM HG: CPT | Mod: CPTII,S$GLB,, | Performed by: PODIATRIST

## 2022-02-15 PROCEDURE — 1126F AMNT PAIN NOTED NONE PRSNT: CPT | Mod: CPTII,S$GLB,, | Performed by: PODIATRIST

## 2022-02-15 PROCEDURE — 1159F PR MEDICATION LIST DOCUMENTED IN MEDICAL RECORD: ICD-10-PCS | Mod: CPTII,S$GLB,, | Performed by: PODIATRIST

## 2022-02-15 PROCEDURE — 99999 PR PBB SHADOW E&M-EST. PATIENT-LVL IV: ICD-10-PCS | Mod: PBBFAC,,, | Performed by: PODIATRIST

## 2022-02-15 PROCEDURE — 99204 OFFICE O/P NEW MOD 45 MIN: CPT | Mod: 25,S$GLB,, | Performed by: PODIATRIST

## 2022-02-15 PROCEDURE — 3072F LOW RISK FOR RETINOPATHY: CPT | Mod: CPTII,S$GLB,, | Performed by: PODIATRIST

## 2022-02-15 PROCEDURE — 99499 RISK ADDL DX/OHS AUDIT: ICD-10-PCS | Mod: S$GLB,,, | Performed by: PODIATRIST

## 2022-02-15 PROCEDURE — 99499 UNLISTED E&M SERVICE: CPT | Mod: S$GLB,,, | Performed by: PODIATRIST

## 2022-02-15 RX ORDER — CICLOPIROX 80 MG/ML
SOLUTION TOPICAL NIGHTLY
Qty: 6.6 ML | Refills: 11 | Status: SHIPPED | OUTPATIENT
Start: 2022-02-15 | End: 2023-10-19

## 2022-02-15 RX ORDER — AMMONIUM LACTATE 12 G/100G
CREAM TOPICAL
Qty: 140 G | Refills: 11 | Status: SHIPPED | OUTPATIENT
Start: 2022-02-15 | End: 2023-10-19

## 2022-02-15 NOTE — PROGRESS NOTES
Subjective:      Patient ID: Kelsey Fields is a 81 y.o. female.    Chief Complaint: Diabetic Foot Exam (PCP Laurie Todd MD 12/20/2021)    Kelsey is a 81 y.o. female who presents to the clinic for evaluation and treatment of high risk feet. Kelsey has a past medical history of Arthritis, Degenerative arthritis of lumbar spine, Episcleritis of right eye, GERD (gastroesophageal reflux disease), Hypertension, Shingles, Sleep apnea, and Type 2 diabetes mellitus with stage 3 chronic kidney disease, without long-term current use of insulin (6/5/2020). The patient's chief complaint is long, thick toenails. Gradual onset, worsening over past several weeks, aggravated by increased weight bearing, shoe gear, pressure.  Periodic debridement helps symptoms. .    PCP: Laurie Todd MD    Date Last Seen by PCP:   Chief Complaint   Patient presents with    Diabetic Foot Exam     PCP Laurie Todd MD 12/20/2021        Current shoe gear:  Affected Foot: Rx diabetic extra depth shoes and custom accommodative insoles     Unaffected Foot: Rx diabetic extra depth shoes and custom accommodative insoles    Hemoglobin A1C   Date Value Ref Range Status   10/25/2021 5.8 (H) 4.0 - 5.6 % Final     Comment:     ADA Screening Guidelines:  5.7-6.4%  Consistent with prediabetes  >or=6.5%  Consistent with diabetes    High levels of fetal hemoglobin interfere with the HbA1C  assay. Heterozygous hemoglobin variants (HbS, HgC, etc)do  not significantly interfere with this assay.   However, presence of multiple variants may affect accuracy.     04/07/2021 5.8 (H) 4.0 - 5.6 % Final     Comment:     ADA Screening Guidelines:  5.7-6.4%  Consistent with prediabetes  >or=6.5%  Consistent with diabetes    High levels of fetal hemoglobin interfere with the HbA1C  assay. Heterozygous hemoglobin variants (HbS, HgC, etc)do  not significantly interfere with this assay.   However, presence of multiple variants may affect  accuracy.     01/13/2021 5.7 (H) 4.0 - 5.6 % Final     Comment:     ADA Screening Guidelines:  5.7-6.4%  Consistent with prediabetes  >or=6.5%  Consistent with diabetes  High levels of fetal hemoglobin interfere with the HbA1C  assay. Heterozygous hemoglobin variants (HbS, HgC, etc)do  not significantly interfere with this assay.   However, presence of multiple variants may affect accuracy.         Review of Systems   Constitutional: Negative for chills, diaphoresis, fever, malaise/fatigue and night sweats.   Cardiovascular: Negative for claudication, cyanosis, leg swelling and syncope.   Skin: Positive for nail changes. Negative for color change, dry skin, rash, suspicious lesions and unusual hair distribution.   Musculoskeletal: Negative for falls, joint pain, joint swelling, muscle cramps, muscle weakness and stiffness.   Gastrointestinal: Negative for constipation, diarrhea, nausea and vomiting.   Neurological: Positive for paresthesias and sensory change. Negative for brief paralysis, disturbances in coordination, focal weakness, numbness and tremors.           Objective:      Physical Exam  Constitutional:       General: She is not in acute distress.     Appearance: She is well-developed and well-nourished. She is not diaphoretic.   Cardiovascular:      Pulses:           Popliteal pulses are 2+ on the right side and 2+ on the left side.        Dorsalis pedis pulses are 2+ on the right side and 1+ on the left side.        Posterior tibial pulses are 2+ on the right side and 2+ on the left side.      Comments: Capillary refill 3 seconds all toes/distal feet, all toes/both feet warm to touch.      Negative lymphadenopathy bilateral popliteal fossa and tarsal tunnel.      Negavie lower extremity edema bilateral.    Musculoskeletal:      Right ankle: No swelling, deformity, ecchymosis or lacerations. Normal range of motion. Normal pulse.      Right Achilles Tendon: Normal. No pain or defects. Alvarez's test  negative.      Comments: Severe hallux abductovalgus bilateral with bunion formation but without symptoms today right or left.    Otherwise,  All ten toes without clubbing, cyanosis, or signs of ischemia.  No pain to palpation bilateral lower extremities.  Range of motion, stability, muscle strength, and muscle tone normal bilateral feet and legs.    Lymphadenopathy:      Lower Body: No right inguinal adenopathy. No left inguinal adenopathy.      Comments: Negative lymphadenopathy bilateral popliteal fossa and tarsal tunnel.    Negative lymphangitic streaking bilateral feet/ankles/legs.   Skin:     General: Skin is warm, dry and intact.      Capillary Refill: Capillary refill takes 2 to 3 seconds.      Coloration: Skin is not pale.      Findings: No abrasion, bruising, burn, ecchymosis, erythema, laceration, lesion or rash.      Nails: There is no clubbing or cyanosis.      Comments: Skin is normal age and health appropriate color, turgor, texture, and temperature bilateral lower extremities without ulceration, hyperpigmentation, discoloration, masses nodules or cords palpated.  No ecchymosis, erythema, edema, or cardinal signs of infection bilateral lower extremities.    Toenails 1st, 2nd, 3rd, 4th, 5th  bilateral are hypertrophic thickened 2-3 mm, dystrophic, discolored tanish brown with tan, gray crumbly subungual debris.  Tender to distal nail plate pressure, without periungual skin abnormality of each.         Neurological:      Mental Status: She is alert and oriented to person, place, and time.      Sensory: No sensory deficit.      Motor: No tremor, atrophy or abnormal muscle tone.      Gait: Gait normal.      Deep Tendon Reflexes: Strength normal.      Reflex Scores:       Patellar reflexes are 2+ on the right side and 2+ on the left side.       Achilles reflexes are 2+ on the right side and 2+ on the left side.     Comments: Decreased/absent vibratory sensation bilateral feet to 128Hz tuning  fork.    Paresthesias, and burning bilateral feet with no clearly identified trigger or source.    Negative tinel sign to percussion sural, superficial peroneal, deep peroneal, saphenous, and posterior tibial nerves right and left ankles and feet.     Psychiatric:         Mood and Affect: Mood and affect normal.         Behavior: Behavior is cooperative.               Assessment:       Encounter Diagnoses   Name Primary?    Type 2 diabetes mellitus with stage 3 chronic kidney disease, without long-term current use of insulin, unspecified whether stage 3a or 3b CKD     Diabetes mellitus with peripheral vascular disease Yes    Onychomycosis due to dermatophyte     Corn or callus     Valgus deformity of both great toes          Plan:       Kelsey was seen today for diabetic foot exam.    Diagnoses and all orders for this visit:    Diabetes mellitus with peripheral vascular disease    Type 2 diabetes mellitus with stage 3 chronic kidney disease, without long-term current use of insulin, unspecified whether stage 3a or 3b CKD  -     Ambulatory referral/consult to Podiatry    Onychomycosis due to dermatophyte    Corn or callus    Valgus deformity of both great toes      I counseled the patient on her conditions, their implications and medical management.        The patient has received literature on basic diabetic foot care.  Patient will inspect feet daily, wear protective shoe gear when ambulatory, and apply moisturizer to skin as needed to maintain elasticity and help prevent ulceration.    With the patient's permission, I debrided all ten toenails with a sterile nipper and curette, removing all offending nail and debris.  Patient tolerated the procedure well and related significant relief.      Discussed conservative treatment with shoes of adequate dimensions, material, and style to alleviate symptoms and delay or prevent surgical intervention.    Rx DM shoes, penlac, lac hydrin.          Follow up in about 1  year (around 2/15/2023).

## 2022-02-25 ENCOUNTER — TELEPHONE (OUTPATIENT)
Dept: PRIMARY CARE CLINIC | Facility: CLINIC | Age: 82
End: 2022-02-25
Payer: MEDICARE

## 2022-02-25 NOTE — TELEPHONE ENCOUNTER
----- Message from Salud Joel MA sent at 2/25/2022  8:55 AM CST -----  Contact: [phone]  PLEASE ADVISE   ----- Message -----  From: Rimma Wang  Sent: 2/24/2022  12:55 PM CST  To: Pey[facility] Ferrer Staff    Patient would like to speak to the nurse in regards to her diabetic shoes. Please call and advise

## 2022-02-25 NOTE — TELEPHONE ENCOUNTER
Call made to [facility] to check on status of Patient's diabetic shoe order.  Spoke to Amy.  When Amy was questioned about status of shoe order, she was unable to find any records under patient's name.  Told Amy of dates which I had communicated with her and information requested and sent to [facility].    Informed Amy that patient had last see by  podiatrist on 2/15/2022.  Will re fax order, notes and referral to [facility]

## 2022-03-11 ENCOUNTER — TELEPHONE (OUTPATIENT)
Dept: PRIMARY CARE CLINIC | Facility: CLINIC | Age: 82
End: 2022-03-11
Payer: MEDICARE

## 2022-03-11 NOTE — TELEPHONE ENCOUNTER
----- Message from Los Swanson sent at 3/11/2022 10:49 AM CST -----  Contact: [phone]  Matt is needing the clinical notes for 2/7 visit on this patient faxed to [phone].Thank you

## 2022-04-14 ENCOUNTER — PATIENT OUTREACH (OUTPATIENT)
Dept: ADMINISTRATIVE | Facility: OTHER | Age: 82
End: 2022-04-14
Payer: MEDICARE

## 2022-05-02 ENCOUNTER — TELEPHONE (OUTPATIENT)
Dept: PRIMARY CARE CLINIC | Facility: CLINIC | Age: 82
End: 2022-05-02
Payer: MEDICARE

## 2022-05-02 NOTE — TELEPHONE ENCOUNTER
Pt called, she stated that because she is not diabetic, she cannot get diabetic shoes.     Her friend has a place she can get shoes cheaper than 600.00, which is what the diabetic shoes would cost if pt purchased med.   Pt is not diabetic nor on any meds

## 2022-05-03 ENCOUNTER — LAB VISIT (OUTPATIENT)
Dept: LAB | Facility: HOSPITAL | Age: 82
End: 2022-05-03
Attending: INTERNAL MEDICINE
Payer: MEDICARE

## 2022-05-03 ENCOUNTER — IMMUNIZATION (OUTPATIENT)
Dept: INTERNAL MEDICINE | Facility: CLINIC | Age: 82
End: 2022-05-03
Payer: MEDICARE

## 2022-05-03 ENCOUNTER — TELEPHONE (OUTPATIENT)
Dept: SLEEP MEDICINE | Facility: CLINIC | Age: 82
End: 2022-05-03
Payer: MEDICARE

## 2022-05-03 ENCOUNTER — OFFICE VISIT (OUTPATIENT)
Dept: PRIMARY CARE CLINIC | Facility: CLINIC | Age: 82
End: 2022-05-03
Payer: MEDICARE

## 2022-05-03 DIAGNOSIS — G47.9 SLEEP DISORDER: ICD-10-CM

## 2022-05-03 DIAGNOSIS — E11.43 DIABETIC AUTONOMIC NEUROPATHY ASSOCIATED WITH TYPE 2 DIABETES MELLITUS: Primary | ICD-10-CM

## 2022-05-03 DIAGNOSIS — Z23 NEED FOR VACCINATION: Primary | ICD-10-CM

## 2022-05-03 DIAGNOSIS — E21.3 HYPERPARATHYROIDISM: ICD-10-CM

## 2022-05-03 DIAGNOSIS — D69.2 SENILE PURPURA: ICD-10-CM

## 2022-05-03 DIAGNOSIS — I70.0 AORTIC ATHEROSCLEROSIS: ICD-10-CM

## 2022-05-03 DIAGNOSIS — E11.43 DIABETIC AUTONOMIC NEUROPATHY ASSOCIATED WITH TYPE 2 DIABETES MELLITUS: ICD-10-CM

## 2022-05-03 DIAGNOSIS — G95.9 CERVICAL MYELOPATHY: ICD-10-CM

## 2022-05-03 DIAGNOSIS — M48.10 DISH (DIFFUSE IDIOPATHIC SKELETAL HYPEROSTOSIS): ICD-10-CM

## 2022-05-03 DIAGNOSIS — N18.30 STAGE 3 CHRONIC KIDNEY DISEASE, UNSPECIFIED WHETHER STAGE 3A OR 3B CKD: ICD-10-CM

## 2022-05-03 PROBLEM — R73.03 PREDIABETES: Status: RESOLVED | Noted: 2018-11-19 | Resolved: 2022-05-03

## 2022-05-03 LAB
ALBUMIN/CREAT UR: 21.5 UG/MG (ref 0–30)
ANION GAP SERPL CALC-SCNC: 9 MMOL/L (ref 8–16)
BASOPHILS # BLD AUTO: 0.03 K/UL (ref 0–0.2)
BASOPHILS NFR BLD: 0.4 % (ref 0–1.9)
BUN SERPL-MCNC: 19 MG/DL (ref 8–23)
CALCIUM SERPL-MCNC: 10 MG/DL (ref 8.7–10.5)
CHLORIDE SERPL-SCNC: 104 MMOL/L (ref 95–110)
CO2 SERPL-SCNC: 28 MMOL/L (ref 23–29)
CREAT SERPL-MCNC: 1.3 MG/DL (ref 0.5–1.4)
CREAT UR-MCNC: 79 MG/DL (ref 15–325)
DIFFERENTIAL METHOD: ABNORMAL
EOSINOPHIL # BLD AUTO: 0.6 K/UL (ref 0–0.5)
EOSINOPHIL NFR BLD: 8.3 % (ref 0–8)
ERYTHROCYTE [DISTWIDTH] IN BLOOD BY AUTOMATED COUNT: 13.2 % (ref 11.5–14.5)
EST. GFR  (AFRICAN AMERICAN): 43.8 ML/MIN/1.73 M^2
EST. GFR  (NON AFRICAN AMERICAN): 38 ML/MIN/1.73 M^2
ESTIMATED AVG GLUCOSE: 120 MG/DL (ref 68–131)
GLUCOSE SERPL-MCNC: 89 MG/DL (ref 70–110)
HBA1C MFR BLD: 5.8 % (ref 4–5.6)
HCT VFR BLD AUTO: 38.8 % (ref 37–48.5)
HGB BLD-MCNC: 11.9 G/DL (ref 12–16)
IMM GRANULOCYTES # BLD AUTO: 0.02 K/UL (ref 0–0.04)
IMM GRANULOCYTES NFR BLD AUTO: 0.3 % (ref 0–0.5)
LYMPHOCYTES # BLD AUTO: 2.3 K/UL (ref 1–4.8)
LYMPHOCYTES NFR BLD: 32.3 % (ref 18–48)
MCH RBC QN AUTO: 27.1 PG (ref 27–31)
MCHC RBC AUTO-ENTMCNC: 30.7 G/DL (ref 32–36)
MCV RBC AUTO: 88 FL (ref 82–98)
MICROALBUMIN UR DL<=1MG/L-MCNC: 17 UG/ML
MONOCYTES # BLD AUTO: 0.6 K/UL (ref 0.3–1)
MONOCYTES NFR BLD: 8.5 % (ref 4–15)
NEUTROPHILS # BLD AUTO: 3.5 K/UL (ref 1.8–7.7)
NEUTROPHILS NFR BLD: 50.2 % (ref 38–73)
NRBC BLD-RTO: 0 /100 WBC
PLATELET # BLD AUTO: 275 K/UL (ref 150–450)
PMV BLD AUTO: 11 FL (ref 9.2–12.9)
POTASSIUM SERPL-SCNC: 4 MMOL/L (ref 3.5–5.1)
RBC # BLD AUTO: 4.39 M/UL (ref 4–5.4)
SODIUM SERPL-SCNC: 141 MMOL/L (ref 136–145)
TSH SERPL DL<=0.005 MIU/L-ACNC: 0.77 UIU/ML (ref 0.4–4)
WBC # BLD AUTO: 7.03 K/UL (ref 3.9–12.7)

## 2022-05-03 PROCEDURE — 83036 HEMOGLOBIN GLYCOSYLATED A1C: CPT | Performed by: INTERNAL MEDICINE

## 2022-05-03 PROCEDURE — 99499 RISK ADDL DX/OHS AUDIT: ICD-10-PCS | Mod: S$GLB,,, | Performed by: INTERNAL MEDICINE

## 2022-05-03 PROCEDURE — 3072F PR LOW RISK FOR RETINOPATHY: ICD-10-PCS | Mod: CPTII,S$GLB,, | Performed by: INTERNAL MEDICINE

## 2022-05-03 PROCEDURE — 1157F PR ADVANCE CARE PLAN OR EQUIV PRESENT IN MEDICAL RECORD: ICD-10-PCS | Mod: CPTII,S$GLB,, | Performed by: INTERNAL MEDICINE

## 2022-05-03 PROCEDURE — 99215 PR OFFICE/OUTPT VISIT, EST, LEVL V, 40-54 MIN: ICD-10-PCS | Mod: S$GLB,,, | Performed by: INTERNAL MEDICINE

## 2022-05-03 PROCEDURE — 1157F ADVNC CARE PLAN IN RCRD: CPT | Mod: CPTII,S$GLB,, | Performed by: INTERNAL MEDICINE

## 2022-05-03 PROCEDURE — 99499 UNLISTED E&M SERVICE: CPT | Mod: S$GLB,,, | Performed by: INTERNAL MEDICINE

## 2022-05-03 PROCEDURE — 84443 ASSAY THYROID STIM HORMONE: CPT | Performed by: INTERNAL MEDICINE

## 2022-05-03 PROCEDURE — 85025 COMPLETE CBC W/AUTO DIFF WBC: CPT | Performed by: INTERNAL MEDICINE

## 2022-05-03 PROCEDURE — 99215 OFFICE O/P EST HI 40 MIN: CPT | Mod: S$GLB,,, | Performed by: INTERNAL MEDICINE

## 2022-05-03 PROCEDURE — 82570 ASSAY OF URINE CREATININE: CPT | Performed by: INTERNAL MEDICINE

## 2022-05-03 PROCEDURE — 80048 BASIC METABOLIC PNL TOTAL CA: CPT | Performed by: INTERNAL MEDICINE

## 2022-05-03 PROCEDURE — 3072F LOW RISK FOR RETINOPATHY: CPT | Mod: CPTII,S$GLB,, | Performed by: INTERNAL MEDICINE

## 2022-05-03 PROCEDURE — 82043 UR ALBUMIN QUANTITATIVE: CPT | Performed by: INTERNAL MEDICINE

## 2022-05-03 PROCEDURE — 91300 COVID-19, MRNA, LNP-S, PF, 30 MCG/0.3 ML DOSE VACCINE: CPT | Mod: PBBFAC | Performed by: INTERNAL MEDICINE

## 2022-05-03 PROCEDURE — 36415 COLL VENOUS BLD VENIPUNCTURE: CPT | Performed by: INTERNAL MEDICINE

## 2022-05-03 NOTE — PATIENT INSTRUCTIONS
TODAY:  - sleep medicine appt  - COVID vaccine today  - labs today  - nephrology appointment with CANDY Hidalgo

## 2022-05-03 NOTE — TELEPHONE ENCOUNTER
Spoke to Jessi @[facility] [place] and the pt is scheduled for 05/04/2022 @[id number]

## 2022-05-03 NOTE — ASSESSMENT & PLAN NOTE
"Seen on CT 3/2020: "Abdominal aorta is normal in caliber with moderate to severe atherosclerotic calcification."  · Continue current meds  · monitor  "

## 2022-05-03 NOTE — PROGRESS NOTES
Primary Care Provider Appointment - [facility]  SHARED NOTE: Tea Christy (MS-4), Dr Todd (Attending)    Subjective:      Patient ID: Kelsey Fields is a 81 y.o. female with arthritis, DISH, mixed urinary incontinence, HTN, DM2 with CKD, TRISTON    Chief Complaint: Diabetes    Prior to this visit, patient's last encounter with PCP was 12/20/2021.    Patient has been having trouble getting diabetic shoes. Dr. RANDHAWA has filled out [employer] requested documentation for patient's diabetic shoes, given patient the paperwork today. Patient will contact [employer] again to see how much it costs for the shoes. If it's 600, she might go to the company suggested by her friend, around 150$ for shoes.   Patient A1C on 06/2021 was 5.8 and has been in prediabetic ranges for at least 3 years. Patient is not on any insulin or antidiabetic agents. However, patient has peripheral neuropathy associated with DM2, legs numbing and decreased sensation bilaterally. Patient saw podiatry in 02/2022, found to have onychomycosis due to dermatophyte as well, treating with ketoconazole cream. Patient stated it helps with the nails changes.   Patient endorses more fatigue in past couple months, having trouble staying asleep. Patient states she sleeps a lot throughout the day, wake up after couple of hours and fall asleep again. Patient had stopped using her CPAP in 2021 due to the mask being uncomfortable on her face. During visit today, patient continuously falling asleep between brief pauses in conversation.   She does not have a lot of activities to do at home, used to go to [employer] for fun but not anymore, wants to go to the mall but difficulty with parking. However, she described her mood as good, has motivation to go back to work and purchase her own habitat home. Patient is trying to find another  job after getting laid back from her job in 11/2021. Patient goes to [employer] daily and has support system with her daughter and 2  sisters.     Arthritis  -Pain is tolerable, tylenol prn.    DISH  -Recommend PT but patient denies at this time.    HTN  BP- 123/60. Patient reports good BP at home but does not take it frequently, once every couple weeks. No symptoms headache, dizziness, blurry vision  -Norvasc 10 mg     DM2  A1c on 06/2021- 5.8, Microalbumin/ Cr on 12/2021- 24.6 wnl   Glucose today was 95. Patient is prediabetic for the past 3 years. Not on any diabetic agent/insulin.  -Recheck A1C and Microalbumin/ Cr today    HLD  Lipid panel on 01/2021 wnl  -On Lipitor 40 mg  -Recheck lipid pannel- shortage supply, check in 6 months    TRISTON  Patient had tried CPAP before with Dr. Vo, but mask was uncomfortable and stop using them in 2021.  Patient have machine at home.  -Schedule appointment with Dr Vo for 05/04/2022    GERD  No symptoms reported  -On cimetidine    CKD  2021- eGFR- 40.4, Cr- 1.4 stable. K- 4.0, Calcium- 9.5 wnl  -Contacted [facility] for evaluation of CKD    Mixed urinary incontinence  Patient go 7-8 times, 3 times at night.   -See Dr. Delvalle in 01/2022.   -Per recommended Kegel, Pelvic PT floor PT, Vesicare 5 mg. Consider pessary/ sling for stress incontinence  -Not trying any of therapy or on any prescribed medication, don't want to follow up with Dr. Delvalle, said she is okay/can live with with urinary frequency and nocturia.     Care gaps  -Dose 4 covid  -Eye exam- refuse at this time, last time she had to pay a 300$ bill  -CBC, BMP  -TSH    2.  Vaginal atrophy (dryness):  Use 1 gram of estrogen cream in vagina nightly x 2 weeks, then twice a week thereafter.   Past Surgical History:   Procedure Laterality Date    blepharitis Bilateral 09/20/2017    Alessandra Grijalva OD    CATARACT EXTRACTION Bilateral 09/20/2017    Alessandra Grijalva MD    CATARACT EXTRACTION W/  INTRAOCULAR LENS IMPLANT Left 9/16/2019    Procedure: EXTRACTION, CATARACT, WITH IOL INSERTION;  Surgeon: Alysa De Souza MD;  [facility]: [facility];  Service:  Ophthalmology;  Laterality: Left;    CATARACT EXTRACTION W/  INTRAOCULAR LENS IMPLANT Right 2019    Procedure: EXTRACTION, CATARACT, WITH IOL INSERTION;  Surgeon: Alysa De Souza MD;  Location: [facility];  Service: Ophthalmology;  Laterality: Right;     SECTION      FRACTURE SURGERY      JOINT REPLACEMENT      right knee     KNEE SURGERY Right        Past Medical History:   Diagnosis Date    Arthritis     Degenerative arthritis of lumbar spine     Episcleritis of right eye     GERD (gastroesophageal reflux disease)     Hypertension     Shingles     Sleep apnea     Type 2 diabetes mellitus with stage 3 chronic kidney disease, without long-term current use of insulin 2020       Social History     Socioeconomic History    Marital status: Single   Tobacco Use    Smoking status: Never Smoker    Smokeless tobacco: Never Used   Substance and Sexual Activity    Alcohol use: No    Drug use: No    Sexual activity: Never     Social Determinants of Health     Financial Resource Strain: Medium Risk    Difficulty of Paying Living Expenses: Somewhat hard   Food Insecurity: No Food Insecurity    Worried About Running Out of Food in the Last Year: Never true    Ran Out of Food in the Last Year: Never true   Transportation Needs: No Transportation Needs    Lack of Transportation (Medical): No    Lack of Transportation (Non-Medical): No   Physical Activity: Inactive    Days of Exercise per Week: 0 days    Minutes of Exercise per Session: 0 min   Stress: No Stress Concern Present    Feeling of Stress : Only a little   Housing Stability: Low Risk     Unable to Pay for Housing in the Last Year: No    Number of Places Lived in the Last Year: 1    Unstable Housing in the Last Year: No       Review of Systems   Constitutional: Positive for fatigue. Negative for fever.   Respiratory: Negative for cough and shortness of breath.    Cardiovascular: Negative for chest pain.   Gastrointestinal: Negative  for abdominal pain, constipation and diarrhea.   Genitourinary: Positive for frequency. Negative for dysuria, flank pain, hematuria and urgency.   Musculoskeletal: Positive for myalgias. Negative for arthralgias.   Neurological: Negative for dizziness and headaches.   Psychiatric/Behavioral: Negative for dysphoric mood.       Objective:   There were no vitals taken for this visit.    Physical Exam  Cardiovascular:      Rate and Rhythm: Normal rate and regular rhythm.   Pulmonary:      Effort: Pulmonary effort is normal. No respiratory distress.      Breath sounds: No rales.   Musculoskeletal:      Right lower leg: No edema.      Left lower leg: No edema.   Neurological:      Mental Status: She is alert.             Lab Results   Component Value Date    WBC 7.03 05/03/2022    HGB 11.9 (L) 05/03/2022    HCT 38.8 05/03/2022     05/03/2022    CHOL 146 01/13/2021    TRIG 65 01/13/2021    HDL 46 01/13/2021    ALT 11 01/13/2021    AST 18 01/13/2021     05/03/2022    K 4.0 05/03/2022     05/03/2022    CREATININE 1.3 05/03/2022    BUN 19 05/03/2022    CO2 28 05/03/2022    TSH 0.770 05/03/2022    HGBA1C 5.8 (H) 05/03/2022       Current Outpatient Medications on File Prior to Visit   Medication Sig Dispense Refill    acetaminophen (TYLENOL) 500 MG tablet Take 2 tablets (1,000 mg total) by mouth every 8 (eight) hours as needed for Pain. 300 tablet 3    albuterol (PROVENTIL/VENTOLIN HFA) 90 mcg/actuation inhaler Inhale 2 puffs into the lungs every 6 (six) hours as needed for Wheezing. Rescue 18 g 3    albuterol-ipratropium (DUO-NEB) 2.5 mg-0.5 mg/3 mL nebulizer solution Take 3 mLs by nebulization every 6 (six) hours while awake. Rescue 90 mL 3    amLODIPine (NORVASC) 10 MG tablet Take 1 tablet (10 mg total) by mouth once daily. 90 tablet 3    ammonium lactate 12 % Crea Apply twice daily to affected parts both feet as needed. 140 g 11    atorvastatin (LIPITOR) 40 MG tablet Take 1 tablet (40 mg total) by  mouth once daily. 90 tablet 3    biotin 10 mg Tab Take 1 application by mouth once daily. 90 each 3    cholecalciferol, vitamin D3, (VITAMIN D3) 125 mcg (5,000 unit) Tab Take 1 tablet (5,000 Units total) by mouth every 48 hours. 90 tablet 3    ciclopirox (LOPROX) 0.77 % Crea Apply topically 2 (two) times daily. 90 g 2    ciclopirox (PENLAC) 8 % Soln Apply topically nightly. 6.6 mL 11    cimetidine (TAGAMET) 400 MG tablet Take 1 tablet (400 mg total) by mouth 2 (two) times daily. 180 tablet 3    CMPD Lidocaine 2%, Prilocaine 2%, Lamotrigine 2.5%, Meloxicam 0.09% in Transdermal Gel Apply 1 Pump (1.6 g total) topically 4 (four) times daily. 240 g 3    cyanocobalamin, vitamin B-12, 1,000 mcg TbSR Take 1,000 mcg by mouth once daily. 30 each 0    diclofenac sodium (VOLTAREN) 1 % Gel Apply 4 g topically 4 (four) times daily. Apply 4 g to right knee every 6-8 hours 450 g 3    estradioL (ESTRACE) 0.01 % (0.1 mg/gram) vaginal cream Place 0.5 g vaginally twice a week. Apply to the vagina daily for two weeks then twice a week. 45 g 4    ferrous sulfate (FEOSOL) 325 mg (65 mg iron) Tab tablet Take 1 tablet (325 mg total) by mouth daily with breakfast. 90 tablet 0    fluticasone propionate (FLONASE) 50 mcg/actuation nasal spray 1 spray (50 mcg total) by Each Nostril route once daily. 16 g 3    gabapentin (NEURONTIN) 300 MG capsule Take 1 capsule (300 mg total) by mouth every evening. 90 capsule 3    hydrocortisone 2.5 % cream Apply topically to rash on legs twice daily 28 g 0    inhalat. spacing dev,sm. mask Spcr Use with rescue inhaler 1 each 0    ketoconazole (NIZORAL) 2 % cream Apply under breast topically twice daily for rash 60 g 0    LIDOcaine (LIDODERM) 5 % Place 1 patch onto the skin once daily. Remove & Discard patch within 12 hours or as directed by MD 30 patch 0    loratadine (CLARITIN) 10 mg tablet Take 1 tablet (10 mg total) by mouth once daily. 90 tablet 3    nebulizer and compressor Paty Use as  "directed 1 each 0    psyllium husk, aspartame, (METAMUCIL FIBER SINGLES) 3.4 gram PwPk packet Take 1 packet by mouth once daily.      solifenacin (VESICARE) 10 MG tablet Take 0.5 tablets (5 mg total) by mouth once daily. 30 tablet 11    triamcinolone acetonide 0.025% (KENALOG) 0.025 % Oint Apply topically once daily to rash on arms and legs as needed 80 g 1     No current facility-administered medications on file prior to visit.         Assessment:   81 y.o. female with multiple co-morbid illnesses here to follow-up with PCP and continue work-up of chronic issues     Plan:     Problem List Items Addressed This Visit        Neuro    DISH (diffuse idiopathic skeletal hyperostosis)     DISH seen on XR in 6/2020, chronic pain associated  · PCP spoke with Rheum who advised supportive care  · Stretches and exercises advised  · Advised to apply ice daily  · Advised aquatherpy  · Patient refuses  · Continue voltaren           Cervical myelopathy     Neck pain, with DISH  · Advised supportive care  · Continue tylenol              Cardiac/Vascular    Aortic atherosclerosis     Seen on CT 3/2020: "Abdominal aorta is normal in caliber with moderate to severe atherosclerotic calcification."  · Continue current meds  · monitor              Renal/    Stage 3 chronic kidney disease     GFR stable, possible ACI. Previously followed by [facility]  · Monitor labs, GFR fluctuates  · Schedule with Dr Hidalgo with palliative nephro           Relevant Orders    Ambulatory referral/consult to Nephrology       Hematology    Senile purpura     Ecchymoses and purpura on UE bilaterally  · monitor              Endocrine    Hyperparathyroidism     Elevated PTH, likely related to CKD  · Reduce nephrotoxins           Diabetic autonomic neuropathy associated with type 2 diabetes mellitus - Primary     DM is diet-controlled, uncontrolled chronic pain  · Monitor A1c  · Apply for diabetic shoes           Relevant Orders    Hemoglobin A1C " (Completed)    MICROALBUMIN / CREATININE RATIO URINE (Completed)    CBC Auto Differential (Completed)    Basic Metabolic Panel (Completed)      Other Visit Diagnoses     Sleep disorder        Relevant Orders    Ambulatory referral/consult to Sleep Disorders    TSH (Completed)          Health Maintenance       Date Due Completion Date    TETANUS VACCINE Never done ---    DEXA Scan 05/16/2021 5/16/2019    Lipid Panel 01/13/2022 1/13/2021- SUPPLIES NOT AVAILABLE    COVID-19 Vaccine (4 - Booster for Pfizer series) 02/21/2022 10/21/2021- TODAY    Eye Exam 03/03/2022 3/3/2021    Hemoglobin A1c 04/25/2022 10/25/2021- TODAY    Diabetes Urine Screening 12/20/2022 12/20/2021    Foot Exam 02/15/2023 2/15/2022          Future Appointments   Date Time Provider Department Center   5/4/2022 10:40 AM Sandra Vo MD [facility]   5/9/2022 11:00 AM Sandra Vo MD [facility]   6/20/2022 11:00 AM Luarie Todd MD [facility]         Follow up ROUTINE. Total clinical care time was 60 min, issues addressed include DM, neuropathy, nephropathy    Laurie Todd MD/MPH  [facility]  [phone] [facility]

## 2022-05-03 NOTE — ASSESSMENT & PLAN NOTE
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 Previously followed by [facility]  · Monitor labs, GFR fluctuates  · Schedule with Dr Hidalgo with palliative nephro

## 2022-05-09 ENCOUNTER — OFFICE VISIT (OUTPATIENT)
Dept: SLEEP MEDICINE | Facility: CLINIC | Age: 82
End: 2022-05-09
Payer: MEDICARE

## 2022-05-09 DIAGNOSIS — G95.9 CERVICAL MYELOPATHY: ICD-10-CM

## 2022-05-09 DIAGNOSIS — N18.30 TYPE 2 DIABETES MELLITUS WITH STAGE 3 CHRONIC KIDNEY DISEASE, WITHOUT LONG-TERM CURRENT USE OF INSULIN, UNSPECIFIED WHETHER STAGE 3A OR 3B CKD: ICD-10-CM

## 2022-05-09 DIAGNOSIS — R06.02 SHORTNESS OF BREATH: ICD-10-CM

## 2022-05-09 DIAGNOSIS — I70.0 AORTIC ATHEROSCLEROSIS: ICD-10-CM

## 2022-05-09 DIAGNOSIS — E11.43 DIABETIC AUTONOMIC NEUROPATHY ASSOCIATED WITH TYPE 2 DIABETES MELLITUS: Primary | ICD-10-CM

## 2022-05-09 DIAGNOSIS — R26.9 GAIT ABNORMALITY: ICD-10-CM

## 2022-05-09 DIAGNOSIS — E11.22 TYPE 2 DIABETES MELLITUS WITH STAGE 3 CHRONIC KIDNEY DISEASE, WITHOUT LONG-TERM CURRENT USE OF INSULIN, UNSPECIFIED WHETHER STAGE 3A OR 3B CKD: ICD-10-CM

## 2022-05-09 DIAGNOSIS — G47.33 OSA (OBSTRUCTIVE SLEEP APNEA): ICD-10-CM

## 2022-05-09 PROCEDURE — 3072F LOW RISK FOR RETINOPATHY: CPT | Mod: CPTII,95,, | Performed by: INTERNAL MEDICINE

## 2022-05-09 PROCEDURE — 1159F PR MEDICATION LIST DOCUMENTED IN MEDICAL RECORD: ICD-10-PCS | Mod: CPTII,95,, | Performed by: INTERNAL MEDICINE

## 2022-05-09 PROCEDURE — 1159F MED LIST DOCD IN RCRD: CPT | Mod: CPTII,95,, | Performed by: INTERNAL MEDICINE

## 2022-05-09 PROCEDURE — 1160F PR REVIEW ALL MEDS BY PRESCRIBER/CLIN PHARMACIST DOCUMENTED: ICD-10-PCS | Mod: CPTII,95,, | Performed by: INTERNAL MEDICINE

## 2022-05-09 PROCEDURE — 99203 PR OFFICE/OUTPT VISIT, NEW, LEVL III, 30-44 MIN: ICD-10-PCS | Mod: 95,,, | Performed by: INTERNAL MEDICINE

## 2022-05-09 PROCEDURE — 99203 OFFICE O/P NEW LOW 30 MIN: CPT | Mod: 95,,, | Performed by: INTERNAL MEDICINE

## 2022-05-09 PROCEDURE — 3072F PR LOW RISK FOR RETINOPATHY: ICD-10-PCS | Mod: CPTII,95,, | Performed by: INTERNAL MEDICINE

## 2022-05-09 PROCEDURE — 1157F ADVNC CARE PLAN IN RCRD: CPT | Mod: CPTII,95,, | Performed by: INTERNAL MEDICINE

## 2022-05-09 PROCEDURE — 1160F RVW MEDS BY RX/DR IN RCRD: CPT | Mod: CPTII,95,, | Performed by: INTERNAL MEDICINE

## 2022-05-09 PROCEDURE — 1157F PR ADVANCE CARE PLAN OR EQUIV PRESENT IN MEDICAL RECORD: ICD-10-PCS | Mod: CPTII,95,, | Performed by: INTERNAL MEDICINE

## 2022-05-09 NOTE — PROGRESS NOTES
Subjective:       Patient ID: Kelsey Fields is a 81 y.o. female.    Chief Complaint: Sleeping Problem    I had the pleasure of seeing Kelsey Fields today, who is a 81 y.o. female that presents with depression.    Kelsey Fields does not have a CDL.    Kelsey Fields is not a shift worker.    Kelsey Fields presents with depression that has been going on for 2 months since being laid off from work.  Previously diagnosed with TRISTON, but has quit CPAP.   Machine under recall.      Bedtime when working ranges from NA to NA.   When not working, bedtime ranges from 2400 to 0200.   Sleep latency ranges from 10 to 20 minutes.     Average number of awakenings is 2 and return to sleep is quick.   Wake up time when working is NA to NA.   When not working, wake up time is 0530 to 0530.   Patient does not feel rested upon awakening.    Kelsey Fields consumes approximately 0 beverages with caffeine daily.   An average of 0 beverages with alcohol are consumed    Medications taken for sleep currently: none  Previous medications taken: none     Kelsey Fields does experience daytime sleepiness.   Naps are taken about   0 times weekly, usually lasting NA to NA minutes.  Kelsey currently does operate an automobile.  Kelsey Fields does not experience drowsiness when driving.   Patient does doze off when sedentary.   Kelsey Fields does not have auxiliary symptoms of narcolepsy including sleep onset paralysis, hypnagogic hallucinations, sleep attacks and cataplexy    EPWORTH SLEEPINESS SCALE 9/24/2018   Sitting and reading 3   Watching TV 3   Sitting, inactive in a public place (e.g. a theatre or a meeting) 2   As a passenger in a car for an hour without a break 0   Lying down to rest in the afternoon when circumstances permit 3   Sitting and talking to someone 0   Sitting quietly after a lunch without alcohol 2   In a car, while stopped for a few minutes in traffic 0   Total score  13       Kelsey Fields has a history of snoring.   Snoring is described as unknown.   Apneic episodes have been noticed during sleep.   A witness to sleep is not present.   The patient awakens with mouth dryness.      Kelsey Fields does not have symptoms of Restless Legs Syndrome. Nocturnal leg movements have not been noticed.   The patient does not experience sleep related leg cramps.   There is not a history of parasomnia.      Current Outpatient Medications:     acetaminophen (TYLENOL) 500 MG tablet, Take 2 tablets (1,000 mg total) by mouth every 8 (eight) hours as needed for Pain., Disp: 300 tablet, Rfl: 3    albuterol (PROVENTIL/VENTOLIN HFA) 90 mcg/actuation inhaler, Inhale 2 puffs into the lungs every 6 (six) hours as needed for Wheezing. Rescue, Disp: 18 g, Rfl: 3    albuterol-ipratropium (DUO-NEB) 2.5 mg-0.5 mg/3 mL nebulizer solution, Take 3 mLs by nebulization every 6 (six) hours while awake. Rescue, Disp: 90 mL, Rfl: 3    amLODIPine (NORVASC) 10 MG tablet, Take 1 tablet (10 mg total) by mouth once daily., Disp: 90 tablet, Rfl: 3    ammonium lactate 12 % Crea, Apply twice daily to affected parts both feet as needed., Disp: 140 g, Rfl: 11    atorvastatin (LIPITOR) 40 MG tablet, Take 1 tablet (40 mg total) by mouth once daily., Disp: 90 tablet, Rfl: 3    biotin 10 mg Tab, Take 1 application by mouth once daily., Disp: 90 each, Rfl: 3    cholecalciferol, vitamin D3, (VITAMIN D3) 125 mcg (5,000 unit) Tab, Take 1 tablet (5,000 Units total) by mouth every 48 hours., Disp: 90 tablet, Rfl: 3    ciclopirox (LOPROX) 0.77 % Crea, Apply topically 2 (two) times daily., Disp: 90 g, Rfl: 2    ciclopirox (PENLAC) 8 % Soln, Apply topically nightly., Disp: 6.6 mL, Rfl: 11    cimetidine (TAGAMET) 400 MG tablet, Take 1 tablet (400 mg total) by mouth 2 (two) times daily., Disp: 180 tablet, Rfl: 3    CMPD Lidocaine 2%, Prilocaine 2%, Lamotrigine 2.5%, Meloxicam 0.09% in Transdermal Gel, Apply 1 Pump  (1.6 g total) topically 4 (four) times daily., Disp: 240 g, Rfl: 3    cyanocobalamin, vitamin B-12, 1,000 mcg TbSR, Take 1,000 mcg by mouth once daily., Disp: 30 each, Rfl: 0    diclofenac sodium (VOLTAREN) 1 % Gel, Apply 4 g topically 4 (four) times daily. Apply 4 g to right knee every 6-8 hours, Disp: 450 g, Rfl: 3    estradioL (ESTRACE) 0.01 % (0.1 mg/gram) vaginal cream, Place 0.5 g vaginally twice a week. Apply to the vagina daily for two weeks then twice a week., Disp: 45 g, Rfl: 4    ferrous sulfate (FEOSOL) 325 mg (65 mg iron) Tab tablet, Take 1 tablet (325 mg total) by mouth daily with breakfast., Disp: 90 tablet, Rfl: 0    fluticasone propionate (FLONASE) 50 mcg/actuation nasal spray, 1 spray (50 mcg total) by Each Nostril route once daily., Disp: 16 g, Rfl: 3    gabapentin (NEURONTIN) 300 MG capsule, Take 1 capsule (300 mg total) by mouth every evening., Disp: 90 capsule, Rfl: 3    hydrocortisone 2.5 % cream, Apply topically to rash on legs twice daily, Disp: 28 g, Rfl: 0    inhalat. spacing dev,sm. mask Spcr, Use with rescue inhaler, Disp: 1 each, Rfl: 0    ketoconazole (NIZORAL) 2 % cream, Apply under breast topically twice daily for rash, Disp: 60 g, Rfl: 0    LIDOcaine (LIDODERM) 5 %, Place 1 patch onto the skin once daily. Remove & Discard patch within 12 hours or as directed by MD, Disp: 30 patch, Rfl: 0    loratadine (CLARITIN) 10 mg tablet, Take 1 tablet (10 mg total) by mouth once daily., Disp: 90 tablet, Rfl: 3    nebulizer and compressor Paty, Use as directed, Disp: 1 each, Rfl: 0    psyllium husk, aspartame, (METAMUCIL FIBER SINGLES) 3.4 gram PwPk packet, Take 1 packet by mouth once daily., Disp: , Rfl:     solifenacin (VESICARE) 10 MG tablet, Take 0.5 tablets (5 mg total) by mouth once daily., Disp: 30 tablet, Rfl: 11    triamcinolone acetonide 0.025% (KENALOG) 0.025 % Oint, Apply topically once daily to rash on arms and legs as needed, Disp: 80 g, Rfl: 1     Review of  patient's allergies indicates:   Allergen Reactions    Codeine          Past Medical History:   Diagnosis Date    Arthritis     Degenerative arthritis of lumbar spine     Episcleritis of right eye     GERD (gastroesophageal reflux disease)     Hypertension     Shingles     Sleep apnea     Type 2 diabetes mellitus with stage 3 chronic kidney disease, without long-term current use of insulin 2020       Past Surgical History:   Procedure Laterality Date    blepharitis Bilateral 2017    Alessandra Grijalva OD    CATARACT EXTRACTION Bilateral 2017    Alessandra Grijalva MD    CATARACT EXTRACTION W/  INTRAOCULAR LENS IMPLANT Left 2019    Procedure: EXTRACTION, CATARACT, WITH IOL INSERTION;  Surgeon: Alysa De Souza MD;  Location: [facility];  Service: Ophthalmology;  Laterality: Left;    CATARACT EXTRACTION W/  INTRAOCULAR LENS IMPLANT Right 2019    Procedure: EXTRACTION, CATARACT, WITH IOL INSERTION;  Surgeon: Alysa De Souza MD;  Location: [facility];  Service: Ophthalmology;  Laterality: Right;     SECTION      FRACTURE SURGERY      JOINT REPLACEMENT      right knee     KNEE SURGERY Right        Family History   Problem Relation Age of Onset    Hypertension Mother     Stroke Mother     Hypertension Father     Heart attack Neg Hx     Heart disease Neg Hx     Blindness Neg Hx     Glaucoma Neg Hx     Macular degeneration Neg Hx     Retinal detachment Neg Hx        Social History     Socioeconomic History    Marital status: Single   Tobacco Use    Smoking status: Never Smoker    Smokeless tobacco: Never Used   Substance and Sexual Activity    Alcohol use: No    Drug use: No    Sexual activity: Never     Social Determinants of Health     Financial Resource Strain: Medium Risk    Difficulty of Paying Living Expenses: Somewhat hard   Food Insecurity: No Food Insecurity    Worried About Running Out of Food in the Last Year: Never true    Ran Out of Food in the Last Year: Never  true   Transportation Needs: No Transportation Needs    Lack of Transportation (Medical): No    Lack of Transportation (Non-Medical): No   Physical Activity: Inactive    Days of Exercise per Week: 0 days    Minutes of Exercise per Session: 0 min   Stress: No Stress Concern Present    Feeling of Stress : Only a little   Housing Stability: Low Risk     Unable to Pay for Housing in the Last Year: No    Number of Places Lived in the Last Year: 1    Unstable Housing in the Last Year: No           Old medical records.    There were no vitals filed for this visit.           The patient was given open opportunity to ask questions and/or express concerns about treatment plan.   All questions/concerns were discussed.   Driving precautions were provided.     Two patient identifiers used prior to evaluation.    Thank you for referring Kelsey Fields for evaluation.             Past Medical History:   Diagnosis Date    Arthritis     Degenerative arthritis of lumbar spine     Episcleritis of right eye     GERD (gastroesophageal reflux disease)     Hypertension     Shingles     Sleep apnea     Type 2 diabetes mellitus with stage 3 chronic kidney disease, without long-term current use of insulin 2020     Past Surgical History:   Procedure Laterality Date    blepharitis Bilateral 2017    Alessandra Grijalva OD    CATARACT EXTRACTION Bilateral 2017    Alessandra Grijalva MD    CATARACT EXTRACTION W/  INTRAOCULAR LENS IMPLANT Left 2019    Procedure: EXTRACTION, CATARACT, WITH IOL INSERTION;  Surgeon: Alysa De Souza MD;  Location: [facility];  Service: Ophthalmology;  Laterality: Left;    CATARACT EXTRACTION W/  INTRAOCULAR LENS IMPLANT Right 2019    Procedure: EXTRACTION, CATARACT, WITH IOL INSERTION;  Surgeon: Alysa De Souza MD;  Location: [facility];  Service: Ophthalmology;  Laterality: Right;     SECTION      FRACTURE SURGERY      JOINT REPLACEMENT      right knee     KNEE SURGERY Right       Family History   Problem Relation Age of Onset    Hypertension Mother     Stroke Mother     Hypertension Father     Heart attack Neg Hx     Heart disease Neg Hx     Blindness Neg Hx     Glaucoma Neg Hx     Macular degeneration Neg Hx     Retinal detachment Neg Hx      Social History     Socioeconomic History    Marital status: Single   Tobacco Use    Smoking status: Never Smoker    Smokeless tobacco: Never Used   Substance and Sexual Activity    Alcohol use: No    Drug use: No    Sexual activity: Never     Social Determinants of Health     Financial Resource Strain: Medium Risk    Difficulty of Paying Living Expenses: Somewhat hard   Food Insecurity: No Food Insecurity    Worried About Running Out of Food in the Last Year: Never true    Ran Out of Food in the Last Year: Never true   Transportation Needs: No Transportation Needs    Lack of Transportation (Medical): No    Lack of Transportation (Non-Medical): No   Physical Activity: Inactive    Days of Exercise per Week: 0 days    Minutes of Exercise per Session: 0 min   Stress: No Stress Concern Present    Feeling of Stress : Only a little   Housing Stability: Low Risk     Unable to Pay for Housing in the Last Year: No    Number of Places Lived in the Last Year: 1    Unstable Housing in the Last Year: No       Current Outpatient Medications   Medication Sig Dispense Refill    acetaminophen (TYLENOL) 500 MG tablet Take 2 tablets (1,000 mg total) by mouth every 8 (eight) hours as needed for Pain. 300 tablet 3    albuterol (PROVENTIL/VENTOLIN HFA) 90 mcg/actuation inhaler Inhale 2 puffs into the lungs every 6 (six) hours as needed for Wheezing. Rescue 18 g 3    albuterol-ipratropium (DUO-NEB) 2.5 mg-0.5 mg/3 mL nebulizer solution Take 3 mLs by nebulization every 6 (six) hours while awake. Rescue 90 mL 3    amLODIPine (NORVASC) 10 MG tablet Take 1 tablet (10 mg total) by mouth once daily. 90 tablet 3    ammonium lactate 12 % Crea Apply  twice daily to affected parts both feet as needed. 140 g 11    atorvastatin (LIPITOR) 40 MG tablet Take 1 tablet (40 mg total) by mouth once daily. 90 tablet 3    biotin 10 mg Tab Take 1 application by mouth once daily. 90 each 3    cholecalciferol, vitamin D3, (VITAMIN D3) 125 mcg (5,000 unit) Tab Take 1 tablet (5,000 Units total) by mouth every 48 hours. 90 tablet 3    ciclopirox (LOPROX) 0.77 % Crea Apply topically 2 (two) times daily. 90 g 2    ciclopirox (PENLAC) 8 % Soln Apply topically nightly. 6.6 mL 11    cimetidine (TAGAMET) 400 MG tablet Take 1 tablet (400 mg total) by mouth 2 (two) times daily. 180 tablet 3    CMPD Lidocaine 2%, Prilocaine 2%, Lamotrigine 2.5%, Meloxicam 0.09% in Transdermal Gel Apply 1 Pump (1.6 g total) topically 4 (four) times daily. 240 g 3    cyanocobalamin, vitamin B-12, 1,000 mcg TbSR Take 1,000 mcg by mouth once daily. 30 each 0    diclofenac sodium (VOLTAREN) 1 % Gel Apply 4 g topically 4 (four) times daily. Apply 4 g to right knee every 6-8 hours 450 g 3    estradioL (ESTRACE) 0.01 % (0.1 mg/gram) vaginal cream Place 0.5 g vaginally twice a week. Apply to the vagina daily for two weeks then twice a week. 45 g 4    ferrous sulfate (FEOSOL) 325 mg (65 mg iron) Tab tablet Take 1 tablet (325 mg total) by mouth daily with breakfast. 90 tablet 0    fluticasone propionate (FLONASE) 50 mcg/actuation nasal spray 1 spray (50 mcg total) by Each Nostril route once daily. 16 g 3    gabapentin (NEURONTIN) 300 MG capsule Take 1 capsule (300 mg total) by mouth every evening. 90 capsule 3    hydrocortisone 2.5 % cream Apply topically to rash on legs twice daily 28 g 0    inhalat. spacing dev,sm. mask Spcr Use with rescue inhaler 1 each 0    ketoconazole (NIZORAL) 2 % cream Apply under breast topically twice daily for rash 60 g 0    LIDOcaine (LIDODERM) 5 % Place 1 patch onto the skin once daily. Remove & Discard patch within 12 hours or as directed by MD 30 patch 0     loratadine (CLARITIN) 10 mg tablet Take 1 tablet (10 mg total) by mouth once daily. 90 tablet 3    nebulizer and compressor Paty Use as directed 1 each 0    psyllium husk, aspartame, (METAMUCIL FIBER SINGLES) 3.4 gram PwPk packet Take 1 packet by mouth once daily.      solifenacin (VESICARE) 10 MG tablet Take 0.5 tablets (5 mg total) by mouth once daily. 30 tablet 11    triamcinolone acetonide 0.025% (KENALOG) 0.025 % Oint Apply topically once daily to rash on arms and legs as needed 80 g 1     No current facility-administered medications for this visit.     Review of patient's allergies indicates:   Allergen Reactions    Codeine        Review of Systems    Objective:      There were no vitals filed for this visit.  Physical Exam    Lab Review:   CBC:   Lab Results   Component Value Date    WBC 7.03 05/03/2022    RBC 4.39 05/03/2022    HGB 11.9 (L) 05/03/2022    HCT 38.8 05/03/2022     05/03/2022     BMP:   Lab Results   Component Value Date    GLU 89 05/03/2022     05/03/2022    K 4.0 05/03/2022     05/03/2022    CO2 28 05/03/2022    BUN 19 05/03/2022    CREATININE 1.3 05/03/2022    CALCIUM 10.0 05/03/2022     Diagnostics Review: Echo: Reviewed     Assessment:       1. Diabetic autonomic neuropathy associated with type 2 diabetes mellitus    2. Aortic atherosclerosis    3. Cervical myelopathy    4. Shortness of breath    5. Type 2 diabetes mellitus with stage 3 chronic kidney disease, without long-term current use of insulin, unspecified whether stage 3a or 3b CKD    6. Gait abnormality    7. TRISTON (obstructive sleep apnea)        Plan:        The pathophysiology of TRISTON was discussed.   The effects of TRISTON on patient's co-morbid conditions and the increased morbidity and/or mortality associated with this condition were reviewed.   The patient was given open opportunity to ask questions and/or express concerns about treatment plan.   All questions/concerns were discussed.   Driving precautions  were provided.       [employer] has issued a voluntary recall of CPAPs/BIPAPs due to potentially dangerous/carcinogenic particles or gas that could come from a foam insert.   The company recommends that you do not use your machine until it is replaced unless you are on a ventilator.         Unfortunately, [employer] has not given us any information on how severe the problem is or how many patients have been affected.  In this case, it is impossible to say what is more harmful: continuing to use the machine or to leave your sleep apnea untreated while [facility] sorts out what to do with the recalled machines.    There is a bacterial filter that that [employer] is suggests you use if you want to continue with your current machine.   I cannot speak to the safety of this recommendation as I have not seen any data presented confirming this is an appropriate option, and this filter would not prevent exposure to gases from foam insert.  You may purchase this filter from the equipment company that you get your supplies from.   It is a cash pay item, and runs about $5-6.   The filter is good for 1 month.   This does not need a special prescription to get.        You will need to complete a patient registration for a replacement machine, and the link is below.   I have no knowledge as to how long it will take [employer] to get replacement machines to patients, but hear it could take at least a year.  If your current machine is at least 5 years old, we may be able to get a replacement machine made by a different  covered by your insurance, but there may be some out of pocket costs with this alternative.      The sleep medicine providers at [facility] have not been given any more information than what is provided on Ke web site.   It is very difficult to advise you how to proceed with using your current machine or not when we do not know the magnitude of risk that machine is placing you at.   We are  working towards more options and getting more information.   We will continue to update you as to our efforts.    Thank you, Sandra Vo       [email or web address]        The patient location is: home  The chief complaint leading to consultation is: triston    Visit type: audio only    Face to Face time with patient: 16  32 minutes of total time spent on the encounter, which includes face to face time and non-face to face time preparing to see the patient (eg, review of tests), Obtaining and/or reviewing separately obtained history, Documenting clinical information in the electronic or other health record, Independently interpreting results (not separately reported) and communicating results to the patient/family/caregiver, or Care coordination (not separately reported).         Each patient to whom he or she provides medical services by telemedicine is:  (1) informed of the relationship between the physician and patient and the respective role of any other health care provider with respect to management of the patient; and (2) notified that he or she may decline to receive medical services by telemedicine and may withdraw from such care at any time.    Notes:       Thank you for referring Kelsey CORREA Diamond for evaluation.       The patient location is: home  The chief complaint leading to consultation is: TRISTON    Visit type: audio only    Face to Face time with patient: 16  32 minutes of total time spent on the encounter, which includes face to face time and non-face to face time preparing to see the patient (eg, review of tests), Obtaining and/or reviewing separately obtained history, Documenting clinical information in the electronic or other health record, Independently interpreting results (not separately reported) and communicating results to the patient/family/caregiver, or Care coordination (not separately reported).         Each patient to whom he or she  provides medical services by telemedicine is:  (1) informed of the relationship between the physician and patient and the respective role of any other health care provider with respect to management of the patient; and (2) notified that he or she may decline to receive medical services by telemedicine and may withdraw from such care at any time.    Notes:

## 2022-06-17 ENCOUNTER — TELEPHONE (OUTPATIENT)
Dept: PRIMARY CARE CLINIC | Facility: CLINIC | Age: 82
End: 2022-06-17
Payer: MEDICARE

## 2022-06-20 ENCOUNTER — OFFICE VISIT (OUTPATIENT)
Dept: PRIMARY CARE CLINIC | Facility: CLINIC | Age: 82
End: 2022-06-20
Payer: MEDICARE

## 2022-06-20 VITALS
HEIGHT: 60 IN | HEART RATE: 93 BPM | OXYGEN SATURATION: 97 % | RESPIRATION RATE: 16 BRPM | TEMPERATURE: 98 F | DIASTOLIC BLOOD PRESSURE: 84 MMHG | BODY MASS INDEX: 39.98 KG/M2 | WEIGHT: 203.63 LBS | SYSTOLIC BLOOD PRESSURE: 120 MMHG

## 2022-06-20 DIAGNOSIS — Z12.31 ENCOUNTER FOR SCREENING MAMMOGRAM FOR BREAST CANCER: Primary | ICD-10-CM

## 2022-06-20 DIAGNOSIS — K21.9 GASTROESOPHAGEAL REFLUX DISEASE WITHOUT ESOPHAGITIS: ICD-10-CM

## 2022-06-20 DIAGNOSIS — B02.23 NEUROPATHY DUE TO HERPES ZOSTER: ICD-10-CM

## 2022-06-20 DIAGNOSIS — N18.30 TYPE 2 DIABETES MELLITUS WITH STAGE 3 CHRONIC KIDNEY DISEASE, WITHOUT LONG-TERM CURRENT USE OF INSULIN, UNSPECIFIED WHETHER STAGE 3A OR 3B CKD: ICD-10-CM

## 2022-06-20 DIAGNOSIS — I10 ESSENTIAL HYPERTENSION: ICD-10-CM

## 2022-06-20 DIAGNOSIS — E78.2 MIXED HYPERLIPIDEMIA: ICD-10-CM

## 2022-06-20 DIAGNOSIS — E11.22 TYPE 2 DIABETES MELLITUS WITH STAGE 3 CHRONIC KIDNEY DISEASE, WITHOUT LONG-TERM CURRENT USE OF INSULIN, UNSPECIFIED WHETHER STAGE 3A OR 3B CKD: ICD-10-CM

## 2022-06-20 PROCEDURE — 3079F DIAST BP 80-89 MM HG: CPT | Mod: CPTII,S$GLB,, | Performed by: NURSE PRACTITIONER

## 2022-06-20 PROCEDURE — 1159F PR MEDICATION LIST DOCUMENTED IN MEDICAL RECORD: ICD-10-PCS | Mod: CPTII,S$GLB,, | Performed by: NURSE PRACTITIONER

## 2022-06-20 PROCEDURE — 1126F PR PAIN SEVERITY QUANTIFIED, NO PAIN PRESENT: ICD-10-PCS | Mod: CPTII,S$GLB,, | Performed by: NURSE PRACTITIONER

## 2022-06-20 PROCEDURE — 1159F MED LIST DOCD IN RCRD: CPT | Mod: CPTII,S$GLB,, | Performed by: NURSE PRACTITIONER

## 2022-06-20 PROCEDURE — 1157F PR ADVANCE CARE PLAN OR EQUIV PRESENT IN MEDICAL RECORD: ICD-10-PCS | Mod: CPTII,S$GLB,, | Performed by: NURSE PRACTITIONER

## 2022-06-20 PROCEDURE — 1101F PR PT FALLS ASSESS DOC 0-1 FALLS W/OUT INJ PAST YR: ICD-10-PCS | Mod: CPTII,S$GLB,, | Performed by: NURSE PRACTITIONER

## 2022-06-20 PROCEDURE — 3074F SYST BP LT 130 MM HG: CPT | Mod: CPTII,S$GLB,, | Performed by: NURSE PRACTITIONER

## 2022-06-20 PROCEDURE — 3074F PR MOST RECENT SYSTOLIC BLOOD PRESSURE < 130 MM HG: ICD-10-PCS | Mod: CPTII,S$GLB,, | Performed by: NURSE PRACTITIONER

## 2022-06-20 PROCEDURE — 1160F RVW MEDS BY RX/DR IN RCRD: CPT | Mod: CPTII,S$GLB,, | Performed by: NURSE PRACTITIONER

## 2022-06-20 PROCEDURE — 3079F PR MOST RECENT DIASTOLIC BLOOD PRESSURE 80-89 MM HG: ICD-10-PCS | Mod: CPTII,S$GLB,, | Performed by: NURSE PRACTITIONER

## 2022-06-20 PROCEDURE — 99214 OFFICE O/P EST MOD 30 MIN: CPT | Mod: S$GLB,,, | Performed by: NURSE PRACTITIONER

## 2022-06-20 PROCEDURE — 1126F AMNT PAIN NOTED NONE PRSNT: CPT | Mod: CPTII,S$GLB,, | Performed by: NURSE PRACTITIONER

## 2022-06-20 PROCEDURE — 1101F PT FALLS ASSESS-DOCD LE1/YR: CPT | Mod: CPTII,S$GLB,, | Performed by: NURSE PRACTITIONER

## 2022-06-20 PROCEDURE — 3288F FALL RISK ASSESSMENT DOCD: CPT | Mod: CPTII,S$GLB,, | Performed by: NURSE PRACTITIONER

## 2022-06-20 PROCEDURE — 1160F PR REVIEW ALL MEDS BY PRESCRIBER/CLIN PHARMACIST DOCUMENTED: ICD-10-PCS | Mod: CPTII,S$GLB,, | Performed by: NURSE PRACTITIONER

## 2022-06-20 PROCEDURE — 3072F LOW RISK FOR RETINOPATHY: CPT | Mod: CPTII,S$GLB,, | Performed by: NURSE PRACTITIONER

## 2022-06-20 PROCEDURE — 99214 PR OFFICE/OUTPT VISIT, EST, LEVL IV, 30-39 MIN: ICD-10-PCS | Mod: S$GLB,,, | Performed by: NURSE PRACTITIONER

## 2022-06-20 PROCEDURE — 3288F PR FALLS RISK ASSESSMENT DOCUMENTED: ICD-10-PCS | Mod: CPTII,S$GLB,, | Performed by: NURSE PRACTITIONER

## 2022-06-20 PROCEDURE — 1157F ADVNC CARE PLAN IN RCRD: CPT | Mod: CPTII,S$GLB,, | Performed by: NURSE PRACTITIONER

## 2022-06-20 PROCEDURE — 3072F PR LOW RISK FOR RETINOPATHY: ICD-10-PCS | Mod: CPTII,S$GLB,, | Performed by: NURSE PRACTITIONER

## 2022-06-20 RX ORDER — CIMETIDINE 400 MG/1
400 TABLET, FILM COATED ORAL 2 TIMES DAILY
Qty: 180 TABLET | Refills: 3 | Status: SHIPPED | OUTPATIENT
Start: 2022-06-20 | End: 2022-09-20 | Stop reason: SDUPTHER

## 2022-06-20 RX ORDER — ATORVASTATIN CALCIUM 40 MG/1
40 TABLET, FILM COATED ORAL DAILY
Qty: 90 TABLET | Refills: 3 | Status: SHIPPED | OUTPATIENT
Start: 2022-06-20 | End: 2024-09-24

## 2022-06-20 RX ORDER — GABAPENTIN 300 MG/1
300 CAPSULE ORAL NIGHTLY
Qty: 90 CAPSULE | Refills: 3 | Status: SHIPPED | OUTPATIENT
Start: 2022-06-20

## 2022-06-20 RX ORDER — AMLODIPINE BESYLATE 10 MG/1
10 TABLET ORAL DAILY
Qty: 90 TABLET | Refills: 3 | Status: SHIPPED | OUTPATIENT
Start: 2022-06-20 | End: 2022-09-20

## 2022-06-20 RX ORDER — SOLIFENACIN SUCCINATE 10 MG/1
5 TABLET, FILM COATED ORAL DAILY
Qty: 30 TABLET | Refills: 11 | Status: SHIPPED | OUTPATIENT
Start: 2022-06-20 | End: 2022-09-20

## 2022-06-21 NOTE — ASSESSMENT & PLAN NOTE
She is diet controlled at this time with an HBA1C of 5.8.  Last GFR is 38.  Continue reinforcing diet and exercise.  She was still working until 2 months ago when she was laid off.

## 2022-06-21 NOTE — PROGRESS NOTES
Primary Care Provider Appointment- [facility]    Subjective      Patient ID: Kelsey Fields is a 81 y.o. female.    Chief Complaint: Follow-up (Right breast pain /M[facility] referral/Slight sore throat)    HPI:  This is an 82 y/o female who is here with complaints of a sore throat and rt breast pain which is along the tract of a previous Shingles outbreak.  Otherwise, she feels great.  Her PMHX is noted below and has had no change in her regimen.  She is not on CPAP d/t a recall on her machine and their has not been another machine issued.  She is however seeing Dr. Vo who is following up with the status of her CPAP machine.      Past Medical History:   Diagnosis Date    Arthritis     Degenerative arthritis of lumbar spine     Episcleritis of right eye     GERD (gastroesophageal reflux disease)     Hypertension     Shingles     Sleep apnea     Type 2 diabetes mellitus with stage 3 chronic kidney disease, without long-term current use of insulin 2020       Past Surgical History:   Procedure Laterality Date    blepharitis Bilateral 2017    Alessandra Grijalva OD    CATARACT EXTRACTION Bilateral 2017    Alessandra Grijalva MD    CATARACT EXTRACTION W/  INTRAOCULAR LENS IMPLANT Left 2019    Procedure: EXTRACTION, CATARACT, WITH IOL INSERTION;  Surgeon: Alysa De Souza MD;  Location: [facility] OR;  Service: Ophthalmology;  Laterality: Left;    CATARACT EXTRACTION W/  INTRAOCULAR LENS IMPLANT Right 2019    Procedure: EXTRACTION, CATARACT, WITH IOL INSERTION;  Surgeon: Alysa De Souza MD;  Location: [facility] OR;  Service: Ophthalmology;  Laterality: Right;     SECTION      FRACTURE SURGERY      JOINT REPLACEMENT      right knee     KNEE SURGERY Right        Family History   Problem Relation Age of Onset    Hypertension Mother     Stroke Mother     Hypertension Father     Heart attack Neg Hx     Heart disease Neg Hx     Blindness Neg Hx     Glaucoma Neg Hx     Macular degeneration Neg Hx      Retinal detachment Neg Hx        Social History     Socioeconomic History    Marital status: Single   Tobacco Use    Smoking status: Never Smoker    Smokeless tobacco: Never Used   Substance and Sexual Activity    Alcohol use: No    Drug use: No    Sexual activity: Never     Social Determinants of Health     Financial Resource Strain: Medium Risk    Difficulty of Paying Living Expenses: Somewhat hard   Food Insecurity: No Food Insecurity    Worried About Running Out of Food in the Last Year: Never true    Ran Out of Food in the Last Year: Never true   Transportation Needs: No Transportation Needs    Lack of Transportation (Medical): No    Lack of Transportation (Non-Medical): No   Physical Activity: Inactive    Days of Exercise per Week: 0 days    Minutes of Exercise per Session: 0 min   Stress: No Stress Concern Present    Feeling of Stress : Only a little   Housing Stability: Low Risk     Unable to Pay for Housing in the Last Year: No    Number of Places Lived in the Last Year: 1    Unstable Housing in the Last Year: No       Current Outpatient Medications   Medication Sig Dispense Refill    acetaminophen (TYLENOL) 500 MG tablet Take 2 tablets (1,000 mg total) by mouth every 8 (eight) hours as needed for Pain. 300 tablet 3    albuterol (PROVENTIL/VENTOLIN HFA) 90 mcg/actuation inhaler Inhale 2 puffs into the lungs every 6 (six) hours as needed for Wheezing. Rescue 18 g 3    albuterol-ipratropium (DUO-NEB) 2.5 mg-0.5 mg/3 mL nebulizer solution Take 3 mLs by nebulization every 6 (six) hours while awake. Rescue 90 mL 3    ammonium lactate 12 % Crea Apply twice daily to affected parts both feet as needed. 140 g 11    biotin 10 mg Tab Take 1 application by mouth once daily. 90 each 3    cholecalciferol, vitamin D3, (VITAMIN D3) 125 mcg (5,000 unit) Tab Take 1 tablet (5,000 Units total) by mouth every 48 hours. 90 tablet 3    ciclopirox (LOPROX) 0.77 % Crea Apply topically 2 (two) times  daily. 90 g 2    ciclopirox (PENLAC) 8 % Soln Apply topically nightly. 6.6 mL 11    CMPD Lidocaine 2%, Prilocaine 2%, Lamotrigine 2.5%, Meloxicam 0.09% in Transdermal Gel Apply 1 Pump (1.6 g total) topically 4 (four) times daily. 240 g 3    cyanocobalamin, vitamin B-12, 1,000 mcg TbSR Take 1,000 mcg by mouth once daily. 30 each 0    diclofenac sodium (VOLTAREN) 1 % Gel Apply 4 g topically 4 (four) times daily. Apply 4 g to right knee every 6-8 hours 450 g 3    estradioL (ESTRACE) 0.01 % (0.1 mg/gram) vaginal cream Place 0.5 g vaginally twice a week. Apply to the vagina daily for two weeks then twice a week. 45 g 4    ferrous sulfate (FEOSOL) 325 mg (65 mg iron) Tab tablet Take 1 tablet (325 mg total) by mouth daily with breakfast. 90 tablet 0    fluticasone propionate (FLONASE) 50 mcg/actuation nasal spray 1 spray (50 mcg total) by Each Nostril route once daily. 16 g 3    hydrocortisone 2.5 % cream Apply topically to rash on legs twice daily 28 g 0    inhalat. spacing dev,sm. mask Spcr Use with rescue inhaler 1 each 0    ketoconazole (NIZORAL) 2 % cream Apply under breast topically twice daily for rash 60 g 0    LIDOcaine (LIDODERM) 5 % Place 1 patch onto the skin once daily. Remove & Discard patch within 12 hours or as directed by MD 30 patch 0    loratadine (CLARITIN) 10 mg tablet Take 1 tablet (10 mg total) by mouth once daily. 90 tablet 3    nebulizer and compressor Paty Use as directed 1 each 0    psyllium husk, aspartame, (METAMUCIL FIBER SINGLES) 3.4 gram PwPk packet Take 1 packet by mouth once daily.      triamcinolone acetonide 0.025% (KENALOG) 0.025 % Oint Apply topically once daily to rash on arms and legs as needed 80 g 1    amLODIPine (NORVASC) 10 MG tablet Take 1 tablet (10 mg total) by mouth once daily. 90 tablet 3    atorvastatin (LIPITOR) 40 MG tablet Take 1 tablet (40 mg total) by mouth once daily. 90 tablet 3    cimetidine (TAGAMET) 400 MG tablet Take 1 tablet (400 mg total) by  mouth 2 (two) times daily. 180 tablet 3    gabapentin (NEURONTIN) 300 MG capsule Take 1 capsule (300 mg total) by mouth every evening. 90 capsule 3    solifenacin (VESICARE) 10 MG tablet Take 0.5 tablets (5 mg total) by mouth once daily. 30 tablet 11     No current facility-administered medications for this visit.       Review of patient's allergies indicates:   Allergen Reactions    Codeine         Review of Systems   Constitutional: Negative.    HENT: Positive for sore throat.    Eyes: Negative.    Respiratory: Negative.    Cardiovascular: Negative.    Gastrointestinal: Negative.    Genitourinary: Negative.    Musculoskeletal: Negative.    Skin: Negative.    Neurological:        Rt side breast pain along the tract from previous flare-up of shingles.   Endo/Heme/Allergies: Negative.    Psychiatric/Behavioral: Negative.           Objective:   /84 (BP Location: Left arm, Patient Position: Sitting, BP Method: Medium (Manual))   Pulse 93   Temp 98.1 °F (36.7 °C) (Oral)   Resp 16   Ht 5' (1.524 m)   Wt 92.4 kg (203 lb 9.5 oz)   SpO2 97%   BMI 39.76 kg/m²     Physical Exam  Constitutional:       Appearance: Normal appearance. She is obese.   HENT:      Head: Normocephalic and atraumatic.      Nose: Nose normal.      Mouth/Throat:      Mouth: Mucous membranes are moist.      Pharynx: Oropharynx is clear.   Eyes:      Extraocular Movements: Extraocular movements intact.      Conjunctiva/sclera: Conjunctivae normal.      Pupils: Pupils are equal, round, and reactive to light.   Cardiovascular:      Rate and Rhythm: Normal rate and regular rhythm.      Pulses: Normal pulses.      Heart sounds: Normal heart sounds.   Pulmonary:      Effort: Pulmonary effort is normal.      Breath sounds: Normal breath sounds.   Abdominal:      General: Abdomen is flat. Bowel sounds are normal.      Palpations: Abdomen is soft.   Musculoskeletal:         General: Normal range of motion.      Cervical back: Normal range of  motion and neck supple.   Skin:     General: Skin is warm and dry.      Capillary Refill: Capillary refill takes 2 to 3 seconds.   Neurological:      General: No focal deficit present.      Mental Status: She is alert and oriented to person, place, and time. Mental status is at baseline.   Psychiatric:         Mood and Affect: Mood normal.         Behavior: Behavior normal.         Thought Content: Thought content normal.         Judgment: Judgment normal.              Lab Results   Component Value Date    WBC 7.03 05/03/2022    HGB 11.9 (L) 05/03/2022    HCT 38.8 05/03/2022     05/03/2022    CHOL 146 01/13/2021    TRIG 65 01/13/2021    HDL 46 01/13/2021    ALT 11 01/13/2021    AST 18 01/13/2021     05/03/2022    K 4.0 05/03/2022     05/03/2022    CREATININE 1.3 05/03/2022    BUN 19 05/03/2022    CO2 28 05/03/2022    TSH 0.770 05/03/2022    HGBA1C 5.8 (H) 05/03/2022       Current Outpatient Medications on File Prior to Visit   Medication Sig Dispense Refill    acetaminophen (TYLENOL) 500 MG tablet Take 2 tablets (1,000 mg total) by mouth every 8 (eight) hours as needed for Pain. 300 tablet 3    albuterol (PROVENTIL/VENTOLIN HFA) 90 mcg/actuation inhaler Inhale 2 puffs into the lungs every 6 (six) hours as needed for Wheezing. Rescue 18 g 3    albuterol-ipratropium (DUO-NEB) 2.5 mg-0.5 mg/3 mL nebulizer solution Take 3 mLs by nebulization every 6 (six) hours while awake. Rescue 90 mL 3    ammonium lactate 12 % Crea Apply twice daily to affected parts both feet as needed. 140 g 11    biotin 10 mg Tab Take 1 application by mouth once daily. 90 each 3    cholecalciferol, vitamin D3, (VITAMIN D3) 125 mcg (5,000 unit) Tab Take 1 tablet (5,000 Units total) by mouth every 48 hours. 90 tablet 3    ciclopirox (LOPROX) 0.77 % Crea Apply topically 2 (two) times daily. 90 g 2    ciclopirox (PENLAC) 8 % Soln Apply topically nightly. 6.6 mL 11    CMPD Lidocaine 2%, Prilocaine 2%, Lamotrigine 2.5%,  Meloxicam 0.09% in Transdermal Gel Apply 1 Pump (1.6 g total) topically 4 (four) times daily. 240 g 3    cyanocobalamin, vitamin B-12, 1,000 mcg TbSR Take 1,000 mcg by mouth once daily. 30 each 0    diclofenac sodium (VOLTAREN) 1 % Gel Apply 4 g topically 4 (four) times daily. Apply 4 g to right knee every 6-8 hours 450 g 3    estradioL (ESTRACE) 0.01 % (0.1 mg/gram) vaginal cream Place 0.5 g vaginally twice a week. Apply to the vagina daily for two weeks then twice a week. 45 g 4    ferrous sulfate (FEOSOL) 325 mg (65 mg iron) Tab tablet Take 1 tablet (325 mg total) by mouth daily with breakfast. 90 tablet 0    fluticasone propionate (FLONASE) 50 mcg/actuation nasal spray 1 spray (50 mcg total) by Each Nostril route once daily. 16 g 3    hydrocortisone 2.5 % cream Apply topically to rash on legs twice daily 28 g 0    inhalat. spacing dev,sm. mask Spcr Use with rescue inhaler 1 each 0    ketoconazole (NIZORAL) 2 % cream Apply under breast topically twice daily for rash 60 g 0    LIDOcaine (LIDODERM) 5 % Place 1 patch onto the skin once daily. Remove & Discard patch within 12 hours or as directed by MD 30 patch 0    loratadine (CLARITIN) 10 mg tablet Take 1 tablet (10 mg total) by mouth once daily. 90 tablet 3    nebulizer and compressor Paty Use as directed 1 each 0    psyllium husk, aspartame, (METAMUCIL FIBER SINGLES) 3.4 gram PwPk packet Take 1 packet by mouth once daily.      triamcinolone acetonide 0.025% (KENALOG) 0.025 % Oint Apply topically once daily to rash on arms and legs as needed 80 g 1     No current facility-administered medications on file prior to visit.         Assessment:   81 y.o. female with multiple co-morbid illnesses here for F/U w/ PCP and continue work-up of chronic issues notably.     1. Encounter for screening mammogram for breast cancer  Mammo Digital Screening Bilat   2. Essential hypertension  amLODIPine (NORVASC) 10 MG tablet   3. Mixed hyperlipidemia  atorvastatin  (LIPITOR) 40 MG tablet   4. Gastroesophageal reflux disease without esophagitis  cimetidine (TAGAMET) 400 MG tablet   5. 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 Type 2 diabetes mellitus with stage 3 chronic kidney disease, without long-term current use of insulin, unspecified whether stage 3a or 3b CKD  gabapentin (NEURONTIN) 300 MG capsule        Plan:     1. Encounter for screening mammogram for breast cancer  -     Mammo Digital Screening Bilat    2. Essential hypertension  Overview:  BP well-controlled on CCB and ARB    Orders:  -     amLODIPine (NORVASC) 10 MG tablet    3. Mixed hyperlipidemia  Overview:  Elevated ASCVD, newly compliant with high-intensity statin    Orders:  -     atorvastatin (LIPITOR) 40 MG tablet    4. Gastroesophageal reflux disease without esophagitis  Overview:  Uncontrolled reflux on cimetidine 200mg    Orders:  -     cimetidine (TAGAMET) 400 MG tablet    5. 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 Type 2 diabetes mellitus with stage 3 chronic kidney disease, without long-term current use of insulin, unspecified whether stage 3a or 3b CKD  Overview:  CHIN and CKD with DM    Assessment & Plan:  She is diet controlled at this time with an HBA1C of 5.8.  Last GFR is 38.  Continue reinforcing diet and exercise.  She was still working until 2 months ago when she was laid off.    Orders:  -     gabapentin (NEURONTIN) 300 MG capsule    Other orders  -     solifenacin (VESICARE) 10 MG tablet     Health Maintenance       Date Due Completion Date    TETANUS VACCINE Never done ---    DEXA Scan 05/16/2021 5/16/2019    Eye Exam 03/03/2022 3/3/2021    Hemoglobin A1c 11/03/2022 5/3/2022    Foot Exam 02/15/2023 2/15/2022    Diabetes Urine Screening 05/03/2023 5/3/2022          Follow up in about 3 months (around 9/20/2022). 60 min spent with patient in  face to face encounter. The following issues were addressed diagnosis, medications, labs, diagnostic test and health maintenance.      Dr. Cheryle Hurst, DNP, MSN, CHFN, ACNP, APRN, B.C.  [facility]  [facility]  [facility] [phone]

## 2022-06-21 NOTE — PROGRESS NOTES
Patient, Kelsey Fields (MRN #[id number]), presented with a recorded BMI of 39.76 kg/m^2 and a documented comorbidity(s):  - Diabetes Mellitus Type 2  - Hypertension  - Hyperlipidemia  to which the severe obesity is a contributing factor. This is consistent with the definition of severe obesity (BMI 35.0-39.9) with comorbidity (ICD-10 E66.01, Z68.35). The patient's severe obesity was monitored, evaluated, addressed and/or treated. This addendum to the medical record is made on 06/21/2022.

## 2022-06-21 NOTE — PROGRESS NOTES
Patient, Kelsey Fields (MRN #[id number]), presented with a recorded BMI of 39.76 kg/m^2 and a documented comorbidity(s):  - Diabetes Mellitus Type 2  - Hypertension  - Hyperlipidemia  to which the severe obesity is a contributing factor. This is consistent with the definition of severe obesity (BMI 35.0-39.9) with comorbidity (ICD-10 E66.01, Z68.35). The patient's severe obesity was monitored, evaluated, addressed and/or treated. This addendum to the medical record is made on 06/21/2022.

## 2022-09-20 ENCOUNTER — OFFICE VISIT (OUTPATIENT)
Dept: PRIMARY CARE CLINIC | Facility: CLINIC | Age: 82
End: 2022-09-20
Payer: MEDICARE

## 2022-09-20 VITALS
SYSTOLIC BLOOD PRESSURE: 106 MMHG | HEIGHT: 60 IN | DIASTOLIC BLOOD PRESSURE: 54 MMHG | BODY MASS INDEX: 39.71 KG/M2 | WEIGHT: 202.25 LBS | HEART RATE: 77 BPM | RESPIRATION RATE: 20 BRPM | OXYGEN SATURATION: 97 %

## 2022-09-20 DIAGNOSIS — K21.9 GASTROESOPHAGEAL REFLUX DISEASE WITHOUT ESOPHAGITIS: ICD-10-CM

## 2022-09-20 DIAGNOSIS — E11.22 TYPE 2 DIABETES MELLITUS WITH STAGE 3 CHRONIC KIDNEY DISEASE, WITHOUT LONG-TERM CURRENT USE OF INSULIN, UNSPECIFIED WHETHER STAGE 3A OR 3B CKD: Primary | ICD-10-CM

## 2022-09-20 DIAGNOSIS — E66.01 MORBID OBESITY: ICD-10-CM

## 2022-09-20 DIAGNOSIS — M48.10 DISH (DIFFUSE IDIOPATHIC SKELETAL HYPEROSTOSIS): ICD-10-CM

## 2022-09-20 DIAGNOSIS — R53.82 CHRONIC FATIGUE: ICD-10-CM

## 2022-09-20 DIAGNOSIS — N18.30 TYPE 2 DIABETES MELLITUS WITH STAGE 3 CHRONIC KIDNEY DISEASE, WITHOUT LONG-TERM CURRENT USE OF INSULIN, UNSPECIFIED WHETHER STAGE 3A OR 3B CKD: Primary | ICD-10-CM

## 2022-09-20 DIAGNOSIS — T14.8XXA WOUND DISCHARGE: ICD-10-CM

## 2022-09-20 DIAGNOSIS — J39.9 UPPER RESPIRATORY DISEASE: ICD-10-CM

## 2022-09-20 DIAGNOSIS — F32.0 CURRENT MILD EPISODE OF MAJOR DEPRESSIVE DISORDER WITHOUT PRIOR EPISODE: ICD-10-CM

## 2022-09-20 PROCEDURE — 3288F PR FALLS RISK ASSESSMENT DOCUMENTED: ICD-10-PCS | Mod: CPTII,S$GLB,, | Performed by: INTERNAL MEDICINE

## 2022-09-20 PROCEDURE — 1101F PR PT FALLS ASSESS DOC 0-1 FALLS W/OUT INJ PAST YR: ICD-10-PCS | Mod: CPTII,S$GLB,, | Performed by: INTERNAL MEDICINE

## 2022-09-20 PROCEDURE — G0008 FLU VACCINE - QUADRIVALENT - ADJUVANTED: ICD-10-PCS | Mod: S$GLB,,, | Performed by: INTERNAL MEDICINE

## 2022-09-20 PROCEDURE — 3078F PR MOST RECENT DIASTOLIC BLOOD PRESSURE < 80 MM HG: ICD-10-PCS | Mod: CPTII,S$GLB,, | Performed by: INTERNAL MEDICINE

## 2022-09-20 PROCEDURE — 1157F ADVNC CARE PLAN IN RCRD: CPT | Mod: CPTII,S$GLB,, | Performed by: INTERNAL MEDICINE

## 2022-09-20 PROCEDURE — 99499 RISK ADDL DX/OHS AUDIT: ICD-10-PCS | Mod: S$GLB,,, | Performed by: INTERNAL MEDICINE

## 2022-09-20 PROCEDURE — 99213 OFFICE O/P EST LOW 20 MIN: CPT | Mod: S$GLB,,, | Performed by: INTERNAL MEDICINE

## 2022-09-20 PROCEDURE — 1125F PR PAIN SEVERITY QUANTIFIED, PAIN PRESENT: ICD-10-PCS | Mod: CPTII,S$GLB,, | Performed by: INTERNAL MEDICINE

## 2022-09-20 PROCEDURE — 99213 PR OFFICE/OUTPT VISIT, EST, LEVL III, 20-29 MIN: ICD-10-PCS | Mod: S$GLB,,, | Performed by: INTERNAL MEDICINE

## 2022-09-20 PROCEDURE — 3074F SYST BP LT 130 MM HG: CPT | Mod: CPTII,S$GLB,, | Performed by: INTERNAL MEDICINE

## 2022-09-20 PROCEDURE — 1101F PT FALLS ASSESS-DOCD LE1/YR: CPT | Mod: CPTII,S$GLB,, | Performed by: INTERNAL MEDICINE

## 2022-09-20 PROCEDURE — 3072F LOW RISK FOR RETINOPATHY: CPT | Mod: CPTII,S$GLB,, | Performed by: INTERNAL MEDICINE

## 2022-09-20 PROCEDURE — 1125F AMNT PAIN NOTED PAIN PRSNT: CPT | Mod: CPTII,S$GLB,, | Performed by: INTERNAL MEDICINE

## 2022-09-20 PROCEDURE — 3072F PR LOW RISK FOR RETINOPATHY: ICD-10-PCS | Mod: CPTII,S$GLB,, | Performed by: INTERNAL MEDICINE

## 2022-09-20 PROCEDURE — 99499 UNLISTED E&M SERVICE: CPT | Mod: S$GLB,,, | Performed by: INTERNAL MEDICINE

## 2022-09-20 PROCEDURE — G0008 ADMIN INFLUENZA VIRUS VAC: HCPCS | Mod: S$GLB,,, | Performed by: INTERNAL MEDICINE

## 2022-09-20 PROCEDURE — 1157F PR ADVANCE CARE PLAN OR EQUIV PRESENT IN MEDICAL RECORD: ICD-10-PCS | Mod: CPTII,S$GLB,, | Performed by: INTERNAL MEDICINE

## 2022-09-20 PROCEDURE — 3074F PR MOST RECENT SYSTOLIC BLOOD PRESSURE < 130 MM HG: ICD-10-PCS | Mod: CPTII,S$GLB,, | Performed by: INTERNAL MEDICINE

## 2022-09-20 PROCEDURE — 3078F DIAST BP <80 MM HG: CPT | Mod: CPTII,S$GLB,, | Performed by: INTERNAL MEDICINE

## 2022-09-20 PROCEDURE — 90694 VACC AIIV4 NO PRSRV 0.5ML IM: CPT | Mod: S$GLB,,, | Performed by: INTERNAL MEDICINE

## 2022-09-20 PROCEDURE — 90694 FLU VACCINE - QUADRIVALENT - ADJUVANTED: ICD-10-PCS | Mod: S$GLB,,, | Performed by: INTERNAL MEDICINE

## 2022-09-20 PROCEDURE — 3288F FALL RISK ASSESSMENT DOCD: CPT | Mod: CPTII,S$GLB,, | Performed by: INTERNAL MEDICINE

## 2022-09-20 RX ORDER — CIMETIDINE 400 MG/1
400 TABLET, FILM COATED ORAL 2 TIMES DAILY
Qty: 180 TABLET | Refills: 3 | Status: SHIPPED | OUTPATIENT
Start: 2022-09-20 | End: 2023-09-20

## 2022-09-20 RX ORDER — LORATADINE 10 MG/1
10 TABLET ORAL DAILY
Qty: 90 TABLET | Refills: 3 | Status: SHIPPED | OUTPATIENT
Start: 2022-09-20 | End: 2023-09-20

## 2022-09-20 NOTE — ASSESSMENT & PLAN NOTE
Unclear if she is using gabapentin, does not have a new CPAP   Avoid gapabentin   Does not have CPAP   Does not have money to buy one over the counter

## 2022-09-20 NOTE — ASSESSMENT & PLAN NOTE
"Depressive symptoms. Recent loss of job, and her brother-in-law . She does not want to talk to anyone. "I just pray."  · Not wanting counseling  "

## 2022-09-20 NOTE — PATIENT INSTRUCTIONS
TODAY:  - flu vaccine  - stop taking amlodipine  - come back in 1 week for a BP check  - optometry  - CT abdomen  - refilled meds

## 2022-09-27 ENCOUNTER — OFFICE VISIT (OUTPATIENT)
Dept: PRIMARY CARE CLINIC | Facility: CLINIC | Age: 82
End: 2022-09-27
Attending: INTERNAL MEDICINE
Payer: MEDICARE

## 2022-09-27 ENCOUNTER — HOSPITAL ENCOUNTER (OUTPATIENT)
Dept: RADIOLOGY | Facility: HOSPITAL | Age: 82
Discharge: HOME | End: 2022-09-27
Attending: INTERNAL MEDICINE
Payer: MEDICARE

## 2022-09-27 VITALS
DIASTOLIC BLOOD PRESSURE: 64 MMHG | HEART RATE: 76 BPM | HEIGHT: 60 IN | OXYGEN SATURATION: 99 % | BODY MASS INDEX: 39.3 KG/M2 | TEMPERATURE: 98 F | SYSTOLIC BLOOD PRESSURE: 132 MMHG | WEIGHT: 200.19 LBS

## 2022-09-27 DIAGNOSIS — I10 ESSENTIAL HYPERTENSION: ICD-10-CM

## 2022-09-27 DIAGNOSIS — T14.8XXA WOUND DISCHARGE: ICD-10-CM

## 2022-09-27 DIAGNOSIS — H57.89 EYE DISCHARGE: Primary | ICD-10-CM

## 2022-09-27 DIAGNOSIS — R19.8 ABDOMINAL DISORDERS: ICD-10-CM

## 2022-09-27 PROCEDURE — 74150 CT ABDOMEN W/O CONTRAST: CPT | Mod: 26,,, | Performed by: RADIOLOGY

## 2022-09-27 PROCEDURE — 74150 CT ABDOMEN W/O CONTRAST: CPT | Mod: TC

## 2022-09-27 PROCEDURE — 1157F PR ADVANCE CARE PLAN OR EQUIV PRESENT IN MEDICAL RECORD: ICD-10-PCS | Mod: CPTII,S$GLB,, | Performed by: INTERNAL MEDICINE

## 2022-09-27 PROCEDURE — 1126F AMNT PAIN NOTED NONE PRSNT: CPT | Mod: CPTII,S$GLB,, | Performed by: INTERNAL MEDICINE

## 2022-09-27 PROCEDURE — 99499 RISK ADDL DX/OHS AUDIT: ICD-10-PCS | Mod: S$GLB,,, | Performed by: INTERNAL MEDICINE

## 2022-09-27 PROCEDURE — 1126F PR PAIN SEVERITY QUANTIFIED, NO PAIN PRESENT: ICD-10-PCS | Mod: CPTII,S$GLB,, | Performed by: INTERNAL MEDICINE

## 2022-09-27 PROCEDURE — 3078F DIAST BP <80 MM HG: CPT | Mod: CPTII,S$GLB,, | Performed by: INTERNAL MEDICINE

## 2022-09-27 PROCEDURE — 3078F PR MOST RECENT DIASTOLIC BLOOD PRESSURE < 80 MM HG: ICD-10-PCS | Mod: CPTII,S$GLB,, | Performed by: INTERNAL MEDICINE

## 2022-09-27 PROCEDURE — 3288F PR FALLS RISK ASSESSMENT DOCUMENTED: ICD-10-PCS | Mod: CPTII,S$GLB,, | Performed by: INTERNAL MEDICINE

## 2022-09-27 PROCEDURE — 3075F PR MOST RECENT SYSTOLIC BLOOD PRESS GE 130-139MM HG: ICD-10-PCS | Mod: CPTII,S$GLB,, | Performed by: INTERNAL MEDICINE

## 2022-09-27 PROCEDURE — 99213 PR OFFICE/OUTPT VISIT, EST, LEVL III, 20-29 MIN: ICD-10-PCS | Mod: S$GLB,,, | Performed by: INTERNAL MEDICINE

## 2022-09-27 PROCEDURE — 99499 UNLISTED E&M SERVICE: CPT | Mod: S$GLB,,, | Performed by: INTERNAL MEDICINE

## 2022-09-27 PROCEDURE — 1101F PT FALLS ASSESS-DOCD LE1/YR: CPT | Mod: CPTII,S$GLB,, | Performed by: INTERNAL MEDICINE

## 2022-09-27 PROCEDURE — 1157F ADVNC CARE PLAN IN RCRD: CPT | Mod: CPTII,S$GLB,, | Performed by: INTERNAL MEDICINE

## 2022-09-27 PROCEDURE — 74150 CT ABDOMEN WITHOUT CONTRAST: ICD-10-PCS | Mod: 26,,, | Performed by: RADIOLOGY

## 2022-09-27 PROCEDURE — 99213 OFFICE O/P EST LOW 20 MIN: CPT | Mod: S$GLB,,, | Performed by: INTERNAL MEDICINE

## 2022-09-27 PROCEDURE — 3072F PR LOW RISK FOR RETINOPATHY: ICD-10-PCS | Mod: CPTII,S$GLB,, | Performed by: INTERNAL MEDICINE

## 2022-09-27 PROCEDURE — 3072F LOW RISK FOR RETINOPATHY: CPT | Mod: CPTII,S$GLB,, | Performed by: INTERNAL MEDICINE

## 2022-09-27 PROCEDURE — 3075F SYST BP GE 130 - 139MM HG: CPT | Mod: CPTII,S$GLB,, | Performed by: INTERNAL MEDICINE

## 2022-09-27 PROCEDURE — 1101F PR PT FALLS ASSESS DOC 0-1 FALLS W/OUT INJ PAST YR: ICD-10-PCS | Mod: CPTII,S$GLB,, | Performed by: INTERNAL MEDICINE

## 2022-09-27 PROCEDURE — 3288F FALL RISK ASSESSMENT DOCD: CPT | Mod: CPTII,S$GLB,, | Performed by: INTERNAL MEDICINE

## 2022-09-27 NOTE — Clinical Note
Hi team Dr De Souza, Ms Fields wants to see Dr De Souza only for her eye discharge. She is concerned that she wakes up with eye discharge. I attempted to encourage her to see optometry, but she only wants to see Dr De Souza.   I hope you can help her get the correct appointment and support her preferences.  Thanks! KARLY (PCP)
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
## 2022-09-28 ENCOUNTER — TELEPHONE (OUTPATIENT)
Dept: PRIMARY CARE CLINIC | Facility: CLINIC | Age: 82
End: 2022-09-28
Payer: MEDICARE

## 2022-09-28 NOTE — TELEPHONE ENCOUNTER
Please let patient know that the CT did not show anything abnormal.     Given that she is having so much weight loss, does she want to pursue a stool test to see if she is losing any blood in her stool? Or mammogram to look for breast cancer?    Thanks,  KJ

## 2022-09-28 NOTE — TELEPHONE ENCOUNTER
Called and spoke with patient.  Informed patient, that Dr. Preciado wanted to let her know that her CT was normal.  Patient verbalized understanding    Asked patient if she would like to do stool test to check for blood in her urine, because she had had so much weight loss.  Patient said that she would be interested in having stool checked. As well as a mammogram.    Please advise

## 2022-09-29 ENCOUNTER — TELEPHONE (OUTPATIENT)
Dept: OPHTHALMOLOGY | Facility: CLINIC | Age: 82
End: 2022-09-29
Payer: MEDICARE

## 2022-10-04 PROBLEM — R19.8: Status: ACTIVE | Noted: 2022-10-04
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
## 2022-10-07 ENCOUNTER — OFFICE VISIT (OUTPATIENT)
Dept: PRIMARY CARE CLINIC | Facility: CLINIC | Age: 82
End: 2022-10-07
Payer: MEDICARE

## 2022-10-07 VITALS
SYSTOLIC BLOOD PRESSURE: 118 MMHG | HEIGHT: 60 IN | DIASTOLIC BLOOD PRESSURE: 58 MMHG | BODY MASS INDEX: 39.9 KG/M2 | HEART RATE: 80 BPM | OXYGEN SATURATION: 98 % | WEIGHT: 203.25 LBS | RESPIRATION RATE: 16 BRPM | TEMPERATURE: 98 F

## 2022-10-07 DIAGNOSIS — I10 ESSENTIAL HYPERTENSION: Primary | ICD-10-CM

## 2022-10-07 DIAGNOSIS — B36.9 FUNGAL INFECTION OF SKIN OF ABDOMEN: ICD-10-CM

## 2022-10-07 DIAGNOSIS — R06.02 SHORTNESS OF BREATH: ICD-10-CM

## 2022-10-07 DIAGNOSIS — Z12.11 COLON CANCER SCREENING: ICD-10-CM

## 2022-10-07 DIAGNOSIS — Z12.39 BREAST SCREENING: ICD-10-CM

## 2022-10-07 DIAGNOSIS — N17.9 AKI (ACUTE KIDNEY INJURY): ICD-10-CM

## 2022-10-07 DIAGNOSIS — Z12.31 BREAST CANCER SCREENING BY MAMMOGRAM: ICD-10-CM

## 2022-10-07 PROCEDURE — 87076 CULTURE ANAEROBE IDENT EACH: CPT | Mod: 59 | Performed by: INTERNAL MEDICINE

## 2022-10-07 PROCEDURE — 87070 CULTURE OTHR SPECIMN AEROBIC: CPT | Performed by: INTERNAL MEDICINE

## 2022-10-07 PROCEDURE — 3074F PR MOST RECENT SYSTOLIC BLOOD PRESSURE < 130 MM HG: ICD-10-PCS | Mod: CPTII,S$GLB,, | Performed by: INTERNAL MEDICINE

## 2022-10-07 PROCEDURE — 1157F PR ADVANCE CARE PLAN OR EQUIV PRESENT IN MEDICAL RECORD: ICD-10-PCS | Mod: CPTII,S$GLB,, | Performed by: INTERNAL MEDICINE

## 2022-10-07 PROCEDURE — 99499 UNLISTED E&M SERVICE: CPT | Mod: S$GLB,,, | Performed by: INTERNAL MEDICINE

## 2022-10-07 PROCEDURE — 1157F ADVNC CARE PLAN IN RCRD: CPT | Mod: CPTII,S$GLB,, | Performed by: INTERNAL MEDICINE

## 2022-10-07 PROCEDURE — 1125F AMNT PAIN NOTED PAIN PRSNT: CPT | Mod: CPTII,S$GLB,, | Performed by: INTERNAL MEDICINE

## 2022-10-07 PROCEDURE — 87102 FUNGUS ISOLATION CULTURE: CPT | Performed by: INTERNAL MEDICINE

## 2022-10-07 PROCEDURE — 99213 PR OFFICE/OUTPT VISIT, EST, LEVL III, 20-29 MIN: ICD-10-PCS | Mod: S$GLB,,, | Performed by: INTERNAL MEDICINE

## 2022-10-07 PROCEDURE — 3072F LOW RISK FOR RETINOPATHY: CPT | Mod: CPTII,S$GLB,, | Performed by: INTERNAL MEDICINE

## 2022-10-07 PROCEDURE — 3072F PR LOW RISK FOR RETINOPATHY: ICD-10-PCS | Mod: CPTII,S$GLB,, | Performed by: INTERNAL MEDICINE

## 2022-10-07 PROCEDURE — 87075 CULTR BACTERIA EXCEPT BLOOD: CPT | Performed by: INTERNAL MEDICINE

## 2022-10-07 PROCEDURE — 3078F PR MOST RECENT DIASTOLIC BLOOD PRESSURE < 80 MM HG: ICD-10-PCS | Mod: CPTII,S$GLB,, | Performed by: INTERNAL MEDICINE

## 2022-10-07 PROCEDURE — 3078F DIAST BP <80 MM HG: CPT | Mod: CPTII,S$GLB,, | Performed by: INTERNAL MEDICINE

## 2022-10-07 PROCEDURE — 1125F PR PAIN SEVERITY QUANTIFIED, PAIN PRESENT: ICD-10-PCS | Mod: CPTII,S$GLB,, | Performed by: INTERNAL MEDICINE

## 2022-10-07 PROCEDURE — 3074F SYST BP LT 130 MM HG: CPT | Mod: CPTII,S$GLB,, | Performed by: INTERNAL MEDICINE

## 2022-10-07 PROCEDURE — 99213 OFFICE O/P EST LOW 20 MIN: CPT | Mod: S$GLB,,, | Performed by: INTERNAL MEDICINE

## 2022-10-07 NOTE — PATIENT INSTRUCTIONS
TODAY:  - infection of abdominal skin (cultures sent)  - mammogram   - stool card  - labs  - we will talk to other specialists about the fluid coming out of her belly button
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
## 2022-10-10 ENCOUNTER — OFFICE VISIT (OUTPATIENT)
Dept: PRIMARY CARE CLINIC | Facility: CLINIC | Age: 82
End: 2022-10-10
Payer: MEDICARE

## 2022-10-10 VITALS — DIASTOLIC BLOOD PRESSURE: 61 MMHG | HEART RATE: 75 BPM | SYSTOLIC BLOOD PRESSURE: 134 MMHG

## 2022-10-10 DIAGNOSIS — F41.9 ANXIETY: ICD-10-CM

## 2022-10-10 LAB — BACTERIA SPEC AEROBE CULT: NORMAL

## 2022-10-10 PROCEDURE — 99212 OFFICE O/P EST SF 10 MIN: CPT | Mod: S$GLB,,, | Performed by: INTERNAL MEDICINE

## 2022-10-10 PROCEDURE — 99499 UNLISTED E&M SERVICE: CPT | Mod: S$GLB,,, | Performed by: INTERNAL MEDICINE

## 2022-10-10 PROCEDURE — 3078F PR MOST RECENT DIASTOLIC BLOOD PRESSURE < 80 MM HG: ICD-10-PCS | Mod: CPTII,S$GLB,, | Performed by: INTERNAL MEDICINE

## 2022-10-10 PROCEDURE — 3075F PR MOST RECENT SYSTOLIC BLOOD PRESS GE 130-139MM HG: ICD-10-PCS | Mod: CPTII,S$GLB,, | Performed by: INTERNAL MEDICINE

## 2022-10-10 PROCEDURE — 3078F DIAST BP <80 MM HG: CPT | Mod: CPTII,S$GLB,, | Performed by: INTERNAL MEDICINE

## 2022-10-10 PROCEDURE — 3072F PR LOW RISK FOR RETINOPATHY: ICD-10-PCS | Mod: CPTII,S$GLB,, | Performed by: INTERNAL MEDICINE

## 2022-10-10 PROCEDURE — 3075F SYST BP GE 130 - 139MM HG: CPT | Mod: CPTII,S$GLB,, | Performed by: INTERNAL MEDICINE

## 2022-10-10 PROCEDURE — 1157F ADVNC CARE PLAN IN RCRD: CPT | Mod: CPTII,S$GLB,, | Performed by: INTERNAL MEDICINE

## 2022-10-10 PROCEDURE — 99212 PR OFFICE/OUTPT VISIT, EST, LEVL II, 10-19 MIN: ICD-10-PCS | Mod: S$GLB,,, | Performed by: INTERNAL MEDICINE

## 2022-10-10 PROCEDURE — 1157F PR ADVANCE CARE PLAN OR EQUIV PRESENT IN MEDICAL RECORD: ICD-10-PCS | Mod: CPTII,S$GLB,, | Performed by: INTERNAL MEDICINE

## 2022-10-10 PROCEDURE — 3072F LOW RISK FOR RETINOPATHY: CPT | Mod: CPTII,S$GLB,, | Performed by: INTERNAL MEDICINE

## 2022-10-10 NOTE — PROGRESS NOTES
"Primary Care Provider Appointment - [facility]  SHARED NOTE: Nancy Ash (NP Student), Dr Todd (Attending)    Subjective:      Patient ID: Kelsey Fields is a 81 y.o. female with HTN, DM type 2.     Chief Complaint: Hypertension    Prior to this visit, patient's last encounter with PCP was 10/7/2022.     10/7/22: Pt was seen and evaluated for 3 day history of HA after eating salty gumbo she purchased from a store. She reported taking her Amlodipine, (which had been discontinued by provider 22) for fear that HA was caused by elevated blood pressure. During that visit, patient reported head pain subsided without intervention while sitting in office. At discharge, she was advised to return the following week if HA returned for further testing and possible imaging.     Today: Pt returned to clinic and states "I'm here for the lab work on my head." Pt denies head pain at present and reports taking her Amlodipine this morning prior to visit. She also denies dizziness, vision changes, fatigue, unilateral weakness. She expressed concern and fear of having a stroke and states "my mother had a stroke in her 50s and was bedridden for over 10 years before she . I don't want that to happen to me." We discussed a CT can be ordered should her headache along with other symptoms concerning for stroke return, but considering her head pain has completely resolved, there is no need for a CT today. Pt verbalized understanding.     Past Surgical History:   Procedure Laterality Date    blepharitis Bilateral 2017    Alessandra Grijalva OD    CATARACT EXTRACTION Bilateral 2017    Alessandra Grijalva MD    CATARACT EXTRACTION W/  INTRAOCULAR LENS IMPLANT Left 2019    Procedure: EXTRACTION, CATARACT, WITH IOL INSERTION;  Surgeon: Alysa De Souza MD;  Location: [facility];  Service: Ophthalmology;  Laterality: Left;    CATARACT EXTRACTION W/  INTRAOCULAR LENS IMPLANT Right 2019    Procedure: EXTRACTION, CATARACT, " WITH IOL INSERTION;  Surgeon: Alysa De Souza MD;  Location: [facility];  Service: Ophthalmology;  Laterality: Right;     SECTION      FRACTURE SURGERY      JOINT REPLACEMENT      right knee     KNEE SURGERY Right        Past Medical History:   Diagnosis Date    Arthritis     Degenerative arthritis of lumbar spine     Episcleritis of right eye     GERD (gastroesophageal reflux disease)     Hypertension     Shingles     Sleep apnea     Type 2 diabetes mellitus with stage 3 chronic kidney disease, without long-term current use of insulin 2020       Social History     Socioeconomic History    Marital status: Single   Tobacco Use    Smoking status: Never    Smokeless tobacco: Never   Substance and Sexual Activity    Alcohol use: No    Drug use: No    Sexual activity: Never     Social Determinants of Health     Financial Resource Strain: Medium Risk    Difficulty of Paying Living Expenses: Somewhat hard   Food Insecurity: No Food Insecurity    Worried About Running Out of Food in the Last Year: Never true    Ran Out of Food in the Last Year: Never true   Transportation Needs: No Transportation Needs    Lack of Transportation (Medical): No    Lack of Transportation (Non-Medical): No   Physical Activity: Inactive    Days of Exercise per Week: 0 days    Minutes of Exercise per Session: 0 min   Stress: No Stress Concern Present    Feeling of Stress : Only a little   Housing Stability: Low Risk     Unable to Pay for Housing in the Last Year: No    Number of Places Lived in the Last Year: 1    Unstable Housing in the Last Year: No       Review of Systems   Constitutional:  Negative for activity change.   Neurological:  Negative for dizziness, speech difficulty, weakness, light-headedness, numbness and headaches.     Objective:   /61 (BP Location: Left arm, Patient Position: Sitting, BP Method: Large (Automatic))   Pulse 75     Physical Exam  Eyes:      General: No visual field deficit.     Pupils: Pupils are  equal, round, and reactive to light.   Neurological:      Mental Status: She is alert and oriented to person, place, and time.      GCS: GCS eye subscore is 4. GCS verbal subscore is 5. GCS motor subscore is 6.      Cranial Nerves: No cranial nerve deficit, dysarthria or facial asymmetry.      Sensory: Sensation is intact.      Motor: Motor function is intact.      Coordination: Coordination is intact.           Lab Results   Component Value Date    WBC 7.03 05/03/2022    HGB 11.9 (L) 05/03/2022    HCT 38.8 05/03/2022     05/03/2022    CHOL 146 01/13/2021    TRIG 65 01/13/2021    HDL 46 01/13/2021    ALT 9 (L) 10/07/2022    AST 18 10/07/2022     10/07/2022    K 4.2 10/07/2022     10/07/2022    CREATININE 1.3 10/07/2022    BUN 19 10/07/2022    CO2 27 10/07/2022    TSH 0.770 05/03/2022    HGBA1C 5.8 (H) 05/03/2022       Current Outpatient Medications on File Prior to Visit   Medication Sig Dispense Refill    acetaminophen (TYLENOL) 500 MG tablet Take 2 tablets (1,000 mg total) by mouth every 8 (eight) hours as needed for Pain. 300 tablet 3    albuterol (PROVENTIL/VENTOLIN HFA) 90 mcg/actuation inhaler Inhale 2 puffs into the lungs every 6 (six) hours as needed for Wheezing. Rescue 18 g 3    albuterol-ipratropium (DUO-NEB) 2.5 mg-0.5 mg/3 mL nebulizer solution Take 3 mLs by nebulization every 6 (six) hours while awake. Rescue 90 mL 3    ammonium lactate 12 % Crea Apply twice daily to affected parts both feet as needed. 140 g 11    atorvastatin (LIPITOR) 40 MG tablet Take 1 tablet (40 mg total) by mouth once daily. 90 tablet 3    biotin 10 mg Tab Take 1 application by mouth once daily. 90 each 3    cholecalciferol, vitamin D3, (VITAMIN D3) 125 mcg (5,000 unit) Tab Take 1 tablet (5,000 Units total) by mouth every 48 hours. 90 tablet 3    ciclopirox (LOPROX) 0.77 % Crea Apply topically 2 (two) times daily. 90 g 2    ciclopirox (PENLAC) 8 % Soln Apply topically nightly. 6.6 mL 11    cimetidine (TAGAMET)  400 MG tablet Take 1 tablet (400 mg total) by mouth 2 (two) times daily. 180 tablet 3    CMPD Lidocaine 2%, Prilocaine 2%, Lamotrigine 2.5%, Meloxicam 0.09% in Transdermal Gel Apply 1 Pump (1.6 g total) topically 4 (four) times daily. 240 g 3    cyanocobalamin, vitamin B-12, 1,000 mcg TbSR Take 1,000 mcg by mouth once daily. 30 each 0    diclofenac sodium (VOLTAREN) 1 % Gel Apply 4 g topically 4 (four) times daily. Apply 4 g to right knee every 6-8 hours 450 g 3    estradioL (ESTRACE) 0.01 % (0.1 mg/gram) vaginal cream Place 0.5 g vaginally twice a week. Apply to the vagina daily for two weeks then twice a week. 45 g 4    ferrous sulfate (FEOSOL) 325 mg (65 mg iron) Tab tablet Take 1 tablet (325 mg total) by mouth daily with breakfast. 90 tablet 0    fluticasone propionate (FLONASE) 50 mcg/actuation nasal spray 1 spray (50 mcg total) by Each Nostril route once daily. 16 g 3    gabapentin (NEURONTIN) 300 MG capsule Take 1 capsule (300 mg total) by mouth every evening. 90 capsule 3    inhalat. spacing dev,sm. mask Spcr Use with rescue inhaler 1 each 0    ketoconazole (NIZORAL) 2 % cream Apply under breast topically twice daily for rash 60 g 0    LIDOcaine (LIDODERM) 5 % Place 1 patch onto the skin once daily. Remove & Discard patch within 12 hours or as directed by MD 30 patch 0    loratadine (CLARITIN) 10 mg tablet Take 1 tablet (10 mg total) by mouth once daily. 90 tablet 3    nebulizer and compressor Paty Use as directed 1 each 0    psyllium husk, aspartame, (METAMUCIL FIBER SINGLES) 3.4 gram PwPk packet Take 1 packet by mouth once daily.      triamcinolone acetonide 0.025% (KENALOG) 0.025 % Oint Apply topically once daily to rash on arms and legs as needed 80 g 1     No current facility-administered medications on file prior to visit.         Assessment:   81 y.o. female with multiple co-morbid illnesses here to follow-up with PCP and continue work-up of chronic issues     Plan:     Problem List Items Addressed  This Visit          Psychiatric    Anxiety     Patient with anxiety over many of her health issues, had recent headache and is now concerned she is having a stroke  Given no quantifiable weight loss, normal BP, and resolution of headache will proceed with routine age-appropriate health screenings  Mammogram upcoming  iFOBT pending            Health Maintenance         Date Due Completion Date    TETANUS VACCINE Never done ---    DEXA Scan 05/16/2021 5/16/2019    Eye Exam 03/03/2022 3/3/2021    COVID-19 Vaccine (5 - Booster for Pfizer series) 06/28/2022 5/3/2022    Hemoglobin A1c 11/03/2022 5/3/2022    Diabetes Urine Screening 05/03/2023 5/3/2022            Future Appointments   Date Time Provider Department Center   10/28/2022 11:30 AM Laurie Todd MD [facility]   12/6/2022 11:30 AM [id number]         Follow up in about 2 weeks (around 10/24/2022). Total clinical care time was 20 min.    Nancy Ash (NP Student)    Laurie Todd MD/MPH  [facility]  [phone] [facility]

## 2022-10-12 LAB
BACTERIA SPEC ANAEROBE CULT: ABNORMAL
BACTERIA SPEC ANAEROBE CULT: ABNORMAL

## 2022-10-16 PROBLEM — F41.9 ANXIETY: Status: ACTIVE | Noted: 2022-10-16

## 2022-10-16 NOTE — ASSESSMENT & PLAN NOTE
Patient with anxiety over many of her health issues, had recent headache and is now concerned she is having a stroke  · Given no quantifiable weight loss, normal BP, and resolution of headache will proceed with routine age-appropriate health screenings  · Mammogram upcoming  · iFOBT pending

## 2022-10-17 ENCOUNTER — TELEPHONE (OUTPATIENT)
Dept: PRIMARY CARE CLINIC | Facility: CLINIC | Age: 82
End: 2022-10-17
Payer: MEDICARE

## 2022-10-17 NOTE — TELEPHONE ENCOUNTER
E-consult sent to ID to discuss next steps for patient's naval discharge and whether this could be a fistula from the GI tract.     Thanks,  KJ

## 2022-10-18 ENCOUNTER — E-CONSULT (OUTPATIENT)
Dept: INFECTIOUS DISEASES | Facility: HOSPITAL | Age: 82
End: 2022-10-18
Payer: MEDICARE

## 2022-10-18 DIAGNOSIS — S30.821A BLISTER OF ABDOMINAL WALL WITH INFECTION, INITIAL ENCOUNTER: Primary | ICD-10-CM

## 2022-10-18 DIAGNOSIS — L08.9 BLISTER OF ABDOMINAL WALL WITH INFECTION, INITIAL ENCOUNTER: Primary | ICD-10-CM

## 2022-10-18 NOTE — PROGRESS NOTES
Infectious Diseases  Response for E-Consult     Patient Name: Kelsey Fields  MRN: [id number]  Primary Care Provider: Laurie Todd MD   Requesting Provider: Laurie Todd, *      Findings:81 year old woman with cultures from abdominal naval region-bacteroides/prevotella.  CT scan of abdomen -non contrast -no obvious fistulous connection   Plan -10 days of po Flagyl 500mg  TID.  If it does not resolve-will need surgery follow up  I did not speak to the requesting provider verbally about this.     Total time of Consultation: 10 minute    Percentage of time spent on verbal/written discussion: 25%     Thank you for your consult.     Billy Vargas MD, [facility]  Infectious Diseases

## 2022-10-19 ENCOUNTER — TELEPHONE (OUTPATIENT)
Dept: PRIMARY CARE CLINIC | Facility: CLINIC | Age: 82
End: 2022-10-19
Payer: MEDICARE

## 2022-10-19 DIAGNOSIS — L03.316 NAVEL CELLULITIS: Primary | ICD-10-CM

## 2022-10-19 RX ORDER — METRONIDAZOLE 500 MG/1
500 TABLET ORAL 3 TIMES DAILY
Qty: 30 TABLET | Refills: 0 | Status: SHIPPED | OUTPATIENT
Start: 2022-10-19 | End: 2022-10-29

## 2022-10-19 NOTE — TELEPHONE ENCOUNTER
----- Message from Elliot aWre sent at 10/19/2022 11:03 AM CDT -----  Regarding: Did not answer call  Called patient twice this morning to discuss giving antibiotics, did not answer. Voicemail inbox is full. Will try to call again in the afternoon.

## 2022-10-19 NOTE — TELEPHONE ENCOUNTER
Please instruct patient that the infectious disease doctor wants her to take antibiotics to treat the discharge from her navel.    Flagyl sent to \[facility]\"" pharmacy.        Thanks,  KJ

## 2022-10-20 NOTE — TELEPHONE ENCOUNTER
----- Message from Elliot Ware sent at 10/19/2022  1:03 PM CDT -----  Regarding: Patient phonecall  Instructed her to complete 10 day course of flagyll per infectious disease recs. She states that she is also having some right breast pain for about 5 days but that today it has not been painful for her. Told her that I will let attending know.

## 2022-10-28 ENCOUNTER — TELEPHONE (OUTPATIENT)
Dept: PRIMARY CARE CLINIC | Facility: CLINIC | Age: 82
End: 2022-10-28
Payer: MEDICARE

## 2022-10-29 NOTE — TELEPHONE ENCOUNTER
Please let patient know that her labs were stable and perfect, just like her!    We missed her at her appt on Friday.    KARLY

## 2022-11-10 LAB — FUNGUS SPEC CULT: NORMAL

## 2022-11-23 ENCOUNTER — PES CALL (OUTPATIENT)
Dept: ADMINISTRATIVE | Facility: CLINIC | Age: 82
End: 2022-11-23
Payer: MEDICARE

## 2022-11-25 ENCOUNTER — TELEPHONE (OUTPATIENT)
Dept: PRIMARY CARE CLINIC | Facility: CLINIC | Age: 82
End: 2022-11-25
Payer: MEDICARE

## 2022-11-25 NOTE — TELEPHONE ENCOUNTER
Please see how patient is doing. She was unable to get her AWV at the location that she wanted. She mentioned to the scheduer that she wanted her mammogram scheduled and other things. Her mammogram is scheduled on 12/6/22.    Here are the other things she needs, does she want me to set her up for these?  DEXA scan  Eye exam  COVID vaccine  A1c    Thanks,  KJ

## 2022-11-25 NOTE — TELEPHONE ENCOUNTER
"----- Message from Vida Bonds sent at 11/23/2022  1:55 PM CST -----  Regarding: Assistance for patient  Good afternoon,    I called the above patient to try to schedule for Enhanced Annual Wellness Visit but I have nothing to offer as she only wanted the [address] only for this visit. She expressed when I offered an option of a Home Visit that she did not want that being her home is in disarray. Upon talking to her she seems to have some things that need to be addressed or spoken to about (her eyes are "stuck" together in the morning, she is not happy with the outcome on some of the questions she has asked, and she did mention about her MAMMO request; I did see that but did not speak on it as I had not gotten her to VERIFY her personal info). She sounds as if she needs assistance but I do not have any options to offer but I made note to "watch" if we get anything to offer before year end.     Thank you,   Patsy"

## 2022-11-28 NOTE — TELEPHONE ENCOUNTER
Spoke to pt, she is aware of appt for mmg. She is at [facility] right now but will call us back tomorrow to discuss other testing needed. Will await a return call from pt.

## 2022-12-06 ENCOUNTER — HOSPITAL ENCOUNTER (OUTPATIENT)
Dept: RADIOLOGY | Facility: HOSPITAL | Age: 82
Discharge: HOME | End: 2022-12-06
Attending: INTERNAL MEDICINE
Payer: MEDICARE

## 2022-12-06 DIAGNOSIS — Z12.31 BREAST CANCER SCREENING BY MAMMOGRAM: ICD-10-CM

## 2022-12-06 DIAGNOSIS — Z12.39 BREAST SCREENING: ICD-10-CM

## 2022-12-06 PROCEDURE — 77063 BREAST TOMOSYNTHESIS BI: CPT | Mod: 26,,, | Performed by: RADIOLOGY

## 2022-12-06 PROCEDURE — 77063 BREAST TOMOSYNTHESIS BI: CPT | Mod: TC

## 2022-12-06 PROCEDURE — 77063 MAMMO DIGITAL SCREENING BILAT WITH TOMO: ICD-10-PCS | Mod: 26,,, | Performed by: RADIOLOGY

## 2022-12-06 PROCEDURE — 77067 MAMMO DIGITAL SCREENING BILAT WITH TOMO: ICD-10-PCS | Mod: 26,,, | Performed by: RADIOLOGY

## 2022-12-06 PROCEDURE — 77067 SCR MAMMO BI INCL CAD: CPT | Mod: TC

## 2022-12-06 PROCEDURE — 77067 SCR MAMMO BI INCL CAD: CPT | Mod: 26,,, | Performed by: RADIOLOGY

## 2022-12-09 ENCOUNTER — TELEPHONE (OUTPATIENT)
Dept: PRIMARY CARE CLINIC | Facility: CLINIC | Age: 82
End: 2022-12-09
Payer: MEDICARE

## 2022-12-09 NOTE — TELEPHONE ENCOUNTER
----- Message from Laurie Todd MD sent at 12/9/2022  4:08 PM CST -----  Please let patient know that her mammogram was normal. The next is due in one year

## 2022-12-09 NOTE — TELEPHONE ENCOUNTER
Called Ms. Cole's daughter Naomie. Was unable to leave a message on her mom's voicemail. Informed her that Ms. Cole's mammogram was normal and that she will need another one next year. Verbalized understanding and reports she will inform Ms. Cole of the results.

## 2023-02-07 DIAGNOSIS — Z00.00 ENCOUNTER FOR MEDICARE ANNUAL WELLNESS EXAM: ICD-10-CM

## 2023-02-09 DIAGNOSIS — Z00.00 ENCOUNTER FOR MEDICARE ANNUAL WELLNESS EXAM: ICD-10-CM

## 2023-07-18 NOTE — PROGRESS NOTES
HPI     S/P PHACO W IOL OS 9/16/19  Pt states that she felt much better after leaving the office yesterday. Pt   states vision is better. Denies any flashes or floaters OU. No pian or   discomfort OU.     COMBO TID OS    Last edited by Lorna Dillon on 9/17/2019  8:06 AM. (History)            Assessment /Plan     For exam results, see Encounter Report.    Post-operative state    Nuclear sclerotic cataract of left eye      Slit lamp exam:  L/L: nl  K: clear, wound sealed  AC: 1+ cell  Lens: IOL centered and stable    POD1 s/p Phaco/IOL  Appropriate precautions and post op medications reviewed.  Patient instructed to call or come in if symptoms of redness, decreased vision, or pain are experienced.                    Patient requests all Lab, Cardiology, and Radiology Results on their Discharge Instructions

## 2023-07-21 ENCOUNTER — TELEPHONE (OUTPATIENT)
Dept: PRIMARY CARE CLINIC | Facility: CLINIC | Age: 83
End: 2023-07-21

## 2023-07-21 NOTE — TELEPHONE ENCOUNTER
Spoke to sister hunter, pt is currently at a  and was unaware of apt today. Please call to reschedule her sometime soon, ty!

## 2023-08-05 NOTE — TELEPHONE ENCOUNTER
----- Message from Melly Henderson sent at 8/20/2019  1:45 PM CDT -----  Contact: Tequila  Needs Advice    Reason for call: pt wanted to know what time do she have to be to the hospital for her surgery. Pt stated she have to let her job know the date and time.           Communication Preference: [phone]    Additional Information:       Patient is alert & oriented x4. Pt ambulates w/ SBA. Crackles heard at left lower base. On room air. 2L NC needed at night. Afib rhythm, HR at rest:'s. HR w/ exertion:110's. No pain reported. Skin dry and intact. Bed in low position and call light within reach. Reviewed plan of care and patient verbalizes understanding.

## 2023-08-08 NOTE — TELEPHONE ENCOUNTER
Please ask patient to come early tomorrow (like 10 or 11am). I will need to leave the office by 1pm and her appt is at 1:30pm.    Thanks,  KJ   Solaraze Counseling:  I discussed with the patient the risks of Solaraze including but not limited to erythema, scaling, itching, weeping, crusting, and pain.

## 2023-10-17 ENCOUNTER — TELEPHONE (OUTPATIENT)
Dept: PODIATRY | Facility: CLINIC | Age: 83
End: 2023-10-17
Payer: COMMERCIAL

## 2023-10-17 NOTE — TELEPHONE ENCOUNTER
Patient daughter stated she will get in contact with patient to give the office a callback to schedule appointment.    Staff verbalized understanding.      ----- Message from Brigette Melendez sent at 10/17/2023  2:47 PM CDT -----  Regarding: sooner apt  Contact: Patient  Type:  Sooner Appointment Request    Caller is requesting a sooner appointment.  Caller declined first available appointment listed below.  Caller will not accept being placed on the waitlist and is requesting a message be sent to doctor.    Name of Caller:  Patient  When is the first available appointment?    Symptoms:  trouble walking  Would the patient rather a call back or a response via [facility]? Call back    Best Call Back Number:  [phone]    Additional Information:  Please call to have pt seen as soon as possible thanks!

## 2023-10-19 ENCOUNTER — OFFICE VISIT (OUTPATIENT)
Dept: PODIATRY | Facility: CLINIC | Age: 83
End: 2023-10-19
Payer: MEDICARE

## 2023-10-19 VITALS — WEIGHT: 203 LBS | HEIGHT: 60 IN | BODY MASS INDEX: 39.85 KG/M2

## 2023-10-19 DIAGNOSIS — E11.51 DIABETES MELLITUS WITH PERIPHERAL VASCULAR DISEASE: ICD-10-CM

## 2023-10-19 DIAGNOSIS — E11.22 TYPE 2 DIABETES MELLITUS WITH STAGE 3 CHRONIC KIDNEY DISEASE, WITHOUT LONG-TERM CURRENT USE OF INSULIN, UNSPECIFIED WHETHER STAGE 3A OR 3B CKD: Primary | ICD-10-CM

## 2023-10-19 DIAGNOSIS — B35.1 ONYCHOMYCOSIS DUE TO DERMATOPHYTE: ICD-10-CM

## 2023-10-19 DIAGNOSIS — N18.30 TYPE 2 DIABETES MELLITUS WITH STAGE 3 CHRONIC KIDNEY DISEASE, WITHOUT LONG-TERM CURRENT USE OF INSULIN, UNSPECIFIED WHETHER STAGE 3A OR 3B CKD: Primary | ICD-10-CM

## 2023-10-19 DIAGNOSIS — L84 CORN OR CALLUS: ICD-10-CM

## 2023-10-19 DIAGNOSIS — M20.11 VALGUS DEFORMITY OF BOTH GREAT TOES: ICD-10-CM

## 2023-10-19 DIAGNOSIS — M20.12 VALGUS DEFORMITY OF BOTH GREAT TOES: ICD-10-CM

## 2023-10-19 PROCEDURE — 11721 DEBRIDE NAIL 6 OR MORE: CPT | Mod: 59,Q9,S$GLB, | Performed by: PODIATRIST

## 2023-10-19 PROCEDURE — 11721 PR DEBRIDEMENT OF NAILS, 6 OR MORE: ICD-10-PCS | Mod: 59,Q9,S$GLB, | Performed by: PODIATRIST

## 2023-10-19 PROCEDURE — 1101F PR PT FALLS ASSESS DOC 0-1 FALLS W/OUT INJ PAST YR: ICD-10-PCS | Mod: CPTII,S$GLB,, | Performed by: PODIATRIST

## 2023-10-19 PROCEDURE — 1159F PR MEDICATION LIST DOCUMENTED IN MEDICAL RECORD: ICD-10-PCS | Mod: CPTII,S$GLB,, | Performed by: PODIATRIST

## 2023-10-19 PROCEDURE — 1160F PR REVIEW ALL MEDS BY PRESCRIBER/CLIN PHARMACIST DOCUMENTED: ICD-10-PCS | Mod: CPTII,S$GLB,, | Performed by: PODIATRIST

## 2023-10-19 PROCEDURE — 99999 PR PBB SHADOW E&M-EST. PATIENT-LVL III: CPT | Mod: PBBFAC,,, | Performed by: PODIATRIST

## 2023-10-19 PROCEDURE — 11055 PR TRIM HYPERKERATOTIC SKIN LESION, ONE: ICD-10-PCS | Mod: Q9,S$GLB,, | Performed by: PODIATRIST

## 2023-10-19 PROCEDURE — 1159F MED LIST DOCD IN RCRD: CPT | Mod: CPTII,S$GLB,, | Performed by: PODIATRIST

## 2023-10-19 PROCEDURE — 1160F RVW MEDS BY RX/DR IN RCRD: CPT | Mod: CPTII,S$GLB,, | Performed by: PODIATRIST

## 2023-10-19 PROCEDURE — 1101F PT FALLS ASSESS-DOCD LE1/YR: CPT | Mod: CPTII,S$GLB,, | Performed by: PODIATRIST

## 2023-10-19 PROCEDURE — 99999 PR PBB SHADOW E&M-EST. PATIENT-LVL III: ICD-10-PCS | Mod: PBBFAC,,, | Performed by: PODIATRIST

## 2023-10-19 PROCEDURE — 1157F ADVNC CARE PLAN IN RCRD: CPT | Mod: CPTII,S$GLB,, | Performed by: PODIATRIST

## 2023-10-19 PROCEDURE — 3288F PR FALLS RISK ASSESSMENT DOCUMENTED: ICD-10-PCS | Mod: CPTII,S$GLB,, | Performed by: PODIATRIST

## 2023-10-19 PROCEDURE — 1157F PR ADVANCE CARE PLAN OR EQUIV PRESENT IN MEDICAL RECORD: ICD-10-PCS | Mod: CPTII,S$GLB,, | Performed by: PODIATRIST

## 2023-10-19 PROCEDURE — 99214 OFFICE O/P EST MOD 30 MIN: CPT | Mod: 25,S$GLB,, | Performed by: PODIATRIST

## 2023-10-19 PROCEDURE — 11055 PARING/CUTG B9 HYPRKER LES 1: CPT | Mod: Q9,S$GLB,, | Performed by: PODIATRIST

## 2023-10-19 PROCEDURE — 3288F FALL RISK ASSESSMENT DOCD: CPT | Mod: CPTII,S$GLB,, | Performed by: PODIATRIST

## 2023-10-19 PROCEDURE — 99214 PR OFFICE/OUTPT VISIT, EST, LEVL IV, 30-39 MIN: ICD-10-PCS | Mod: 25,S$GLB,, | Performed by: PODIATRIST

## 2023-10-19 PROCEDURE — 1125F AMNT PAIN NOTED PAIN PRSNT: CPT | Mod: CPTII,S$GLB,, | Performed by: PODIATRIST

## 2023-10-19 PROCEDURE — 1125F PR PAIN SEVERITY QUANTIFIED, PAIN PRESENT: ICD-10-PCS | Mod: CPTII,S$GLB,, | Performed by: PODIATRIST

## 2023-10-19 RX ORDER — AMMONIUM LACTATE 12 G/100G
1 CREAM TOPICAL 2 TIMES DAILY
Qty: 140 G | Refills: 11 | Status: SHIPPED | OUTPATIENT
Start: 2023-10-19

## 2023-10-19 RX ORDER — AMLODIPINE BESYLATE 10 MG/1
10 TABLET ORAL
COMMUNITY
Start: 2023-10-15

## 2023-10-19 RX ORDER — CICLOPIROX 80 MG/ML
SOLUTION TOPICAL NIGHTLY
Qty: 6.6 ML | Refills: 11 | Status: SHIPPED | OUTPATIENT
Start: 2023-10-19

## 2023-10-19 NOTE — PROGRESS NOTES
Subjective:      Patient ID: Kelsey Fields is a 82 y.o. female.    Chief Complaint: Foot Pain (L foot)    Kelsey is a 82 y.o. female who presents to the clinic for evaluation and treatment of high risk feet. Kelsey has a past medical history of Arthritis, Degenerative arthritis of lumbar spine, Episcleritis of right eye, GERD (gastroesophageal reflux disease), Hypertension, Shingles, Sleep apnea, and Type 2 diabetes mellitus with stage 3 chronic kidney disease, without long-term current use of insulin (6/5/2020). The patient's chief complaint is long, thick toenails. Gradual onset, worsening over past several weeks, aggravated by increased weight bearing, shoe gear, pressure.  Periodic debridement helps symptoms.     Cc2 hard painfuol callus tip 2nd toe left.  Gradual onset, worsening over past several weeks, aggravated by increased weight bearing, shoe gear, pressure.  No previous medical treatment.  OTC pain med not helping.     PCP: Laurie Todd MD    Date Last Seen by PCP:   Chief Complaint   Patient presents with    Foot Pain     L foot        Current shoe gear:  Affected Foot: Rx diabetic extra depth shoes and custom accommodative insoles     Unaffected Foot: Rx diabetic extra depth shoes and custom accommodative insoles    Hemoglobin A1C   Date Value Ref Range Status   05/03/2022 5.8 (H) 4.0 - 5.6 % Final     Comment:     ADA Screening Guidelines:  5.7-6.4%  Consistent with prediabetes  >or=6.5%  Consistent with diabetes    High levels of fetal hemoglobin interfere with the HbA1C  assay. Heterozygous hemoglobin variants (HbS, HgC, etc)do  not significantly interfere with this assay.   However, presence of multiple variants may affect accuracy.     10/25/2021 5.8 (H) 4.0 - 5.6 % Final     Comment:     ADA Screening Guidelines:  5.7-6.4%  Consistent with prediabetes  >or=6.5%  Consistent with diabetes    High levels of fetal hemoglobin interfere with the HbA1C  assay. Heterozygous hemoglobin  variants (HbS, HgC, etc)do  not significantly interfere with this assay.   However, presence of multiple variants may affect accuracy.     04/07/2021 5.8 (H) 4.0 - 5.6 % Final     Comment:     ADA Screening Guidelines:  5.7-6.4%  Consistent with prediabetes  >or=6.5%  Consistent with diabetes    High levels of fetal hemoglobin interfere with the HbA1C  assay. Heterozygous hemoglobin variants (HbS, HgC, etc)do  not significantly interfere with this assay.   However, presence of multiple variants may affect accuracy.         Review of Systems   Constitutional: Negative for chills, diaphoresis, fever, malaise/fatigue and night sweats.   Cardiovascular:  Positive for leg swelling. Negative for claudication, cyanosis and syncope.   Skin:  Positive for nail changes and suspicious lesions. Negative for color change, dry skin, rash and unusual hair distribution.   Musculoskeletal:  Negative for falls, joint pain, joint swelling, muscle cramps, muscle weakness and stiffness.   Gastrointestinal:  Negative for constipation, diarrhea, nausea and vomiting.   Neurological:  Positive for paresthesias and sensory change. Negative for brief paralysis, disturbances in coordination, focal weakness, numbness and tremors.           Objective:      Physical Exam  Constitutional:       General: She is not in acute distress.     Appearance: She is well-developed. She is not diaphoretic.   Cardiovascular:      Pulses:           Popliteal pulses are 2+ on the right side and 2+ on the left side.        Dorsalis pedis pulses are 2+ on the right side and 2+ on the left side.        Posterior tibial pulses are 1+ on the right side and 1+ on the left side.      Comments: Capillary refill 3 seconds all toes/distal feet, all toes/both feet warm to touch.      Negative lymphadenopathy bilateral popliteal fossa and tarsal tunnel.     Than 2+ pitting lower extremity edema bilateral.    Musculoskeletal:      Right ankle: No swelling, deformity,  ecchymosis or lacerations. Normal range of motion. Normal pulse.      Right Achilles Tendon: Normal. No defects. Alvarez's test negative.      Comments: Severe hallux abductovalgus bilateral with bunion formation but without symptoms today right or left.    Otherwise,  All ten toes without clubbing, cyanosis, or signs of ischemia.  No pain to palpation bilateral lower extremities.  Range of motion, stability, muscle strength, and muscle tone normal bilateral feet and legs.    Lymphadenopathy:      Lower Body: No right inguinal adenopathy. No left inguinal adenopathy.      Comments: Negative lymphadenopathy bilateral popliteal fossa and tarsal tunnel.    Negative lymphangitic streaking bilateral feet/ankles/legs.   Skin:     General: Skin is warm and dry.      Capillary Refill: Capillary refill takes 2 to 3 seconds.      Coloration: Skin is not pale.      Findings: No abrasion, bruising, burn, ecchymosis, erythema, laceration, lesion or rash.      Nails: There is no clubbing.      Comments: Focal hyperkeratotic lesion consisting entirely of hyperkeratotic tissue without open skin, drainage, pus, fluctuance, malodor, or signs of infection tip 2nd toe left    Otherwise, Skin is normal age and health appropriate color, turgor, texture, and temperature bilateral lower extremities without ulceration, hyperpigmentation, discoloration, masses nodules or cords palpated.  No ecchymosis, erythema, edema, or cardinal signs of infection bilateral lower extremities.    Toenails 1st, 2nd, 3rd, 4th, 5th  bilateral are hypertrophic thickened 2-3 mm, dystrophic, discolored tanish brown with tan, gray crumbly subungual debris.  Tender to distal nail plate pressure, without periungual skin abnormality of each.         Neurological:      Mental Status: She is alert and oriented to person, place, and time.      Sensory: No sensory deficit.      Motor: No tremor, atrophy or abnormal muscle tone.      Gait: Gait normal.      Deep Tendon  Reflexes:      Reflex Scores:       Patellar reflexes are 2+ on the right side and 2+ on the left side.       Achilles reflexes are 2+ on the right side and 2+ on the left side.     Comments: Decreased/absent vibratory sensation bilateral feet to 128Hz tuning fork.    Paresthesias, and burning bilateral feet with no clearly identified trigger or source.    Negative tinel sign to percussion sural, superficial peroneal, deep peroneal, saphenous, and posterior tibial nerves right and left ankles and feet.     Psychiatric:         Behavior: Behavior is cooperative.             Assessment:       Encounter Diagnoses   Name Primary?    Type 2 diabetes mellitus with stage 3 chronic kidney disease, without long-term current use of insulin, unspecified whether stage 3a or 3b CKD Yes    Diabetes mellitus with peripheral vascular disease     Onychomycosis due to dermatophyte     Corn or callus     Valgus deformity of both great toes          Plan:       Kelsey was seen today for foot pain.    Diagnoses and all orders for this visit:    Type 2 diabetes mellitus with stage 3 chronic kidney disease, without long-term current use of insulin, unspecified whether stage 3a or 3b CKD  -     DIABETIC SHOES FOR HOME USE    Diabetes mellitus with peripheral vascular disease  -     DIABETIC SHOES FOR HOME USE    Onychomycosis due to dermatophyte  -     DIABETIC SHOES FOR HOME USE    Corn or callus  -     DIABETIC SHOES FOR HOME USE    Valgus deformity of both great toes  -     DIABETIC SHOES FOR HOME USE    Other orders  -     ciclopirox (PENLAC) 8 % Soln; Apply topically nightly.  -     ammonium lactate 12 % Crea; Apply 1 application  topically 2 (two) times daily.      I counseled the patient on her conditions, their implications and medical management.        The patient has received literature on basic diabetic foot care.  Patient will inspect feet daily, wear protective shoe gear when ambulatory, and apply moisturizer to skin as  needed to maintain elasticity and help prevent ulceration.    With the patient's permission, I debrided all ten toenails with a sterile nipper and curette, removing all offending nail and debris.  Patient tolerated the procedure well and related significant relief.    With the patient's permission, I debrided hyperkeratotic lesion(s) as above totaling       1         to, not  Including dermis with sterile #15 blade.  Patient tolerated the procedure well and related significant relief.       Discussed conservative treatment with shoes of adequate dimensions, material, and style to alleviate symptoms and delay or prevent surgical intervention.    Rx DM shoes, penlac, lac hydrin.          No follow-ups on file.

## 2023-11-06 ENCOUNTER — TELEPHONE (OUTPATIENT)
Dept: PODIATRY | Facility: CLINIC | Age: 83
End: 2023-11-06
Payer: MEDICARE

## 2023-11-06 NOTE — TELEPHONE ENCOUNTER
Staff informed patient there are no sooner appointments with Dr. Pena at this time.    Patient verbalized understanding.        ----- Message from Tyree Lucas sent at 11/6/2023 10:55 AM CST -----  Contact: Self  Type:  Same Day Appointment Request    Caller is requesting a same day appointment.  Caller declined first available appointment listed below.      Name of Caller:  PT  When is the first available appointment?    Symptoms:  Foot Pain  Best Call Back Number:  [phone]    Additional Information:

## 2023-11-07 ENCOUNTER — OFFICE VISIT (OUTPATIENT)
Dept: PODIATRY | Facility: CLINIC | Age: 83
End: 2023-11-07
Payer: MEDICARE

## 2023-11-07 VITALS
HEART RATE: 79 BPM | BODY MASS INDEX: 39.85 KG/M2 | HEIGHT: 60 IN | WEIGHT: 203 LBS | DIASTOLIC BLOOD PRESSURE: 65 MMHG | SYSTOLIC BLOOD PRESSURE: 143 MMHG

## 2023-11-07 DIAGNOSIS — L84 CORN OR CALLUS: ICD-10-CM

## 2023-11-07 DIAGNOSIS — M20.12 VALGUS DEFORMITY OF BOTH GREAT TOES: ICD-10-CM

## 2023-11-07 DIAGNOSIS — M20.11 VALGUS DEFORMITY OF BOTH GREAT TOES: ICD-10-CM

## 2023-11-07 DIAGNOSIS — E11.42 TYPE 2 DIABETES MELLITUS WITH DIABETIC POLYNEUROPATHY, UNSPECIFIED WHETHER LONG TERM INSULIN USE: ICD-10-CM

## 2023-11-07 DIAGNOSIS — E11.51 DIABETES MELLITUS WITH PERIPHERAL VASCULAR DISEASE: Primary | ICD-10-CM

## 2023-11-07 PROCEDURE — 3288F FALL RISK ASSESSMENT DOCD: CPT | Mod: CPTII,S$GLB,, | Performed by: PODIATRIST

## 2023-11-07 PROCEDURE — 3077F PR MOST RECENT SYSTOLIC BLOOD PRESSURE >= 140 MM HG: ICD-10-PCS | Mod: CPTII,S$GLB,, | Performed by: PODIATRIST

## 2023-11-07 PROCEDURE — 3078F PR MOST RECENT DIASTOLIC BLOOD PRESSURE < 80 MM HG: ICD-10-PCS | Mod: CPTII,S$GLB,, | Performed by: PODIATRIST

## 2023-11-07 PROCEDURE — 1160F RVW MEDS BY RX/DR IN RCRD: CPT | Mod: CPTII,S$GLB,, | Performed by: PODIATRIST

## 2023-11-07 PROCEDURE — 3078F DIAST BP <80 MM HG: CPT | Mod: CPTII,S$GLB,, | Performed by: PODIATRIST

## 2023-11-07 PROCEDURE — 3288F PR FALLS RISK ASSESSMENT DOCUMENTED: ICD-10-PCS | Mod: CPTII,S$GLB,, | Performed by: PODIATRIST

## 2023-11-07 PROCEDURE — 99999 PR PBB SHADOW E&M-EST. PATIENT-LVL IV: ICD-10-PCS | Mod: PBBFAC,,, | Performed by: PODIATRIST

## 2023-11-07 PROCEDURE — 1125F AMNT PAIN NOTED PAIN PRSNT: CPT | Mod: CPTII,S$GLB,, | Performed by: PODIATRIST

## 2023-11-07 PROCEDURE — 99213 PR OFFICE/OUTPT VISIT, EST, LEVL III, 20-29 MIN: ICD-10-PCS | Mod: S$GLB,,, | Performed by: PODIATRIST

## 2023-11-07 PROCEDURE — 1159F PR MEDICATION LIST DOCUMENTED IN MEDICAL RECORD: ICD-10-PCS | Mod: CPTII,S$GLB,, | Performed by: PODIATRIST

## 2023-11-07 PROCEDURE — 1101F PT FALLS ASSESS-DOCD LE1/YR: CPT | Mod: CPTII,S$GLB,, | Performed by: PODIATRIST

## 2023-11-07 PROCEDURE — 1157F ADVNC CARE PLAN IN RCRD: CPT | Mod: CPTII,S$GLB,, | Performed by: PODIATRIST

## 2023-11-07 PROCEDURE — 99213 OFFICE O/P EST LOW 20 MIN: CPT | Mod: S$GLB,,, | Performed by: PODIATRIST

## 2023-11-07 PROCEDURE — 1125F PR PAIN SEVERITY QUANTIFIED, PAIN PRESENT: ICD-10-PCS | Mod: CPTII,S$GLB,, | Performed by: PODIATRIST

## 2023-11-07 PROCEDURE — 3077F SYST BP >= 140 MM HG: CPT | Mod: CPTII,S$GLB,, | Performed by: PODIATRIST

## 2023-11-07 PROCEDURE — 99999 PR PBB SHADOW E&M-EST. PATIENT-LVL IV: CPT | Mod: PBBFAC,,, | Performed by: PODIATRIST

## 2023-11-07 PROCEDURE — 1160F PR REVIEW ALL MEDS BY PRESCRIBER/CLIN PHARMACIST DOCUMENTED: ICD-10-PCS | Mod: CPTII,S$GLB,, | Performed by: PODIATRIST

## 2023-11-07 PROCEDURE — 1157F PR ADVANCE CARE PLAN OR EQUIV PRESENT IN MEDICAL RECORD: ICD-10-PCS | Mod: CPTII,S$GLB,, | Performed by: PODIATRIST

## 2023-11-07 PROCEDURE — 1101F PR PT FALLS ASSESS DOC 0-1 FALLS W/OUT INJ PAST YR: ICD-10-PCS | Mod: CPTII,S$GLB,, | Performed by: PODIATRIST

## 2023-11-07 PROCEDURE — 1159F MED LIST DOCD IN RCRD: CPT | Mod: CPTII,S$GLB,, | Performed by: PODIATRIST

## 2023-11-07 RX ORDER — UREA 40 %
CREAM (GRAM) TOPICAL DAILY
Qty: 85 G | Refills: 11 | Status: SHIPPED | OUTPATIENT
Start: 2023-11-07

## 2023-11-07 NOTE — PROGRESS NOTES
Subjective:      Patient ID: Kelsey Fields is a 82 y.o. female.    Chief Complaint: Callouses (Foot pain from callous)    Kelsey is a 82 y.o. female who presents to the clinic f complaining of painful callus to her L second digit.  Kelsey has a past medical history of Arthritis, Degenerative arthritis of lumbar spine, Episcleritis of right eye, GERD (gastroesophageal reflux disease), Hypertension, Shingles, Sleep apnea, and Type 2 diabetes mellitus with stage 3 chronic kidney disease, without long-term current use of insulin (6/5/2020).  Patient says debridement of her callus last visit relieved pain for a brief time.  Gradual onset, worsening over past days, aggravated by increased weight bearing, shoe gear, pressure.  Periodic debridement helps symptoms.  Denies any other pedal complaints.    PCP: Laurie Todd MD    Date Last Seen by PCP:   Chief Complaint   Patient presents with    Callouses     Foot pain from callous        Current shoe gear:  [employer] slip-on shoes    Hemoglobin A1C   Date Value Ref Range Status   05/03/2022 5.8 (H) 4.0 - 5.6 % Final     Comment:     ADA Screening Guidelines:  5.7-6.4%  Consistent with prediabetes  >or=6.5%  Consistent with diabetes    High levels of fetal hemoglobin interfere with the HbA1C  assay. Heterozygous hemoglobin variants (HbS, HgC, etc)do  not significantly interfere with this assay.   However, presence of multiple variants may affect accuracy.     10/25/2021 5.8 (H) 4.0 - 5.6 % Final     Comment:     ADA Screening Guidelines:  5.7-6.4%  Consistent with prediabetes  >or=6.5%  Consistent with diabetes    High levels of fetal hemoglobin interfere with the HbA1C  assay. Heterozygous hemoglobin variants (HbS, HgC, etc)do  not significantly interfere with this assay.   However, presence of multiple variants may affect accuracy.     04/07/2021 5.8 (H) 4.0 - 5.6 % Final     Comment:     ADA Screening Guidelines:  5.7-6.4%  Consistent with  prediabetes  >or=6.5%  Consistent with diabetes    High levels of fetal hemoglobin interfere with the HbA1C  assay. Heterozygous hemoglobin variants (HbS, HgC, etc)do  not significantly interfere with this assay.   However, presence of multiple variants may affect accuracy.       Review of Systems   Constitutional: Negative for chills, diaphoresis, fever, malaise/fatigue and night sweats.   Cardiovascular:  Positive for leg swelling. Negative for claudication, cyanosis and syncope.   Skin:  Positive for nail changes and suspicious lesions. Negative for color change, dry skin, rash and unusual hair distribution.   Musculoskeletal:  Negative for falls, joint pain, joint swelling, muscle cramps, muscle weakness and stiffness.   Gastrointestinal:  Negative for constipation, diarrhea, nausea and vomiting.   Neurological:  Positive for paresthesias and sensory change. Negative for brief paralysis, disturbances in coordination, focal weakness, numbness and tremors.         Objective:      Physical Exam  Constitutional:       General: She is not in acute distress.     Appearance: She is well-developed. She is not diaphoretic.   Cardiovascular:      Pulses:           Popliteal pulses are 2+ on the right side and 2+ on the left side.        Dorsalis pedis pulses are 2+ on the right side and 2+ on the left side.        Posterior tibial pulses are 1+ on the right side and 1+ on the left side.      Comments: Capillary refill 3 seconds all toes/distal feet, all toes/both feet warm to touch.      Negative lymphadenopathy bilateral popliteal fossa and tarsal tunnel.     Than 2+ pitting lower extremity edema bilateral.    Musculoskeletal:      Right ankle: No swelling, deformity, ecchymosis or lacerations. Normal range of motion. Normal pulse.      Right Achilles Tendon: Normal. No defects. Alvarez's test negative.      Comments: Severe hallux abductovalgus bilateral with bunion formation but without symptoms today right or  left.    Otherwise,  All ten toes without clubbing, cyanosis, or signs of ischemia.  No pain to palpation bilateral lower extremities.  Range of motion, stability, muscle strength, and muscle tone normal bilateral feet and legs.    Lymphadenopathy:      Lower Body: No right inguinal adenopathy. No left inguinal adenopathy.      Comments: Negative lymphadenopathy bilateral popliteal fossa and tarsal tunnel.    Negative lymphangitic streaking bilateral feet/ankles/legs.   Skin:     General: Skin is warm and dry.      Capillary Refill: Capillary refill takes 2 to 3 seconds.      Coloration: Skin is not pale.      Findings: No abrasion, bruising, burn, ecchymosis, erythema, laceration, lesion or rash.      Nails: There is no clubbing.      Comments: Focal hyperkeratotic lesion consisting entirely of hyperkeratotic tissue without open skin, drainage, pus, fluctuance, malodor, or signs of infection tip 2nd toe left    Otherwise, Skin is normal age and health appropriate color, turgor, texture, and temperature bilateral lower extremities without ulceration, hyperpigmentation, discoloration, masses nodules or cords palpated.  No ecchymosis, erythema, edema, or cardinal signs of infection bilateral lower extremities.    Toenails 1st, 2nd, 3rd, 4th, 5th  bilateral are hypertrophic thickened 2-3 mm, dystrophic, discolored tanish brown with tan, gray crumbly subungual debris.  Tender to distal nail plate pressure, without periungual skin abnormality of each.         Neurological:      Mental Status: She is alert and oriented to person, place, and time.      Sensory: No sensory deficit.      Motor: No tremor, atrophy or abnormal muscle tone.      Gait: Gait normal.      Deep Tendon Reflexes:      Reflex Scores:       Patellar reflexes are 2+ on the right side and 2+ on the left side.       Achilles reflexes are 2+ on the right side and 2+ on the left side.     Comments: Decreased/absent vibratory sensation bilateral feet to  128Hz tuning fork.    Paresthesias, and burning bilateral feet with no clearly identified trigger or source.    Negative tinel sign to percussion sural, superficial peroneal, deep peroneal, saphenous, and posterior tibial nerves right and left ankles and feet.     Psychiatric:         Behavior: Behavior is cooperative.           Assessment:       Encounter Diagnoses   Name Primary?    Diabetes mellitus with peripheral vascular disease Yes    Corn or callus     Valgus deformity of both great toes     Type 2 diabetes mellitus with diabetic polyneuropathy, unspecified whether long term insulin use            Plan:       Kelsey was seen today for callouses.    Diagnoses and all orders for this visit:    Diabetes mellitus with peripheral vascular disease    Corn or callus    Valgus deformity of both great toes    Type 2 diabetes mellitus with diabetic polyneuropathy, unspecified whether long term insulin use      I counseled the patient on her conditions, their implications and medical management.    The patient has received literature on basic diabetic foot care.  Patient will inspect feet daily, wear protective shoe gear when ambulatory, and apply moisturizer to skin as needed to maintain elasticity and help prevent ulceration.    With the patient's consent, I debrided hyperkeratotic lesion on the L second digit with #15 scalpel, without incident    Advised patient to continue using ammonia lactate for b/l foot dryness    Discussed conservative treatment with shoes of adequate dimensions, material, and style to alleviate symptoms and delay or prevent surgical intervention.    Discussed and recommended toe caps to apply on L second digit to prevent callus formation which can be purchased from local pharmacies    Rx DM shoes and urea cream    Return to clinic PRN          No follow-ups on file.

## 2023-11-08 ENCOUNTER — TELEPHONE (OUTPATIENT)
Dept: PRIMARY CARE CLINIC | Facility: CLINIC | Age: 83
End: 2023-11-08
Payer: MEDICARE

## 2023-11-08 NOTE — TELEPHONE ENCOUNTER
Patient appears to have a PCP at [place] or on the .    I updated her PCP in the chart to [facility].    Thanks,  Dr BRANDT

## 2024-01-24 ENCOUNTER — HOSPITAL ENCOUNTER (OUTPATIENT)
Dept: RADIOLOGY | Facility: HOSPITAL | Age: 84
Discharge: HOME | End: 2024-01-24
Attending: NURSE PRACTITIONER
Payer: MEDICARE

## 2024-01-24 DIAGNOSIS — Z12.31 ENCOUNTER FOR SCREENING MAMMOGRAM FOR BREAST CANCER: ICD-10-CM

## 2024-01-24 PROCEDURE — 77067 SCR MAMMO BI INCL CAD: CPT | Mod: TC

## 2024-01-24 PROCEDURE — 77063 BREAST TOMOSYNTHESIS BI: CPT | Mod: 26,,, | Performed by: RADIOLOGY

## 2024-01-24 PROCEDURE — 77067 SCR MAMMO BI INCL CAD: CPT | Mod: 26,,, | Performed by: RADIOLOGY

## 2024-04-27 NOTE — PROGRESS NOTES
"Primary Care Provider Appointment- [facility]  Shared Note: Estela Dave ([employer] RN) & Laurie Todd ([employer] MD)    Subjective:      Patient ID: Kelsey Fields is a 81 y.o. female with DISH, HLD, untreated TRISTON, preDM    Chief Complaint: Follow-up    RN NOTE:  Pt states she is "super" tired, all the time. She continues to have burning in her back, middle of back, denies numbness or tingling.    MD NOTE:  Depression:  Patient is sad today, she lost her brother-in-law. "I feel a little tired."    PreDM:  She has preDM, last A1c was 5.8. Has been stable for over a year.    Eyes:  She is waking up with her eyes stuck together. She wants her eyes examined, and is due for her DM eye exam.    Abdominal discharge:  Continues to have discharged from her naval. It was diagnosed as a pannus yeast infection one year ago, but the area remains moist and apeears to have yeast. Last abd CT was in  and normal.    Personal Drama:  She lost her job at [employer]. She has a family reunion in October, and will wait to look for work then. She had some drama with the people who cut her grass, they couldn't get in her yard. She believes her daughter had a nervous breakdown but she's not sure. Daughter recently had an altercation with someone on [address] [address].     Past Surgical History:   Procedure Laterality Date    blepharitis Bilateral 2017    Alessandra Grijalva OD    CATARACT EXTRACTION Bilateral 2017    Alessandra Grijalva MD    CATARACT EXTRACTION W/  INTRAOCULAR LENS IMPLANT Left 2019    Procedure: EXTRACTION, CATARACT, WITH IOL INSERTION;  Surgeon: Alysa De Souza MD;  Location: [facility] OR;  Service: Ophthalmology;  Laterality: Left;    CATARACT EXTRACTION W/  INTRAOCULAR LENS IMPLANT Right 2019    Procedure: EXTRACTION, CATARACT, WITH IOL INSERTION;  Surgeon: Alysa De Souza MD;  Location: [facility] OR;  Service: Ophthalmology;  Laterality: Right;     SECTION      FRACTURE SURGERY      JOINT REPLACEMENT      " sc  Patient Name: Nirav Baker   MRN: [id number]   Date of Admission: 4/17/2024    Procedure: Procedure(s):  CORONARY ARTERY BYPASS GRAFT TIMES  TWO  WITH LEFT INTERNAL MAMMARY ARTERY HARVEST, LEFT ENDOSCOPIC VESSEL PROCUREMENT, ANESTHESIA TRANSESOPHAGEAL ECHOCARDIOGRAM, EPI AORTIC ULTRASOUND    Post Op day #:7    Subjective (Patient focus/Primary Problem for shift): need for a shower and wound on left leg          Pain Goalno pain Pain Rating2            Pain Medication/ Regime effective to reduce patient pain PRN Tylenol, Lidocaine patches    Objective (Physical assessment):           Rhythm: normal sinus rhythm, BBB.             Bowel Activity: yes if Yes indicate when: today          Bowel Medications: yes            Incision: healing well          Incentive Spirometry Q 1-2 hour when awake:  yes Volume: 1500 ml          Epicardial Pacing Wires:  not applicable            Patient Activity:           Up to chair for meals: yes          Ambulation with RN x2 (Not including CR): yes, independently            Is patient in home clothes:no              Dressing Change Daily:no If No, why?DOMINICK                              Preventative WOC consult (need MD order): yes, WOC ordered by Hospitalist re: concern on left leg wound      Assessment (Nursing primary shift focus): A/O, room air on days, CPAP during nap and bedtime. BG-121, 118. Carb Insulin with meals. Showered today. Calmoseptine for dry flaky buttocks. Self administers I.S.  PRN Tylenol for sore body, effective.    Plan (Patient Care Plan/focus): WOC to see left shin.       Jessica Madison RN   4/26/2024   10:23 PM               Goal Outcome Evaluation:                         right knee     KNEE SURGERY Right        Past Medical History:   Diagnosis Date    Arthritis     Degenerative arthritis of lumbar spine     Episcleritis of right eye     GERD (gastroesophageal reflux disease)     Hypertension     Shingles     Sleep apnea     Type 2 diabetes mellitus with stage 3 chronic kidney disease, without long-term current use of insulin 6/5/2020       Social History     Socioeconomic History    Marital status: Single   Tobacco Use    Smoking status: Never    Smokeless tobacco: Never   Substance and Sexual Activity    Alcohol use: No    Drug use: No    Sexual activity: Never     Social Determinants of Health     Financial Resource Strain: Medium Risk    Difficulty of Paying Living Expenses: Somewhat hard   Food Insecurity: No Food Insecurity    Worried About Running Out of Food in the Last Year: Never true    Ran Out of Food in the Last Year: Never true   Transportation Needs: No Transportation Needs    Lack of Transportation (Medical): No    Lack of Transportation (Non-Medical): No   Physical Activity: Inactive    Days of Exercise per Week: 0 days    Minutes of Exercise per Session: 0 min   Stress: No Stress Concern Present    Feeling of Stress : Only a little   Housing Stability: Low Risk     Unable to Pay for Housing in the Last Year: No    Number of Places Lived in the Last Year: 1    Unstable Housing in the Last Year: No     Objective:   BP (!) 106/54 (BP Location: Right arm, Patient Position: Sitting, BP Method: Medium (Manual))   Pulse 77   Resp 20   Ht 5' (1.524 m)   Wt 91.7 kg (202 lb 4.4 oz)   SpO2 97%   BMI 39.50 kg/m²     Lab Results   Component Value Date    WBC 7.03 05/03/2022    HGB 11.9 (L) 05/03/2022    HCT 38.8 05/03/2022     05/03/2022    CHOL 146 01/13/2021    TRIG 65 01/13/2021    HDL 46 01/13/2021    ALT 11 01/13/2021    AST 18 01/13/2021     05/03/2022    K 4.0 05/03/2022     05/03/2022    CREATININE 1.3 05/03/2022    BUN 19 05/03/2022    CO2 28  05/03/2022    TSH 0.770 05/03/2022    HGBA1C 5.8 (H) 05/03/2022       Current Outpatient Medications on File Prior to Visit   Medication Sig Dispense Refill    acetaminophen (TYLENOL) 500 MG tablet Take 2 tablets (1,000 mg total) by mouth every 8 (eight) hours as needed for Pain. 300 tablet 3    albuterol (PROVENTIL/VENTOLIN HFA) 90 mcg/actuation inhaler Inhale 2 puffs into the lungs every 6 (six) hours as needed for Wheezing. Rescue 18 g 3    albuterol-ipratropium (DUO-NEB) 2.5 mg-0.5 mg/3 mL nebulizer solution Take 3 mLs by nebulization every 6 (six) hours while awake. Rescue 90 mL 3    ammonium lactate 12 % Crea Apply twice daily to affected parts both feet as needed. 140 g 11    atorvastatin (LIPITOR) 40 MG tablet Take 1 tablet (40 mg total) by mouth once daily. 90 tablet 3    biotin 10 mg Tab Take 1 application by mouth once daily. 90 each 3    cholecalciferol, vitamin D3, (VITAMIN D3) 125 mcg (5,000 unit) Tab Take 1 tablet (5,000 Units total) by mouth every 48 hours. 90 tablet 3    ciclopirox (LOPROX) 0.77 % Crea Apply topically 2 (two) times daily. 90 g 2    ciclopirox (PENLAC) 8 % Soln Apply topically nightly. 6.6 mL 11    CMPD Lidocaine 2%, Prilocaine 2%, Lamotrigine 2.5%, Meloxicam 0.09% in Transdermal Gel Apply 1 Pump (1.6 g total) topically 4 (four) times daily. 240 g 3    cyanocobalamin, vitamin B-12, 1,000 mcg TbSR Take 1,000 mcg by mouth once daily. 30 each 0    diclofenac sodium (VOLTAREN) 1 % Gel Apply 4 g topically 4 (four) times daily. Apply 4 g to right knee every 6-8 hours 450 g 3    estradioL (ESTRACE) 0.01 % (0.1 mg/gram) vaginal cream Place 0.5 g vaginally twice a week. Apply to the vagina daily for two weeks then twice a week. 45 g 4    ferrous sulfate (FEOSOL) 325 mg (65 mg iron) Tab tablet Take 1 tablet (325 mg total) by mouth daily with breakfast. 90 tablet 0    fluticasone propionate (FLONASE) 50 mcg/actuation nasal spray 1 spray (50 mcg total) by Each Nostril route once daily. 16 g 3     "gabapentin (NEURONTIN) 300 MG capsule Take 1 capsule (300 mg total) by mouth every evening. 90 capsule 3    inhalat. spacing dev,sm. mask Spcr Use with rescue inhaler 1 each 0    ketoconazole (NIZORAL) 2 % cream Apply under breast topically twice daily for rash 60 g 0    LIDOcaine (LIDODERM) 5 % Place 1 patch onto the skin once daily. Remove & Discard patch within 12 hours or as directed by MD 30 patch 0    nebulizer and compressor Paty Use as directed 1 each 0    psyllium husk, aspartame, (METAMUCIL FIBER SINGLES) 3.4 gram PwPk packet Take 1 packet by mouth once daily.      triamcinolone acetonide 0.025% (KENALOG) 0.025 % Oint Apply topically once daily to rash on arms and legs as needed 80 g 1     No current facility-administered medications on file prior to visit.         Assessment:   81 y.o. female with multiple co-morbid illnesses here to follow-up with PCP and continue work-up of chronic issues    Plan:     Problem List Items Addressed This Visit          Neuro    DISH (diffuse idiopathic skeletal hyperostosis)     DISH seen on XR in 2020, chronic pain associated  PCP spoke with Rheum who advised supportive care  Stretches and exercises advised  Advised to apply ice daily  Advised aquatherpy  Patient refuses  Continue voltaren            Psychiatric    Current mild episode of major depressive disorder without prior episode     Depressive symptoms. Recent loss of job, and her brother-in-law . She does not want to talk to anyone. "I just pray."  Not wanting counseling            Pulmonary    Upper respiratory disease    Relevant Medications    loratadine (CLARITIN) 10 mg tablet       Endocrine    Morbid obesity     BMI>35, HTN, HLD, OA  Advised to increase activity  Patient is acutely grieving at this time         Type 2 diabetes mellitus with stage 3 chronic kidney disease, without long-term current use of insulin - Primary    Relevant Orders    Ambulatory referral/consult to Optometry    Influenza " (FLUAD) - Quadrivalent (Adjuvanted) *Preferred* (65+) (PF) (Completed)       GI    Gastroesophageal reflux disease without esophagitis    Relevant Medications    cimetidine (TAGAMET) 400 MG tablet       Orthopedic    Wound discharge    Relevant Orders    CT Abdomen Without Contrast       Other    Chronic fatigue     Unclear if she is using gabapentin, does not have a new CPAP  Avoid gapabentin  Does not have CPAP  Does not have money to buy one over the counter            Health Maintenance         Date Due Completion Date    TETANUS VACCINE Never done ---    DEXA Scan 05/16/2021 5/16/2019    Eye Exam 03/03/2022 3/3/2021- TODAY    Influenza Vaccine (1) 09/01/2022 12/20/2021- TODAY    Hemoglobin A1c 11/03/2022 5/3/2022    Foot Exam 02/15/2023 2/15/2022    Diabetes Urine Screening 05/03/2023 5/3/2022            Follow up in about 1 week (around 9/27/2022), or ROUTINE F2F.   Total face-to-face time was 30 min. The following issues were discussed: DM, fatigue, abdominal discharge    Estela Dave, RN    Laurie Todd MD/MPH  [facility]  [phone]

## 2024-05-30 NOTE — ASSESSMENT & PLAN NOTE
LM on VM for pt to call office re: appeal for Dulera.    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 Pain controlled with PRN prednisone  · Does not want surgery  · Continue steroids  · Refer to Hand Surgery

## 2024-10-14 NOTE — TELEPHONE ENCOUNTER
"----- Message from Dane Saavedra MA sent at 11/29/2017 10:33 AM CST -----  Contact: Patient herself   LOV 11/09  Please advise  ----- Message -----  From: Pam Thomas  Sent: 11/29/2017  10:07 AM  To: Cody Mccarthy Staff    X  1st Request  _  2nd Request  _  3rd Request    Who: Kelsey Fields (mrn# [id number])    Why: Patient called requesting to be seen today. Says, "her machine need to be re-adjusted because the air comes out too strong."  I did attempt to accommodate but I was unsuccessful.    Please give a call back at your earliest convenience.     THANKS!     What Number to Call Back: [phone]    When to Expect a call back: (Before the end of the day)   -- if the call is after 12:00, the call back will be tomorrow.                            " Detail Level: Detailed Add 00756 Cpt? (Important Note: In 2017 The Use Of 53415 Is Being Tracked By Cms To Determine Future Global Period Reimbursement For Global Periods): no Wound Diameter In Cm(Optional): 1.5 Sutures?: intact

## 2025-02-19 NOTE — PROGRESS NOTES
[employer] linq monthly Summary report     Primary Care Provider Appointment - [facility]  SHARED NOTE: Navneet West (MS4), Dr Todd (Attending)    Subjective:      Patient ID: Kelsey Fields is a 79 y.o. female with DISH, HTN, CKD, asthma, TRISTON who presents for f/u and shoulder pain.       Chief Complaint: Follow-up    Chronic pain (DISH): She mentioned that the pain is doing really good. She went to the orthopedic doctor 2 months due to pain and left foot swollen. She mentions that orthopedics prescribed prednisone. She received 1 prednisone injection in right wrist and another in right shoulder. She also received prednisone prescription tablets. She states that it helped relieve pain. Per chart: last ketoralac injection 60 mg was on 04/25/2020. Per patient, she received injection by Dr Fam at [facility] for her right shoulder and right wrist pain in July. She also received prednisone tablets which was last taken in July 2020. Patient has tendency to seek providers for steroid injection due to pain.    Asthma: She mentions that she is almost out of her ventolin inhaler and she would like to get it refilled. She mentions that her ventolin use has decreased now that pollen season is slowing down. Over past week, she has used her ventolin inhaler 4x total. She also uses her nebulizer treatment daily.    CKD: She mentions that she drinks water throughout the day but she does not urinate that much. She states she would urinate more if she drinks tea. She estimates her water intake to be at 1 liter per day. She mentions that sometimes she experiences urgency. If it wakes her up in the middle of the night, she may not be able to make to the bathroom before she urinates. If she is awake, she is able to make it to the bathroom without have any accidents. She denies fullness sensation after passing urine. She denies discomfort or dysuria while passing urine. She wears diapers. She is only concerned about wetting herself in her sleep or in the middle of the  night. She denies having urgency leading to involuntary wetting during the day. She mentions that in the morning when she wakes, her urine is dark yellow - almost orange. As day passes, the urine color goes back to light yellow.    Headache: She mentioned that she would sometimes get 'twitches' while pointing to temporal pre-auricular region with pain. It would last for about 1 minute. Upon onset, she would experience 10/10 pain. Sometimes onset would be on left side and other times it would present on right side. This would happen when she is standing or sitting down. She says that it does not happen when she is lying down.  Previous history of shingles was right sided at level of right breast and right shoulder.    Per patient: denies taking feosol and robaxin.    Past Surgical History:   Procedure Laterality Date    blepharitis Bilateral 2017    Alessandra Grijalva OD    CATARACT EXTRACTION Bilateral 2017    Alessandra Grijalva MD    CATARACT EXTRACTION W/  INTRAOCULAR LENS IMPLANT Left 2019    Procedure: EXTRACTION, CATARACT, WITH IOL INSERTION;  Surgeon: Alysa De Souza MD;  Location: [facility];  Service: Ophthalmology;  Laterality: Left;    CATARACT EXTRACTION W/  INTRAOCULAR LENS IMPLANT Right 2019    Procedure: EXTRACTION, CATARACT, WITH IOL INSERTION;  Surgeon: Alysa De Souza MD;  Location: [facility];  Service: Ophthalmology;  Laterality: Right;     SECTION      FRACTURE SURGERY      JOINT REPLACEMENT      right knee     KNEE SURGERY Right        Past Medical History:   Diagnosis Date    Arthritis     Degenerative arthritis of lumbar spine     Episcleritis of right eye     GERD (gastroesophageal reflux disease)     Hypertension     Shingles     Sleep apnea     Type 2 diabetes mellitus with stage 3 chronic kidney disease, without long-term current use of insulin 2020       Social History     Socioeconomic History    Marital status: Single     Spouse name: Not on file     Number of children: Not on file    Years of education: Not on file    Highest education level: Not on file   Occupational History    Not on file   Social Needs    Financial resource strain: Not very hard    Food insecurity     Worry: Never true     Inability: Never true    Transportation needs     Medical: No     Non-medical: No   Tobacco Use    Smoking status: Never Smoker    Smokeless tobacco: Never Used   Substance and Sexual Activity    Alcohol use: No    Drug use: No    Sexual activity: Never   Lifestyle    Physical activity     Days per week: 0 days     Minutes per session: 0 min    Stress: Not at all   Relationships    Social connections     Talks on phone: Once a week     Gets together: Once a week     Attends Taoist service: More than 4 times per year     Active member of club or organization: Yes     Attends meetings of clubs or organizations: More than 4 times per year     Relationship status: Never    Other Topics Concern    Not on file   Social History Narrative    Not on file       Review of Systems   Constitutional: Positive for unexpected weight change (Lost 8 lbs. attributes to restraurants not open). Negative for chills, diaphoresis, fatigue and fever.   HENT: Negative for ear discharge, ear pain, hearing loss, rhinorrhea, sinus pressure, sinus pain and tinnitus.    Eyes: Positive for itching (sometimes it feels like there is sand in eyes. Relieved with eyedrops). Negative for pain, discharge and redness.   Respiratory: Positive for apnea (does not always use CPAP). Negative for cough, choking, chest tightness, shortness of breath and wheezing.    Cardiovascular: Positive for leg swelling. Negative for chest pain and palpitations.   Gastrointestinal: Positive for constipation (sometimes stool feels like rocks). Negative for abdominal distention, abdominal pain, anal bleeding, blood in stool, diarrhea, nausea and vomiting.   Endocrine: Negative for cold intolerance and heat  "intolerance.   Musculoskeletal: Negative for arthralgias, back pain, joint swelling and neck pain.   Skin: Negative for color change, pallor, rash and wound.   Allergic/Immunologic: Positive for environmental allergies. Negative for food allergies.   Neurological: Positive for headaches. Negative for dizziness, tremors, seizures, syncope, weakness, light-headedness and numbness.       Objective:   BP (!) 118/52 (BP Location: Right arm, Patient Position: Sitting, BP Method: Medium (Manual))   Pulse 83   Temp 97.9 °F (36.6 °C)   Resp 16   Ht 5' 4" (1.626 m)   Wt 87.4 kg (192 lb 10.9 oz)   SpO2 97%   BMI 33.07 kg/m²     Physical Exam  Constitutional:       General: She is not in acute distress.     Appearance: She is not ill-appearing, toxic-appearing or diaphoretic.   HENT:      Head: Normocephalic and atraumatic.      Right Ear: External ear normal.      Left Ear: External ear normal.      Nose: No congestion or rhinorrhea.      Mouth/Throat:      Mouth: Mucous membranes are moist.      Pharynx: Oropharyngeal exudate present. No posterior oropharyngeal erythema.   Eyes:      General: No scleral icterus.        Right eye: No discharge.         Left eye: No discharge.      Conjunctiva/sclera: Conjunctivae normal.   Neck:      Musculoskeletal: Neck supple.      Vascular: No carotid bruit.   Cardiovascular:      Rate and Rhythm: Normal rate.      Heart sounds: Murmur present. No gallop.    Pulmonary:      Effort: No respiratory distress.      Breath sounds: No wheezing, rhonchi or rales.   Abdominal:      General: Bowel sounds are normal.      Palpations: Abdomen is soft.   Musculoskeletal:      Right lower le+ Pitting Edema present.      Left lower le+ Pitting Edema present.   Neurological:      General: No focal deficit present.      Mental Status: She is alert. Mental status is at baseline.   Psychiatric:         Mood and Affect: Mood normal.         Behavior: Behavior normal.             Lab Results "   Component Value Date    WBC 8.54 06/15/2020    HGB 10.2 (L) 06/15/2020    HCT 35.3 (L) 06/15/2020     06/15/2020    CHOL 139 02/13/2019    TRIG 72 02/13/2019    HDL 41 02/13/2019    ALT 8 (L) 06/02/2020    AST 12 06/02/2020     06/15/2020    K 4.2 06/15/2020     06/15/2020    CREATININE 1.5 (H) 06/15/2020    BUN 13 06/15/2020    CO2 24 06/15/2020    TSH 0.725 09/20/2018    HGBA1C 6.1 (H) 12/13/2019       Current Outpatient Medications on File Prior to Visit   Medication Sig Dispense Refill    albuterol-ipratropium (DUO-NEB) 2.5 mg-0.5 mg/3 mL nebulizer solution Take 3 mLs by nebulization every 6 (six) hours while awake. Rescue 90 mL 3    amLODIPine (NORVASC) 10 MG tablet Take 1 tablet (10 mg total) by mouth once daily. 90 tablet 3    ammonium lactate 12 % Crea Apply twice daily to affected parts both feet as needed. 140 g 11    atorvastatin (LIPITOR) 40 MG tablet Take 1 tablet (40 mg total) by mouth once daily. 90 tablet 3    cholecalciferol, vitamin D3, (VITAMIN D3) 125 mcg (5,000 unit) Tab Take 1 tablet (5,000 Units total) by mouth every 48 hours. 90 tablet 3    ciclopirox (LOPROX) 0.77 % Crea Apply topically 2 (two) times daily. 90 g 2    cimetidine (TAGAMET) 400 MG tablet Take 1 tablet (400 mg total) by mouth 2 (two) times daily. 180 tablet 3    CMPD Lidocaine 2%, Prilocaine 2%, Lamotrigine 2.5%, Meloxicam 0.09% in Transdermal Gel Apply 1 Pump (1.6 g total) topically 4 (four) times daily. 240 g 3    diclofenac sodium (VOLTAREN) 1 % Gel Apply 2 g topically 2 (two) times daily. 3 Tube 3    ferrous sulfate (FEOSOL) 325 mg (65 mg iron) Tab tablet Take 1 tablet (325 mg total) by mouth daily with breakfast. 90 tablet 0    fluticasone propionate (FLONASE) 50 mcg/actuation nasal spray 1 spray (50 mcg total) by Each Nostril route once daily. 16 g 2    gabapentin (NEURONTIN) 300 MG capsule Take 1 capsule (300 mg total) by mouth 2 (two) times daily as needed. 180 capsule 3     hydrocortisone 2.5 % cream Apply topically to rash on legs twice daily 28 g 0    inhalat. spacing dev,sm. mask Spcr Use with rescue inhaler 1 each 0    ketoconazole (NIZORAL) 2 % cream Apply under breast topically twice daily for rash 60 g 0    loratadine (CLARITIN) 10 mg tablet Take 1 tablet (10 mg total) by mouth once daily. 30 tablet 11    losartan (COZAAR) 25 MG tablet Take 1 tablet (25 mg total) by mouth once daily. 90 tablet 3    methocarbamoL (ROBAXIN) 750 MG Tab 1 tab every 8 hours as needed for muscle spasm 30 tablet 0    nebulizer and compressor Paty Use as directed 1 each 0    triamcinolone acetonide 0.025% (KENALOG) 0.025 % Oint Apply topically once daily to rash on arms and legs as needed 80 g 1    [DISCONTINUED] albuterol (PROVENTIL/VENTOLIN HFA) 90 mcg/actuation inhaler Inhale 2 puffs into the lungs every 6 (six) hours as needed for Wheezing. Rescue 18 g 3     No current facility-administered medications on file prior to visit.          Assessment:   79 y.o. female with multiple co-morbid illnesses here to follow-up with PCP and continue work-up of chronic issues notably with DISH, HTN, CKD, asthma, TRISTON who presents for f/u and shoulder pain..     Plan:     Problem List Items Addressed This Visit        Neuro    DISH (diffuse idiopathic skeletal hyperostosis) - Primary     DISH seen on XR in 6/2020, chronic pain associated  · PCP spoke with Rheum who advised supportive care  · Stretches and exercises advised  · Advised to apply ice daily  · Advised aquatherpy  · Patient refuses  · Continue voltaren  · Prescribed prednisone by outside Ortho at [facility] (Dr Fam)            Pulmonary    Mild intermittent asthma without complication    Relevant Medications    albuterol (PROVENTIL/VENTOLIN HFA) 90 mcg/actuation inhaler       Immunology/Multi System    Arteritis     Elevated ESR, vision changes  · Transcranial doppler  · labs         Relevant Orders    C-reactive protein    Sedimentation rate        Endocrine    Prediabetes     A1c 6.1, poor dietary compliance  · Diabetes education completed  · Advised lifestyle changes  · Monitor A1c  · Advised portion control, avoidance of sweets         Relevant Orders    Basic metabolic panel    Hemoglobin A1C    Type 2 diabetes mellitus with stage 3 chronic kidney disease, without long-term current use of insulin     CHIN and CKD with DM  · Monitor kidney function         Relevant Orders    Basic metabolic panel    Hemoglobin A1C          Health Maintenance       Date Due Completion Date    TETANUS VACCINE 11/19/1958 ---    Influenza Vaccine (1) 08/01/2020 9/24/2019    DEXA SCAN 05/16/2021 5/16/2019    Lipid Panel 02/13/2024 2/13/2019              Follow up in about 4 weeks (around 10/8/2020). Total face-to-face time was 60 min, >50% of this was spent on counseling and coordination of care. The following issues were discussed: with DISH, HTN, CKD, asthma, TRISTON who presents for f/u and shoulder pain.    Navneet West, MS4    Laurie Todd MD/MPH  [facility]  [phone] [employer]

## 2025-07-17 ENCOUNTER — OFFICE VISIT (OUTPATIENT)
Dept: PODIATRY | Facility: CLINIC | Age: 85
End: 2025-07-17
Payer: MEDICARE

## 2025-07-17 VITALS — HEIGHT: 60 IN | WEIGHT: 203.06 LBS | BODY MASS INDEX: 39.87 KG/M2

## 2025-07-17 DIAGNOSIS — M20.12 VALGUS DEFORMITY OF BOTH GREAT TOES: ICD-10-CM

## 2025-07-17 DIAGNOSIS — M20.11 VALGUS DEFORMITY OF BOTH GREAT TOES: ICD-10-CM

## 2025-07-17 DIAGNOSIS — L84 CORN OR CALLUS: Primary | ICD-10-CM

## 2025-07-17 PROCEDURE — 99999 PR PBB SHADOW E&M-EST. PATIENT-LVL III: CPT | Mod: PBBFAC,,, | Performed by: PODIATRIST

## 2025-07-17 RX ORDER — AMMONIUM LACTATE 12 G/100G
CREAM TOPICAL
Qty: 140 G | Refills: 11 | Status: SHIPPED | OUTPATIENT
Start: 2025-07-17

## 2025-07-17 RX ORDER — CICLOPIROX 80 MG/ML
SOLUTION TOPICAL NIGHTLY
Qty: 6.6 ML | Refills: 11 | Status: SHIPPED | OUTPATIENT
Start: 2025-07-17

## 2025-07-17 NOTE — PROGRESS NOTES
Subjective:      Patient ID: Kelsey Fields is a 84 y.o. female.    Chief Complaint: Higinio (PCP Ivette Pa NP [facility] 06/25/2025)    Kelsey is a 84 y.o. female who presents to the clinic for evaluation and treatment of high risk feet. Kelsey has a past medical history of Arthritis, Degenerative arthritis of lumbar spine, Episcleritis of right eye, GERD (gastroesophageal reflux disease), Hypertension, Shingles, Sleep apnea, and Type 2 diabetes mellitus with stage 3 chronic kidney disease, without long-term current use of insulin (6/5/2020). The patient's chief complaint is long, thick toenails. Gradual onset, worsening over past several weeks, aggravated by increased weight bearing, shoe gear, pressure.  Periodic debridement helps symptoms.     Cc2 hard painfuol callus tip 2nd toe left.  Gradual onset, worsening over past several weeks, aggravated by increased weight bearing, shoe gear, pressure.  No previous medical treatment.  OTC pain med not helping.     PCP: No, Primary Doctor    Date Last Seen by PCP:   Chief Complaint   Patient presents with    Higinio     PCP Ivette Pa NP [facility] 06/25/2025        Current shoe gear:  Affected Foot: Rx diabetic extra depth shoes and custom accommodative insoles     Unaffected Foot: Rx diabetic extra depth shoes and custom accommodative insoles    Hemoglobin A1C   Date Value Ref Range Status   05/03/2022 5.8 (H) 4.0 - 5.6 % Final     Comment:     ADA Screening Guidelines:  5.7-6.4%  Consistent with prediabetes  >or=6.5%  Consistent with diabetes    High levels of fetal hemoglobin interfere with the HbA1C  assay. Heterozygous hemoglobin variants (HbS, HgC, etc)do  not significantly interfere with this assay.   However, presence of multiple variants may affect accuracy.     10/25/2021 5.8 (H) 4.0 - 5.6 % Final     Comment:     ADA Screening Guidelines:  5.7-6.4%  Consistent with prediabetes  >or=6.5%  Consistent with diabetes    High  levels of fetal hemoglobin interfere with the HbA1C  assay. Heterozygous hemoglobin variants (HbS, HgC, etc)do  not significantly interfere with this assay.   However, presence of multiple variants may affect accuracy.     04/07/2021 5.8 (H) 4.0 - 5.6 % Final     Comment:     ADA Screening Guidelines:  5.7-6.4%  Consistent with prediabetes  >or=6.5%  Consistent with diabetes    High levels of fetal hemoglobin interfere with the HbA1C  assay. Heterozygous hemoglobin variants (HbS, HgC, etc)do  not significantly interfere with this assay.   However, presence of multiple variants may affect accuracy.         Review of Systems   Constitutional: Negative for chills, diaphoresis, fever, malaise/fatigue and night sweats.   Cardiovascular:  Positive for leg swelling. Negative for claudication, cyanosis and syncope.   Skin:  Positive for nail changes and suspicious lesions. Negative for color change, dry skin, rash and unusual hair distribution.   Musculoskeletal:  Negative for falls, joint pain, joint swelling, muscle cramps, muscle weakness and stiffness.   Gastrointestinal:  Negative for constipation, diarrhea, nausea and vomiting.   Neurological:  Positive for paresthesias and sensory change. Negative for brief paralysis, disturbances in coordination, focal weakness, numbness and tremors.           Objective:      Physical Exam  Constitutional:       General: She is not in acute distress.     Appearance: She is well-developed. She is not diaphoretic.   Cardiovascular:      Pulses:           Popliteal pulses are 2+ on the right side and 2+ on the left side.        Dorsalis pedis pulses are 2+ on the right side and 2+ on the left side.        Posterior tibial pulses are 1+ on the right side and 1+ on the left side.      Comments: Capillary refill 3 seconds all toes/distal feet, all toes/both feet warm to touch.      Negative lymphadenopathy bilateral popliteal fossa and tarsal tunnel.     Than 2+ pitting lower extremity  edema bilateral.    Musculoskeletal:      Right ankle: No swelling, deformity, ecchymosis or lacerations. Normal range of motion. Normal pulse.      Right Achilles Tendon: Normal. No defects. Alvarez's test negative.      Comments: Severe hallux abductovalgus bilateral with bunion formation but without symptoms today right or left.    Otherwise,  All ten toes without clubbing, cyanosis, or signs of ischemia.  No pain to palpation bilateral lower extremities.  Range of motion, stability, muscle strength, and muscle tone normal bilateral feet and legs.    Lymphadenopathy:      Lower Body: No right inguinal adenopathy. No left inguinal adenopathy.      Comments: Negative lymphadenopathy bilateral popliteal fossa and tarsal tunnel.    Negative lymphangitic streaking bilateral feet/ankles/legs.   Skin:     General: Skin is warm and dry.      Capillary Refill: Capillary refill takes 2 to 3 seconds.      Coloration: Skin is not pale.      Findings: No abrasion, bruising, burn, ecchymosis, erythema, laceration, lesion or rash.      Nails: There is no clubbing.      Comments: Focal hyperkeratotic lesion consisting entirely of hyperkeratotic tissue without open skin, drainage, pus, fluctuance, malodor, or signs of infection tip 2nd toe left    Otherwise, Skin is normal age and health appropriate color, turgor, texture, and temperature bilateral lower extremities without ulceration, hyperpigmentation, discoloration, masses nodules or cords palpated.  No ecchymosis, erythema, edema, or cardinal signs of infection bilateral lower extremities.    Toenails 1st, 2nd, 3rd, 4th, 5th  bilateral are hypertrophic thickened 2-3 mm, dystrophic, discolored tanish brown with tan, gray crumbly subungual debris.  Tender to distal nail plate pressure, without periungual skin abnormality of each.         Neurological:      Mental Status: She is alert and oriented to person, place, and time.      Sensory: No sensory deficit.      Motor: No  tremor, atrophy or abnormal muscle tone.      Gait: Gait normal.      Deep Tendon Reflexes:      Reflex Scores:       Patellar reflexes are 2+ on the right side and 2+ on the left side.       Achilles reflexes are 2+ on the right side and 2+ on the left side.     Comments: Decreased/absent vibratory sensation bilateral feet to 128Hz tuning fork.    Paresthesias, and burning bilateral feet with no clearly identified trigger or source.    Negative tinel sign to percussion sural, superficial peroneal, deep peroneal, saphenous, and posterior tibial nerves right and left ankles and feet.     Psychiatric:         Behavior: Behavior is cooperative.               Assessment:       Encounter Diagnoses   Name Primary?    Corn or callus Yes    Valgus deformity of both great toes          Plan:       Kelsey was seen today for callouses.    Diagnoses and all orders for this visit:    Corn or callus    Valgus deformity of both great toes      I counseled the patient on her conditions, their implications and medical management.        The patient has received literature on basic diabetic foot care.  Patient will inspect feet daily, wear protective shoe gear when ambulatory, and apply moisturizer to skin as needed to maintain elasticity and help prevent ulceration.    With the patient's permission, I debrided all ten toenails with a sterile nipper and curette, removing all offending nail and debris.  Patient tolerated the procedure well and related significant relief.    With the patient's permission, I debrided hyperkeratotic lesion(s) as above totaling       1         to, not  Including dermis with sterile #15 blade.  Patient tolerated the procedure well and related significant relief.       Discussed conservative treatment with shoes of adequate dimensions, material, and style to alleviate symptoms and delay or prevent surgical intervention.    Rx DM shoes, penlac, lac hydrin.          No follow-ups on file.

## (undated) DEVICE — GLOVE BIOGEL SKINSENSE PI 6.5

## (undated) DEVICE — GLASSES EYE PROTECTIVE

## (undated) DEVICE — SYR SLIP TIP 1CC

## (undated) DEVICE — Device

## (undated) DEVICE — SOL BETADINE 5%

## (undated) DEVICE — CASSETTE INFINITI

## (undated) DEVICE — GLOVE BIOGEL SKINSENSE PI 7.5